# Patient Record
Sex: FEMALE | Race: WHITE | NOT HISPANIC OR LATINO | Employment: OTHER | URBAN - NONMETROPOLITAN AREA
[De-identification: names, ages, dates, MRNs, and addresses within clinical notes are randomized per-mention and may not be internally consistent; named-entity substitution may affect disease eponyms.]

---

## 2023-02-26 PROBLEM — N95.2 ATROPHIC VAGINITIS: Status: ACTIVE | Noted: 2023-02-26

## 2023-02-26 PROBLEM — M81.0 POSTMENOPAUSAL OSTEOPOROSIS: Status: ACTIVE | Noted: 2023-02-26

## 2023-02-26 PROBLEM — I10 HTN (HYPERTENSION): Status: ACTIVE | Noted: 2023-02-26

## 2023-02-26 PROBLEM — E55.9 VITAMIN D DEFICIENCY: Status: ACTIVE | Noted: 2023-02-26

## 2023-02-26 PROBLEM — E78.5 HYPERLIPIDEMIA: Status: ACTIVE | Noted: 2023-02-26

## 2023-02-26 PROBLEM — J44.9 COPD (CHRONIC OBSTRUCTIVE PULMONARY DISEASE) (MULTI): Status: ACTIVE | Noted: 2023-02-26

## 2023-02-26 PROBLEM — N89.8 VAGINAL DRYNESS: Status: ACTIVE | Noted: 2023-02-26

## 2023-02-26 PROBLEM — R03.0 ELEVATED BLOOD PRESSURE READING: Status: ACTIVE | Noted: 2023-02-26

## 2023-02-26 RX ORDER — LOSARTAN POTASSIUM 25 MG/1
25 TABLET ORAL
COMMUNITY
End: 2023-03-29 | Stop reason: SDUPTHER

## 2023-02-26 RX ORDER — ALBUTEROL SULFATE 90 UG/1
1-2 AEROSOL, METERED RESPIRATORY (INHALATION) AS NEEDED
COMMUNITY
End: 2023-09-27 | Stop reason: SDUPTHER

## 2023-02-26 RX ORDER — TIOTROPIUM BROMIDE 18 UG/1
1 CAPSULE ORAL; RESPIRATORY (INHALATION) DAILY
COMMUNITY
End: 2023-06-12 | Stop reason: SDUPTHER

## 2023-02-26 RX ORDER — ALENDRONATE SODIUM 70 MG/1
70 TABLET ORAL
COMMUNITY
End: 2023-05-15

## 2023-02-26 RX ORDER — ALBUTEROL SULFATE 90 UG/1
1-2 AEROSOL, METERED RESPIRATORY (INHALATION) AS NEEDED
COMMUNITY
End: 2023-10-03

## 2023-03-16 DIAGNOSIS — Z98.818 OTHER DENTAL PROCEDURE STATUS: Primary | ICD-10-CM

## 2023-03-16 RX ORDER — AMOXICILLIN AND CLAVULANATE POTASSIUM 500; 125 MG/1; MG/1
500 TABLET, FILM COATED ORAL 2 TIMES DAILY
Qty: 20 TABLET | Refills: 0 | Status: SHIPPED | OUTPATIENT
Start: 2023-03-16 | End: 2023-03-26

## 2023-03-29 ENCOUNTER — OFFICE VISIT (OUTPATIENT)
Dept: PRIMARY CARE | Facility: CLINIC | Age: 68
End: 2023-03-29
Payer: MEDICARE

## 2023-03-29 VITALS
HEART RATE: 111 BPM | WEIGHT: 116.4 LBS | SYSTOLIC BLOOD PRESSURE: 125 MMHG | DIASTOLIC BLOOD PRESSURE: 80 MMHG | HEIGHT: 64 IN | BODY MASS INDEX: 19.87 KG/M2

## 2023-03-29 DIAGNOSIS — I10 PRIMARY HYPERTENSION: Primary | ICD-10-CM

## 2023-03-29 PROCEDURE — 3074F SYST BP LT 130 MM HG: CPT | Performed by: REGISTERED NURSE

## 2023-03-29 PROCEDURE — 3079F DIAST BP 80-89 MM HG: CPT | Performed by: REGISTERED NURSE

## 2023-03-29 PROCEDURE — 1159F MED LIST DOCD IN RCRD: CPT | Performed by: REGISTERED NURSE

## 2023-03-29 PROCEDURE — 99213 OFFICE O/P EST LOW 20 MIN: CPT | Performed by: REGISTERED NURSE

## 2023-03-29 RX ORDER — LOSARTAN POTASSIUM 25 MG/1
25 TABLET ORAL
Qty: 90 TABLET | Refills: 3 | Status: SHIPPED | OUTPATIENT
Start: 2023-03-29 | End: 2024-01-22

## 2023-03-29 ASSESSMENT — ENCOUNTER SYMPTOMS
ABDOMINAL PAIN: 0
FEVER: 0
DIARRHEA: 0
EYE DISCHARGE: 0
WEAKNESS: 0
CHILLS: 0
FREQUENCY: 0
DIFFICULTY URINATING: 0
NERVOUS/ANXIOUS: 0
DIZZINESS: 0
CONFUSION: 0
RHINORRHEA: 0
WOUND: 0
HEADACHES: 0
VOMITING: 0
CONSTIPATION: 0
EYE REDNESS: 0
SHORTNESS OF BREATH: 0
NAUSEA: 0
COUGH: 0
WHEEZING: 0

## 2023-03-29 NOTE — PROGRESS NOTES
Subjective   Patient ID: Billie Hernadez is a 67 y.o. female who presents for No chief complaint on file..    HPI   HTN: Bp has been running high recently, started on Losartan 25mg last visit. Here for a BP check. BP today is 125/80.     All other systems reviewed and negative for complaint unless stated above.    Review of Systems   Constitutional:  Negative for chills and fever.   HENT:  Negative for congestion, ear pain and rhinorrhea.    Eyes:  Negative for discharge and redness.   Respiratory:  Negative for cough, shortness of breath and wheezing.    Cardiovascular:  Negative for chest pain and leg swelling.   Gastrointestinal:  Negative for abdominal pain, constipation, diarrhea, nausea and vomiting.   Genitourinary:  Negative for difficulty urinating, frequency and urgency.   Musculoskeletal:  Negative for gait problem.   Skin:  Negative for rash and wound.   Neurological:  Negative for dizziness, weakness and headaches.   Psychiatric/Behavioral:  Negative for confusion. The patient is not nervous/anxious.        Objective   There were no vitals taken for this visit.    Physical Exam  Vitals reviewed.   Constitutional:       Appearance: Normal appearance.   HENT:      Head: Normocephalic.      Right Ear: Tympanic membrane, ear canal and external ear normal.      Left Ear: Tympanic membrane, ear canal and external ear normal.      Nose: No rhinorrhea.      Mouth/Throat:      Mouth: Mucous membranes are moist.      Pharynx: Oropharynx is clear.   Eyes:      Pupils: Pupils are equal, round, and reactive to light.   Cardiovascular:      Rate and Rhythm: Normal rate and regular rhythm.      Pulses: Normal pulses.   Pulmonary:      Effort: Pulmonary effort is normal.      Breath sounds: Normal breath sounds.   Abdominal:      General: Abdomen is flat. Bowel sounds are normal.      Palpations: Abdomen is soft.   Musculoskeletal:         General: No tenderness. Normal range of motion.      Right lower leg: No edema.       Left lower leg: No edema.   Lymphadenopathy:      Cervical: No cervical adenopathy.   Skin:     General: Skin is warm and dry.      Findings: No rash.   Neurological:      Mental Status: She is alert and oriented to person, place, and time.   Psychiatric:         Mood and Affect: Mood normal.         Behavior: Behavior normal.         Assessment/Plan        #HTN   Losartan 25mg   BP controlled   Continue medication

## 2023-05-03 LAB
ALANINE AMINOTRANSFERASE (SGPT) (U/L) IN SER/PLAS: 20 U/L (ref 7–45)
ALBUMIN (G/DL) IN SER/PLAS: 4.5 G/DL (ref 3.4–5)
ALKALINE PHOSPHATASE (U/L) IN SER/PLAS: 81 U/L (ref 33–136)
ANION GAP IN SER/PLAS: 12 MMOL/L (ref 10–20)
ASPARTATE AMINOTRANSFERASE (SGOT) (U/L) IN SER/PLAS: 28 U/L (ref 9–39)
BASOPHILS (10*3/UL) IN BLOOD BY AUTOMATED COUNT: NORMAL
BASOPHILS/100 LEUKOCYTES IN BLOOD BY AUTOMATED COUNT: NORMAL
BILIRUBIN TOTAL (MG/DL) IN SER/PLAS: 0.4 MG/DL (ref 0–1.2)
CALCIUM (MG/DL) IN SER/PLAS: 9.3 MG/DL (ref 8.6–10.3)
CARBON DIOXIDE, TOTAL (MMOL/L) IN SER/PLAS: 29 MMOL/L (ref 21–32)
CHLORIDE (MMOL/L) IN SER/PLAS: 100 MMOL/L (ref 98–107)
CHOLESTEROL (MG/DL) IN SER/PLAS: 146 MG/DL (ref 0–199)
CHOLESTEROL IN HDL (MG/DL) IN SER/PLAS: 63.3 MG/DL
CHOLESTEROL/HDL RATIO: 2.3
CREATININE (MG/DL) IN SER/PLAS: 0.53 MG/DL (ref 0.5–1.05)
EOSINOPHILS (10*3/UL) IN BLOOD BY AUTOMATED COUNT: NORMAL
EOSINOPHILS/100 LEUKOCYTES IN BLOOD BY AUTOMATED COUNT: NORMAL
ERYTHROCYTE DISTRIBUTION WIDTH (RATIO) BY AUTOMATED COUNT: NORMAL
ERYTHROCYTE MEAN CORPUSCULAR HEMOGLOBIN CONCENTRATION (G/DL) BY AUTOMATED: NORMAL
ERYTHROCYTE MEAN CORPUSCULAR VOLUME (FL) BY AUTOMATED COUNT: NORMAL
ERYTHROCYTES (10*6/UL) IN BLOOD BY AUTOMATED COUNT: NORMAL
GFR FEMALE: >90 ML/MIN/1.73M2
GLUCOSE (MG/DL) IN SER/PLAS: 87 MG/DL (ref 74–99)
HEMATOCRIT (%) IN BLOOD BY AUTOMATED COUNT: NORMAL
HEMOGLOBIN (G/DL) IN BLOOD: NORMAL
IMMATURE GRANULOCYTES/100 LEUKOCYTES IN BLOOD BY AUTOMATED COUNT: NORMAL
LDL: 72 MG/DL (ref 0–99)
LEUKOCYTES (10*3/UL) IN BLOOD BY AUTOMATED COUNT: NORMAL
LYMPHOCYTES (10*3/UL) IN BLOOD BY AUTOMATED COUNT: NORMAL
LYMPHOCYTES/100 LEUKOCYTES IN BLOOD BY AUTOMATED COUNT: NORMAL
MANUAL DIFFERENTIAL Y/N: NORMAL
MONOCYTES (10*3/UL) IN BLOOD BY AUTOMATED COUNT: NORMAL
MONOCYTES/100 LEUKOCYTES IN BLOOD BY AUTOMATED COUNT: NORMAL
NEUTROPHILS (10*3/UL) IN BLOOD BY AUTOMATED COUNT: NORMAL
NEUTROPHILS/100 LEUKOCYTES IN BLOOD BY AUTOMATED COUNT: NORMAL
NRBC (PER 100 WBCS) BY AUTOMATED COUNT: NORMAL
PLATELETS (10*3/UL) IN BLOOD AUTOMATED COUNT: NORMAL
POTASSIUM (MMOL/L) IN SER/PLAS: 4.5 MMOL/L (ref 3.5–5.3)
PROTEIN TOTAL: 7.3 G/DL (ref 6.4–8.2)
SODIUM (MMOL/L) IN SER/PLAS: 136 MMOL/L (ref 136–145)
TRIGLYCERIDE (MG/DL) IN SER/PLAS: 56 MG/DL (ref 0–149)
UREA NITROGEN (MG/DL) IN SER/PLAS: 8 MG/DL (ref 6–23)
VLDL: 11 MG/DL (ref 0–40)

## 2023-05-05 LAB
BASOPHILS (10*3/UL) IN BLOOD BY AUTOMATED COUNT: 0.05 X10E9/L (ref 0–0.1)
BASOPHILS/100 LEUKOCYTES IN BLOOD BY AUTOMATED COUNT: 0.5 % (ref 0–2)
CALCIDIOL (25 OH VITAMIN D3) (NG/ML) IN SER/PLAS: NORMAL
EOSINOPHILS (10*3/UL) IN BLOOD BY AUTOMATED COUNT: 0.08 X10E9/L (ref 0–0.7)
EOSINOPHILS/100 LEUKOCYTES IN BLOOD BY AUTOMATED COUNT: 0.8 % (ref 0–6)
ERYTHROCYTE DISTRIBUTION WIDTH (RATIO) BY AUTOMATED COUNT: 13.4 % (ref 11.5–14.5)
ERYTHROCYTE MEAN CORPUSCULAR HEMOGLOBIN CONCENTRATION (G/DL) BY AUTOMATED: 32.1 G/DL (ref 32–36)
ERYTHROCYTE MEAN CORPUSCULAR VOLUME (FL) BY AUTOMATED COUNT: 101 FL (ref 80–100)
ERYTHROCYTES (10*6/UL) IN BLOOD BY AUTOMATED COUNT: 4.11 X10E12/L (ref 4–5.2)
HEMATOCRIT (%) IN BLOOD BY AUTOMATED COUNT: 41.7 % (ref 36–46)
HEMOGLOBIN (G/DL) IN BLOOD: 13.4 G/DL (ref 12–16)
IMMATURE GRANULOCYTES/100 LEUKOCYTES IN BLOOD BY AUTOMATED COUNT: 0.2 % (ref 0–0.9)
LEUKOCYTES (10*3/UL) IN BLOOD BY AUTOMATED COUNT: 10.5 X10E9/L (ref 4.4–11.3)
LYMPHOCYTES (10*3/UL) IN BLOOD BY AUTOMATED COUNT: 3.37 X10E9/L (ref 1.2–4.8)
LYMPHOCYTES/100 LEUKOCYTES IN BLOOD BY AUTOMATED COUNT: 32.2 % (ref 13–44)
MONOCYTES (10*3/UL) IN BLOOD BY AUTOMATED COUNT: 0.56 X10E9/L (ref 0.1–1)
MONOCYTES/100 LEUKOCYTES IN BLOOD BY AUTOMATED COUNT: 5.4 % (ref 2–10)
NEUTROPHILS (10*3/UL) IN BLOOD BY AUTOMATED COUNT: 6.37 X10E9/L (ref 1.2–7.7)
NEUTROPHILS/100 LEUKOCYTES IN BLOOD BY AUTOMATED COUNT: 60.9 % (ref 40–80)
PLATELETS (10*3/UL) IN BLOOD AUTOMATED COUNT: 412 X10E9/L (ref 150–450)

## 2023-05-13 DIAGNOSIS — E78.49 OTHER HYPERLIPIDEMIA: Primary | ICD-10-CM

## 2023-05-15 RX ORDER — ALENDRONATE SODIUM 70 MG/1
TABLET ORAL
Qty: 12 TABLET | Refills: 3 | Status: SHIPPED | OUTPATIENT
Start: 2023-05-15 | End: 2023-06-12 | Stop reason: SDUPTHER

## 2023-06-12 DIAGNOSIS — E78.49 OTHER HYPERLIPIDEMIA: ICD-10-CM

## 2023-06-12 DIAGNOSIS — J44.9 CHRONIC OBSTRUCTIVE PULMONARY DISEASE, UNSPECIFIED COPD TYPE (MULTI): ICD-10-CM

## 2023-06-12 RX ORDER — ALENDRONATE SODIUM 70 MG/1
70 TABLET ORAL
Qty: 12 TABLET | Refills: 3 | Status: SHIPPED | OUTPATIENT
Start: 2023-06-12 | End: 2023-12-18 | Stop reason: SDUPTHER

## 2023-06-12 RX ORDER — TIOTROPIUM BROMIDE 18 UG/1
1 CAPSULE ORAL; RESPIRATORY (INHALATION)
Qty: 90 CAPSULE | Refills: 3 | Status: SHIPPED | OUTPATIENT
Start: 2023-06-12 | End: 2023-06-19

## 2023-06-18 DIAGNOSIS — J44.9 CHRONIC OBSTRUCTIVE PULMONARY DISEASE, UNSPECIFIED COPD TYPE (MULTI): ICD-10-CM

## 2023-06-19 RX ORDER — TIOTROPIUM BROMIDE 18 UG/1
CAPSULE ORAL; RESPIRATORY (INHALATION)
Qty: 90 CAPSULE | Refills: 3 | Status: SHIPPED | OUTPATIENT
Start: 2023-06-19

## 2023-09-22 NOTE — PROGRESS NOTES
"Subjective   Reason for Visit: Billie Hernadez is an 67 y.o. female here for a Medicare Wellness visit.          Reviewed all medications by prescribing practitioner or clinical pharmacist (such as prescriptions, OTCs, herbal therapies and supplements) and documented in the medical record.    HPI  Postmenopausal osteoporosis- she is taking alendronate 70 mg oral tablet every week. Taking vitamin D and calcium supplements. Very active in the summer. She stays active at home in winter.      COPD: Breathing has been better. Patient is also active smoker and get short of breath with exertion. She have a history of COPD. She smokes about a pack for a few days. She is trying to wean herself down. She has been smoking since she was about 18. She has issues with blowing out candles or blowing up balloons.   Goal is try to get it down to 5.      Mammogram: history of abnormal results. Last was Aug 2020, supposed to follow up every 6 months to monitor \"suspicious for Microcalcification.\" 4/2022- normal mammogram, due in 1 year      Hyperlipidemia- She is not on any medication.      Elevated BP reading: She is still running high. Agreeable to start low dose bp medication.      Vitamin D deficiency- Last vitamin D level increased to normal.     [facility] is [facility]      She is Florin's grandma     All other systems reviewed and negative for complaint unless stated above.    Patient Care Team:  KAREN Holliday-PAMELA as PCP - General  MOR Holliday as PCP - [employer] PCP     Review of Systems   Constitutional:  Negative for chills and fever.   HENT:  Negative for congestion, ear pain and rhinorrhea.    Eyes:  Negative for discharge and redness.   Respiratory:  Negative for cough, shortness of breath and wheezing.    Cardiovascular:  Negative for chest pain and leg swelling.   Gastrointestinal:  Negative for abdominal pain, constipation, diarrhea, nausea and vomiting. " "  Genitourinary:  Negative for difficulty urinating, frequency and urgency.   Musculoskeletal:  Negative for gait problem.   Skin:  Negative for rash and wound.   Neurological:  Negative for dizziness, weakness and headaches.   Psychiatric/Behavioral:  Negative for confusion. The patient is not nervous/anxious.        Objective   Vitals:  /71 (BP Location: Right arm, Patient Position: Sitting, BP Cuff Size: Adult)   Pulse 90   Ht 1.626 m (5' 4\")   Wt 49 kg (108 lb)   SpO2 97%   BMI 18.54 kg/m²       Physical Exam  Vitals reviewed.   Constitutional:       Appearance: Normal appearance.   HENT:      Head: Normocephalic.      Right Ear: Tympanic membrane, ear canal and external ear normal.      Left Ear: Tympanic membrane, ear canal and external ear normal.      Nose: No rhinorrhea.      Mouth/Throat:      Mouth: Mucous membranes are moist.      Pharynx: Oropharynx is clear.   Eyes:      Pupils: Pupils are equal, round, and reactive to light.   Cardiovascular:      Rate and Rhythm: Normal rate and regular rhythm.      Pulses: Normal pulses.   Pulmonary:      Effort: Pulmonary effort is normal.      Breath sounds: Normal breath sounds.   Abdominal:      General: Abdomen is flat. Bowel sounds are normal.      Palpations: Abdomen is soft.   Musculoskeletal:         General: No tenderness. Normal range of motion.      Right lower leg: No edema.      Left lower leg: No edema.   Lymphadenopathy:      Cervical: No cervical adenopathy.   Skin:     General: Skin is warm and dry.      Findings: No rash.   Neurological:      Mental Status: She is alert and oriented to person, place, and time.   Psychiatric:         Mood and Affect: Mood normal.         Behavior: Behavior normal.       Assessment/Plan   Problem List Items Addressed This Visit    None       #HTN   Losartan 25mg   BP controlled   Continue medication      Postmenopausal Osteoporosis  - Continue Alendronate.  - Dexa scan 2016, needed every 2 years  - " ordered today      Hyperlipidemia  - Lipid panel ordered.  - Lifestyle modification to continue.     COPD  - No wheezing on auscultation  - Patient encouraged to stop smoking.  - discussed possible PFT to assess  - refilled Spiriva and albuterol inhaler for PRN use      Breast cancer screening  - ordered mammogram today, needed every year      Vitamin D level ordered.     #HCM  Cologuard was ordered, a fecal occult blood was done.   Needs Cologuard 2023   Ordering Dexa   CT low cancer screening- May consider at follow up  declining today   UTD on vaccines

## 2023-09-27 ENCOUNTER — OFFICE VISIT (OUTPATIENT)
Dept: PRIMARY CARE | Facility: CLINIC | Age: 68
End: 2023-09-27
Payer: MEDICARE

## 2023-09-27 VITALS
BODY MASS INDEX: 18.44 KG/M2 | WEIGHT: 108 LBS | HEIGHT: 64 IN | OXYGEN SATURATION: 97 % | DIASTOLIC BLOOD PRESSURE: 71 MMHG | SYSTOLIC BLOOD PRESSURE: 135 MMHG | HEART RATE: 90 BPM

## 2023-09-27 DIAGNOSIS — I10 PRIMARY HYPERTENSION: ICD-10-CM

## 2023-09-27 DIAGNOSIS — E78.2 MIXED HYPERLIPIDEMIA: ICD-10-CM

## 2023-09-27 DIAGNOSIS — E55.9 VITAMIN D DEFICIENCY: ICD-10-CM

## 2023-09-27 DIAGNOSIS — M81.0 POSTMENOPAUSAL OSTEOPOROSIS: ICD-10-CM

## 2023-09-27 DIAGNOSIS — Z00.00 ROUTINE GENERAL MEDICAL EXAMINATION AT HEALTH CARE FACILITY: Primary | ICD-10-CM

## 2023-09-27 DIAGNOSIS — M81.0 AGE-RELATED OSTEOPOROSIS WITHOUT CURRENT PATHOLOGICAL FRACTURE: ICD-10-CM

## 2023-09-27 DIAGNOSIS — Z12.11 COLON CANCER SCREENING: ICD-10-CM

## 2023-09-27 DIAGNOSIS — J43.9 PULMONARY EMPHYSEMA, UNSPECIFIED EMPHYSEMA TYPE (MULTI): ICD-10-CM

## 2023-09-27 PROCEDURE — 1170F FXNL STATUS ASSESSED: CPT | Performed by: REGISTERED NURSE

## 2023-09-27 PROCEDURE — 1159F MED LIST DOCD IN RCRD: CPT | Performed by: REGISTERED NURSE

## 2023-09-27 PROCEDURE — G0439 PPPS, SUBSEQ VISIT: HCPCS | Performed by: REGISTERED NURSE

## 2023-09-27 PROCEDURE — 3078F DIAST BP <80 MM HG: CPT | Performed by: REGISTERED NURSE

## 2023-09-27 PROCEDURE — 3075F SYST BP GE 130 - 139MM HG: CPT | Performed by: REGISTERED NURSE

## 2023-09-27 PROCEDURE — 1160F RVW MEDS BY RX/DR IN RCRD: CPT | Performed by: REGISTERED NURSE

## 2023-09-27 RX ORDER — CHLORHEXIDINE GLUCONATE ORAL RINSE 1.2 MG/ML
SOLUTION DENTAL
COMMUNITY
Start: 2023-08-01 | End: 2023-11-29 | Stop reason: ALTCHOICE

## 2023-09-27 ASSESSMENT — ENCOUNTER SYMPTOMS
CONFUSION: 0
RHINORRHEA: 0
DEPRESSION: 0
CHILLS: 0
EYE REDNESS: 0
DIFFICULTY URINATING: 0
DIARRHEA: 0
VOMITING: 0
COUGH: 0
LOSS OF SENSATION IN FEET: 0
DIZZINESS: 0
CONSTIPATION: 0
NERVOUS/ANXIOUS: 0
ABDOMINAL PAIN: 0
WOUND: 0
NAUSEA: 0
FREQUENCY: 0
OCCASIONAL FEELINGS OF UNSTEADINESS: 0
EYE DISCHARGE: 0
WEAKNESS: 0
HEADACHES: 0
FEVER: 0
WHEEZING: 0
SHORTNESS OF BREATH: 0

## 2023-09-27 ASSESSMENT — ACTIVITIES OF DAILY LIVING (ADL)
DRESSING: INDEPENDENT
BATHING: INDEPENDENT
GROCERY_SHOPPING: INDEPENDENT
MANAGING_FINANCES: INDEPENDENT
TAKING_MEDICATION: INDEPENDENT
DOING_HOUSEWORK: INDEPENDENT

## 2023-09-27 ASSESSMENT — PATIENT HEALTH QUESTIONNAIRE - PHQ9: 2. FEELING DOWN, DEPRESSED OR HOPELESS: NOT AT ALL

## 2023-10-03 DIAGNOSIS — J43.9 PULMONARY EMPHYSEMA, UNSPECIFIED EMPHYSEMA TYPE (MULTI): Primary | ICD-10-CM

## 2023-10-03 RX ORDER — ALBUTEROL SULFATE 90 UG/1
AEROSOL, METERED RESPIRATORY (INHALATION)
Qty: 51 G | Refills: 2 | Status: SHIPPED | OUTPATIENT
Start: 2023-10-03 | End: 2023-12-18 | Stop reason: SDUPTHER

## 2023-10-11 LAB — NONINV COLON CA DNA+OCC BLD SCRN STL QL: POSITIVE

## 2023-10-18 DIAGNOSIS — R19.5 POSITIVE COLORECTAL CANCER SCREENING USING COLOGUARD TEST: ICD-10-CM

## 2023-10-18 DIAGNOSIS — R19.5 POSITIVE COLORECTAL CANCER SCREENING USING COLOGUARD TEST: Primary | ICD-10-CM

## 2023-10-26 ENCOUNTER — HOSPITAL ENCOUNTER (OUTPATIENT)
Dept: RADIOLOGY | Facility: HOSPITAL | Age: 68
Discharge: HOME | End: 2023-10-26
Payer: MEDICARE

## 2023-10-26 ENCOUNTER — LAB (OUTPATIENT)
Dept: LAB | Facility: LAB | Age: 68
End: 2023-10-26
Payer: MEDICARE

## 2023-10-26 DIAGNOSIS — E55.9 VITAMIN D DEFICIENCY: ICD-10-CM

## 2023-10-26 DIAGNOSIS — M81.0 AGE-RELATED OSTEOPOROSIS WITHOUT CURRENT PATHOLOGICAL FRACTURE: ICD-10-CM

## 2023-10-26 PROCEDURE — 36415 COLL VENOUS BLD VENIPUNCTURE: CPT

## 2023-10-26 PROCEDURE — 77085 DXA BONE DENSITY AXL VRT FX: CPT

## 2023-10-26 PROCEDURE — 82306 VITAMIN D 25 HYDROXY: CPT

## 2023-10-26 PROCEDURE — 77085 DXA BONE DENSITY AXL VRT FX: CPT | Performed by: RADIOLOGY

## 2023-10-27 LAB — 25(OH)D3 SERPL-MCNC: 42 NG/ML (ref 30–100)

## 2023-11-29 ENCOUNTER — ANESTHESIA EVENT (OUTPATIENT)
Dept: GASTROENTEROLOGY | Facility: HOSPITAL | Age: 68
End: 2023-11-29
Payer: MEDICARE

## 2023-11-29 ENCOUNTER — PRE-ADMISSION TESTING (OUTPATIENT)
Dept: PREADMISSION TESTING | Facility: HOSPITAL | Age: 68
End: 2023-11-29
Payer: MEDICARE

## 2023-11-29 VITALS — HEIGHT: 63 IN | BODY MASS INDEX: 19.49 KG/M2 | WEIGHT: 110 LBS

## 2023-11-29 ASSESSMENT — DUKE ACTIVITY SCORE INDEX (DASI)
CAN YOU CLIMB A FLIGHT OF STAIRS OR WALK UP A HILL: YES
DASI METS SCORE: 4.6
TOTAL_SCORE: 15.45
CAN YOU WALK INDOORS, SUCH AS AROUND YOUR HOUSE: YES
CAN YOU DO MODERATE WORK AROUND THE HOUSE LIKE VACUUMING, SWEEPING FLOORS OR CARRYING GROCERIES: NO
CAN YOU DO HEAVY WORK AROUND THE HOUSE LIKE SCRUBBING FLOORS OR LIFTING AND MOVING HEAVY FURNITURE: NO
CAN YOU DO LIGHT WORK AROUND THE HOUSE LIKE DUSTING OR WASHING DISHES: YES
CAN YOU PARTICIPATE IN STRENOUS SPORTS LIKE SWIMMING, SINGLES TENNIS, FOOTBALL, BASKETBALL, OR SKIING: NO
CAN YOU DO YARD WORK LIKE RAKING LEAVES, WEEDING OR PUSHING A MOWER: NO
CAN YOU TAKE CARE OF YOURSELF (EAT, DRESS, BATHE, OR USE TOILET): YES
CAN YOU HAVE SEXUAL RELATIONS: NO
CAN YOU PARTICIPATE IN MODERATE RECREATIONAL ACTIVITIES LIKE GOLF, BOWLING, DANCING, DOUBLES TENNIS OR THROWING A BASEBALL OR FOOTBALL: NO
CAN YOU RUN A SHORT DISTANCE: NO
CAN YOU WALK A BLOCK OR TWO ON LEVEL GROUND: YES

## 2023-11-29 NOTE — PREPROCEDURE INSTRUCTIONS
Medication List            Accurate as of November 29, 2023 11:08 AM. Always use your most recent med list.                albuterol 90 mcg/actuation inhaler  USE 1 TO 2 INHALATIONS BY  MOUTH EVERY 4 TO 6 HOURS AS NEEDED  Medication Adjustments for Surgery: Take morning of surgery with sip of water, no other fluids     alendronate 70 mg tablet  Commonly known as: Fosamax  Take 1 tablet (70 mg) by mouth 1 (one) time per week. Take in the morning with a full glass of water, on an empty stomach, and do not take anything else by mouth or lie down for the next 30 min.  Medication Adjustments for Surgery: Continue until night before surgery     cetirizine 10 mg capsule  Commonly known as: ZYRTEC  Medication Adjustments for Surgery: Take morning of surgery with sip of water, no other fluids     losartan 25 mg tablet  Commonly known as: Cozaar  Take 1 tablet (25 mg) by mouth once every day.  Medication Adjustments for Surgery: Take morning of surgery with sip of water, no other fluids     Spiriva with HandiHaler 18 mcg inhalation capsule  Generic drug: tiotropium  INHALE THE CONTENTS OF 1  CAPSULE BY MOUTH VIA  HANDIHALER DAILY  Medication Adjustments for Surgery: Take morning of surgery with sip of water, no other fluids                              NPO Instructions:    Follow instructions. Day before procedure-  Then CLEAR LIQUIDS ONLY-No dairy products, nothing red/purple.  Take first part of prep- Evening Before Procedure.  Drink lots of liquids.  Day of Procedure- 3-4am Take Second Part of Prep.   STOP DRINKING 3 HOURS PRIOR TO PROCEDURE.  Take medications as instructed.      Additional Instructions:     Review your medication instructions, take indicated medications  Wear  comfortable loose fitting clothing  All jewelry and valuables should be left at home    Park in back of hospital by ER. Come up to Second floor-Out dept to check in.  Bring Photo ID and Insurance card,   You MUST have a  with you.      If  you get ill at all before your procedure- CALL YOUR DOCTOR/SURGEON.  We want you in the best shape that is possible. Any sickness might lead to your procedure being delayed.      Call Outpatient dept at [phone] the night before your procedure (Friday for Monday procedure), between 1-3 pm.     Be here at 9:30am for 10:30 procedure- try to decrease smoking.

## 2023-11-29 NOTE — ANESTHESIA PREPROCEDURE EVALUATION
Patient: Billie Hernadez    Procedure Information       Date/Time: 11/30/23 1030    Scheduled providers: Juventino Mckenzie MD    Procedure: COLONOSCOPY    Location: [facility]          There were no vitals filed for this visit.    Past Surgical History:   Procedure Laterality Date    OTHER SURGICAL HISTORY  03/12/2015    Open Treatment Of Tibial Shaft Fracture With Implant     Past Medical History:   Diagnosis Date    Chronic obstructive pulmonary disease with (acute) exacerbation (CMS/[facility]) 08/08/2017    COPD exacerbation    Other specified health status 03/12/2015    No known problems    Personal history of other venous thrombosis and embolism 03/12/2015    History of deep venous thrombosis       Current Outpatient Medications:     albuterol 90 mcg/actuation inhaler, USE 1 TO 2 INHALATIONS BY  MOUTH EVERY 4 TO 6 HOURS AS NEEDED, Disp: 51 g, Rfl: 2    alendronate (Fosamax) 70 mg tablet, Take 1 tablet (70 mg) by mouth 1 (one) time per week. Take in the morning with a full glass of water, on an empty stomach, and do not take anything else by mouth or lie down for the next 30 min. (Patient not taking: Reported on 9/27/2023), Disp: 12 tablet, Rfl: 3    cetirizine (ZYRTEC) 10 mg capsule, Take by mouth., Disp: , Rfl:     chlorhexidine (Peridex) 0.12 % solution, RINSE with ONE capful TWICE DAILY FOR 30 seconds THEN EXPECTORATE. DO not swallow, Disp: , Rfl:     losartan (Cozaar) 25 mg tablet, Take 1 tablet (25 mg) by mouth once every day., Disp: 90 tablet, Rfl: 3    Spiriva with HandiHaler 18 mcg inhalation capsule, INHALE THE CONTENTS OF 1  CAPSULE BY MOUTH VIA  HANDIHALER DAILY, Disp: 90 capsule, Rfl: 3  Prior to Admission medications    Medication Sig Start Date End Date Taking? Authorizing Provider   albuterol 90 mcg/actuation inhaler USE 1 TO 2 INHALATIONS BY  MOUTH EVERY 4 TO 6 HOURS AS NEEDED 10/3/23   Puja Rae, APRN-CNP   alendronate (Fosamax) 70 mg tablet Take 1 tablet (70 mg) by mouth 1 (one)  "time per week. Take in the morning with a full glass of water, on an empty stomach, and do not take anything else by mouth or lie down for the next 30 min.  Patient not taking: Reported on 9/27/2023 6/12/23   MOR Holliday   cetirizine (ZYRTEC) 10 mg capsule Take by mouth.    Historical Provider, MD   chlorhexidine (Peridex) 0.12 % solution RINSE with ONE capful TWICE DAILY FOR 30 seconds THEN EXPECTORATE. DO not swallow 8/1/23   Historical Provider, MD   losartan (Cozaar) 25 mg tablet Take 1 tablet (25 mg) by mouth once every day. 3/29/23 3/28/24  MOR Holliday   Spiriva with HandiHaler 18 mcg inhalation capsule INHALE THE CONTENTS OF 1  CAPSULE BY MOUTH VIA  HANDIHALER DAILY 6/19/23   MOR Holliday     No Known Allergies  Social History     Tobacco Use    Smoking status: Every Day     Types: Cigarettes    Smokeless tobacco: Never   Substance Use Topics    Alcohol use: Not on file         Chemistry    Lab Results   Component Value Date/Time     05/03/2023 1022    K 4.5 05/03/2023 1022     05/03/2023 1022    CO2 29 05/03/2023 1022    BUN 8 05/03/2023 1022    CREATININE 0.53 05/03/2023 1022    Lab Results   Component Value Date/Time    CALCIUM 9.3 05/03/2023 1022    ALKPHOS 81 05/03/2023 1022    AST 28 05/03/2023 1022    ALT 20 05/03/2023 1022    BILITOT 0.4 05/03/2023 1022          Lab Results   Component Value Date/Time    WBC 10.5 05/05/2023 1314    HGB 13.4 05/05/2023 1314    HCT 41.7 05/05/2023 1314     05/05/2023 1314     No results found for: \"PROTIME\", \"PTT\", \"INR\"  No results found for this or any previous visit (from the past 4464 hour(s)).  No results found for this or any previous visit from the past 1095 days.    Relevant Problems   Cardiovascular   (+) HTN (hypertension)   (+) Hyperlipidemia      Pulmonary   (+) COPD (chronic obstructive pulmonary disease) (CMS/HCC)       Clinical information reviewed: PT. Routinely follows up with primary " care. Recent normal wellness follow up. No further clearances required.      Meds               NPO Detail:  No data recorded     Physical Exam    Airway  Mallampati: II  TM distance: >3 FB     Cardiovascular - normal exam     Dental - normal exam     Pulmonary - normal exam     Abdominal - normal exam         : Deferred    Anesthesia Plan    ASA 3     MAC     The patient is a current smoker.  Patient was previously instructed to abstain from smoking on day of procedure.  Patient did not smoke on day of procedure.  Education provided regarding risk of obstructive sleep apnea.  intravenous induction   Anesthetic plan and risks discussed with patient.

## 2023-11-30 ENCOUNTER — ANESTHESIA (OUTPATIENT)
Dept: GASTROENTEROLOGY | Facility: HOSPITAL | Age: 68
End: 2023-11-30
Payer: MEDICARE

## 2023-11-30 ENCOUNTER — HOSPITAL ENCOUNTER (OUTPATIENT)
Dept: GASTROENTEROLOGY | Facility: HOSPITAL | Age: 68
Setting detail: OUTPATIENT SURGERY
Discharge: HOME | End: 2023-11-30
Payer: MEDICARE

## 2023-11-30 VITALS
SYSTOLIC BLOOD PRESSURE: 85 MMHG | OXYGEN SATURATION: 95 % | TEMPERATURE: 97 F | RESPIRATION RATE: 16 BRPM | HEART RATE: 89 BPM | DIASTOLIC BLOOD PRESSURE: 49 MMHG

## 2023-11-30 DIAGNOSIS — R19.5 POSITIVE COLORECTAL CANCER SCREENING USING COLOGUARD TEST: ICD-10-CM

## 2023-11-30 PROCEDURE — 3700000001 HC GENERAL ANESTHESIA TIME - INITIAL BASE CHARGE

## 2023-11-30 PROCEDURE — 7100000010 HC PHASE TWO TIME - EACH INCREMENTAL 1 MINUTE

## 2023-11-30 PROCEDURE — 2720000007 HC OR 272 NO HCPCS

## 2023-11-30 PROCEDURE — 3700000002 HC GENERAL ANESTHESIA TIME - EACH INCREMENTAL 1 MINUTE

## 2023-11-30 PROCEDURE — 45385 COLONOSCOPY W/LESION REMOVAL: CPT | Performed by: SURGERY

## 2023-11-30 PROCEDURE — 2500000004 HC RX 250 GENERAL PHARMACY W/ HCPCS (ALT 636 FOR OP/ED): Performed by: NURSE ANESTHETIST, CERTIFIED REGISTERED

## 2023-11-30 PROCEDURE — 45380 COLONOSCOPY AND BIOPSY: CPT | Performed by: SURGERY

## 2023-11-30 PROCEDURE — 2500000005 HC RX 250 GENERAL PHARMACY W/O HCPCS: Performed by: NURSE ANESTHETIST, CERTIFIED REGISTERED

## 2023-11-30 PROCEDURE — 45381 COLONOSCOPY SUBMUCOUS NJX: CPT | Performed by: SURGERY

## 2023-11-30 PROCEDURE — 2500000004 HC RX 250 GENERAL PHARMACY W/ HCPCS (ALT 636 FOR OP/ED)

## 2023-11-30 PROCEDURE — 88305 TISSUE EXAM BY PATHOLOGIST: CPT | Performed by: PATHOLOGY

## 2023-11-30 PROCEDURE — 88305 TISSUE EXAM BY PATHOLOGIST: CPT | Mod: TC,SUR,GENLAB | Performed by: SURGERY

## 2023-11-30 PROCEDURE — 7100000009 HC PHASE TWO TIME - INITIAL BASE CHARGE

## 2023-11-30 RX ORDER — LIDOCAINE HYDROCHLORIDE 20 MG/ML
INJECTION, SOLUTION INFILTRATION; PERINEURAL AS NEEDED
Status: DISCONTINUED | OUTPATIENT
Start: 2023-11-30 | End: 2023-11-30

## 2023-11-30 RX ORDER — PHENYLEPHRINE HCL IN 0.9% NACL 1 MG/10 ML
SYRINGE (ML) INTRAVENOUS AS NEEDED
Status: DISCONTINUED | OUTPATIENT
Start: 2023-11-30 | End: 2023-11-30

## 2023-11-30 RX ORDER — SODIUM CHLORIDE, SODIUM LACTATE, POTASSIUM CHLORIDE, CALCIUM CHLORIDE 600; 310; 30; 20 MG/100ML; MG/100ML; MG/100ML; MG/100ML
30 INJECTION, SOLUTION INTRAVENOUS CONTINUOUS
Status: DISCONTINUED | OUTPATIENT
Start: 2023-11-30 | End: 2023-12-01 | Stop reason: HOSPADM

## 2023-11-30 RX ORDER — PROPOFOL 10 MG/ML
INJECTION, EMULSION INTRAVENOUS AS NEEDED
Status: DISCONTINUED | OUTPATIENT
Start: 2023-11-30 | End: 2023-11-30

## 2023-11-30 RX ADMIN — LIDOCAINE HYDROCHLORIDE 40 MG: 20 INJECTION, SOLUTION INFILTRATION; PERINEURAL at 10:55

## 2023-11-30 RX ADMIN — PROPOFOL 10 MG: 10 INJECTION, EMULSION INTRAVENOUS at 10:59

## 2023-11-30 RX ADMIN — PROPOFOL 10 MG: 10 INJECTION, EMULSION INTRAVENOUS at 11:09

## 2023-11-30 RX ADMIN — PROPOFOL 10 MG: 10 INJECTION, EMULSION INTRAVENOUS at 11:13

## 2023-11-30 RX ADMIN — PROPOFOL 10 MG: 10 INJECTION, EMULSION INTRAVENOUS at 11:03

## 2023-11-30 RX ADMIN — PROPOFOL 10 MG: 10 INJECTION, EMULSION INTRAVENOUS at 11:07

## 2023-11-30 RX ADMIN — Medication 200 MCG: at 11:15

## 2023-11-30 RX ADMIN — PROPOFOL 10 MG: 10 INJECTION, EMULSION INTRAVENOUS at 11:01

## 2023-11-30 RX ADMIN — PROPOFOL 10 MG: 10 INJECTION, EMULSION INTRAVENOUS at 11:05

## 2023-11-30 RX ADMIN — Medication 200 MCG: at 11:06

## 2023-11-30 RX ADMIN — PROPOFOL 10 MG: 10 INJECTION, EMULSION INTRAVENOUS at 11:15

## 2023-11-30 RX ADMIN — PROPOFOL 10 MG: 10 INJECTION, EMULSION INTRAVENOUS at 10:57

## 2023-11-30 RX ADMIN — SODIUM CHLORIDE, POTASSIUM CHLORIDE, SODIUM LACTATE AND CALCIUM CHLORIDE 30 ML/HR: 600; 310; 30; 20 INJECTION, SOLUTION INTRAVENOUS at 09:49

## 2023-11-30 RX ADMIN — PROPOFOL 70 MG: 10 INJECTION, EMULSION INTRAVENOUS at 10:55

## 2023-11-30 RX ADMIN — PROPOFOL 10 MG: 10 INJECTION, EMULSION INTRAVENOUS at 11:11

## 2023-11-30 SDOH — HEALTH STABILITY: MENTAL HEALTH: CURRENT SMOKER: 1

## 2023-11-30 ASSESSMENT — COLUMBIA-SUICIDE SEVERITY RATING SCALE - C-SSRS
1. IN THE PAST MONTH, HAVE YOU WISHED YOU WERE DEAD OR WISHED YOU COULD GO TO SLEEP AND NOT WAKE UP?: NO
2. HAVE YOU ACTUALLY HAD ANY THOUGHTS OF KILLING YOURSELF?: NO
6. HAVE YOU EVER DONE ANYTHING, STARTED TO DO ANYTHING, OR PREPARED TO DO ANYTHING TO END YOUR LIFE?: NO

## 2023-11-30 ASSESSMENT — PAIN - FUNCTIONAL ASSESSMENT
PAIN_FUNCTIONAL_ASSESSMENT: 0-10
PAIN_FUNCTIONAL_ASSESSMENT: 0-10

## 2023-11-30 ASSESSMENT — PAIN SCALES - GENERAL
PAINLEVEL_OUTOF10: 0 - NO PAIN
PAIN_LEVEL: 0

## 2023-11-30 NOTE — ANESTHESIA POSTPROCEDURE EVALUATION
[birth year]Patient: Billie Hernadez    Procedure Summary       Date: 11/30/23 Room / Location: [facility]    Anesthesia Start: 1052 Anesthesia Stop: 1123    Procedure: COLONOSCOPY Diagnosis: Positive colorectal cancer screening using Cologuard test    Scheduled Providers: Juventino Mckenzie MD Responsible Provider: AURA Salazar    Anesthesia Type: MAC ASA Status: 3            Anesthesia Type: MAC    Vitals Value Taken Time   BP 89/56 11/30/23 1123   Temp 36.1 11/30/23 1123   Pulse 78 11/30/23 1123   Resp 18 11/30/23 1123   SpO2 91 11/30/23 1123       Anesthesia Post Evaluation    Patient location during evaluation: PACU  Patient participation: complete - patient participated  Level of consciousness: awake and alert  Pain score: 0  Pain management: adequate  Airway patency: patent  Two or more strategies used to mitigate risk of obstructive sleep apnea  Cardiovascular status: acceptable  Respiratory status: acceptable  Hydration status: acceptable  Postoperative Nausea and Vomiting: none        There were no known notable events for this encounter.

## 2023-11-30 NOTE — H&P
History Of Present Illness  Billie Hernadez is a 68 y.o. female presenting for a colonoscopy for a positive cologuard      Past Medical History  Past Medical History:   Diagnosis Date    Chronic obstructive pulmonary disease with (acute) exacerbation (CMS/[facility]) 08/08/2017    COPD exacerbation    Hypertension     Other specified health status 03/12/2015    No known problems    Personal history of other venous thrombosis and embolism 03/12/2015    History of deep venous thrombosis    Pneumonia        Surgical History  Past Surgical History:   Procedure Laterality Date    OTHER SURGICAL HISTORY  03/12/2015    Open Treatment Of Tibial Shaft Fracture With Implant    TONSILLECTOMY          Social History  She reports that she has been smoking cigarettes. She has a 15.00 pack-year smoking history. She has never used smokeless tobacco. She reports current alcohol use. She reports that she does not use drugs.    Family History  No family history on file.     Allergies  Patient has no known allergies.    Review of Systems   All other systems reviewed and are negative.       Physical Exam  Vitals reviewed. Exam conducted with a chaperone present.   Constitutional:       Appearance: Normal appearance.   HENT:      Head: Normocephalic.      Nose: Nose normal.      Mouth/Throat:      Pharynx: Oropharynx is clear.   Cardiovascular:      Rate and Rhythm: Normal rate and regular rhythm.      Heart sounds: Normal heart sounds.   Pulmonary:      Effort: Pulmonary effort is normal.      Breath sounds: Normal breath sounds. Decreased air movement present.   Abdominal:      General: Abdomen is flat.      Palpations: Abdomen is soft. There is no mass.      Tenderness: There is no abdominal tenderness. There is no guarding.      Hernia: No hernia is present.   Musculoskeletal:         General: Normal range of motion.      Cervical back: Normal range of motion.   Skin:     General: Skin is warm.   Neurological:      General: No focal deficit  present.   Psychiatric:         Mood and Affect: Mood normal.          Last Recorded Vitals  There were no vitals taken for this visit.    Relevant Results         Assessment/Plan   Active Problems: Positive cologuard   There are no active Hospital Problems.       COLONOSCOPY/BIOPSIES.  Risks include, but not limited to pain, infection, bleeding, perforation, missed lesions, aspiration, risk of cardiac, pulmonary, neurologic, locomotor, anesthetic events, incomplete colonoscopy, and other unforeseen complications including death      Juventino Mckenzie MD

## 2023-11-30 NOTE — DISCHARGE INSTRUCTIONS
Patient Instructions after a Colonoscopy    Smoking cessation.      The anesthetics, sedatives or narcotics which were given to you today will be acting in your body for the next 24 hours, so you might feel a little sleepy or groggy.  This feeling should slowly wear off. Carefully read and follow the instructions.     You received sedation today:  - Do not drive or operate any machinery or power tools of any kind.   - No alcoholic beverages today, not even beer or wine.  - Do not make any important decisions or sign any legal documents.  - No over the counter medications that contain alcohol or that may cause drowsiness.  - Do not make any important decisions or sign any legal documents.    While it is common to experience mild to moderate abdominal distention, gas, or belching after your procedure, if any of these symptoms occur following discharge from the GI Lab or within one week of having your procedure, call the [facility] to be advised whether a visit to your nearest Urgent Care or Emergency Department is indicated.  Take this paper with you if you go.     - If you develop an allergic reaction to the medications that were given during your procedure such as difficulty breathing, rash, hives, severe nausea, vomiting or lightheadedness.  - If you experience chest pain, shortness of breath, severe abdominal pain, fevers and chills.  -If you develop signs and symptoms of bleeding such as blood in your spit, if your stools turn black, tarry, or bloody  - If you have not urinated within 8 hours following your procedure.  - If your IV site becomes painful, red, inflamed, or looks infected.    If you received a biopsy/polypectomy/sphincterotomy the following instructions apply below:    __ Do not use Aspirin containing products, non-steroidal medications or anti-coagulants for one week following your procedure. (Examples of these types of medications are: Advil, Arthrotec, Aleve, Coumadin, Ecotrin,  Heparin, Ibuprofen, Indocin, Motrin, Naprosyn, Nuprin, Plavix, Vioxx, and Voltarin, or their generic forms.  This list is not all-inclusive.  Check with your physician or pharmacist before resuming medications.)   __ Eat a soft diet today.  Avoid foods that are poorly digested for the next 24 hours.  These foods would include: nuts, beans, lettuce, red meats, and fried foods. Start with liquids and advance your diet as tolerated, gradually work up to eating solids.   __ Do not have a Barium Study or Enema for one week.    Your physician recommends the additional following instructions:    -You have a contact number available for emergencies. The signs and symptoms of potential delayed complications were discussed with you. You may return to normal activities tomorrow.  -Resume your previous diet.  -Continue your present medications.   -We are waiting for your pathology results.  -Your physician has recommended a repeat colonoscopy (date to be determined after pending pathology results are reviewed) for surveillance based on pathology results.  -The findings and recommendations have been discussed with you.  -The findings and recommendations were discussed with your family.  - Please see Medication Reconciliation Form for new medication/medications prescribed.       If you experience any problems or have any questions following discharge from the GI Lab, please call:        Nurse Signature                                                                        Date___________________                                                                            Patient/Responsible Party Signature                                        Date___________________

## 2023-12-04 NOTE — ADDENDUM NOTE
Encounter addended by: Eric Linares RN on: 12/4/2023 1:34 PM   Actions taken: Contacts section saved

## 2023-12-11 LAB
LABORATORY COMMENT REPORT: NORMAL
PATH REPORT.FINAL DX SPEC: NORMAL
PATH REPORT.GROSS SPEC: NORMAL
PATH REPORT.TOTAL CANCER: NORMAL

## 2023-12-12 ENCOUNTER — TELEPHONE (OUTPATIENT)
Dept: SURGERY | Facility: CLINIC | Age: 68
End: 2023-12-12
Payer: MEDICARE

## 2023-12-12 NOTE — TELEPHONE ENCOUNTER
----- Message from Juventino Mckenzie MD sent at 12/11/2023  4:38 PM EST -----  Let patient know polyp was removed and nothing to worry about. A my chart reminder will be sent for a repeat colonoscopy in 5  years

## 2023-12-18 ENCOUNTER — TELEPHONE (OUTPATIENT)
Dept: PRIMARY CARE | Facility: CLINIC | Age: 68
End: 2023-12-18
Payer: MEDICARE

## 2023-12-18 DIAGNOSIS — E78.49 OTHER HYPERLIPIDEMIA: ICD-10-CM

## 2023-12-18 DIAGNOSIS — J43.9 PULMONARY EMPHYSEMA, UNSPECIFIED EMPHYSEMA TYPE (MULTI): ICD-10-CM

## 2023-12-18 RX ORDER — ALBUTEROL SULFATE 90 UG/1
AEROSOL, METERED RESPIRATORY (INHALATION)
Qty: 51 G | Refills: 2 | Status: SHIPPED | OUTPATIENT
Start: 2023-12-18

## 2023-12-18 RX ORDER — ALENDRONATE SODIUM 70 MG/1
70 TABLET ORAL
Qty: 12 TABLET | Refills: 3 | Status: SHIPPED | OUTPATIENT
Start: 2023-12-18

## 2024-01-21 DIAGNOSIS — I10 PRIMARY HYPERTENSION: ICD-10-CM

## 2024-01-22 RX ORDER — LOSARTAN POTASSIUM 25 MG/1
25 TABLET ORAL DAILY
Qty: 90 TABLET | Refills: 3 | Status: SHIPPED | OUTPATIENT
Start: 2024-01-22

## 2024-03-20 NOTE — PROGRESS NOTES
"Subjective   Patient ID: Billie Hernadez is a 68 y.o. female who presents for Follow-up (No concerns at this time; ).    HPI   Postmenopausal osteoporosis- she is taking alendronate 70 mg oral tablet every week. Taking vitamin D and calcium supplements. Very active in the summer. She stays active at home in winter.      COPD: Breathing has been better. Patient is also active smoker and get short of breath with exertion. She have a history of COPD. She smokes about a pack for a few days. She is trying to wean herself down. She has been smoking since she was about 18. She has issues with blowing out candles or blowing up balloons.   Goal is try to get it down to 5.      Mammogram: history of abnormal results. Last was Aug 2020, supposed to follow up every 6 months to monitor \"suspicious for Microcalcification.\" 4/2022- normal mammogram, due in 1 year      Hyperlipidemia- She is not on any medication.      Elevated BP reading: She is still running high. Agreeable to start low dose bp medication.      Vitamin D deficiency- Last vitamin D level increased to normal.     [facility] is [facility]      She is Florin's grandma     All other systems reviewed and negative for complaint unless stated above.    Review of Systems   Constitutional:  Negative for chills and fever.   HENT:  Negative for congestion, ear pain and rhinorrhea.    Eyes:  Negative for discharge and redness.   Respiratory:  Negative for cough, shortness of breath and wheezing.    Cardiovascular:  Negative for chest pain and leg swelling.   Gastrointestinal:  Negative for abdominal pain, constipation, diarrhea, nausea and vomiting.   Genitourinary:  Negative for difficulty urinating, frequency and urgency.   Musculoskeletal:  Negative for gait problem.   Skin:  Negative for rash and wound.   Neurological:  Negative for dizziness, weakness and headaches.   Psychiatric/Behavioral:  Negative for confusion. The patient is not nervous/anxious.  "       Objective   /77 (BP Location: Right arm, Patient Position: Sitting, BP Cuff Size: Adult)   Pulse 101   Wt 51.8 kg (114 lb 3.2 oz)   BMI 20.23 kg/m²     Physical Exam  Constitutional:       Appearance: Normal appearance.   Cardiovascular:      Rate and Rhythm: Normal rate and regular rhythm.   Pulmonary:      Effort: Pulmonary effort is normal.      Breath sounds: Normal breath sounds.   Skin:     General: Skin is warm and dry.   Neurological:      Mental Status: She is alert and oriented to person, place, and time. Mental status is at baseline.   Psychiatric:         Mood and Affect: Mood normal.         Behavior: Behavior normal.         Assessment/Plan           #HTN   Losartan 25mg   BP controlled   Continue medication      Postmenopausal Osteoporosis  - Continue Alendronate.  - Dexa scan 2016, needed every 2 years  - ordered today      Hyperlipidemia  - Lipid panel ordered.  - Lifestyle modification to continue.     COPD  - No wheezing on auscultation  - Patient encouraged to stop smoking.  - discussed possible PFT to assess  - refilled Spiriva and albuterol inhaler for PRN use      Breast cancer screening  - ordered mammogram today, needed every year     Vitamin D level ordered.     #HCM  Cologuard was ordered, a fecal occult blood was done.   Cologuard done 2023   Dexa 2023- osteoporosis   CT low cancer screening- May consider at follow up  declining today   UTD on vaccines

## 2024-03-27 ENCOUNTER — OFFICE VISIT (OUTPATIENT)
Dept: PRIMARY CARE | Facility: CLINIC | Age: 69
End: 2024-03-27
Payer: MEDICARE

## 2024-03-27 VITALS
DIASTOLIC BLOOD PRESSURE: 77 MMHG | SYSTOLIC BLOOD PRESSURE: 134 MMHG | WEIGHT: 114.2 LBS | BODY MASS INDEX: 20.23 KG/M2 | HEART RATE: 101 BPM

## 2024-03-27 DIAGNOSIS — E55.9 VITAMIN D DEFICIENCY: ICD-10-CM

## 2024-03-27 DIAGNOSIS — F17.210 CIGARETTE NICOTINE DEPENDENCE WITHOUT COMPLICATION: ICD-10-CM

## 2024-03-27 DIAGNOSIS — Z12.31 ENCOUNTER FOR SCREENING MAMMOGRAM FOR MALIGNANT NEOPLASM OF BREAST: ICD-10-CM

## 2024-03-27 DIAGNOSIS — I10 PRIMARY HYPERTENSION: ICD-10-CM

## 2024-03-27 DIAGNOSIS — Z12.2 ENCOUNTER FOR SCREENING FOR LUNG CANCER: Primary | ICD-10-CM

## 2024-03-27 DIAGNOSIS — E78.2 MIXED HYPERLIPIDEMIA: ICD-10-CM

## 2024-03-27 PROCEDURE — 1159F MED LIST DOCD IN RCRD: CPT | Performed by: REGISTERED NURSE

## 2024-03-27 PROCEDURE — 3078F DIAST BP <80 MM HG: CPT | Performed by: REGISTERED NURSE

## 2024-03-27 PROCEDURE — 3075F SYST BP GE 130 - 139MM HG: CPT | Performed by: REGISTERED NURSE

## 2024-03-27 PROCEDURE — 99214 OFFICE O/P EST MOD 30 MIN: CPT | Performed by: REGISTERED NURSE

## 2024-03-27 ASSESSMENT — ENCOUNTER SYMPTOMS
DIARRHEA: 0
VOMITING: 0
EYE DISCHARGE: 0
WHEEZING: 0
CHILLS: 0
HEADACHES: 0
COUGH: 0
CONFUSION: 0
DIZZINESS: 0
WOUND: 0
WEAKNESS: 0
SHORTNESS OF BREATH: 0
EYE REDNESS: 0
NERVOUS/ANXIOUS: 0
DIFFICULTY URINATING: 0
FREQUENCY: 0
FEVER: 0
RHINORRHEA: 0
ABDOMINAL PAIN: 0
CONSTIPATION: 0
NAUSEA: 0

## 2024-03-27 NOTE — PATIENT INSTRUCTIONS
Radiology:  [place]:  [phone]    [place]:  [phone] opt 2     Central Scheduling:  Radiology: [phone]  [place]: [phone]    [facility] [phone]    Open [phone], [place]    Sedated -[phone], [facility]

## 2024-05-28 ENCOUNTER — HOSPITAL ENCOUNTER (OUTPATIENT)
Dept: RADIOLOGY | Facility: HOSPITAL | Age: 69
Discharge: HOME | End: 2024-05-28
Payer: MEDICARE

## 2024-05-28 VITALS — HEIGHT: 63 IN | WEIGHT: 115 LBS | BODY MASS INDEX: 20.38 KG/M2

## 2024-05-28 DIAGNOSIS — Z12.31 ENCOUNTER FOR SCREENING MAMMOGRAM FOR MALIGNANT NEOPLASM OF BREAST: ICD-10-CM

## 2024-05-28 PROCEDURE — 77063 BREAST TOMOSYNTHESIS BI: CPT | Performed by: RADIOLOGY

## 2024-05-28 PROCEDURE — 77067 SCR MAMMO BI INCL CAD: CPT | Performed by: RADIOLOGY

## 2024-05-28 PROCEDURE — 77067 SCR MAMMO BI INCL CAD: CPT

## 2024-06-24 DIAGNOSIS — J44.9 CHRONIC OBSTRUCTIVE PULMONARY DISEASE, UNSPECIFIED COPD TYPE (MULTI): ICD-10-CM

## 2024-06-24 RX ORDER — TIOTROPIUM BROMIDE 18 UG/1
CAPSULE ORAL; RESPIRATORY (INHALATION)
Qty: 90 CAPSULE | Refills: 3 | Status: SHIPPED | OUTPATIENT
Start: 2024-06-24

## 2024-09-16 ENCOUNTER — LAB (OUTPATIENT)
Dept: LAB | Facility: LAB | Age: 69
End: 2024-09-16
Payer: MEDICARE

## 2024-09-16 DIAGNOSIS — E78.2 MIXED HYPERLIPIDEMIA: ICD-10-CM

## 2024-09-16 DIAGNOSIS — E55.9 VITAMIN D DEFICIENCY: ICD-10-CM

## 2024-09-16 DIAGNOSIS — I10 PRIMARY HYPERTENSION: ICD-10-CM

## 2024-09-16 LAB
ALBUMIN SERPL BCP-MCNC: 4.1 G/DL (ref 3.4–5)
ALP SERPL-CCNC: 57 U/L (ref 33–136)
ALT SERPL W P-5'-P-CCNC: 12 U/L (ref 7–45)
ANION GAP SERPL CALC-SCNC: 10 MMOL/L (ref 10–20)
AST SERPL W P-5'-P-CCNC: 18 U/L (ref 9–39)
BASOPHILS # BLD AUTO: 0.04 X10*3/UL (ref 0–0.1)
BASOPHILS NFR BLD AUTO: 0.5 %
BILIRUB SERPL-MCNC: 0.6 MG/DL (ref 0–1.2)
BUN SERPL-MCNC: 12 MG/DL (ref 6–23)
CALCIUM SERPL-MCNC: 9.5 MG/DL (ref 8.6–10.3)
CHLORIDE SERPL-SCNC: 95 MMOL/L (ref 98–107)
CHOLEST SERPL-MCNC: 171 MG/DL (ref 0–199)
CHOLESTEROL/HDL RATIO: 2.4
CO2 SERPL-SCNC: 35 MMOL/L (ref 21–32)
CREAT SERPL-MCNC: 0.52 MG/DL (ref 0.5–1.05)
EGFRCR SERPLBLD CKD-EPI 2021: >90 ML/MIN/1.73M*2
EOSINOPHIL # BLD AUTO: 0.06 X10*3/UL (ref 0–0.7)
EOSINOPHIL NFR BLD AUTO: 0.8 %
ERYTHROCYTE [DISTWIDTH] IN BLOOD BY AUTOMATED COUNT: 15 % (ref 11.5–14.5)
GLUCOSE SERPL-MCNC: 98 MG/DL (ref 74–99)
HCT VFR BLD AUTO: 45.3 % (ref 36–46)
HDLC SERPL-MCNC: 71.7 MG/DL
HGB BLD-MCNC: 14.7 G/DL (ref 12–16)
IMM GRANULOCYTES # BLD AUTO: 0.01 X10*3/UL (ref 0–0.7)
IMM GRANULOCYTES NFR BLD AUTO: 0.1 % (ref 0–0.9)
LDLC SERPL CALC-MCNC: 86 MG/DL
LYMPHOCYTES # BLD AUTO: 1.93 X10*3/UL (ref 1.2–4.8)
LYMPHOCYTES NFR BLD AUTO: 26.3 %
MCH RBC QN AUTO: 31.1 PG (ref 26–34)
MCHC RBC AUTO-ENTMCNC: 32.5 G/DL (ref 32–36)
MCV RBC AUTO: 96 FL (ref 80–100)
MONOCYTES # BLD AUTO: 0.56 X10*3/UL (ref 0.1–1)
MONOCYTES NFR BLD AUTO: 7.6 %
NEUTROPHILS # BLD AUTO: 4.74 X10*3/UL (ref 1.2–7.7)
NEUTROPHILS NFR BLD AUTO: 64.7 %
NON HDL CHOLESTEROL: 99 MG/DL (ref 0–149)
NRBC BLD-RTO: 0 /100 WBCS (ref 0–0)
PLATELET # BLD AUTO: 315 X10*3/UL (ref 150–450)
POTASSIUM SERPL-SCNC: 4.8 MMOL/L (ref 3.5–5.3)
PROT SERPL-MCNC: 7 G/DL (ref 6.4–8.2)
RBC # BLD AUTO: 4.72 X10*6/UL (ref 4–5.2)
SODIUM SERPL-SCNC: 135 MMOL/L (ref 136–145)
TRIGL SERPL-MCNC: 65 MG/DL (ref 0–149)
VLDL: 13 MG/DL (ref 0–40)
WBC # BLD AUTO: 7.3 X10*3/UL (ref 4.4–11.3)

## 2024-09-16 PROCEDURE — 80061 LIPID PANEL: CPT

## 2024-09-16 PROCEDURE — 82306 VITAMIN D 25 HYDROXY: CPT

## 2024-09-16 PROCEDURE — 85025 COMPLETE CBC W/AUTO DIFF WBC: CPT

## 2024-09-16 PROCEDURE — 80053 COMPREHEN METABOLIC PANEL: CPT

## 2024-09-16 PROCEDURE — 36415 COLL VENOUS BLD VENIPUNCTURE: CPT

## 2024-09-17 LAB — 25(OH)D3 SERPL-MCNC: 77 NG/ML (ref 30–100)

## 2024-09-19 NOTE — PROGRESS NOTES
"Subjective   Patient ID: Billie Hernadez is a 68 y.o. female who presents for Follow-up (No concerns at this time;).    HPI     Postmenopausal osteoporosis- she is taking alendronate 70 mg oral tablet every week. Taking vitamin D and calcium supplements. Very active in the summer. She stays active at home in winter.      COPD: Breathing has been better. Patient is also active smoker and get short of breath with exertion. She have a history of COPD. She smokes about a pack for a few days. She is trying to wean herself down. She has been smoking since she was about 18. She has issues with blowing out candles or blowing up balloons. Goal is try to get it down to 5.   The last few days it has been bad. She is now having some nasal congestion. Taking zyrtec daily, unsure if this is helping.     She is going for the CT scan on 10/8 for cancer screening.      Mammogram: history of abnormal results. Last was Aug 2020, supposed to follow up every 6 months to monitor \"suspicious for Microcalcification.\" 4/2022- normal mammogram, due in 1 year      Hyperlipidemia- She is not on any medication.      Elevated BP reading: She is still running high. Agreeable to start low dose bp medication.      Vitamin D deficiency- Last vitamin D level increased to normal.     [facility] when [facility] is due      She is Florin's grandma     All other systems reviewed and negative for complaint unless stated above.    Review of Systems    Objective   /79 (BP Location: Right arm, Patient Position: Sitting, BP Cuff Size: Child)   Pulse 101   Wt 45.6 kg (100 lb 9.6 oz)   BMI 17.82 kg/m²     Physical Exam  Vitals reviewed.   Constitutional:       Appearance: Normal appearance.   HENT:      Head: Normocephalic.      Right Ear: Tympanic membrane, ear canal and external ear normal.      Left Ear: Tympanic membrane, ear canal and external ear normal.      Nose: No rhinorrhea.      Mouth/Throat:      Mouth: Mucous membranes are " moist.      Pharynx: Oropharynx is clear.   Eyes:      Pupils: Pupils are equal, round, and reactive to light.   Cardiovascular:      Rate and Rhythm: Normal rate and regular rhythm.      Pulses: Normal pulses.   Pulmonary:      Effort: Pulmonary effort is normal.      Breath sounds: Normal breath sounds.   Abdominal:      General: Abdomen is flat. Bowel sounds are normal.      Palpations: Abdomen is soft.   Musculoskeletal:         General: No tenderness. Normal range of motion.      Right lower leg: No edema.      Left lower leg: No edema.   Lymphadenopathy:      Cervical: No cervical adenopathy.   Skin:     General: Skin is warm and dry.      Findings: No rash.   Neurological:      Mental Status: She is alert and oriented to person, place, and time.   Psychiatric:         Mood and Affect: Mood normal.         Behavior: Behavior normal.       Assessment/Plan        #HTN   Losartan 25mg   BP controlled   Continue medication   BP better at home      Postmenopausal Osteoporosis  - Continue Alendronate.  - Dexa scan 2016, needed every 2 years  - ordered today      Hyperlipidemia  - Lipid panel ordered.  - Lifestyle modification to continue.     COPD  - No wheezing on auscultation  - Patient encouraged to stop smoking.  - discussed possible PFT to assess  - refilled Spiriva and albuterol inhaler for PRN use   Has Low dose CT lung cancer screening scheduled for 10/8/2024     Breast cancer screening  - ordered mammogram today, needed every year      Vitamin D level ordered.     #HCM  Mammogram 5/2024  Cologuard was ordered, a fecal occult blood was done.   Cologuard done 2023   Dexa 2023- osteoporosis   CT low cancer screening- getting next month   UTD on vaccines   Getting flu vaccine today

## 2024-09-25 ENCOUNTER — APPOINTMENT (OUTPATIENT)
Dept: PRIMARY CARE | Facility: CLINIC | Age: 69
End: 2024-09-25
Payer: MEDICARE

## 2024-09-25 VITALS
WEIGHT: 100.6 LBS | SYSTOLIC BLOOD PRESSURE: 142 MMHG | HEART RATE: 101 BPM | BODY MASS INDEX: 17.82 KG/M2 | DIASTOLIC BLOOD PRESSURE: 79 MMHG

## 2024-09-25 DIAGNOSIS — I10 PRIMARY HYPERTENSION: Primary | ICD-10-CM

## 2024-09-25 DIAGNOSIS — E78.2 MIXED HYPERLIPIDEMIA: ICD-10-CM

## 2024-09-25 DIAGNOSIS — Z23 ENCOUNTER FOR IMMUNIZATION: ICD-10-CM

## 2024-09-25 DIAGNOSIS — M81.0 POSTMENOPAUSAL OSTEOPOROSIS: ICD-10-CM

## 2024-09-25 DIAGNOSIS — J43.9 PULMONARY EMPHYSEMA, UNSPECIFIED EMPHYSEMA TYPE (MULTI): ICD-10-CM

## 2024-09-25 PROCEDURE — 1159F MED LIST DOCD IN RCRD: CPT | Performed by: REGISTERED NURSE

## 2024-09-25 PROCEDURE — 99214 OFFICE O/P EST MOD 30 MIN: CPT | Performed by: REGISTERED NURSE

## 2024-09-25 PROCEDURE — 1160F RVW MEDS BY RX/DR IN RCRD: CPT | Performed by: REGISTERED NURSE

## 2024-09-25 PROCEDURE — 3077F SYST BP >= 140 MM HG: CPT | Performed by: REGISTERED NURSE

## 2024-09-25 PROCEDURE — G0008 ADMIN INFLUENZA VIRUS VAC: HCPCS | Performed by: REGISTERED NURSE

## 2024-09-25 PROCEDURE — 3078F DIAST BP <80 MM HG: CPT | Performed by: REGISTERED NURSE

## 2024-09-25 PROCEDURE — 90662 IIV NO PRSV INCREASED AG IM: CPT | Performed by: REGISTERED NURSE

## 2024-10-08 ENCOUNTER — HOSPITAL ENCOUNTER (OUTPATIENT)
Dept: RADIOLOGY | Facility: HOSPITAL | Age: 69
Discharge: HOME | End: 2024-10-08
Payer: MEDICARE

## 2024-10-08 DIAGNOSIS — F17.210 CIGARETTE NICOTINE DEPENDENCE WITHOUT COMPLICATION: ICD-10-CM

## 2024-10-08 DIAGNOSIS — Z12.2 ENCOUNTER FOR SCREENING FOR LUNG CANCER: ICD-10-CM

## 2024-10-08 PROCEDURE — 71271 CT THORAX LUNG CANCER SCR C-: CPT

## 2024-10-10 ENCOUNTER — TELEPHONE (OUTPATIENT)
Dept: RADIOLOGY | Facility: HOSPITAL | Age: 69
End: 2024-10-10
Payer: MEDICARE

## 2024-10-10 NOTE — TELEPHONE ENCOUNTER
Call placed to the patient to review the results of her LDCT scan with her and to inquire if there are any questions that I can assist with. Unable to leave voicemail message due to no patient identifiers available on the patient voicemail message.

## 2024-10-10 NOTE — TELEPHONE ENCOUNTER
Epic message sent to the patient provider with regard to the significant findings on the patient LDCT of the chest.

## 2024-10-14 DIAGNOSIS — C34.11 MALIGNANT NEOPLASM OF UPPER LOBE, RIGHT BRONCHUS OR LUNG: ICD-10-CM

## 2024-10-14 DIAGNOSIS — R93.89 ABNORMAL SCREENING CT OF CHEST: Primary | ICD-10-CM

## 2024-10-14 NOTE — PROGRESS NOTES
Spoke with patient over the phone about test results. Patient states understanding and read over [employer]. She is aware of needing a PET scan next with consultation to thoracic surgeon. Patient states understanding, and all questions asked an answered. Planning to move forward with PET scan and information provided for Dr. Dominguez.       [place]:  [phone] opt 2  [phone]      Aman Dominguez, he's in [facility] [phone]

## 2024-11-06 ENCOUNTER — HOSPITAL ENCOUNTER (OUTPATIENT)
Dept: RADIOLOGY | Facility: HOSPITAL | Age: 69
Discharge: HOME | End: 2024-11-06
Payer: MEDICARE

## 2024-11-06 DIAGNOSIS — C34.11 MALIGNANT NEOPLASM OF UPPER LOBE, RIGHT BRONCHUS OR LUNG: ICD-10-CM

## 2024-11-06 DIAGNOSIS — R93.89 ABNORMAL SCREENING CT OF CHEST: ICD-10-CM

## 2024-11-06 PROCEDURE — 3430000001 HC RX 343 DIAGNOSTIC RADIOPHARMACEUTICALS: Performed by: REGISTERED NURSE

## 2024-11-06 PROCEDURE — A9552 F18 FDG: HCPCS | Performed by: REGISTERED NURSE

## 2024-11-06 PROCEDURE — 78815 PET IMAGE W/CT SKULL-THIGH: CPT | Mod: PET TUMOR INIT TX STRAT | Performed by: RADIOLOGY

## 2024-11-06 PROCEDURE — 78815 PET IMAGE W/CT SKULL-THIGH: CPT | Mod: PI

## 2024-11-06 RX ORDER — FLUDEOXYGLUCOSE F 18 200 MCI/ML
13 INJECTION, SOLUTION INTRAVENOUS
Status: COMPLETED | OUTPATIENT
Start: 2024-11-06 | End: 2024-11-06

## 2024-11-12 ENCOUNTER — OFFICE VISIT (OUTPATIENT)
Dept: CARDIAC SURGERY | Facility: CLINIC | Age: 69
End: 2024-11-12
Payer: MEDICARE

## 2024-11-12 VITALS
SYSTOLIC BLOOD PRESSURE: 118 MMHG | BODY MASS INDEX: 18.42 KG/M2 | WEIGHT: 104 LBS | DIASTOLIC BLOOD PRESSURE: 75 MMHG | HEART RATE: 110 BPM | OXYGEN SATURATION: 94 %

## 2024-11-12 DIAGNOSIS — R93.89 ABNORMAL SCREENING CT OF CHEST: ICD-10-CM

## 2024-11-12 DIAGNOSIS — R91.1 LUNG NODULE: Primary | ICD-10-CM

## 2024-11-12 DIAGNOSIS — C34.11 MALIGNANT NEOPLASM OF UPPER LOBE OF RIGHT LUNG (MULTI): ICD-10-CM

## 2024-11-12 PROCEDURE — 3078F DIAST BP <80 MM HG: CPT | Performed by: THORACIC SURGERY (CARDIOTHORACIC VASCULAR SURGERY)

## 2024-11-12 PROCEDURE — 1159F MED LIST DOCD IN RCRD: CPT | Performed by: THORACIC SURGERY (CARDIOTHORACIC VASCULAR SURGERY)

## 2024-11-12 PROCEDURE — 3074F SYST BP LT 130 MM HG: CPT | Performed by: THORACIC SURGERY (CARDIOTHORACIC VASCULAR SURGERY)

## 2024-11-12 PROCEDURE — 99205 OFFICE O/P NEW HI 60 MIN: CPT | Performed by: THORACIC SURGERY (CARDIOTHORACIC VASCULAR SURGERY)

## 2024-11-12 PROCEDURE — 99215 OFFICE O/P EST HI 40 MIN: CPT | Performed by: THORACIC SURGERY (CARDIOTHORACIC VASCULAR SURGERY)

## 2024-11-12 PROCEDURE — 1126F AMNT PAIN NOTED NONE PRSNT: CPT | Performed by: THORACIC SURGERY (CARDIOTHORACIC VASCULAR SURGERY)

## 2024-11-12 ASSESSMENT — ENCOUNTER SYMPTOMS
EYES NEGATIVE: 1
SHORTNESS OF BREATH: 0
WHEEZING: 0
DIAPHORESIS: 0
CHILLS: 0
ALLERGIC/IMMUNOLOGIC NEGATIVE: 1
PSYCHIATRIC NEGATIVE: 1
LOSS OF SENSATION IN FEET: 0
VOMITING: 0
DIARRHEA: 0
PALPITATIONS: 0
COUGH: 0
HEMATOLOGIC/LYMPHATIC NEGATIVE: 1
FEVER: 0
ENDOCRINE NEGATIVE: 1
ABDOMINAL PAIN: 0
APPETITE CHANGE: 0
CHEST TIGHTNESS: 0
UNEXPECTED WEIGHT CHANGE: 0
OCCASIONAL FEELINGS OF UNSTEADINESS: 0
CHOKING: 0
NEUROLOGICAL NEGATIVE: 1
CONSTIPATION: 0
NAUSEA: 0
MUSCULOSKELETAL NEGATIVE: 1
STRIDOR: 0
DEPRESSION: 0
FATIGUE: 0
ABDOMINAL DISTENTION: 0

## 2024-11-12 ASSESSMENT — PATIENT HEALTH QUESTIONNAIRE - PHQ9
SUM OF ALL RESPONSES TO PHQ9 QUESTIONS 1 AND 2: 0
2. FEELING DOWN, DEPRESSED OR HOPELESS: NOT AT ALL
1. LITTLE INTEREST OR PLEASURE IN DOING THINGS: NOT AT ALL

## 2024-11-12 ASSESSMENT — PAIN SCALES - GENERAL: PAINLEVEL_OUTOF10: 0-NO PAIN

## 2024-11-12 NOTE — PROGRESS NOTES
Subjective   Patient ID: Billie Hernadez is a 69 y.o. female who presents for Ref from [facility] /Abnormal screening CT of chest.  HPI    69-year-old female with a history of COPD who is a smoker of 50 packs year actively smoking 1 pack a day.  Found to have a right upper lobe lung nodule measuring 11 x 14 mm.  PET scan showing that the nodule has an SUV max of 9.  He was also found to have avid mediastinal lymph nodes including hilar right paratracheal subcarinal.  I do believe were dealing with a malignancy here she will need biopsy of these nodule and the mediastinal lymph nodes.  Will refer her to interventional pulmonology for navigational bronchoscopy and EBUS.  She will need PFTs.  She has baseline short of breath and probably not a good candidate for surgery if all the mediastinal lymph nodes come back positive.  I will refer her to medical oncology and radiation oncology as well.    Review of Systems   Constitutional:  Negative for appetite change, chills, diaphoresis, fatigue, fever and unexpected weight change.   HENT: Negative.     Eyes: Negative.    Respiratory:  Negative for cough, choking, chest tightness, shortness of breath, wheezing and stridor.    Cardiovascular:  Negative for chest pain, palpitations and leg swelling.   Gastrointestinal:  Negative for abdominal distention, abdominal pain, constipation, diarrhea, nausea and vomiting.   Endocrine: Negative.    Genitourinary: Negative.    Musculoskeletal: Negative.    Skin: Negative.    Allergic/Immunologic: Negative.    Neurological: Negative.    Hematological: Negative.    Psychiatric/Behavioral: Negative.     All other systems reviewed and are negative.      Objective   Physical Exam  Constitutional:       Appearance: Normal appearance.   HENT:      Head: Normocephalic and atraumatic.      Nose: Nose normal.      Mouth/Throat:      Mouth: Mucous membranes are moist.      Pharynx: Oropharynx is clear.   Eyes:      Extraocular Movements: Extraocular  movements intact.      Conjunctiva/sclera: Conjunctivae normal.      Pupils: Pupils are equal, round, and reactive to light.   Cardiovascular:      Rate and Rhythm: Normal rate and regular rhythm.      Pulses: Normal pulses.      Heart sounds: Normal heart sounds.   Pulmonary:      Effort: Pulmonary effort is normal. No respiratory distress.      Breath sounds: Normal breath sounds. No stridor. No wheezing, rhonchi or rales.   Chest:      Chest wall: No tenderness.   Abdominal:      General: Abdomen is flat. Bowel sounds are normal.      Palpations: Abdomen is soft.   Musculoskeletal:         General: Normal range of motion.      Cervical back: Normal range of motion and neck supple.   Skin:     General: Skin is warm and dry.      Capillary Refill: Capillary refill takes less than 2 seconds.   Neurological:      General: No focal deficit present.      Mental Status: She is alert and oriented to person, place, and time.         Assessment/Plan   Diagnoses and all orders for this visit:  Lung nodule  Abnormal screening CT of chest  -     Referral to Thoracic Surgery  Obtain PFTs  Referral to interventional pulmonology for navigational bronchoscopy and EBUS  Referral to medical oncology  Referral to radiation oncology         Aman Dominguez MD 11/12/24 12:32 PM

## 2024-11-12 NOTE — PATIENT INSTRUCTIONS
New Referrals:  Medical Oncology Dr. Alicja Sanford  Radiation Oncology Dr. Abimael Bridges  Interventional Pulmonology Dr. Giles Garcia for your lung biopsy.  Pulmonary Function Test. Please Call [phone] to schedule Please!    We will see you with results from the biopsy and will call to schedule that appointment with you.  Thank you!

## 2024-11-13 DIAGNOSIS — R94.2 ABNORMAL PET SCAN OF LUNG: Primary | ICD-10-CM

## 2024-11-13 DIAGNOSIS — Z01.818 PRE-OP TESTING: ICD-10-CM

## 2024-11-13 NOTE — PROGRESS NOTES
Bronchoscopy Scheduling Request    Pre-bronchoscopy visit: New patient visit with Bronchoscopy group provider  Please schedule procedure: Next available    Cytology on-site:  Yes  Location:  [facility]  Performing physician:  Advanced diagnostic bronchoscopist  Referring physician:  Aman Zazueta MD, Puja Rae, APRN-CNP  Indication:  RUL Nodule, RML collapse, LAD, abnormal PET   Sedation / Anesthesia:  GA  Procedure:  Airway exam, BAL, EBBx, TBNA, TBBx, Navigational bronchoscopy, Radial EBUS, Staging EBUS  Time:  Tier 3  Fluorscopy:   Yes  Imaging needed:  CT Ky[place] - same day as procedure  Labs:  CBC, BMP  Meds:  None  Special Considerations:  None  History of smoking.  Evaluate for RUL nodule and RML collapse, ? Infectious related, obstruction, also abnormal PET/CT and LAD.      Reviewed by:  Luna Dyer MD

## 2024-11-18 ENCOUNTER — APPOINTMENT (OUTPATIENT)
Dept: PULMONOLOGY | Facility: CLINIC | Age: 69
End: 2024-11-18
Payer: MEDICARE

## 2024-11-18 ENCOUNTER — LAB (OUTPATIENT)
Dept: LAB | Facility: LAB | Age: 69
End: 2024-11-18
Payer: MEDICARE

## 2024-11-18 VITALS — WEIGHT: 104 LBS | BODY MASS INDEX: 18.43 KG/M2 | HEIGHT: 63 IN

## 2024-11-18 DIAGNOSIS — R59.0 MEDIASTINAL ADENOPATHY: ICD-10-CM

## 2024-11-18 DIAGNOSIS — Z01.811 PREOP PULMONARY/RESPIRATORY EXAM: Primary | ICD-10-CM

## 2024-11-18 DIAGNOSIS — Z01.818 PRE-OP TESTING: ICD-10-CM

## 2024-11-18 DIAGNOSIS — R91.1 LUNG NODULE: ICD-10-CM

## 2024-11-18 DIAGNOSIS — R94.2 ABNORMAL PET SCAN OF LUNG: ICD-10-CM

## 2024-11-18 LAB
ANION GAP SERPL CALC-SCNC: 12 MMOL/L (ref 10–20)
BUN SERPL-MCNC: 7 MG/DL (ref 6–23)
CALCIUM SERPL-MCNC: 9.3 MG/DL (ref 8.6–10.3)
CHLORIDE SERPL-SCNC: 94 MMOL/L (ref 98–107)
CO2 SERPL-SCNC: 35 MMOL/L (ref 21–32)
CREAT SERPL-MCNC: 0.52 MG/DL (ref 0.5–1.05)
EGFRCR SERPLBLD CKD-EPI 2021: >90 ML/MIN/1.73M*2
ERYTHROCYTE [DISTWIDTH] IN BLOOD BY AUTOMATED COUNT: 14.8 % (ref 11.5–14.5)
GLUCOSE SERPL-MCNC: 97 MG/DL (ref 74–99)
HCT VFR BLD AUTO: 46.1 % (ref 36–46)
HGB BLD-MCNC: 14.7 G/DL (ref 12–16)
MCH RBC QN AUTO: 31.1 PG (ref 26–34)
MCHC RBC AUTO-ENTMCNC: 31.9 G/DL (ref 32–36)
MCV RBC AUTO: 98 FL (ref 80–100)
NRBC BLD-RTO: 0 /100 WBCS (ref 0–0)
PLATELET # BLD AUTO: 389 X10*3/UL (ref 150–450)
POTASSIUM SERPL-SCNC: 4.6 MMOL/L (ref 3.5–5.3)
RBC # BLD AUTO: 4.73 X10*6/UL (ref 4–5.2)
SODIUM SERPL-SCNC: 136 MMOL/L (ref 136–145)
WBC # BLD AUTO: 8.6 X10*3/UL (ref 4.4–11.3)

## 2024-11-18 PROCEDURE — 1159F MED LIST DOCD IN RCRD: CPT | Performed by: INTERNAL MEDICINE

## 2024-11-18 PROCEDURE — 85027 COMPLETE CBC AUTOMATED: CPT

## 2024-11-18 PROCEDURE — 99202 OFFICE O/P NEW SF 15 MIN: CPT | Performed by: INTERNAL MEDICINE

## 2024-11-18 PROCEDURE — 3008F BODY MASS INDEX DOCD: CPT | Performed by: INTERNAL MEDICINE

## 2024-11-18 PROCEDURE — 80048 BASIC METABOLIC PNL TOTAL CA: CPT

## 2024-11-18 PROCEDURE — 36415 COLL VENOUS BLD VENIPUNCTURE: CPT

## 2024-11-18 RX ORDER — MULTIVIT-MIN/IRON FUM/FOLIC AC 7.5 MG-4
1 TABLET ORAL DAILY
COMMUNITY

## 2024-11-18 NOTE — H&P (VIEW-ONLY)
Subjective   Patient ID: Billie Hernadez is a 69 y.o. female who presents for Pre Bronchoscopy.  HPI  The patient was contacted at her home by telephone this afternoon.  On reviewing her chart it appears that there will be potentially some interventional bronchoscopy being done on her to evaluate the right upper lobe nodule and the right middle lobe collapse also.  Patient reports some shortness of breath that is increased over the last 6 months.  She denies having any chest pain and no fevers.  She is not having a sore throat is unaware of any heart problems.  She does have chronic obstructive pulmonary disease and is on Spiriva HandiHaler and albuterol inhaler.  She denies being on any oxygen therapy.  She has had no problems with hemoptysis and she has had no previous history of surgery on her chest.  She also tells me that she smokes 1/2 to 1 pack a day since 1993.  She is scheduled for her procedure on 11/21/2024 at 9:15 in the morning to be done at the [facility] in [facility].  I answered all the questions that the patient had to her satisfaction.  Review of Systems  No change  Objective   Physical Exam  Not done.  Assessment/Plan        1.  Preop pulmonary/respiratory exam.  2.  Lung nodule.  3.  Mediastinal adenopathy    Jarrod Flores, DO 11/18/24 2:28 PM

## 2024-11-18 NOTE — PROGRESS NOTES
Subjective   Patient ID: Billie Hernadez is a 69 y.o. female who presents for Pre Bronchoscopy.  HPI  The patient was contacted at her home by telephone this afternoon.  On reviewing her chart it appears that there will be potentially some interventional bronchoscopy being done on her to evaluate the right upper lobe nodule and the right middle lobe collapse also.  Patient reports some shortness of breath that is increased over the last 6 months.  She denies having any chest pain and no fevers.  She is not having a sore throat is unaware of any heart problems.  She does have chronic obstructive pulmonary disease and is on Spiriva HandiHaler and albuterol inhaler.  She denies being on any oxygen therapy.  She has had no problems with hemoptysis and she has had no previous history of surgery on her chest.  She also tells me that she smokes 1/2 to 1 pack a day since 1993.  She is scheduled for her procedure on 11/21/2024 at 9:15 in the morning to be done at the [facility] in [facility].  I answered all the questions that the patient had to her satisfaction.  Review of Systems  No change  Objective   Physical Exam  Not done.  Assessment/Plan        1.  Preop pulmonary/respiratory exam.  2.  Lung nodule.  3.  Mediastinal adenopathy    Jarrod Flores, DO 11/18/24 2:28 PM

## 2024-11-21 ENCOUNTER — HOSPITAL ENCOUNTER (OUTPATIENT)
Dept: RADIOLOGY | Facility: HOSPITAL | Age: 69
Discharge: HOME | End: 2024-11-21
Payer: MEDICARE

## 2024-11-21 ENCOUNTER — ANESTHESIA EVENT (OUTPATIENT)
Dept: GASTROENTEROLOGY | Facility: HOSPITAL | Age: 69
End: 2024-11-21
Payer: MEDICARE

## 2024-11-21 ENCOUNTER — ANESTHESIA (OUTPATIENT)
Dept: GASTROENTEROLOGY | Facility: HOSPITAL | Age: 69
End: 2024-11-21
Payer: MEDICARE

## 2024-11-21 ENCOUNTER — HOSPITAL ENCOUNTER (OUTPATIENT)
Dept: GASTROENTEROLOGY | Facility: HOSPITAL | Age: 69
Discharge: HOME | End: 2024-11-21
Payer: MEDICARE

## 2024-11-21 VITALS
SYSTOLIC BLOOD PRESSURE: 106 MMHG | RESPIRATION RATE: 16 BRPM | HEART RATE: 75 BPM | HEIGHT: 63 IN | OXYGEN SATURATION: 94 % | TEMPERATURE: 97.9 F | BODY MASS INDEX: 18.43 KG/M2 | DIASTOLIC BLOOD PRESSURE: 90 MMHG | WEIGHT: 104 LBS

## 2024-11-21 DIAGNOSIS — R94.2 ABNORMAL PET SCAN OF LUNG: ICD-10-CM

## 2024-11-21 PROCEDURE — 2720000007 HC OR 272 NO HCPCS

## 2024-11-21 PROCEDURE — 3700000001 HC GENERAL ANESTHESIA TIME - INITIAL BASE CHARGE

## 2024-11-21 PROCEDURE — 7100000002 HC RECOVERY ROOM TIME - EACH INCREMENTAL 1 MINUTE

## 2024-11-21 PROCEDURE — 31623 DX BRONCHOSCOPE/BRUSH: CPT | Performed by: STUDENT IN AN ORGANIZED HEALTH CARE EDUCATION/TRAINING PROGRAM

## 2024-11-21 PROCEDURE — 71250 CT THORAX DX C-: CPT

## 2024-11-21 PROCEDURE — 3700000002 HC GENERAL ANESTHESIA TIME - EACH INCREMENTAL 1 MINUTE

## 2024-11-21 PROCEDURE — 31629 BRONCHOSCOPY/NEEDLE BX EACH: CPT | Performed by: STUDENT IN AN ORGANIZED HEALTH CARE EDUCATION/TRAINING PROGRAM

## 2024-11-21 PROCEDURE — 71250 CT THORAX DX C-: CPT | Performed by: RADIOLOGY

## 2024-11-21 PROCEDURE — 31653 BRONCH EBUS SAMPLNG 3/> NODE: CPT | Performed by: STUDENT IN AN ORGANIZED HEALTH CARE EDUCATION/TRAINING PROGRAM

## 2024-11-21 PROCEDURE — 7100000001 HC RECOVERY ROOM TIME - INITIAL BASE CHARGE

## 2024-11-21 PROCEDURE — 7100000010 HC PHASE TWO TIME - EACH INCREMENTAL 1 MINUTE

## 2024-11-21 PROCEDURE — 71045 X-RAY EXAM CHEST 1 VIEW: CPT

## 2024-11-21 PROCEDURE — 7100000009 HC PHASE TWO TIME - INITIAL BASE CHARGE

## 2024-11-21 PROCEDURE — 31628 BRONCHOSCOPY/LUNG BX EACH: CPT | Performed by: STUDENT IN AN ORGANIZED HEALTH CARE EDUCATION/TRAINING PROGRAM

## 2024-11-21 PROCEDURE — 31654 BRONCH EBUS IVNTJ PERPH LES: CPT | Performed by: STUDENT IN AN ORGANIZED HEALTH CARE EDUCATION/TRAINING PROGRAM

## 2024-11-21 PROCEDURE — 2500000005 HC RX 250 GENERAL PHARMACY W/O HCPCS

## 2024-11-21 PROCEDURE — C1601 HC OR 272 NO HCPCS: HCPCS

## 2024-11-21 PROCEDURE — 31641 BRONCHOSCOPY TREAT BLOCKAGE: CPT | Performed by: STUDENT IN AN ORGANIZED HEALTH CARE EDUCATION/TRAINING PROGRAM

## 2024-11-21 PROCEDURE — 31622 DX BRONCHOSCOPE/WASH: CPT | Performed by: STUDENT IN AN ORGANIZED HEALTH CARE EDUCATION/TRAINING PROGRAM

## 2024-11-21 PROCEDURE — 31627 NAVIGATIONAL BRONCHOSCOPY: CPT | Performed by: STUDENT IN AN ORGANIZED HEALTH CARE EDUCATION/TRAINING PROGRAM

## 2024-11-21 PROCEDURE — P9045 ALBUMIN (HUMAN), 5%, 250 ML: HCPCS | Mod: JZ,JG

## 2024-11-21 PROCEDURE — 2500000004 HC RX 250 GENERAL PHARMACY W/ HCPCS (ALT 636 FOR OP/ED): Mod: JZ,JG

## 2024-11-21 RX ORDER — ROCURONIUM BROMIDE 10 MG/ML
INJECTION, SOLUTION INTRAVENOUS AS NEEDED
Status: DISCONTINUED | OUTPATIENT
Start: 2024-11-21 | End: 2024-11-21

## 2024-11-21 RX ORDER — SODIUM CHLORIDE, SODIUM LACTATE, POTASSIUM CHLORIDE, CALCIUM CHLORIDE 600; 310; 30; 20 MG/100ML; MG/100ML; MG/100ML; MG/100ML
INJECTION, SOLUTION INTRAVENOUS CONTINUOUS PRN
Status: DISCONTINUED | OUTPATIENT
Start: 2024-11-21 | End: 2024-11-21

## 2024-11-21 RX ORDER — PROPOFOL 10 MG/ML
INJECTION, EMULSION INTRAVENOUS CONTINUOUS PRN
Status: DISCONTINUED | OUTPATIENT
Start: 2024-11-21 | End: 2024-11-21

## 2024-11-21 RX ORDER — LIDOCAINE HYDROCHLORIDE 20 MG/ML
INJECTION, SOLUTION INFILTRATION; PERINEURAL AS NEEDED
Status: DISCONTINUED | OUTPATIENT
Start: 2024-11-21 | End: 2024-11-21

## 2024-11-21 RX ORDER — PHENYLEPHRINE 10 MG/250 ML(40 MCG/ML)IN 0.9 % SOD.CHLORIDE INTRAVENOUS
CONTINUOUS PRN
Status: DISCONTINUED | OUTPATIENT
Start: 2024-11-21 | End: 2024-11-21

## 2024-11-21 RX ORDER — MIDAZOLAM HYDROCHLORIDE 1 MG/ML
INJECTION, SOLUTION INTRAMUSCULAR; INTRAVENOUS CONTINUOUS PRN
Status: DISCONTINUED | OUTPATIENT
Start: 2024-11-21 | End: 2024-11-21

## 2024-11-21 RX ORDER — PHENYLEPHRINE HCL IN 0.9% NACL 0.4MG/10ML
SYRINGE (ML) INTRAVENOUS AS NEEDED
Status: DISCONTINUED | OUTPATIENT
Start: 2024-11-21 | End: 2024-11-21

## 2024-11-21 RX ORDER — LABETALOL HYDROCHLORIDE 5 MG/ML
5 INJECTION, SOLUTION INTRAVENOUS ONCE AS NEEDED
OUTPATIENT
Start: 2024-11-21

## 2024-11-21 RX ORDER — LIDOCAINE HYDROCHLORIDE 10 MG/ML
0.1 INJECTION, SOLUTION EPIDURAL; INFILTRATION; INTRACAUDAL; PERINEURAL ONCE
OUTPATIENT
Start: 2024-11-21 | End: 2024-11-21

## 2024-11-21 RX ORDER — ONDANSETRON HYDROCHLORIDE 2 MG/ML
INJECTION, SOLUTION INTRAVENOUS AS NEEDED
Status: DISCONTINUED | OUTPATIENT
Start: 2024-11-21 | End: 2024-11-21

## 2024-11-21 RX ORDER — ALBUTEROL SULFATE 0.83 MG/ML
2.5 SOLUTION RESPIRATORY (INHALATION) ONCE AS NEEDED
OUTPATIENT
Start: 2024-11-21

## 2024-11-21 RX ORDER — HYDRALAZINE HYDROCHLORIDE 20 MG/ML
5 INJECTION INTRAMUSCULAR; INTRAVENOUS EVERY 30 MIN PRN
OUTPATIENT
Start: 2024-11-21

## 2024-11-21 RX ORDER — FENTANYL CITRATE 50 UG/ML
INJECTION, SOLUTION INTRAMUSCULAR; INTRAVENOUS CONTINUOUS PRN
Status: DISCONTINUED | OUTPATIENT
Start: 2024-11-21 | End: 2024-11-21

## 2024-11-21 RX ORDER — ALBUMIN HUMAN 50 G/1000ML
SOLUTION INTRAVENOUS AS NEEDED
Status: DISCONTINUED | OUTPATIENT
Start: 2024-11-21 | End: 2024-11-21

## 2024-11-21 RX ORDER — ONDANSETRON HYDROCHLORIDE 2 MG/ML
4 INJECTION, SOLUTION INTRAVENOUS ONCE AS NEEDED
OUTPATIENT
Start: 2024-11-21

## 2024-11-21 RX ORDER — SODIUM CHLORIDE, SODIUM LACTATE, POTASSIUM CHLORIDE, CALCIUM CHLORIDE 600; 310; 30; 20 MG/100ML; MG/100ML; MG/100ML; MG/100ML
100 INJECTION, SOLUTION INTRAVENOUS CONTINUOUS
OUTPATIENT
Start: 2024-11-21 | End: 2024-11-22

## 2024-11-21 SDOH — HEALTH STABILITY: MENTAL HEALTH: CURRENT SMOKER: 1

## 2024-11-21 ASSESSMENT — PAIN - FUNCTIONAL ASSESSMENT
PAIN_FUNCTIONAL_ASSESSMENT: 0-10

## 2024-11-21 ASSESSMENT — COLUMBIA-SUICIDE SEVERITY RATING SCALE - C-SSRS
2. HAVE YOU ACTUALLY HAD ANY THOUGHTS OF KILLING YOURSELF?: NO
6. HAVE YOU EVER DONE ANYTHING, STARTED TO DO ANYTHING, OR PREPARED TO DO ANYTHING TO END YOUR LIFE?: NO
1. IN THE PAST MONTH, HAVE YOU WISHED YOU WERE DEAD OR WISHED YOU COULD GO TO SLEEP AND NOT WAKE UP?: NO

## 2024-11-21 ASSESSMENT — PAIN SCALES - GENERAL
PAINLEVEL_OUTOF10: 0 - NO PAIN

## 2024-11-21 NOTE — ANESTHESIA PROCEDURE NOTES
Airway  Date/Time: 11/21/2024 10:49 AM  Urgency: elective    Airway not difficult    Staffing  Performed: MILADIS   Authorized by: Marcel Magdaleno MD    Performed by: MILADIS Alvarenga  Patient location during procedure: OR    Indications and Patient Condition  Indications for airway management: anesthesia  Spontaneous Ventilation: absent  Sedation level: deep  Preoxygenated: yes  Patient position: sniffing  MILS maintained throughout  Mask difficulty assessment: 1 - vent by mask    Final Airway Details  Final airway type: endotracheal airway      Successful airway: ETT  Cuffed: yes   Successful intubation technique: direct laryngoscopy  Facilitating devices/methods: intubating stylet  Endotracheal tube insertion site: oral  Blade: Angie  Blade size: #3  ETT size (mm): 8.5  Cormack-Lehane Classification: grade I - full view of glottis  Placement verified by: chest auscultation and capnometry   Measured from: lips  ETT to lips (cm): 21  Number of attempts at approach: 1

## 2024-11-21 NOTE — DISCHARGE INSTRUCTIONS
[facility] Interventional Pulmonology    The anesthetics, sedatives or narcotics which were given to you today will be acting in your body for the next 24 hours, so you might feel a little sleepy or groggy. This feeling should slowly wear off.   Carefully read and follow the instructions below:   You received sedation today.   Do not drive or operate machinery or power tools of any kind.   No alcoholic beverages today, not even beer or wine.   No over the counter medications that contain alcohol or may cause drowsiness.   Do not make important decisions or sign legal documents.     Do not use Aspirin containing products or non-steroidal medications for the next 24 hours.  (Examples of these types of medications include: Advil, Aleve, Ecotrin, Ibuprofen, Motrin or Naprosyn.  This list is not all-inclusive.  Check with your physician or pharmacist before resuming these medications.)  Tylenol, cough medicine, cough drops or throat lozenges may be used when you are allowed to resume eating and drinking.     Call your physician if any of these symptoms occur:   High fever over 101 degrees or chills (a low grade fever is common for 24 hours)   Rash or hives   Persistent nausea or vomiting   Inability to urinate within 8 hours after the procedure  Go directly to the emergency room if you notice any of the following:   Shortness of breath   Chest pain  Coughing up large amounts of bright red blood greater than a teaspoonful of blood clots (about a teaspoonful for the next 24-48 hours is normal, especially if you had a biopsy)  Resume all normal medications unless directed otherwise by your doctor.       Follow up with your referring physician as previously scheduled.    If you experience any problems or have any questions following discharge, please call:   Before 5 pm: [phone]   After 5pm and on weekends: [phone] / [phone] and ask for the Pulmonary Fellow on-call (Pager Number: [phone])

## 2024-11-21 NOTE — INTERVAL H&P NOTE
H&P reviewed. The patient was examined and there are no changes to the H&P.    Plan for robotic bronchoscopy with interventions *right upper lobe pet avid nodule)  and linear ebus for staging.     Candy Joe MD  Interventional Pulmonology

## 2024-11-21 NOTE — LETTER
Dear, Billie Hernadez       11/13/2024                                                                                              INFORMATION FOR YOUR PROCEDURE    INSTRUCTIONS MUST BE FOLLOWED OR YOU RUN THE RISK OF YOUR CASE BEING CANCELED    Information is attached to this e-mail for your upcoming procedure. (Look for the paperclip in your email to access this information)  Lab requisitions (if needed), are also included as well as procedural instructions.       You are scheduled for your Bronchoscopy on  11/21/24, , with Dr. Candy Joe [facility] [address] [place] [address]    09:30 AM    - CT  Scan  [facility]   2nd Floor Radiology  [address]    When finished with the CT scan,   please make your way to [facility] Room 1300.  This is right around the corner from [facility].  Located on the first floor.  There are information desks there for your convenience and if you have questions.    Check In will be at  10:30  AM.  This allows us to prepare you for the actual procedure.                                        Bronchoscopy   Location:  [address] [facility]                                         Bronchoscopy / Endoscopy Suite    NPO (No food or drink) after midnight the night before your procedure. This includes coffee, water, and soda, hard candy, gum or mints.  If taking your morning medications, a small sip of water is allowed to get the medication down.    For your safety, you must have a responsible, adult  accompany you to your procedure.  You will not be permitted to drive yourself home if you have received any type of anesthesia or sedation.    Automated calls about your upcoming scheduled appointments can be quite confusing for patients.    The times may vary depending on what you have scheduled for procedure day. Follow the time/s I have given you on your information sheet so there is no confusion.  Be aware you may receive  conflicting times from different departments.      Blood work will need to be done prior to your procedure, preferably at a  Facility.  There are no restrictions for testing. I have attached a requisition for you.    Dr. Jarrod Flores, will call you on 11/18/24, @ 1:30 PM    This is a phone visit to go over your medical history prior to your procedure, and is a necessary part of your medical workup.  The patient must be present at this visit.      Please reach out to me with any questions you may have  Day  [phone] or Floyd @ [phone]    Have a nice day!    Day Kern    Bronchoscopy   Interventional Pulmonology    MD Cameron Quintana MD Sameer Avasarala, MD Catalina Teba, MD Andrew Dunatchik, MD Sruti Brahmandam, MD      [facility]  Pulmonary, Critical Care and Sleep Medicine  [address] [place] [address]  P -[phone]  - [phone]  [email or web address]

## 2024-11-21 NOTE — ANESTHESIA PREPROCEDURE EVALUATION
Patient: Billie Hernadez    Procedure Information       Date/Time: 11/21/24 1130    Scheduled providers: Candy Joe MD    Procedure: BRONCHOSCOPY    Location: [facility]            Relevant Problems   Cardiac   (+) HTN (hypertension)   (+) Hyperlipidemia      Pulmonary   (+) COPD (chronic obstructive pulmonary disease) (Multi)       Clinical information reviewed:   Tobacco  Allergies  Meds   Med Hx  Surg Hx  OB Status  Fam Hx  Soc   Hx        NPO Detail:  NPO/Void Status  Carbohydrate Drink Given Prior to Surgery? : N  Date of Last Liquid: 11/20/24  Time of Last Liquid: 2200  Date of Last Solid: 11/20/24  Time of Last Solid: 2000  Last Intake Type: Clear fluids  Time of Last Void: 0936         Physical Exam    Airway  Mallampati: I  TM distance: >3 FB     Cardiovascular - normal exam     Dental    Pulmonary - normal exam     Abdominal   Abdomen: soft             Anesthesia Plan    History of general anesthesia?: yes  History of complications of general anesthesia?: no    ASA 3     general     The patient is a current smoker.    intravenous induction   Trial extubation is planned.  Anesthetic plan and risks discussed with patient.  Use of blood products discussed with patient who.    Plan discussed with [facility].

## 2024-11-22 ASSESSMENT — PAIN SCALES - GENERAL: PAIN_LEVEL: 0

## 2024-11-22 NOTE — ANESTHESIA POSTPROCEDURE EVALUATION
Patient: Billie Hernadez    Procedure Summary       Date: 11/21/24 Room / Location: [facility]    Anesthesia Start: 1033 Anesthesia Stop: 1317    Procedure: BRONCHOSCOPY Diagnosis: Abnormal PET scan of lung    Scheduled Providers: Candy Joe MD Responsible Provider: Marcel Magdaleno MD    Anesthesia Type: general ASA Status: 3            Anesthesia Type: general    Vitals Value Taken Time   /61 11/21/24 1355   Temp 36.6 °C (97.9 °F) 11/21/24 1318   Pulse 69 11/21/24 1355   Resp 16 11/21/24 1355   SpO2 98 % 11/21/24 1355       Anesthesia Post Evaluation    Patient location during evaluation: PACU  Patient participation: complete - patient participated  Level of consciousness: awake and alert  Pain score: 0  Pain management: adequate  Multimodal analgesia pain management approach  Airway patency: patent  Cardiovascular status: acceptable  Respiratory status: acceptable  Hydration status: acceptable  Postoperative Nausea and Vomiting: none        There were no known notable events for this encounter.

## 2024-11-26 ENCOUNTER — OFFICE VISIT (OUTPATIENT)
Dept: HEMATOLOGY/ONCOLOGY | Facility: HOSPITAL | Age: 69
End: 2024-11-26
Payer: MEDICARE

## 2024-11-26 VITALS
RESPIRATION RATE: 16 BRPM | BODY MASS INDEX: 18.49 KG/M2 | TEMPERATURE: 98.5 F | DIASTOLIC BLOOD PRESSURE: 84 MMHG | SYSTOLIC BLOOD PRESSURE: 131 MMHG | OXYGEN SATURATION: 96 % | WEIGHT: 104.4 LBS | HEART RATE: 113 BPM

## 2024-11-26 DIAGNOSIS — C34.11 MALIGNANT NEOPLASM OF UPPER LOBE OF RIGHT LUNG (MULTI): Primary | ICD-10-CM

## 2024-11-26 PROCEDURE — 1126F AMNT PAIN NOTED NONE PRSNT: CPT | Performed by: INTERNAL MEDICINE

## 2024-11-26 PROCEDURE — 99215 OFFICE O/P EST HI 40 MIN: CPT | Performed by: INTERNAL MEDICINE

## 2024-11-26 PROCEDURE — 1159F MED LIST DOCD IN RCRD: CPT | Performed by: INTERNAL MEDICINE

## 2024-11-26 PROCEDURE — 99205 OFFICE O/P NEW HI 60 MIN: CPT | Performed by: INTERNAL MEDICINE

## 2024-11-26 PROCEDURE — 3075F SYST BP GE 130 - 139MM HG: CPT | Performed by: INTERNAL MEDICINE

## 2024-11-26 PROCEDURE — 3079F DIAST BP 80-89 MM HG: CPT | Performed by: INTERNAL MEDICINE

## 2024-11-26 ASSESSMENT — PATIENT HEALTH QUESTIONNAIRE - PHQ9
1. LITTLE INTEREST OR PLEASURE IN DOING THINGS: NOT AT ALL
SUM OF ALL RESPONSES TO PHQ9 QUESTIONS 1 AND 2: 0
2. FEELING DOWN, DEPRESSED OR HOPELESS: NOT AT ALL

## 2024-11-26 ASSESSMENT — ENCOUNTER SYMPTOMS
LOSS OF SENSATION IN FEET: 0
DEPRESSION: 0
OCCASIONAL FEELINGS OF UNSTEADINESS: 0

## 2024-11-26 ASSESSMENT — COLUMBIA-SUICIDE SEVERITY RATING SCALE - C-SSRS
6. HAVE YOU EVER DONE ANYTHING, STARTED TO DO ANYTHING, OR PREPARED TO DO ANYTHING TO END YOUR LIFE?: NO
2. HAVE YOU ACTUALLY HAD ANY THOUGHTS OF KILLING YOURSELF?: NO
1. IN THE PAST MONTH, HAVE YOU WISHED YOU WERE DEAD OR WISHED YOU COULD GO TO SLEEP AND NOT WAKE UP?: NO

## 2024-11-26 ASSESSMENT — PAIN SCALES - GENERAL: PAINLEVEL_OUTOF10: 0-NO PAIN

## 2024-11-26 NOTE — PROGRESS NOTES
Patient ID: Billie Hernadez is a 69 y.o. female.  Referring Physician: Aman Dominguez MD  [id number]  [facility] [address]  [place] [address]  Primary Care Provider: KAREN Holliday-CNP  Referral Reason: Lung mass    Subjective:  Was found to have a R lung nodule in a routine screening CT Chest in Oct 2024. Was feeling well without any symptoms. Now has more cough likely due to recent bronchoscopy. Denies fever. No previous h/o cancer. No weight loss. No fever.     Heme/Onc History:  - CT c screening (Oct 2024): RUL nodule  - PET/CT: RUL lung nodule with uptake. Small R hilar, mediastinal, and a supraclavicular LN with moderate uptake. L adrenal nodule with low uptake (could not see that one myself)  - Bronch (11/22/24): Path pending    Assessment/Plan:  ? Very likely lung cancer: I reviewed PET/CT images and the report. Supraclavicular Lymph node is concerning but is rather small with faint uptake on PET. Would not label this cancer as incurable based on it alone. We need the final path results to tailor her treatment. I am inclined toward starting with chemoimmunotherapy, repeating scans, and then decide on surgery vs. Radiation. We talked abotu chemo and immunotherapy side effects today. Will talk more after the path results. Will need MR Brain as well.    Review Of Systems:  Review of Systems - Oncology    Physical Exam:  /84 (BP Location: Left arm, Patient Position: Sitting)   Pulse (!) 113   Temp 36.9 °C (98.5 °F) (Temporal)   Resp 16   Wt 47.4 kg (104 lb 6.4 oz)   SpO2 96%   BMI 18.49 kg/m²   BSA: 1.45 meters squared  Performance Status: Asymptomatic  Physical Exam    Results:  Diagnostic Results   Lab Results   Component Value Date    WBC 8.6 11/18/2024    HGB 14.7 11/18/2024    HCT 46.1 (H) 11/18/2024    MCV 98 11/18/2024     11/18/2024     Lab Results   Component Value Date    CALCIUM 9.3 11/18/2024     11/18/2024    K 4.6 11/18/2024    CO2 35 (H) 11/18/2024    CL 94  (L) 11/18/2024    BUN 7 11/18/2024    CREATININE 0.52 11/18/2024    ALT 12 09/16/2024    AST 18 09/16/2024       Current Outpatient Medications:     albuterol 90 mcg/actuation inhaler, USE 1 TO 2 INHALATIONS BY  MOUTH EVERY 4 TO 6 HOURS AS NEEDED, Disp: 51 g, Rfl: 2    alendronate (Fosamax) 70 mg tablet, Take 1 tablet (70 mg) by mouth 1 (one) time per week. Take in the morning with a full glass of water, on an empty stomach, and do not take anything else by mouth or lie down for the next 30 min., Disp: 12 tablet, Rfl: 3    calcium citrate-vitamin D2 250 mg-2.5 mcg (100 unit) tablet, Take 1 tablet by mouth 2 times a day., Disp: , Rfl:     losartan (Cozaar) 25 mg tablet, TAKE ONE TABLET BY MOUTH ONCE DAILY, Disp: 90 tablet, Rfl: 3    multivitamin with minerals tablet, Take 1 tablet by mouth once daily., Disp: , Rfl:     Spiriva with HandiHaler 18 mcg inhalation capsule, place ONE CAPSULE into inhaler AND inhale ONCE DAILY, Disp: 90 capsule, Rfl: 3     Past Surgical History:   Procedure Laterality Date    EXCISION / BIOPSY BREAST / NIPPLE / DUCT Left 05/28/2014    OTHER SURGICAL HISTORY  03/12/2015    Open Treatment Of Tibial Shaft Fracture With Implant    TONSILLECTOMY       No family history on file.   reports that she has been smoking cigarettes. She started smoking about 51 years ago. She has a 26 pack-year smoking history. She has never used smokeless tobacco.  Social History     Socioeconomic History    Marital status:      Spouse name: Not on file    Number of children: Not on file    Years of education: Not on file    Highest education level: Not on file   Occupational History    Not on file   Tobacco Use    Smoking status: Every Day     Current packs/day: 0.50     Average packs/day: 0.5 packs/day for 51.9 years (26.0 ttl pk-yrs)     Types: Cigarettes     Start date: 1973    Smokeless tobacco: Never   Vaping Use    Vaping status: Never Used   Substance and Sexual Activity    Alcohol use: Yes      Alcohol/week: 2.0 standard drinks of alcohol     Types: 2 Cans of beer per week     Comment: 1-3 beers a week    Drug use: Never    Sexual activity: Defer   Other Topics Concern    Not on file   Social History Narrative    Not on file     Social Drivers of Health     Financial Resource Strain: Not on file   Food Insecurity: Not on file   Transportation Needs: Not on file   Physical Activity: Not on file   Stress: Stress Concern Present (11/26/2024)    [employer] - Occupational Stress Questionnaire     Feeling of Stress : To some extent   Social Connections: Not on file   Intimate Partner Violence: Not on file   Housing Stability: Not on file       Diagnoses and all orders for this visit:  Malignant neoplasm of upper lobe of right lung (Multi)  -     Referral to Hematology and Oncology  -     Clinic Appointment Request; Future       Judith Santillan MD

## 2024-11-26 NOTE — PROGRESS NOTES
Patient scored 2A on PG-SGA nutrition screen. Nursing education provided on nutrition, weight management, and healthy lifestyle.

## 2024-11-29 LAB
LAB AP ASR DISCLAIMER: NORMAL
LAB AP ASR DISCLAIMER: NORMAL
LABORATORY COMMENT REPORT: NORMAL
PATH REPORT.COMMENTS IMP SPEC: NORMAL
PATH REPORT.FINAL DX SPEC: NORMAL
PATH REPORT.FINAL DX SPEC: NORMAL
PATH REPORT.GROSS SPEC: NORMAL
PATH REPORT.GROSS SPEC: NORMAL
PATH REPORT.INTRAOP OBS SPEC DOC: NORMAL
PATH REPORT.TOTAL CANCER: NORMAL
PATH REPORT.TOTAL CANCER: NORMAL

## 2024-12-02 ENCOUNTER — HOSPITAL ENCOUNTER (OUTPATIENT)
Dept: RADIATION ONCOLOGY | Facility: CLINIC | Age: 69
Setting detail: RADIATION/ONCOLOGY SERIES
Discharge: HOME | End: 2024-12-02
Payer: MEDICARE

## 2024-12-02 DIAGNOSIS — C34.11 MALIGNANT NEOPLASM OF UPPER LOBE OF RIGHT LUNG (MULTI): Primary | ICD-10-CM

## 2024-12-02 PROCEDURE — 99205 OFFICE O/P NEW HI 60 MIN: CPT | Performed by: STUDENT IN AN ORGANIZED HEALTH CARE EDUCATION/TRAINING PROGRAM

## 2024-12-02 PROCEDURE — 99215 OFFICE O/P EST HI 40 MIN: CPT | Mod: 95 | Performed by: STUDENT IN AN ORGANIZED HEALTH CARE EDUCATION/TRAINING PROGRAM

## 2024-12-02 SDOH — ECONOMIC STABILITY: FOOD INSECURITY: WITHIN THE PAST 12 MONTHS, THE FOOD YOU BOUGHT JUST DIDN'T LAST AND YOU DIDN'T HAVE MONEY TO GET MORE.: NEVER TRUE

## 2024-12-02 SDOH — ECONOMIC STABILITY: INCOME INSECURITY: IN THE LAST 12 MONTHS, WAS THERE A TIME WHEN YOU WERE NOT ABLE TO PAY THE MORTGAGE OR RENT ON TIME?: NO

## 2024-12-02 SDOH — ECONOMIC STABILITY: TRANSPORTATION INSECURITY
IN THE PAST 12 MONTHS, HAS THE LACK OF TRANSPORTATION KEPT YOU FROM MEDICAL APPOINTMENTS OR FROM GETTING MEDICATIONS?: NO

## 2024-12-02 SDOH — ECONOMIC STABILITY: TRANSPORTATION INSECURITY
IN THE PAST 12 MONTHS, HAS LACK OF TRANSPORTATION KEPT YOU FROM MEETINGS, WORK, OR FROM GETTING THINGS NEEDED FOR DAILY LIVING?: NO

## 2024-12-02 SDOH — ECONOMIC STABILITY: FOOD INSECURITY: WITHIN THE PAST 12 MONTHS, YOU WORRIED THAT YOUR FOOD WOULD RUN OUT BEFORE YOU GOT MONEY TO BUY MORE.: NEVER TRUE

## 2024-12-02 ASSESSMENT — ENCOUNTER SYMPTOMS
LOSS OF SENSATION IN FEET: 0
DEPRESSION: 0
HEADACHES: 0
SHORTNESS OF BREATH: 1
CHILLS: 0
FATIGUE: 0
FEVER: 0
OCCASIONAL FEELINGS OF UNSTEADINESS: 0

## 2024-12-02 ASSESSMENT — COLUMBIA-SUICIDE SEVERITY RATING SCALE - C-SSRS: 1. IN THE PAST MONTH, HAVE YOU WISHED YOU WERE DEAD OR WISHED YOU COULD GO TO SLEEP AND NOT WAKE UP?: NO

## 2024-12-02 ASSESSMENT — PATIENT HEALTH QUESTIONNAIRE - PHQ9
1. LITTLE INTEREST OR PLEASURE IN DOING THINGS: NOT AT ALL
2. FEELING DOWN, DEPRESSED OR HOPELESS: NOT AT ALL
SUM OF ALL RESPONSES TO PHQ9 QUESTIONS 1 AND 2: 0

## 2024-12-02 ASSESSMENT — SOCIAL DETERMINANTS OF HEALTH (SDOH): HOW HARD IS IT FOR YOU TO PAY FOR THE VERY BASICS LIKE FOOD, HOUSING, MEDICAL CARE, AND HEATING?: NOT HARD AT ALL

## 2024-12-02 NOTE — PROGRESS NOTES
Radiation Oncology Outpatient Consult    Patient Name:  Billie Hernadez  MRN:  [id number]  :  1955    Referring Provider: Aman Briscoe*  Primary Care Provider: MOR Holliday  Care Team: Patient Care Team:  MOR Holliday as PCP - General  Fredi Nolen MD as PCP - [employer] PCP  Judith Santillan MD as Medical Oncologist (Hematology and Oncology)    Date of Service: 2024     Virtual or Telephone Consent    An interactive audio and video telecommunication system which permits real time communications between the patient (at the originating site) and provider (at the distant site) was utilized to provide this telehealth service.   Verbal consent was requested and obtained from Billie Hernadez on this date, 24 for a telehealth visit.      SUBJECTIVE  History of Present Illness:  Billie Hernadez is a 69 y.o. female who was referred by Aman Briscoe*, for a consultation to the [facility] Department of Radiation Oncology.  She is presenting for evaluation and management of at least locally advanced non-small cell lung cancer of the right upper lobe.     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 Hernadez is a female that was found to have on screening CT lung imaging on 10/8/2024 a irregular 1.4 cm nodule in the right upper lobe with subcentimeter nodules of the right lower lobe and increase in size and number of mediastinal lymph nodes.  The patient was seen by cardiothoracic surgery and underwent staging PET/CT on 2024 which showed right hypermetabolic supraclavicular lymph node, irregular right upper lobe for metabolic nodule, hypermetabolic activity in mediastinal and hilar lymph nodes including right lower paratracheal, subcarinal, right hilar and aortopulmonary lymph node and mild uptake and thickening in the left adrenal  gland with no evidence of distant metastatic osseous disease.  The patient underwent bronchoscopy/EBUS on 11/21/2024 which revealed no evidence of tracheal, right or left mainstem bronchus lesions, sampling occurred from station 11 RS, 4R, 7 and 11L.  Rapid onsite evaluation was a suggestive of lymphoid tissue in the lymph nodes and in the right upper lobe atypical cells noted.  Final pathology revealed malignant cells of adenosquamous possibility in the right upper lobe, atypical cells on bronchial right upper lobe brushings, malignant cells from non-small cell lung cancer in station 7 negative in station 4R, 11 RS and 11L..  PD-L1 TPS 10% (low).    The patient is being further evaluated with pulmonary function test, evaluation for surgical options from cardiothoracic surgery and discussion of additional workup and additional options including definitive chemoradiation therapy with medical oncology.  The patient presents today for discussion of radiation therapy options.    Prior Radiotherapy:  No radiation treatments to show. (Treatments may have been administered in another system.)       Past Medical History:    Past Medical History:   Diagnosis Date    Chronic obstructive pulmonary disease with (acute) exacerbation (Multi) 08/08/2017    COPD exacerbation    Hypertension     Other specified health status 03/12/2015    No known problems    Personal history of other venous thrombosis and embolism 03/12/2015    History of deep venous thrombosis    Pneumonia         Past Surgical History:    Past Surgical History:   Procedure Laterality Date    EXCISION / BIOPSY BREAST / NIPPLE / DUCT Left 05/28/2014    OTHER SURGICAL HISTORY  03/12/2015    Open Treatment Of Tibial Shaft Fracture With Implant    TONSILLECTOMY          Family History:  Cancer-related family history is not on file.    Social History:    Social History     Tobacco Use    Smoking status: Every Day     Current packs/day: 0.50     Average packs/day: 0.5  packs/day for 51.9 years (26.0 ttl pk-yrs)     Types: Cigarettes     Start date: 1973    Smokeless tobacco: Never   Vaping Use    Vaping status: Never Used   Substance Use Topics    Alcohol use: Yes     Alcohol/week: 2.0 standard drinks of alcohol     Types: 2 Cans of beer per week     Comment: 1-3 beers a week    Drug use: Never       Allergies:  No Known Allergies     Medications:    Current Outpatient Medications:     albuterol 90 mcg/actuation inhaler, USE 1 TO 2 INHALATIONS BY  MOUTH EVERY 4 TO 6 HOURS AS NEEDED, Disp: 51 g, Rfl: 2    alendronate (Fosamax) 70 mg tablet, Take 1 tablet (70 mg) by mouth 1 (one) time per week. Take in the morning with a full glass of water, on an empty stomach, and do not take anything else by mouth or lie down for the next 30 min., Disp: 12 tablet, Rfl: 3    calcium citrate-vitamin D2 250 mg-2.5 mcg (100 unit) tablet, Take 1 tablet by mouth 2 times a day., Disp: , Rfl:     losartan (Cozaar) 25 mg tablet, TAKE ONE TABLET BY MOUTH ONCE DAILY, Disp: 90 tablet, Rfl: 3    multivitamin with minerals tablet, Take 1 tablet by mouth once daily., Disp: , Rfl:     Spiriva with HandiHaler 18 mcg inhalation capsule, place ONE CAPSULE into inhaler AND inhale ONCE DAILY, Disp: 90 capsule, Rfl: 3      Review of Systems:  Review of Systems   Constitutional:  Negative for chills, fatigue and fever.   Respiratory:  Positive for shortness of breath.    Cardiovascular:  Negative for chest pain.   Neurological:  Negative for headaches.       Performance Status:  The Karnofsky performance scale today is 80, Normal activity with effort; some signs or symptoms of disease (ECOG equivalent 1).        OBJECTIVE  There were no vitals taken for this visit.   Physical Exam  Nursing note reviewed.   Constitutional:       Comments: Limited examination due to televisit   HENT:      Head: Normocephalic.   Eyes:      Extraocular Movements: Extraocular movements intact.   Pulmonary:      Effort: Pulmonary effort is  normal.   Neurological:      Mental Status: She is alert.          Laboratory Review:  There are no laboratory contraindications to radiation therapy.    The pertinent lab results were reviewed and discussed with the patient.  Notably,   12/3/2024-PFTs: Pending  11/18/2024-BMP: Chloride 94 (L), bicarbonate 35 (H), creatinine 0.52      Pathology Review:  The pertinent pathology results were reviewed and discussed with the patient.  Notably,     11/21/2024- A. LUNG, RIGHT UPPER LOBE; BIOPSY:-- Non-small cell carcinoma. Comment: The tumor is composed of two morphologically and immunophenotypically distinct populations; one of which is positive for p40 and negative for TTF-1 and the other is positive for TTF-1 and negative for p40. These findings raise the possibility of adenosquamous carcinoma, but this classification requires a resection specimen. Clinical and radiologic correlation is recommended.    11/21/2024- A. LUNG FINE NEEDLE ASPIRATION RIGHT UPPER LOBE NODULE, CYTOLOGY AND CELL BLOCK: --MALIGNANT CELLS DERIVED FROM NON-SMALL CELL CARCINOMA.  --SEE ALSO SURGICAL PATHOLOGY REPORT, [id number]. Note:  Two malignant components are identified. One component shows keratinizing squamous morphology and immunohistochemically is positive for p40, while it is negative for TTF-1. The other component shows adenocarcinoma morphology and immunohistochemically is focally positive for TTF-1 and negative for p40, raising concern for adenosquamous carcinoma.  However a final classification cannot be done on this cell block material. Molecular testing (NGS) has been ordered and results will be issued in a separate report. The cell block contains low tumor cellularity representing roughly 20% of all nucleated cells.    B. BRONCHIAL BRUSH RIGHT UPPER LOBE, CYTOLOGY AND CELL BLOCK:  --PAUCICELLULAR SPECIMEN.  A FEW ATYPICAL CELLS ARE PRESENT, SUSPICIOUS FOR MALIGNANCY.       C. LYMPH NODE 11 L PULMONARY FINE NEEDLE ASPIRATION,  CYTOLOGY AND CELL BLOCK:  --LIMITED LYMPHOID SPECIMEN. --NO MALIGNANT CELLS IDENTIFIED.           D. LYMPH NODE 7 PULMONARY FINE NEEDLE ASPIRATION, CYTOLOGY AND CELL BLOCK:  --MALIGNANT CELLS  DERIVED FROM NON-SMALL CELL CARCINOMA. Note:  Tumor cell morphology is similar to that of the adenocarcinoma component noted in part A (right upper lobe nodule).   Tumor cells show positive immunostaining for CK7 and keratins AE1/AE3 and CAM5.2.  Tumor cells show no immunostaining for TTF-1 or p40.      E. LYMPH NODE 4 R PULMONARY FINE NEEDLE ASPIRATION, CYTOLOGY AND CELL BLOCK: --LIMITED LYMPHOID SPECIMEN. --NO MALIGNANT CELLS IDENTIFIED.           F. LYMPH NODE 11 Rs PULMONARY FINE NEEDLE ASPIRATION, CYTOLOGY AND CELL BLOCK: --LIMITED LYMPHOID SPECIMEN. --NO MALIGNANT CELLS IDENTIFIED.      Disease Associated Genomics:   PD-L1 TPS 10% (low)  NGS: Pending    Disease Tumor Markers:  None     Imaging:  The pertinent imaging results were reviewed and discussed with the patient.  Notably,     11/21/2024-CT chest without contrast: Mild centrilobular emphysematous changes with enlargement of the right upper lobe pulmonary nodule measuring 1.7 x 1.6 cm, right middle lobe consolidation and collapse and pulmonary nodule within the anterior right lower lobe measuring 6 mm, interval increase of 4 mm subpleural nodule within the left lower lobe and multiple punctate subcentimeter pulmonary nodules throughout the right lung.  Multiple prominent right-sided hilar and mediastinal lymph nodes with right paratracheal measuring 1.5 cm and additional right paratracheal lymph node measuring 1.2 cm.  Interlobar septal thickening in the right upper lobe surrounding the nodule, lymphangitic spread cannot be excluded.    11/6/2024-PET/CT: No evidence of hypermetabolic cervical lymphadenopathy.  There is a right hypermetabolic supraclavicular lymph node with max SUV 6.4.  Within the chest there is a irregular right upper lobe nodule with max SUV 9.1.   No hypermetabolic activity at the right lower lobe or subpleural left lower lobe nodules.  Mediastinal and hilar lymph nodes with hypermetabolic activity including right upper paratracheal lymph node with max SUV 3.3, right lower paratracheal lymph node with max SUV 5.9, subcarinal lymph node with max SUV 6.9 and right hilar lymph node with max SUV 7.0.  There is uptake and aortopulmonary lymph nodes with max SUV 4.3.  There is mild uptake within the left adrenal gland with max SUV 2.9.  No evidence of osseous metastatic disease.    10/8/2024-CT lung dose screening: Extensive tracheobronchial calcifications with narrowing of the right middle lobe bronchus with mucoid impaction.  Right middle lobe consolidation/collapse.  In the right upper lobe a irregular 1.1 x 1.4 cm nodule, within the anterior segment of the right lower lobe a irregular 5 mm lung nodule and 2 mm subpleural lung nodule with associated 3 mm focus in the right lower lobe of mucoid impaction.  Increased size and number of mediastinal lymph nodes.       Prior Systemic Therapies:  None    Prior Surgeries:  11/21/2024-bronchoscopy/EBUS: No evidence of lesions involving the trachea, right or left bronchial tree.  Navigation bronchoscopy was conducted with sampling of the right upper lobe.  EBUS was conducted and sampling occurred from station 11 RS, 4R, 7 and 11L.  Rapid onsite evaluation was suggestive of lymphoid tissue in station 4R, 7 and 11L.  Rapid onsite evaluation of the right upper lobe lesion was suggestive of atypical cells.    Prior Radiation Therapy:  None    ASSESSMENT:   Billie Hernadez is a 69 y.o. female with Primary malignant neoplasm of right upper lobe of lung (Multi), Clinical: cT1b, cN2.      Ms. Hernadez is a female with at least locally advanced non-small cell lung cancer, cT1b cN2 (N3? 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  I discussed with the patient regarding additional workup required including MRI of the brain  with and without contrast and referral for MRI of the adrenal glands with and without contrast to both evaluate for intracranial disease involvement as well as further characterization of the hypermetabolic thickened left adrenal gland.  The patient has significant claustrophobia and declines MRIs at this time, the patient will be scheduled instead for CT head with contrast and CT abdomen with contrast to characterize for any obvious intracranial metastatic disease or evaluation of the left adrenal nodule prior to referral to biopsy of concerning right supraclavicular lymph node.  The patient will be undergoing pulmonary function test this month.    I discussed with the patient regarding her clinical presentation, pathology, images and treatment recommendations under the pretenses of confirmed locally advanced disease for which the patient would likely be more favored of chemoradiation therapy with N3 disease and depending on the patient's pulmonary function testing may be a candidate for perioperative therapy followed by surgical resection if found to only have concern for single station N2 disease.  I discussed with the patient if the patient is found to have extracranial or intracranial metastatic disease outside of the thorax that the patient would undergo initial systemic therapy.    I discussed with the patient regarding the acute, long-term and process of chemoradiation therapy to the thorax.  The plan would be for 6000 cGy in 30 fractions with concurrent chemotherapy covering all sites of involvement of gross disease.  Depending on NGS, the patient may be a candidate for additional consolidative therapies.    The patient will be scheduled for CT head with contrast and CT abdomen with contrast.  The patient will follow-up in clinic after completion of imaging.      PLAN:     #) At least locally advanced non-small cell lung cancer, cT1b cN2 (N3? 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  -Ordered CT  head with contrast (patient declines MRI brain due to claustrophobia) and CT abdomen with contrast to evaluate left adrenal gland  -If no concern for left adrenal metastases, discussed referral to IR for biopsy of the right supraclavicular lymph node to rule out N3 disease  -Following with medical oncology and thoracic surgery for discussion of treatment options  -Follow-up in clinic after completion of imaging to discuss next steps regarding biopsy of supraclavicular right lymph node versus left adrenal gland    NCCN Guidelines were applicable to guide this patients treatment plan.     A total of 30 minutes were spent face-to-face with the patient, the majority of time spent detailing treatment options with an additional 30 minutes spent reviewing records including imaging, pathology and physician notes.    Abimael Bridges MD  [facility]  [facility] clinical  - Department of Radiation Oncology  Phone: [phone]  Fax: [phone]  [facility] secure chat preferred / Pager [phone]    Note: This was transcribed using Dragon voice recognition software. Attempts were made to correct any errors; however, errors or omissions may be present.

## 2024-12-02 NOTE — ADDENDUM NOTE
Encounter addended by: Sandra Eduardo RN on: 12/2/2024 2:56 PM   Actions taken: Clinical Note Signed

## 2024-12-02 NOTE — PROGRESS NOTES
Patient has virtual NPV with Dr Bridges. Today she denies pain, chest tightness, headaches, and fatigue. The patient does report SOB with activity and chronic cough with milky to clear sputum. The patient is a current everyday smoker of 0.5 packs per day. She had bronchoscopy 11/22. Whe will have PFT's tomoorw 12/3/24 and see [facility] 12/10/24. The phone call was ended so that the patient could proceed to virtual visit with Dr Bridges.

## 2024-12-03 ENCOUNTER — OFFICE VISIT (OUTPATIENT)
Dept: HEMATOLOGY/ONCOLOGY | Facility: HOSPITAL | Age: 69
End: 2024-12-03
Payer: MEDICARE

## 2024-12-03 ENCOUNTER — LAB (OUTPATIENT)
Dept: LAB | Facility: LAB | Age: 69
End: 2024-12-03
Payer: MEDICARE

## 2024-12-03 ENCOUNTER — HOSPITAL ENCOUNTER (OUTPATIENT)
Dept: RESPIRATORY THERAPY | Facility: HOSPITAL | Age: 69
Discharge: HOME | End: 2024-12-03
Payer: MEDICARE

## 2024-12-03 VITALS
WEIGHT: 101.3 LBS | HEART RATE: 76 BPM | SYSTOLIC BLOOD PRESSURE: 106 MMHG | BODY MASS INDEX: 17.94 KG/M2 | OXYGEN SATURATION: 93 % | TEMPERATURE: 99.1 F | RESPIRATION RATE: 20 BRPM | DIASTOLIC BLOOD PRESSURE: 63 MMHG

## 2024-12-03 DIAGNOSIS — C34.11 MALIGNANT NEOPLASM OF UPPER LOBE OF RIGHT LUNG (MULTI): ICD-10-CM

## 2024-12-03 DIAGNOSIS — C34.11 MALIGNANT NEOPLASM OF UPPER LOBE OF RIGHT LUNG (MULTI): Primary | ICD-10-CM

## 2024-12-03 LAB
ALBUMIN SERPL BCP-MCNC: 4.8 G/DL (ref 3.4–5)
ALP SERPL-CCNC: 66 U/L (ref 33–136)
ALT SERPL W P-5'-P-CCNC: 16 U/L (ref 7–45)
ANION GAP SERPL CALC-SCNC: 13 MMOL/L (ref 10–20)
AST SERPL W P-5'-P-CCNC: 24 U/L (ref 9–39)
BASOPHILS # BLD AUTO: 0.03 X10*3/UL (ref 0–0.1)
BASOPHILS NFR BLD AUTO: 0.3 %
BILIRUB SERPL-MCNC: 1.1 MG/DL (ref 0–1.2)
BUN SERPL-MCNC: 10 MG/DL (ref 6–23)
CALCIUM SERPL-MCNC: 9.9 MG/DL (ref 8.6–10.3)
CHLORIDE SERPL-SCNC: 94 MMOL/L (ref 98–107)
CO2 SERPL-SCNC: 34 MMOL/L (ref 21–32)
CREAT SERPL-MCNC: 0.6 MG/DL (ref 0.5–1.05)
EGFRCR SERPLBLD CKD-EPI 2021: >90 ML/MIN/1.73M*2
EOSINOPHIL # BLD AUTO: 0.05 X10*3/UL (ref 0–0.4)
EOSINOPHIL NFR BLD AUTO: 0.5 %
ERYTHROCYTE [DISTWIDTH] IN BLOOD BY AUTOMATED COUNT: 14.6 % (ref 11.5–14.5)
GLUCOSE SERPL-MCNC: 77 MG/DL (ref 74–99)
HCT VFR BLD AUTO: 47.3 % (ref 36–46)
HGB BLD-MCNC: 15 G/DL (ref 12–16)
IMM GRANULOCYTES # BLD AUTO: 0.02 X10*3/UL (ref 0–0.5)
IMM GRANULOCYTES NFR BLD AUTO: 0.2 % (ref 0–0.9)
LYMPHOCYTES # BLD AUTO: 2.08 X10*3/UL (ref 0.8–3)
LYMPHOCYTES NFR BLD AUTO: 21.3 %
MCH RBC QN AUTO: 31.3 PG (ref 26–34)
MCHC RBC AUTO-ENTMCNC: 31.7 G/DL (ref 32–36)
MCV RBC AUTO: 99 FL (ref 80–100)
MGC ASCENT PFT - FEV1 - PRE: 0.7
MGC ASCENT PFT - FEV1 - PREDICTED: 2.12
MGC ASCENT PFT - FVC - PRE: 1.88
MGC ASCENT PFT - FVC - PREDICTED: 2.73
MONOCYTES # BLD AUTO: 0.49 X10*3/UL (ref 0.05–0.8)
MONOCYTES NFR BLD AUTO: 5 %
NEUTROPHILS # BLD AUTO: 7.09 X10*3/UL (ref 1.6–5.5)
NEUTROPHILS NFR BLD AUTO: 72.7 %
NRBC BLD-RTO: 0 /100 WBCS (ref 0–0)
PLATELET # BLD AUTO: 398 X10*3/UL (ref 150–450)
POTASSIUM SERPL-SCNC: 4.9 MMOL/L (ref 3.5–5.3)
PROT SERPL-MCNC: 7.9 G/DL (ref 6.4–8.2)
RBC # BLD AUTO: 4.8 X10*6/UL (ref 4–5.2)
SODIUM SERPL-SCNC: 136 MMOL/L (ref 136–145)
T4 FREE SERPL-MCNC: 1.07 NG/DL (ref 0.61–1.12)
TSH SERPL-ACNC: 0.34 MIU/L (ref 0.44–3.98)
WBC # BLD AUTO: 9.8 X10*3/UL (ref 4.4–11.3)

## 2024-12-03 PROCEDURE — 94760 N-INVAS EAR/PLS OXIMETRY 1: CPT

## 2024-12-03 PROCEDURE — 94726 PLETHYSMOGRAPHY LUNG VOLUMES: CPT | Performed by: PEDIATRICS

## 2024-12-03 PROCEDURE — 86704 HEP B CORE ANTIBODY TOTAL: CPT

## 2024-12-03 PROCEDURE — 85025 COMPLETE CBC W/AUTO DIFF WBC: CPT

## 2024-12-03 PROCEDURE — 99215 OFFICE O/P EST HI 40 MIN: CPT | Mod: 25 | Performed by: INTERNAL MEDICINE

## 2024-12-03 PROCEDURE — 36415 COLL VENOUS BLD VENIPUNCTURE: CPT

## 2024-12-03 PROCEDURE — 82024 ASSAY OF ACTH: CPT

## 2024-12-03 PROCEDURE — 99215 OFFICE O/P EST HI 40 MIN: CPT | Performed by: INTERNAL MEDICINE

## 2024-12-03 PROCEDURE — 86706 HEP B SURFACE ANTIBODY: CPT

## 2024-12-03 PROCEDURE — 3074F SYST BP LT 130 MM HG: CPT | Performed by: INTERNAL MEDICINE

## 2024-12-03 PROCEDURE — 94729 DIFFUSING CAPACITY: CPT | Performed by: PEDIATRICS

## 2024-12-03 PROCEDURE — 80053 COMPREHEN METABOLIC PANEL: CPT

## 2024-12-03 PROCEDURE — 1126F AMNT PAIN NOTED NONE PRSNT: CPT | Performed by: INTERNAL MEDICINE

## 2024-12-03 PROCEDURE — 82533 TOTAL CORTISOL: CPT

## 2024-12-03 PROCEDURE — 94726 PLETHYSMOGRAPHY LUNG VOLUMES: CPT

## 2024-12-03 PROCEDURE — 3078F DIAST BP <80 MM HG: CPT | Performed by: INTERNAL MEDICINE

## 2024-12-03 PROCEDURE — 94729 DIFFUSING CAPACITY: CPT

## 2024-12-03 PROCEDURE — 87340 HEPATITIS B SURFACE AG IA: CPT

## 2024-12-03 PROCEDURE — 1159F MED LIST DOCD IN RCRD: CPT | Performed by: INTERNAL MEDICINE

## 2024-12-03 PROCEDURE — 94010 BREATHING CAPACITY TEST: CPT

## 2024-12-03 PROCEDURE — 94010 BREATHING CAPACITY TEST: CPT | Performed by: PEDIATRICS

## 2024-12-03 RX ORDER — DEXAMETHASONE 4 MG/1
8 TABLET ORAL DAILY
Qty: 6 TABLET | Refills: 2 | Status: SHIPPED | OUTPATIENT
Start: 2024-12-03

## 2024-12-03 RX ORDER — ONDANSETRON HYDROCHLORIDE 8 MG/1
8 TABLET, FILM COATED ORAL EVERY 8 HOURS PRN
Qty: 30 TABLET | Refills: 5 | Status: SHIPPED | OUTPATIENT
Start: 2024-12-03

## 2024-12-03 RX ORDER — DIPHENHYDRAMINE HCL 50 MG
50 CAPSULE ORAL ONCE
OUTPATIENT
Start: 2024-12-10

## 2024-12-03 RX ORDER — FAMOTIDINE 10 MG/ML
20 INJECTION INTRAVENOUS ONCE
OUTPATIENT
Start: 2024-12-10

## 2024-12-03 RX ORDER — PROCHLORPERAZINE EDISYLATE 5 MG/ML
10 INJECTION INTRAMUSCULAR; INTRAVENOUS EVERY 6 HOURS PRN
OUTPATIENT
Start: 2024-12-10

## 2024-12-03 RX ORDER — OLANZAPINE 5 MG/1
5 TABLET ORAL NIGHTLY
Qty: 4 TABLET | Refills: 2 | Status: SHIPPED | OUTPATIENT
Start: 2024-12-03

## 2024-12-03 RX ORDER — PROCHLORPERAZINE MALEATE 10 MG
10 TABLET ORAL EVERY 6 HOURS PRN
Qty: 30 TABLET | Refills: 5 | Status: SHIPPED | OUTPATIENT
Start: 2024-12-03

## 2024-12-03 RX ORDER — PROCHLORPERAZINE MALEATE 5 MG
10 TABLET ORAL EVERY 6 HOURS PRN
OUTPATIENT
Start: 2024-12-10

## 2024-12-03 RX ORDER — PALONOSETRON 0.05 MG/ML
0.25 INJECTION, SOLUTION INTRAVENOUS ONCE
OUTPATIENT
Start: 2024-12-10

## 2024-12-03 ASSESSMENT — ENCOUNTER SYMPTOMS
CARDIOVASCULAR NEGATIVE: 1
RESPIRATORY NEGATIVE: 1
CONSTITUTIONAL NEGATIVE: 1
GASTROINTESTINAL NEGATIVE: 1

## 2024-12-03 ASSESSMENT — PAIN SCALES - GENERAL: PAINLEVEL_OUTOF10: 0-NO PAIN

## 2024-12-03 NOTE — PROGRESS NOTES
Patient ID: Billie Hernadez is a 69 y.o. female.  Referring Physician: Judith Santillan MD  [address] [place] [address]  Primary Care Provider: KAREN Holliday-CNP  Referral Reason: Lung mass    Subjective:  Returns for follow up for lung cancer.     Heme/Onc History:  - CT c screening (Oct 2024): RUL nodule  - PET/CT: RUL lung nodule with uptake. Small R hilar, mediastinal, and a supraclavicular LN with moderate uptake. L adrenal nodule with low uptake (could not see that one myself)  - Bronch (11/22/24): NSCLC. C/w adenosquamous. PD-L1 10%. NGS pending. Level 7 LN aspiration positive for malignant cells. Level 4R, 11R, 11L negative.     Assessment/Plan:  ? NSCLC: At least stage III with biopsy proven mediastinal LAPs. I reviwed PET/CT second time today, R supraclavicular LAP is small but has FDG uptake. L adrenal nodule has very mild uptake. As per Rad Onc recommendations, we will get CT abd to evalaute the adrenal gland. Meanwhile, a CT guided biopsy was ordered for supraclavuclar Lymph node. That node is small and the yield would be small. I asked Dr. Varghese @ Rad Onc whether they can radiate that Ly,ph node with the tumor if needed => He said it is possible.     After a long conversation with the patient, we decided to go ahead with the following possibly curative plan:   A. Start chemoimmunotherapy with CARBO-PACLitaxel + nivolumab  B. Repeat PET/CT after C3. If disease is controlled, start chemoRT to include R supraclavicular lymph node.   C. Meanwhile, if L adrenal gland is c/w metastasis, would get radiation to the adrenal as well.   D. CT Brain is pending        Review Of Systems:  Review of Systems   Constitutional: Negative.    HENT:  Negative.     Respiratory: Negative.     Cardiovascular: Negative.    Gastrointestinal: Negative.        Physical Exam:  /63 (BP Location: Left arm, Patient Position: Sitting)   Pulse 76   Temp 37.3 °C (99.1 °F) (Temporal)   Resp 20   Wt 45.9 kg (101  lb 4.8 oz)   SpO2 93%   BMI 17.94 kg/m²   BSA: 1.43 meters squared  Performance Status: Asymptomatic  Physical Exam  HENT:      Head: Normocephalic and atraumatic.   Eyes:      General: No scleral icterus.  Pulmonary:      Effort: Pulmonary effort is normal.   Musculoskeletal:         General: Normal range of motion.   Skin:     Coloration: Skin is not jaundiced.   Neurological:      General: No focal deficit present.      Mental Status: She is alert and oriented to person, place, and time.         Results:  Diagnostic Results   Lab Results   Component Value Date    WBC 8.6 11/18/2024    HGB 14.7 11/18/2024    HCT 46.1 (H) 11/18/2024    MCV 98 11/18/2024     11/18/2024     Lab Results   Component Value Date    CALCIUM 9.3 11/18/2024     11/18/2024    K 4.6 11/18/2024    CO2 35 (H) 11/18/2024    CL 94 (L) 11/18/2024    BUN 7 11/18/2024    CREATININE 0.52 11/18/2024    ALT 12 09/16/2024    AST 18 09/16/2024       Current Outpatient Medications:     albuterol 90 mcg/actuation inhaler, USE 1 TO 2 INHALATIONS BY  MOUTH EVERY 4 TO 6 HOURS AS NEEDED, Disp: 51 g, Rfl: 2    alendronate (Fosamax) 70 mg tablet, Take 1 tablet (70 mg) by mouth 1 (one) time per week. Take in the morning with a full glass of water, on an empty stomach, and do not take anything else by mouth or lie down for the next 30 min., Disp: 12 tablet, Rfl: 3    calcium citrate-vitamin D2 250 mg-2.5 mcg (100 unit) tablet, Take 1 tablet by mouth 2 times a day., Disp: , Rfl:     losartan (Cozaar) 25 mg tablet, TAKE ONE TABLET BY MOUTH ONCE DAILY, Disp: 90 tablet, Rfl: 3    multivitamin with minerals tablet, Take 1 tablet by mouth once daily., Disp: , Rfl:     Spiriva with HandiHaler 18 mcg inhalation capsule, place ONE CAPSULE into inhaler AND inhale ONCE DAILY, Disp: 90 capsule, Rfl: 3    dexAMETHasone (Decadron) 4 mg tablet, Take 2 tablets (8 mg) by mouth once daily. For 3 days starting the day after treatment., Disp: 6 tablet, Rfl: 2     OLANZapine (ZyPREXA) 5 mg tablet, Take 1 tablet (5 mg) by mouth once daily at bedtime. For 4 days starting the evening of treatment., Disp: 4 tablet, Rfl: 2    ondansetron (Zofran) 8 mg tablet, Take 1 tablet (8 mg) by mouth every 8 hours if needed for nausea or vomiting., Disp: 30 tablet, Rfl: 5    prochlorperazine (Compazine) 10 mg tablet, Take 1 tablet (10 mg) by mouth every 6 hours if needed for nausea or vomiting., Disp: 30 tablet, Rfl: 5     Past Surgical History:   Procedure Laterality Date    EXCISION / BIOPSY BREAST / NIPPLE / DUCT Left 05/28/2014    OTHER SURGICAL HISTORY  03/12/2015    Open Treatment Of Tibial Shaft Fracture With Implant    TONSILLECTOMY       No family history on file.   reports that she has been smoking cigarettes. She started smoking about 51 years ago. She has a 26 pack-year smoking history. She has never used smokeless tobacco.  Social History     Socioeconomic History    Marital status:      Spouse name: Not on file    Number of children: Not on file    Years of education: Not on file    Highest education level: Not on file   Occupational History    Not on file   Tobacco Use    Smoking status: Every Day     Current packs/day: 0.50     Average packs/day: 0.5 packs/day for 51.9 years (26.0 ttl pk-yrs)     Types: Cigarettes     Start date: 1973    Smokeless tobacco: Never   Vaping Use    Vaping status: Never Used   Substance and Sexual Activity    Alcohol use: Yes     Alcohol/week: 2.0 standard drinks of alcohol     Types: 2 Cans of beer per week     Comment: 1-3 beers a week    Drug use: Never    Sexual activity: Defer   Other Topics Concern    Not on file   Social History Narrative    Not on file     Social Drivers of Health     Financial Resource Strain: Low Risk  (12/2/2024)    Overall Financial Resource Strain (CARDIA)     Difficulty of Paying Living Expenses: Not hard at all   Food Insecurity: No Food Insecurity (12/2/2024)    Hunger Vital Sign     Worried About Running Out  of Food in the Last Year: Never true     Ran Out of Food in the Last Year: Never true   Transportation Needs: No Transportation Needs (12/2/2024)    PRAPARE - Transportation     Lack of Transportation (Medical): No     Lack of Transportation (Non-Medical): No   Physical Activity: Not on file   Stress: Stress Concern Present (12/3/2024)    [employer] [place] [employer] - Occupational Stress Questionnaire     Feeling of Stress : To some extent   Social Connections: Not on file   Intimate Partner Violence: Not on file   Housing Stability: Low Risk  (12/2/2024)    Housing Stability Vital Sign     Unable to Pay for Housing in the Last Year: No     Number of Times Moved in the Last Year: 0     Homeless in the Last Year: No       Diagnoses and all orders for this visit:  Malignant neoplasm of upper lobe of right lung (Multi)  -     Clinic Appointment Request  -     OLANZapine (ZyPREXA) 5 mg tablet; Take 1 tablet (5 mg) by mouth once daily at bedtime. For 4 days starting the evening of treatment.  -     dexAMETHasone (Decadron) 4 mg tablet; Take 2 tablets (8 mg) by mouth once daily. For 3 days starting the day after treatment.  -     ondansetron (Zofran) 8 mg tablet; Take 1 tablet (8 mg) by mouth every 8 hours if needed for nausea or vomiting.  -     prochlorperazine (Compazine) 10 mg tablet; Take 1 tablet (10 mg) by mouth every 6 hours if needed for nausea or vomiting.  -     CBC and Auto Differential; Future  -     Comprehensive metabolic panel; Future  -     Hepatitis B surface antigen; Future  -     Hepatitis B Core Antibody, Total; Future  -     Hepatitis B surface antibody; Future  -     Acth; Future  -     Cortisol Am; Future  -     Tsh With Reflex To Free T4 If Abnormal; Future  -     Clinic Appointment Request; Future  -     Infusion Appointment Request; Future  Other orders  -     dexAMETHasone (Decadron) 20 mg in dextrose 5% 50 mL IV  -     diphenhydrAMINE (BENADryl) capsule 50 mg  -     famotidine PF  (Pepcid) injection 20 mg  -     palonosetron (Aloxi) injection 250 mcg  -     fosaprepitant (Emend) 150 mg in sodium chloride 0.9% 250 mL IV  -     prochlorperazine (Compazine) tablet 10 mg  -     prochlorperazine (Compazine) injection 10 mg       Judith Santillan MD

## 2024-12-04 LAB
CORTIS AM PEAK SERPL-MSCNC: 14.5 UG/DL (ref 5–20)
ELECTRONICALLY SIGNED BY: NORMAL
FOCUSED SOLID TUMOR DNA/RNA RESULTS: NORMAL
HBV CORE AB SER QL: NONREACTIVE
HBV SURFACE AB SER-ACNC: <3.1 MIU/ML
HBV SURFACE AG SERPL QL IA: NONREACTIVE

## 2024-12-05 ENCOUNTER — HOSPITAL ENCOUNTER (OUTPATIENT)
Dept: RADIOLOGY | Facility: HOSPITAL | Age: 69
Discharge: HOME | End: 2024-12-05
Payer: MEDICARE

## 2024-12-05 DIAGNOSIS — C34.11 MALIGNANT NEOPLASM OF UPPER LOBE OF RIGHT LUNG (MULTI): ICD-10-CM

## 2024-12-05 LAB — ACTH PLAS-MCNC: 16 PG/ML (ref 7.2–63.3)

## 2024-12-05 PROCEDURE — 2550000001 HC RX 255 CONTRASTS: Performed by: STUDENT IN AN ORGANIZED HEALTH CARE EDUCATION/TRAINING PROGRAM

## 2024-12-05 PROCEDURE — 70460 CT HEAD/BRAIN W/DYE: CPT

## 2024-12-05 PROCEDURE — 74160 CT ABDOMEN W/CONTRAST: CPT

## 2024-12-06 ENCOUNTER — HOSPITAL ENCOUNTER (OUTPATIENT)
Dept: RADIATION ONCOLOGY | Facility: CLINIC | Age: 69
Setting detail: RADIATION/ONCOLOGY SERIES
Discharge: HOME | End: 2024-12-06
Payer: MEDICARE

## 2024-12-06 VITALS
BODY MASS INDEX: 17.93 KG/M2 | HEART RATE: 128 BPM | DIASTOLIC BLOOD PRESSURE: 55 MMHG | TEMPERATURE: 97.5 F | OXYGEN SATURATION: 95 % | RESPIRATION RATE: 18 BRPM | WEIGHT: 101.19 LBS | SYSTOLIC BLOOD PRESSURE: 105 MMHG

## 2024-12-06 DIAGNOSIS — C34.11 MALIGNANT NEOPLASM OF UPPER LOBE OF RIGHT LUNG (MULTI): Primary | ICD-10-CM

## 2024-12-06 LAB — FOCUSED SOLID TUMOR DNA/RNA RESULTS: NORMAL

## 2024-12-06 PROCEDURE — 99215 OFFICE O/P EST HI 40 MIN: CPT | Performed by: STUDENT IN AN ORGANIZED HEALTH CARE EDUCATION/TRAINING PROGRAM

## 2024-12-06 ASSESSMENT — PAIN SCALES - GENERAL: PAINLEVEL_OUTOF10: 0-NO PAIN

## 2024-12-06 ASSESSMENT — ENCOUNTER SYMPTOMS
SHORTNESS OF BREATH: 1
CHILLS: 0
HEADACHES: 0
FEVER: 0
FATIGUE: 0

## 2024-12-06 NOTE — PROGRESS NOTES
Patient is in today for follow up with Dr Bridges. She denies pain, SOB, and headaches. The patient reports cough with clear sputum. She had CT yesterday and is back to discuss plan with Dr Bridges. She will see Jacque on 1/7/25.

## 2024-12-06 NOTE — PROGRESS NOTES
Radiation Oncology Follow-Up    Patient Name:  Billie Hernadez  MRN:  [id number]  :  1955    Referring Provider: Abimael Bridges MD  Primary Care Provider: MOR Holliday  Care Team: Patient Care Team:  MOR Holliday as PCP - General  Fredi Nolen MD as PCP - [employer] PCP  Judith Santillan MD as Medical Oncologist (Hematology and Oncology)    Date of Service: 2024    SUBJECTIVE  History of Present Illness:  Billie Hernadez is a 69 y.o. female who was previously seen at the [facility] Department of Radiation Oncology for  locally advanced non-small cell lung cancer of the right upper lobe.      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    Ms. Hernadez is a female that was found to have on screening CT lung imaging on 10/8/2024 a irregular 1.4 cm nodule in the right upper lobe with subcentimeter nodules of the right lower lobe and increase in size and number of mediastinal lymph nodes.  The patient was seen by cardiothoracic surgery and underwent staging PET/CT on 2024 which showed right hypermetabolic supraclavicular lymph node, irregular right upper lobe for metabolic nodule, hypermetabolic activity in mediastinal and hilar lymph nodes including right lower paratracheal, subcarinal, right hilar and aortopulmonary lymph node and mild uptake and thickening in the left adrenal gland with no evidence of distant metastatic osseous disease.  The patient underwent bronchoscopy/EBUS on 2024 which revealed no evidence of tracheal, right or left mainstem bronchus lesions, sampling occurred from station 11 RS, 4R, 7 and 11L.  Rapid onsite evaluation was a suggestive of lymphoid tissue in the lymph nodes and in the right upper lobe atypical cells noted.  Final pathology revealed malignant cells of adenosquamous possibility in the  right upper lobe, atypical cells on bronchial right upper lobe brushings, malignant cells from non-small cell lung cancer in station 7 negative in station 4R, 11 RS and 11L..  PD-L1 TPS 10% (low).     The patient elected for intracranial staging with CT head with contrast, deferring MRI brain with and without contrast which showed no evidence of metastatic disease or acute hemorrhage on 12/2/2024.  The patient underwent CT abdomen with IV contrast to further evaluate the hypermetabolic left adrenal lesion for which final pathology is pending.    The patient is planned for carbo Taxol and nivolumab on 12/10/2024.    Labs:  12/3/2024-PFTs: Pending  11/18/2024-BMP: Chloride 94 (L), bicarbonate 35 (H), creatinine 0.52        Pathology:  11/21/2024- A. LUNG, RIGHT UPPER LOBE; BIOPSY:-- Non-small cell carcinoma. Comment: The tumor is composed of two morphologically and immunophenotypically distinct populations; one of which is positive for p40 and negative for TTF-1 and the other is positive for TTF-1 and negative for p40. These findings raise the possibility of adenosquamous carcinoma, but this classification requires a resection specimen. Clinical and radiologic correlation is recommended.     11/21/2024- A. LUNG FINE NEEDLE ASPIRATION RIGHT UPPER LOBE NODULE, CYTOLOGY AND CELL BLOCK: --MALIGNANT CELLS DERIVED FROM NON-SMALL CELL CARCINOMA.  --SEE ALSO SURGICAL PATHOLOGY REPORT, [id number]. Note:  Two malignant components are identified. One component shows keratinizing squamous morphology and immunohistochemically is positive for p40, while it is negative for TTF-1. The other component shows adenocarcinoma morphology and immunohistochemically is focally positive for TTF-1 and negative for p40, raising concern for adenosquamous carcinoma.  However a final classification cannot be done on this cell block material. Molecular testing (NGS) has been ordered and results will be issued in a separate report. The cell block  contains low tumor cellularity representing roughly 20% of all nucleated cells.     B. BRONCHIAL BRUSH RIGHT UPPER LOBE, CYTOLOGY AND CELL BLOCK:  --PAUCICELLULAR SPECIMEN.  A FEW ATYPICAL CELLS ARE PRESENT, SUSPICIOUS FOR MALIGNANCY.       C. LYMPH NODE 11 L PULMONARY FINE NEEDLE ASPIRATION, CYTOLOGY AND CELL BLOCK:  --LIMITED LYMPHOID SPECIMEN. --NO MALIGNANT CELLS IDENTIFIED.           D. LYMPH NODE 7 PULMONARY FINE NEEDLE ASPIRATION, CYTOLOGY AND CELL BLOCK:  --MALIGNANT CELLS  DERIVED FROM NON-SMALL CELL CARCINOMA. Note:  Tumor cell morphology is similar to that of the adenocarcinoma component noted in part A (right upper lobe nodule).   Tumor cells show positive immunostaining for CK7 and keratins AE1/AE3 and CAM5.2.  Tumor cells show no immunostaining for TTF-1 or p40.      E. LYMPH NODE 4 R PULMONARY FINE NEEDLE ASPIRATION, CYTOLOGY AND CELL BLOCK: --LIMITED LYMPHOID SPECIMEN. --NO MALIGNANT CELLS IDENTIFIED.           F. LYMPH NODE 11 Rs PULMONARY FINE NEEDLE ASPIRATION, CYTOLOGY AND CELL BLOCK: --LIMITED LYMPHOID SPECIMEN. --NO MALIGNANT CELLS IDENTIFIED.       Disease Associated Genomics:   PD-L1 TPS 10% (low)  NGS: Pending     Disease Tumor Markers:  None      Imagin2024-CT abdomen with contrast: (Pending official read) the left adrenal gland has a 1.5 cm isodense mass.    2024-CT chest without contrast: Mild centrilobular emphysematous changes with enlargement of the right upper lobe pulmonary nodule measuring 1.7 x 1.6 cm, right middle lobe consolidation and collapse and pulmonary nodule within the anterior right lower lobe measuring 6 mm, interval increase of 4 mm subpleural nodule within the left lower lobe and multiple punctate subcentimeter pulmonary nodules throughout the right lung.  Multiple prominent right-sided hilar and mediastinal lymph nodes with right paratracheal measuring 1.5 cm and additional right paratracheal lymph node measuring 1.2 cm.  Interlobar septal thickening  in the right upper lobe surrounding the nodule, lymphangitic spread cannot be excluded.     11/6/2024-PET/CT: No evidence of hypermetabolic cervical lymphadenopathy.  There is a right hypermetabolic supraclavicular lymph node with max SUV 6.4.  Within the chest there is a irregular right upper lobe nodule with max SUV 9.1.  No hypermetabolic activity at the right lower lobe or subpleural left lower lobe nodules.  Mediastinal and hilar lymph nodes with hypermetabolic activity including right upper paratracheal lymph node with max SUV 3.3, right lower paratracheal lymph node with max SUV 5.9, subcarinal lymph node with max SUV 6.9 and right hilar lymph node with max SUV 7.0.  There is uptake and aortopulmonary lymph nodes with max SUV 4.3.  There is mild uptake within the left adrenal gland with max SUV 2.9.  No evidence of osseous metastatic disease.     10/8/2024-CT lung dose screening: Extensive tracheobronchial calcifications with narrowing of the right middle lobe bronchus with mucoid impaction.  Right middle lobe consolidation/collapse.  In the right upper lobe a irregular 1.1 x 1.4 cm nodule, within the anterior segment of the right lower lobe a irregular 5 mm lung nodule and 2 mm subpleural lung nodule with associated 3 mm focus in the right lower lobe of mucoid impaction.  Increased size and number of mediastinal lymph nodes.     Prior Systemic Therapies:  12/2024-current: CarboTaxol plus nivolumab     Prior Surgeries:  11/21/2024-bronchoscopy/EBUS: No evidence of lesions involving the trachea, right or left bronchial tree.  Navigation bronchoscopy was conducted with sampling of the right upper lobe.  EBUS was conducted and sampling occurred from station 11 RS, 4R, 7 and 11L.  Rapid onsite evaluation was suggestive of lymphoid tissue in station 4R, 7 and 11L.  Rapid onsite evaluation of the right upper lobe lesion was suggestive of atypical cells.     Prior Radiation Therapy:  None    Treatment Rendered:   No  radiation treatments to show. (Treatments may have been administered in another system.)       Review of Systems:   Review of Systems   Constitutional:  Negative for chills, fatigue and fever.   Respiratory:  Positive for shortness of breath.    Cardiovascular:  Negative for chest pain.   Neurological:  Negative for headaches.       Performance Status:   The Karnofsky performance scale today is 80, Normal activity with effort; some signs or symptoms of disease (ECOG equivalent 1).       OBJECTIVE  Vital Signs:  /55 (BP Location: Left arm, Patient Position: Sitting, BP Cuff Size: Adult)   Pulse (!) 128   Temp 36.4 °C (97.5 °F) (Temporal)   Resp 18   Wt 45.9 kg (101 lb 3.1 oz)   SpO2 95%   BMI 17.93 kg/m²   Physical Exam  Vitals and nursing note reviewed.   HENT:      Head: Normocephalic.   Eyes:      Extraocular Movements: Extraocular movements intact.   Pulmonary:      Effort: Pulmonary effort is normal.   Musculoskeletal:         General: Normal range of motion.   Neurological:      General: No focal deficit present.      Mental Status: She is alert.            ASSESSMENT:   Billie Hernadez is a 69 y.o. female with Malignant neoplasm of upper lobe of right lung (Multi), Clinical: cT1b, cN2.      Ms. Hernadez is a female with at least locally advanced non-small cell lung cancer, cT1b cN2 (N3? 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  The patient has negative CT head with contrast, which I discussed with her does not completely rule out risk of small metastatic disease but given her deferment of brain MRI is enough to proceed with potential curative intent definitive therapy.  The CT abdomen is pending read; however, the left adrenal lesion is approximately 1.5 cm in size, circumferential without any overt sign of significant density changes concerning for necrosis or significant enhancement.  I discussed with the patient regarding awaiting the final read for possible need for additional imaging  to workup the adrenal lesion versus proceeding to biopsy if the lesion looks concerning.    I discussed with the patient tentatively, the patient will require biopsy of her right supraclavicular lymph node if the patient desires radiation omission to the right supraclavicular fossa, given the complexity of her disease manifestation with hypermetabolic lymphadenopathy that has been staged with endobronchial ultrasound and negative except for station 7 being positive, for which her PET avidity may not be specific for malignancy.  I discussed with the patient regarding the potential risk of false negative biopsy if the patient is treated with chemo-immunotherapy prior to the biopsy.  If the supraclavicular lymph node is found to be positive, the radiation field will be extended to cover the regional lymphatics of drainage as well as the right supraclavicular fossa while ensuring safe delivery of treatment to organs at risk.    I discussed with the patient regarding the acute and long-term toxicities of radiation therapy to the thorax, hilum, mediastinum and supraclavicular fossa.  The patient had all questions and concerns addressed during the visit.  Consent was obtained.  The patient will be scheduled for CT simulation once final pathology from the supraclavicular lymph node has resulted.     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  -Follow-up after biopsy for discussion of final pathology and potential CT simulation  -CT head with contrast negative (patient declines MRI brain due to claustrophobia) and CT abdomen with contrast shows possible left adrenal adenoma (pending final read)  -Referred to interventional radiology for right supraclavicular lymph node biopsy, if radiology deems appropriate for biopsy of the left adrenal gland, supraclavicular lymph node biopsy will be canceled and instead  pursued of the left adrenal gland as this will change clinical management  -Following with medical oncology and thoracic surgery for discussion of treatment options    #) Acute toxicities    #) Long term side effects    A total of 30 minutes were spent face-to-face with the patient, with an additional 15 minutes spent reviewing records including imaging, pathology and physician notes.    Abimael Bridges MD  [facility] [place]  [facility] clinical  - Department of Radiation Oncology  Phone: [phone]  Fax: [phone]  Startapp secure chat preferred / Pager [phone]    Note: This was transcribed using Dragon voice recognition software. Attempts were made to correct any errors; however, errors or omissions may be present.     NCCN Guidelines were applicable to guide this patients treatment plan.

## 2024-12-09 ENCOUNTER — TELEPHONE (OUTPATIENT)
Dept: RADIATION ONCOLOGY | Facility: CLINIC | Age: 69
End: 2024-12-09
Payer: MEDICARE

## 2024-12-09 DIAGNOSIS — E27.9 LESION OF ADRENAL GLAND (MULTI): Primary | ICD-10-CM

## 2024-12-09 LAB
MGC ASCENT PFT - FEV1 - PRE: 0.7
MGC ASCENT PFT - FEV1 - PREDICTED: 2.12
MGC ASCENT PFT - FVC - PRE: 1.88
MGC ASCENT PFT - FVC - PREDICTED: 2.73

## 2024-12-10 ENCOUNTER — APPOINTMENT (OUTPATIENT)
Dept: HEMATOLOGY/ONCOLOGY | Facility: HOSPITAL | Age: 69
End: 2024-12-10
Payer: MEDICARE

## 2024-12-11 ENCOUNTER — TELEPHONE (OUTPATIENT)
Dept: RADIATION ONCOLOGY | Facility: CLINIC | Age: 69
End: 2024-12-11
Payer: MEDICARE

## 2024-12-11 NOTE — TELEPHONE ENCOUNTER
Patient contacted via phone call with appointment date CT and informed it is needed to decide which area will be biopsied. She verbalized understanding of information and phone call was ended.

## 2024-12-11 NOTE — TELEPHONE ENCOUNTER
CT abdomen inconclusive for etiology, obtaining CT adrenals w/ wo/ contrast to assess for adenoma vs metastasis.

## 2024-12-16 NOTE — PROGRESS NOTES
Treatment Pre-Review for Date of Service: 12/16/24    Diagnosis:  Malignant neoplasm of upper lobe of right lung (Multi), Clinical: cT1b, cN2      Regimen: Opdivo + Paclitaxel + Carboplatin every 21 days     Current Cycle/Day: Cycle 1 Day 1   Treatment Date Appropriate: Yes; Last Treatment: NA/ Cycle 1   Date change required: none    Dose or Regimen Modifications: Yes:  Starting carboplatin dose AUC 6 -> 5     Med Rec/Drug Interactions: None noted during pre-review    Growth Factor: none    Financial Clearance/Authorization: Yes    Echo:  No results found for this or any previous visit from the past 1095 days.    Lifetime Dose Tracking   No doses have been documented on this patient for the following tracked chemicals: doxorubicin, epirubicin, idarubicin, daunorubicin, mitoxantrone, bleomycin, mitomycin, carmustine, doxorubicin HCl pegylated liposomal, daunorubicin citrate liposomal     Comments: none    I Axel White, PharmD hereby acknowledge that the treatment plan indicated above has been verified for correctness to the best of my ability on 12/16/2024 at 2:07 PM.

## 2024-12-17 ENCOUNTER — INFUSION (OUTPATIENT)
Dept: HEMATOLOGY/ONCOLOGY | Facility: HOSPITAL | Age: 69
End: 2024-12-17
Payer: MEDICARE

## 2024-12-17 VITALS
OXYGEN SATURATION: 95 % | BODY MASS INDEX: 18.44 KG/M2 | HEIGHT: 62 IN | TEMPERATURE: 98.1 F | HEART RATE: 100 BPM | SYSTOLIC BLOOD PRESSURE: 127 MMHG | DIASTOLIC BLOOD PRESSURE: 73 MMHG | RESPIRATION RATE: 18 BRPM | WEIGHT: 100.2 LBS

## 2024-12-17 DIAGNOSIS — C34.11 MALIGNANT NEOPLASM OF UPPER LOBE OF RIGHT LUNG (MULTI): ICD-10-CM

## 2024-12-17 LAB
CREAT SERPL-MCNC: 0.52 MG/DL (ref 0.5–1.05)
EGFRCR SERPLBLD CKD-EPI 2021: >90 ML/MIN/1.73M*2

## 2024-12-17 PROCEDURE — 2500000001 HC RX 250 WO HCPCS SELF ADMINISTERED DRUGS (ALT 637 FOR MEDICARE OP): Performed by: INTERNAL MEDICINE

## 2024-12-17 PROCEDURE — 96415 CHEMO IV INFUSION ADDL HR: CPT

## 2024-12-17 PROCEDURE — 96413 CHEMO IV INFUSION 1 HR: CPT

## 2024-12-17 PROCEDURE — 96375 TX/PRO/DX INJ NEW DRUG ADDON: CPT | Mod: INF

## 2024-12-17 PROCEDURE — 2500000004 HC RX 250 GENERAL PHARMACY W/ HCPCS (ALT 636 FOR OP/ED): Performed by: INTERNAL MEDICINE

## 2024-12-17 PROCEDURE — 96367 TX/PROPH/DG ADDL SEQ IV INF: CPT | Mod: INF

## 2024-12-17 PROCEDURE — 82565 ASSAY OF CREATININE: CPT

## 2024-12-17 PROCEDURE — 96417 CHEMO IV INFUS EACH ADDL SEQ: CPT

## 2024-12-17 RX ORDER — FAMOTIDINE 10 MG/ML
20 INJECTION INTRAVENOUS ONCE AS NEEDED
Status: DISCONTINUED | OUTPATIENT
Start: 2024-12-17 | End: 2024-12-17 | Stop reason: HOSPADM

## 2024-12-17 RX ORDER — PROCHLORPERAZINE MALEATE 5 MG
10 TABLET ORAL EVERY 6 HOURS PRN
Status: DISCONTINUED | OUTPATIENT
Start: 2024-12-17 | End: 2024-12-17 | Stop reason: HOSPADM

## 2024-12-17 RX ORDER — DIPHENHYDRAMINE HCL 25 MG
50 CAPSULE ORAL ONCE
Status: COMPLETED | OUTPATIENT
Start: 2024-12-17 | End: 2024-12-17

## 2024-12-17 RX ORDER — EPINEPHRINE 0.3 MG/.3ML
0.3 INJECTION SUBCUTANEOUS EVERY 5 MIN PRN
Status: DISCONTINUED | OUTPATIENT
Start: 2024-12-17 | End: 2024-12-17 | Stop reason: HOSPADM

## 2024-12-17 RX ORDER — DIPHENHYDRAMINE HYDROCHLORIDE 50 MG/ML
50 INJECTION INTRAMUSCULAR; INTRAVENOUS AS NEEDED
Status: DISCONTINUED | OUTPATIENT
Start: 2024-12-17 | End: 2024-12-17 | Stop reason: HOSPADM

## 2024-12-17 RX ORDER — FAMOTIDINE 10 MG/ML
20 INJECTION INTRAVENOUS ONCE
Status: COMPLETED | OUTPATIENT
Start: 2024-12-17 | End: 2024-12-17

## 2024-12-17 RX ORDER — PROCHLORPERAZINE EDISYLATE 5 MG/ML
10 INJECTION INTRAMUSCULAR; INTRAVENOUS EVERY 6 HOURS PRN
Status: DISCONTINUED | OUTPATIENT
Start: 2024-12-17 | End: 2024-12-17 | Stop reason: HOSPADM

## 2024-12-17 RX ORDER — ALBUTEROL SULFATE 0.83 MG/ML
3 SOLUTION RESPIRATORY (INHALATION) AS NEEDED
Status: DISCONTINUED | OUTPATIENT
Start: 2024-12-17 | End: 2024-12-17 | Stop reason: HOSPADM

## 2024-12-17 RX ORDER — DEXAMETHASONE IN 0.9 % SOD CHL 20 MG/50ML
20 INTRAVENOUS SOLUTION, PIGGYBACK (ML) INTRAVENOUS ONCE
Status: DISCONTINUED | OUTPATIENT
Start: 2024-12-17 | End: 2024-12-17

## 2024-12-17 RX ORDER — DEXAMETHASONE 4 MG/1
20 TABLET ORAL ONCE
Status: COMPLETED | OUTPATIENT
Start: 2024-12-17 | End: 2024-12-17

## 2024-12-17 RX ORDER — PALONOSETRON 0.05 MG/ML
0.25 INJECTION, SOLUTION INTRAVENOUS ONCE
Status: COMPLETED | OUTPATIENT
Start: 2024-12-17 | End: 2024-12-17

## 2024-12-17 ASSESSMENT — LIFESTYLE VARIABLES
AUDIT-C TOTAL SCORE: 0
HOW OFTEN DO YOU HAVE SIX OR MORE DRINKS ON ONE OCCASION: NEVER
SKIP TO QUESTIONS 9-10: 1
HOW MANY STANDARD DRINKS CONTAINING ALCOHOL DO YOU HAVE ON A TYPICAL DAY: PATIENT DOES NOT DRINK
HOW OFTEN DO YOU HAVE A DRINK CONTAINING ALCOHOL: NEVER

## 2024-12-17 ASSESSMENT — COLUMBIA-SUICIDE SEVERITY RATING SCALE - C-SSRS
6. HAVE YOU EVER DONE ANYTHING, STARTED TO DO ANYTHING, OR PREPARED TO DO ANYTHING TO END YOUR LIFE?: NO
1. IN THE PAST MONTH, HAVE YOU WISHED YOU WERE DEAD OR WISHED YOU COULD GO TO SLEEP AND NOT WAKE UP?: NO
2. HAVE YOU ACTUALLY HAD ANY THOUGHTS OF KILLING YOURSELF?: NO

## 2024-12-17 ASSESSMENT — PAIN SCALES - GENERAL: PAINLEVEL_OUTOF10: 0-NO PAIN

## 2024-12-17 NOTE — SIGNIFICANT EVENT
12/17/24 0901   Prechemo Checklist   Has the patient been in the hospital, ED, or urgent care since last date of service No   Chemo/Immuno Consent Completed and Signed Yes   Protocol/Indications Verified Yes   Confirmed to previous date/time of medication Yes   Compared to previous dose N/A   All medications are dated accurately Yes   Pregnancy Test Negative Not applicable   Parameters Met Yes   BSA/Weight-Height Verified Yes   Dose Calculations Verified (current, total, cumulative) Yes

## 2024-12-17 NOTE — PROGRESS NOTES
"December 17, 2024 at 10:49 AM    Patient has no known allergies.    Billie Hernadez is a 69 y.o. female   Blood pressure 132/73, pulse 105, temperature 36.8 °C (98.2 °F), temperature source Temporal, resp. rate 18, height 1.58 m (5' 2.21\"), weight 45.5 kg (100 lb 3.2 oz), SpO2 95%.  Body surface area is 1.41 meters squared.    Past Medical History:   Diagnosis Date    Chronic obstructive pulmonary disease with (acute) exacerbation (Multi) 08/08/2017    COPD exacerbation    Hypertension     Other specified health status 03/12/2015    No known problems    Personal history of other venous thrombosis and embolism 03/12/2015    History of deep venous thrombosis    Pneumonia        Encounter Diagnosis   Name Primary?    Malignant neoplasm of upper lobe of right lung (Multi)        Has the entire regimen been authorized by financial clearance (pre-certification)?  Yes     Prior to Admission medications    Medication Sig Start Date End Date Taking? Authorizing Provider   albuterol 90 mcg/actuation inhaler USE 1 TO 2 INHALATIONS BY  MOUTH EVERY 4 TO 6 HOURS AS NEEDED 12/18/23   MOR Holliday   alendronate (Fosamax) 70 mg tablet Take 1 tablet (70 mg) by mouth 1 (one) time per week. Take in the morning with a full glass of water, on an empty stomach, and do not take anything else by mouth or lie down for the next 30 min. 12/18/23   MOR Holliday   calcium citrate-vitamin D2 250 mg-2.5 mcg (100 unit) tablet Take 1 tablet by mouth 2 times a day.    Historical Provider, MD   dexAMETHasone (Decadron) 4 mg tablet Take 2 tablets (8 mg) by mouth once daily. For 3 days starting the day after treatment. 12/3/24   Judith Santillan MD   losartan (Cozaar) 25 mg tablet TAKE ONE TABLET BY MOUTH ONCE DAILY 1/22/24   MOR Holliday   multivitamin with minerals tablet Take 1 tablet by mouth once daily.    Historical Provider, MD   OLANZapine (ZyPREXA) 5 mg tablet Take 1 tablet (5 mg) by mouth once daily " at bedtime. For 4 days starting the evening of treatment. 12/3/24   Judith Santillan MD   ondansetron (Zofran) 8 mg tablet Take 1 tablet (8 mg) by mouth every 8 hours if needed for nausea or vomiting. 12/3/24   Judith Santillan MD   prochlorperazine (Compazine) 10 mg tablet Take 1 tablet (10 mg) by mouth every 6 hours if needed for nausea or vomiting. 12/3/24   Judith Santillan MD   Spiriva with HandiHaler 18 mcg inhalation capsule place ONE CAPSULE into inhaler AND inhale ONCE DAILY 6/24/24   KAREN Holliday-CNP       Reviewed documented list of home medications.  No significant drug interactions identified between home medications and chemotherapy regimen.    Yes    WBC   Date Value Ref Range Status   12/03/2024 9.8 4.4 - 11.3 x10*3/uL Final   11/18/2024 8.6 4.4 - 11.3 x10*3/uL Final   09/16/2024 7.3 4.4 - 11.3 x10*3/uL Final     Hemoglobin   Date Value Ref Range Status   12/03/2024 15.0 12.0 - 16.0 g/dL Final   11/18/2024 14.7 12.0 - 16.0 g/dL Final   09/16/2024 14.7 12.0 - 16.0 g/dL Final     Hematocrit   Date Value Ref Range Status   12/03/2024 47.3 (H) 36.0 - 46.0 % Final   11/18/2024 46.1 (H) 36.0 - 46.0 % Final   09/16/2024 45.3 36.0 - 46.0 % Final     Neutrophils Absolute   Date Value Ref Range Status   12/03/2024 7.09 (H) 1.60 - 5.50 x10*3/uL Final     Comment:     Percent differential counts (%) should be interpreted in the context of the absolute cell counts (cells/uL).   09/16/2024 4.74 1.20 - 7.70 x10*3/uL Final     Comment:     Percent differential counts (%) should be interpreted in the context of the absolute cell counts (cells/uL).   05/05/2023 6.37 1.20 - 7.70 x10E9/L Final   05/03/2023 CANCELED       Comment:     Result canceled by the ancillary.   04/19/2022 4.62 1.20 - 7.70 x10E9/L Final     Platelets   Date Value Ref Range Status   12/03/2024 398 150 - 450 x10*3/uL Final   11/18/2024 389 150 - 450 x10*3/uL Final   09/16/2024 315 150 - 450 x10*3/uL Final     Creatinine   Date Value  Ref Range Status   12/17/2024 0.52 0.50 - 1.05 mg/dL Final   12/03/2024 0.60 0.50 - 1.05 mg/dL Final   11/18/2024 0.52 0.50 - 1.05 mg/dL Final     Alkaline Phosphatase   Date Value Ref Range Status   12/03/2024 66 33 - 136 U/L Final   09/16/2024 57 33 - 136 U/L Final   05/03/2023 81 33 - 136 U/L Final   04/19/2022 56 33 - 136 U/L Final   02/02/2021 43 33 - 136 U/L Final     AST   Date Value Ref Range Status   12/03/2024 24 9 - 39 U/L Final   09/16/2024 18 9 - 39 U/L Final   05/03/2023 28 9 - 39 U/L Final   04/19/2022 20 9 - 39 U/L Final   02/02/2021 20 9 - 39 U/L Final     ALT   Date Value Ref Range Status   12/03/2024 16 7 - 45 U/L Final     Comment:     Patients treated with Sulfasalazine may generate falsely decreased results for ALT.   09/16/2024 12 7 - 45 U/L Final     Comment:     Patients treated with Sulfasalazine may generate falsely decreased results for ALT.     ALT (SGPT)   Date Value Ref Range Status   05/03/2023 20 7 - 45 U/L Final     Comment:      Patients treated with Sulfasalazine may generate    falsely decreased results for ALT.     04/19/2022 9 7 - 45 U/L Final     Comment:      Patients treated with Sulfasalazine may generate    falsely decreased results for ALT.     02/02/2021 13 7 - 45 U/L Final     Comment:      Patients treated with Sulfasalazine may generate    falsely decreased results for ALT.       Bilirubin, Total   Date Value Ref Range Status   12/03/2024 1.1 0.0 - 1.2 mg/dL Final   09/16/2024 0.6 0.0 - 1.2 mg/dL Final     Total Bilirubin   Date Value Ref Range Status   05/03/2023 0.4 0.0 - 1.2 mg/dL Final   04/19/2022 0.9 0.0 - 1.2 mg/dL Final   02/02/2021 0.7 0.0 - 1.2 mg/dL Final         Does the patient meet the treatment conditions?  Yes    ANC >= 1.5  Plt >= 100  AST/ALT <= 3 x ULN  Bili <= 1.5 x ULN  sCr <= 1.5 x ULN    Are dosage calculations correct?  Yes    Are doses the same as previous cycle?   Yes  NA, cycle 1  Were any doses modified?  No    If appropriate, was the  Creatinine Clearance independently calculated based on [facility] standards?   Yes      Comments:      I Axel White, PharmD hereby acknowledge that the treatment plan indicated above has been verified for correctness to the best of my ability on 12/17/2024 at 10:49 AM.

## 2024-12-17 NOTE — SIGNIFICANT EVENT

## 2024-12-19 ENCOUNTER — TELEPHONE (OUTPATIENT)
Dept: HEMATOLOGY/ONCOLOGY | Facility: HOSPITAL | Age: 69
End: 2024-12-19
Payer: MEDICARE

## 2024-12-19 ENCOUNTER — APPOINTMENT (OUTPATIENT)
Dept: RADIATION ONCOLOGY | Facility: CLINIC | Age: 69
End: 2024-12-19
Payer: MEDICARE

## 2024-12-19 NOTE — TELEPHONE ENCOUNTER
Reached out to patient to make her aware of her infusion time for 1/7/2024. Patient will come in and get prepped for chemo @ 8:30 then see provider Dr. Santillan @ 9AM. Patient verbalized and agreed to appointment time. Patient will get labs done the day prior to her Chemo.

## 2024-12-19 NOTE — H&P
History Of Present Illness  Billie Hernadez is a 69 y.o. female presenting with history of RUL lung cancer. Pet scan identified FDG uptake in right supraclavicular and right hilar lymph nodes. CT showed nodule right adrenal gland. Concerns for metastasis. Here for supraclavicular lymph node biopsy. Currently on chemoimmunotherapy treatment for malignant right lung caner. Last treatment 2 weeks ago. Next treatment scheduled next week. PMH includes  lung cancer, HTN, HLD, COPD,         Past Medical History:  Past Medical History:   Diagnosis Date    Chronic obstructive pulmonary disease with (acute) exacerbation (Multi) 08/08/2017    COPD exacerbation    Hypertension     Other specified health status 03/12/2015    No known problems    Personal history of other venous thrombosis and embolism 03/12/2015    History of deep venous thrombosis    Pneumonia         Past Surgical History:  Past Surgical History:   Procedure Laterality Date    EXCISION / BIOPSY BREAST / NIPPLE / DUCT Left 05/28/2014    OTHER SURGICAL HISTORY  03/12/2015    Open Treatment Of Tibial Shaft Fracture With Implant    TONSILLECTOMY            Social History:   reports that she has been smoking cigarettes. She started smoking about 52 years ago. She has a 26 pack-year smoking history. She has never used smokeless tobacco. She reports current alcohol use of about 2.0 standard drinks of alcohol per week. She reports that she does not use drugs.     Family History:  No family history on file.     Allergies:  No Known Allergies     Home Medications:  Current Outpatient Medications   Medication Instructions    albuterol 90 mcg/actuation inhaler USE 1 TO 2 INHALATIONS BY  MOUTH EVERY 4 TO 6 HOURS AS NEEDED    alendronate (FOSAMAX) 70 mg, oral, Once Weekly, Take in the morning with a full glass of water, on an empty stomach, and do not take anything else by mouth or lie down for the next 30 min.    calcium citrate-vitamin D2 250 mg-2.5 mcg (100 unit) tablet 1  tablet, 2 times daily    dexAMETHasone (DECADRON) 8 mg, oral, Daily, For 3 days starting the day after treatment.    losartan (COZAAR) 25 mg, oral, Daily    multivitamin with minerals tablet 1 tablet, Daily    OLANZapine (ZYPREXA) 5 mg, oral, Nightly, For 4 days starting the evening of treatment.    ondansetron (ZOFRAN) 8 mg, oral, Every 8 hours PRN    prochlorperazine (COMPAZINE) 10 mg, oral, Every 6 hours PRN    Spiriva with HandiHaler 18 mcg inhalation capsule place ONE CAPSULE into inhaler AND inhale ONCE DAILY       Inpatient Medications:            Review of Systems   Constitutional:  Positive for fatigue. Negative for fever.   HENT: Negative.     Eyes: Negative.    Respiratory:  Positive for cough (frequent moist cough) and shortness of breath (CADET).    Cardiovascular:  Positive for leg swelling.   Gastrointestinal: Negative.    Endocrine: Negative.    Genitourinary: Negative.    Musculoskeletal: Negative.    Skin: Negative.    Allergic/Immunologic: Negative.    Neurological: Negative.    Hematological: Negative.    Psychiatric/Behavioral: Negative.            Physical Exam  Constitutional:       General: She is awake. She is not in acute distress.     Appearance: She is not ill-appearing.   HENT:      Head: Normocephalic and atraumatic.      Mouth/Throat:      Mouth: Mucous membranes are moist.      Pharynx: Oropharynx is clear.   Cardiovascular:      Rate and Rhythm: Normal rate and regular rhythm.      Pulses:           Radial pulses are 2+ on the right side and 2+ on the left side.        Dorsalis pedis pulses are 2+ on the right side and 2+ on the left side.      Heart sounds: Normal heart sounds. No murmur heard.  Pulmonary:      Effort: Pulmonary effort is normal.      Breath sounds: Normal breath sounds.   Abdominal:      General: Bowel sounds are normal.      Palpations: Abdomen is soft.   Musculoskeletal:      Cervical back: Normal range of motion and neck supple.      Right lower leg: Edema  present.      Left lower leg: Edema present.      Comments: Trace bilat ankle edema   Skin:     General: Skin is warm and dry.      Capillary Refill: Capillary refill takes less than 2 seconds.   Neurological:      General: No focal deficit present.      Mental Status: She is alert and oriented to person, place, and time. Mental status is at baseline.      Cranial Nerves: Cranial nerves 2-12 are intact.   Psychiatric:         Mood and Affect: Mood and affect normal.         Behavior: Behavior normal.        Sedation Plan    ASA 3     Mallampati class: II.           NPO since 2200 on 12/30/2024    Last Recorded Vitals  Blood pressure 114/62, pulse 110, temperature 36.6 °C (97.9 °F), temperature source Temporal, resp. rate 18, SpO2 98%.         Vitals from the Past 24 Hours  Heart Rate:  [110]   Temp:  [36.6 °C (97.9 °F)]   Resp:  [18]   BP: (114)/(62)   SpO2:  [91 %-98 %]          Relevant Results    Labs    POCT Glucose:      POCT INR:   Results from last 7 days   Lab Units 12/31/24  0922   POCT INTERNATIONAL NORMALIZATION RATIO  1.0     POCT Urine Pregnancy:       CBC:   Recent Labs     12/03/24  1220 11/18/24  0827 09/16/24  1047 05/05/23  1314 05/03/23  1022 04/19/22  0911   WBC 9.8 8.6 7.3 10.5 CANCELED 8.4   HGB 15.0 14.7 14.7 13.4 CANCELED 15.0   HCT 47.3* 46.1* 45.3 41.7 CANCELED 46.8*    389 315 412 CANCELED 318   MCV 99 98 96 101* CANCELED 105*     BMP/CMP:   Recent Labs     12/17/24  0843 12/03/24  1220 11/18/24  0827 09/16/24  1047 05/03/23  1022 04/19/22  0911 02/02/21  0939 07/30/18  0854   NA  --  136 136 135* 136 136 138 137   K  --  4.9 4.6 4.8 4.5 3.8 4.0 3.9   CL  --  94* 94* 95* 100 97* 99 98   BUN  --  10 7 12 8 16 10 6   CREATININE 0.52 0.60 0.52 0.52 0.53 0.70 0.61 0.51   CO2  --  34* 35* 35* 29 32 34* 35*   CALCIUM  --  9.9 9.3 9.5 9.3 10.2 9.2 9.4   PROT  --  7.9  --  7.0 7.3 7.7 7.0 6.4   BILITOT  --  1.1  --  0.6 0.4 0.9 0.7 0.8   ALKPHOS  --  66  --  57 81 56 43 42   ALT  --  16  --  " 12 20 9 13 14   AST  --  24  --  18 28 20 20 21   GLUCOSE  --  77 97 98 87 109* 108* 101*      Magnesium: No results for input(s): \"MG\" in the last 58290 hours.  Lipid Panel:   Recent Labs     09/16/24  1047 05/03/23  1022 04/19/22  0911 02/02/21  0939 07/24/20  1051 07/30/18  0854   CHOL 171 146 195 181 203* 167   HDL 71.7 63.3 78.7 80.4 79.1 86.3   CHHDL 2.4 2.3 2.5 2.3 2.6 1.9   VLDL 13 11 18 17 16 20   TRIG 65 56 92 87 79 101   NHDL 99  --   --   --   --   --      Cardiac       No lab exists for component: \"CK\", \"CKMBP\"   Hemoglobin A1C: No results for input(s): \"HGBA1C\" in the last 34268 hours.  TSH/ Free T4:   Recent Labs     12/03/24  1220   TSH 0.34*   FREET4 1.07         STUDY:  CT ABDOMEN W IV CONTRAST;  12/5/2024 1:18 pm      INDICATION:  Signs/Symptoms:Mildly hypermetabolic adrenal lesion, diagnosis of  non-small cell lung cancer, assess adrenal lesion.      ,C34.11 Malignant neoplasm of upper lobe, right bronchus or lung      COMPARISON:  No prior abdomen CT multiple prior chest CTs.      ACCESSION NUMBER(S):  [id number]      ORDERING CLINICIAN:  LOULOU GRAVES      TECHNIQUE:  CT of the abdomen was performed.  Contiguous axial images were  obtained at 3 mm slice thickness through the abdomen. Coronal and  sagittal reconstructions at 3 mm slice thickness were performed.   68  ML of Omnipaque 350 was administered intravenously without immediate  complication.      FINDINGS:  LOWER CHEST:  Centrilobular emphysema. Some mucous plugging in right lower lobe  bronchus posterior basal segment. Cardiomegaly with marked  enlargement of the right atrium.      ABDOMEN:      LIVER:  Liver: Within normal limits.      BILE DUCTS:  Bile ducts: Normal caliber.      GALLBLADDER:  Gallbladder: No calcified stones. No wall thickening.      PANCREAS:  The pancreas appears unremarkable.      SPLEEN:  Within normal limits.      ADRENAL GLANDS:  The right adrenal gland appears normal. There is nodular enlargement  of the " left adrenal gland. This measures approximately 14 mm in  diameter similar to prior but recent chest CTs. Its Hounsfield unit  measurement on this exam is 60. Nonspecific Hounsfield unit  measurements of 15 on unenhanced exam is CT suggesting some  enhancement of this lesion. The findings on this exam are nonspecific  and require a dedicated adrenal study either adrenal washout protocol  CT or adrenal MRI.      KIDNEYS AND URETERS:  The kidneys have normal size and enhance symmetrically. No  hydroureteronephrosis or nephroureterolithiasis is present.      BOWEL:  The stomach appears normal. There is mild diffuse dilation of  multiple small bowel loops with a few gas fluid levels. This is  nonspecific. There could be a degree of obstipation is there is a  large amount of stool retained stool in visualized segments of the  colon. The appendix is not identified.      VESSELS:  There are heavy atherosclerotic calcification of the abdominal aorta.  This includes remarkably prominent plaque in the right and anterior  upper abdominal aorta extending into the lumen. This likely results  in considerable stenosis of the right renal artery and possibly more  mild stenosis of the superior mesenteric artery. All if symptoms  warrant, CT angiogram would better characterize.      PERITONEUM/RETROPERITONEUM/LYMPH NODES:  No ascites or free air, no fluid collection.  No abdominopelvic  lymphadenopathy is present.      ABDOMINAL WALL:  Visualized portions are intact.      BONES:  No suspicious osseous lesions are identified.      IMPRESSION:  1.  There is a nonspecific nodule in the anterior limb of the right  adrenal gland. In light of its activity on and FDG PET scanning,  further evaluation with dedicated washout protocol adrenal CT or  adrenal MRI is recommended in light of the lack of more remote  comparative exams.  2. Considerable atherosclerotic plaque in the upper abdominal aorta  as detailed above. This may warrant further  evaluation with CT  angiogram of symptoms warrant.  3. There is diffuse dilation of visualized portions of the small  bowel and considerable retained stool in the colon in the visualized  segments. This could represent a degree of obstipation associated  constipation.      MACRO:  None      Signed by: Joseph Schoenberger 12/7/2024 4:30 PM  Dictation workstation:   [id number]        STUDY:  NM PET CT LUNG CA  INITIAL DIAGNOSIS;  11/6/2024 12:01 pm      INDICATION:  Signs/Symptoms:Abnormal lung cancer screening CT.      COMPARISON:  CT lung screening dated 10/08/2024.      ACCESSION NUMBER(S):  [id number]      ORDERING CLINICIAN:  MARGRET SCHAEFER      TECHNIQUE:  DIVISION OF NUCLEAR MEDICINE  POSITRON EMISSION TOMOGRAPHY (PET-CT)      The patient received an intravenous dose of 13.0 mCi of Fluorine-18  fluorodeoxyglucose (FDG).  Positron emission tomographic (PET) images  from mid thigh to skull base were then acquired after a one hour  delay. Also acquired was a contemporaneous low dose non-contrast CT  scan performed for attenuation correction of PET images and anatomic  localization.  The PET and CT images were digitally fused for  display.  All images were acquired on a combined PET-CT scanner unit.  Some areas of FDG accumulation may be described in standardized  uptake value (SUV) units.      CODING:  Initial Treatment Strategy (PI)      CALIBRATION:  Dose Injection-to-Scan Interval (mins): 53 min  Mediastinal bloodpool SUV (normal 1.5-2.5): 1.9  Blood glucose: 115 mg/dL      FINDINGS:  HEAD AND NECK:  *No evidence of focal FDG avid lesion in the partially visualized  brain parenchyma, noting that evaluation is limited because of the  expected physiologic diffuse FDG uptake in the brain. *No FDG avid  cervical lymphadenopathy is present. * No paranasal sinus diease.  *FDG avid right supraclavicular lymph node with maximum SUV 6.4.          CHEST:  *FDG uptake with maximum SUV 9.1 within the irregular right  "upper  lobe nodule as seen on recent CT chest dated 10/08/2024. *The 5 mm  right lower lobe and 2 mm subpleural left lower lobe nodules do not  show FDG uptake. *Multiple FDG avid mediastinal and hilar lymph  nodes. For example: Right upper paratracheal (maximum SUV 3.3), right  lower paratracheal (maximum SUV 5.9), carinal (maximum SUV 6.9),  right hilar (maximum SUV 7.0), subcarinal (maximum SUV 5.0). *Mild  FDG uptake with maximum SUV 4.3 within aortopulmonary lymph node.          ABDOMEN AND PELVIS:  *No evidence of FDG avid abdominal or pelvic lymphadenopathy.  *Mild FDG uptake within left adrenal gland with maximum SUV 2.9,  slightly more than liver (maximum SUV 2.3) *Physiologic radiotracer  uptake is present in the liver and spleen with excretion into the  bowel loops and the genitourinary tract.          MUSCULOSKELETAL:  *No focal FDG avid  lesion is seen in the axial or appendicular to  suggest osseous metastasis.          IMPRESSION:  1. FDG avid right upper lobe nodule, concerning for neoplastic  process.  2. FDG avid right supraclavicular mediastinal and right hilar lymph  nodes as described, concerning for carrie metastasis.  3. FDG avid aortopulmonary lymph node. Finding is also concerning for  malignant involvement.  4. Mildly FDG avid left adrenal nodule (greater than liver) is  nonspecific, but but metastatic involvement cannot be excluded.  5. No FDG avid bone metastasis.      I personally reviewed the images/study and I agree with the findings  as stated by Gabriel Moscoso MD.  This study was interpreted at  [facility], [place], [place].      Signed by: Jarrod Tolentino 11/6/2024 12:54 PM  Dictation workstation:   [id number]              Past Cardiology Tests (Last 3 Years):    EKG:  No results for input(s): \"ATRRATE\", \"VENTRATE\", \"PRINT\", \"QRSDUR\", \"QTCFRED\", \"QTCCALCB\" in the last 91386 hours.No results found for this or any previous visit (from the past 4464 " "hours).  Echo:  Echocardiogram: No results found for this or any previous visit from the past 1800 days.    Ejection Fractions:  No results found for: \"EF\"  Cath:  Coronary Angiography: No results found for this or any previous visit from the past 1800 days.    Right Heart Cath: No results found for this or any previous visit from the past 1800 days.    Stress Test:  Nuclear:No results found for this or any previous visit from the past 1800 days.    Metabolic Stress: No results found for this or any previous visit from the past 1800 days.    Cardiac Imaging:  Cardiac Scoring: No results found for this or any previous visit from the past 1800 days.    Cardiac MRI: No results found for this or any previous visit from the past 1800 days.         Assessment/Plan  Assessment/Plan   Assessment & Plan  Malignant neoplasm of upper lobe of right lung (Multi)      Right lung cancer  Concerns for metastasis to  right supraclavicular and right hilar lymph nodes     Here for ultrasound guided lymph node biopsy with Dr. Vega on 12/31/2024.          I spent 35 minutes in the professional and overall care of this patient.      Asia Gasca, APRN-CNP    "

## 2024-12-23 ENCOUNTER — TELEPHONE (OUTPATIENT)
Dept: HEMATOLOGY/ONCOLOGY | Facility: HOSPITAL | Age: 69
End: 2024-12-23
Payer: MEDICARE

## 2024-12-23 NOTE — TELEPHONE ENCOUNTER
First dose follow up, 12/17/20 Nivolumab, Paclitaxel, Carboplatin. Pt reports zero side effects on days 1-3 after chemotherapy. Stated she experienced fatigue for a few days and it is currently improving. No other side effects except normal (per chemotherapy) constipation resolved with usual OTC medications. Confirmed the next treatment date. No questions at this time.

## 2024-12-26 ENCOUNTER — HOSPITAL ENCOUNTER (OUTPATIENT)
Dept: RADIOLOGY | Facility: HOSPITAL | Age: 69
Discharge: HOME | End: 2024-12-26
Payer: MEDICARE

## 2024-12-26 DIAGNOSIS — E27.9 LESION OF ADRENAL GLAND (MULTI): ICD-10-CM

## 2024-12-26 PROCEDURE — 74176 CT ABD & PELVIS W/O CONTRAST: CPT | Mod: 52

## 2024-12-30 ENCOUNTER — TELEPHONE (OUTPATIENT)
Dept: RADIATION ONCOLOGY | Facility: CLINIC | Age: 69
End: 2024-12-30
Payer: MEDICARE

## 2024-12-31 ENCOUNTER — APPOINTMENT (OUTPATIENT)
Dept: RADIOLOGY | Facility: HOSPITAL | Age: 69
DRG: 190 | End: 2024-12-31
Payer: MEDICARE

## 2024-12-31 ENCOUNTER — APPOINTMENT (OUTPATIENT)
Dept: CARDIOLOGY | Facility: HOSPITAL | Age: 69
DRG: 190 | End: 2024-12-31
Payer: MEDICARE

## 2024-12-31 ENCOUNTER — HOSPITAL ENCOUNTER (INPATIENT)
Facility: HOSPITAL | Age: 69
LOS: 5 days | Discharge: HOME | DRG: 190 | End: 2025-01-05
Attending: STUDENT IN AN ORGANIZED HEALTH CARE EDUCATION/TRAINING PROGRAM | Admitting: INTERNAL MEDICINE
Payer: MEDICARE

## 2024-12-31 ENCOUNTER — HOSPITAL ENCOUNTER (OUTPATIENT)
Dept: RADIOLOGY | Facility: HOSPITAL | Age: 69
Discharge: HOME | End: 2024-12-31
Payer: MEDICARE

## 2024-12-31 VITALS
TEMPERATURE: 97.9 F | SYSTOLIC BLOOD PRESSURE: 110 MMHG | RESPIRATION RATE: 20 BRPM | OXYGEN SATURATION: 93 % | DIASTOLIC BLOOD PRESSURE: 55 MMHG | HEART RATE: 90 BPM

## 2024-12-31 DIAGNOSIS — J44.1 COPD EXACERBATION (MULTI): Primary | ICD-10-CM

## 2024-12-31 DIAGNOSIS — J96.01 ACUTE RESPIRATORY FAILURE WITH HYPOXIA (MULTI): ICD-10-CM

## 2024-12-31 DIAGNOSIS — J96.01 ACUTE HYPOXEMIC RESPIRATORY FAILURE (MULTI): ICD-10-CM

## 2024-12-31 DIAGNOSIS — I50.20 UNSPECIFIED SYSTOLIC (CONGESTIVE) HEART FAILURE: ICD-10-CM

## 2024-12-31 DIAGNOSIS — C34.11 MALIGNANT NEOPLASM OF UPPER LOBE OF RIGHT LUNG (MULTI): ICD-10-CM

## 2024-12-31 PROBLEM — J18.9 PNEUMONIA DUE TO INFECTIOUS ORGANISM: Status: ACTIVE | Noted: 2024-12-31

## 2024-12-31 LAB
ALBUMIN SERPL BCP-MCNC: 3.6 G/DL (ref 3.4–5)
ALP SERPL-CCNC: 67 U/L (ref 33–136)
ALT SERPL W P-5'-P-CCNC: 15 U/L (ref 7–45)
ANION GAP BLDA CALCULATED.4IONS-SCNC: 7 MMO/L (ref 10–25)
ANION GAP SERPL CALCULATED.3IONS-SCNC: 11 MMOL/L (ref 10–20)
APPARATUS: ABNORMAL
ARTERIAL PATENCY WRIST A: POSITIVE
AST SERPL W P-5'-P-CCNC: 21 U/L (ref 9–39)
BASE EXCESS BLDA CALC-SCNC: 6.4 MMOL/L (ref -2–3)
BASOPHILS # BLD AUTO: 0.04 X10*3/UL (ref 0–0.1)
BASOPHILS NFR BLD AUTO: 0.7 %
BILIRUB SERPL-MCNC: 0.4 MG/DL (ref 0–1.2)
BNP SERPL-MCNC: 134 PG/ML (ref 0–99)
BODY TEMPERATURE: 37 DEGREES CELSIUS
BUN SERPL-MCNC: 5 MG/DL (ref 6–23)
CA-I BLDA-SCNC: 1.17 MMOL/L (ref 1.1–1.33)
CALCIUM SERPL-MCNC: 8.3 MG/DL (ref 8.6–10.3)
CHLORIDE BLDA-SCNC: 92 MMOL/L (ref 98–107)
CHLORIDE SERPL-SCNC: 93 MMOL/L (ref 98–107)
CO2 SERPL-SCNC: 31 MMOL/L (ref 21–32)
CREAT SERPL-MCNC: 0.45 MG/DL (ref 0.5–1.05)
EGFRCR SERPLBLD CKD-EPI 2021: >90 ML/MIN/1.73M*2
EOSINOPHIL # BLD AUTO: 0.06 X10*3/UL (ref 0–0.4)
EOSINOPHIL NFR BLD AUTO: 1.1 %
ERYTHROCYTE [DISTWIDTH] IN BLOOD BY AUTOMATED COUNT: 13.9 % (ref 11.5–14.5)
FLOW: 6 LPM
FLUAV RNA RESP QL NAA+PROBE: NOT DETECTED
FLUBV RNA RESP QL NAA+PROBE: NOT DETECTED
GLUCOSE BLDA-MCNC: 122 MG/DL (ref 74–99)
GLUCOSE SERPL-MCNC: 93 MG/DL (ref 74–99)
HCO3 BLDA-SCNC: 34 MMOL/L (ref 22–26)
HCT VFR BLD AUTO: 38.7 % (ref 36–46)
HCT VFR BLD EST: 36 % (ref 36–46)
HGB BLD-MCNC: 12.7 G/DL (ref 12–16)
HGB BLDA-MCNC: 12.1 G/DL (ref 12–16)
IMM GRANULOCYTES # BLD AUTO: 0.02 X10*3/UL (ref 0–0.5)
IMM GRANULOCYTES NFR BLD AUTO: 0.4 % (ref 0–0.9)
INHALED O2 CONCENTRATION: 44 %
LACTATE BLDA-SCNC: 0.5 MMOL/L (ref 0.4–2)
LACTATE SERPL-SCNC: 0.8 MMOL/L (ref 0.4–2)
LYMPHOCYTES # BLD AUTO: 1.66 X10*3/UL (ref 0.8–3)
LYMPHOCYTES NFR BLD AUTO: 31 %
MCH RBC QN AUTO: 32.2 PG (ref 26–34)
MCHC RBC AUTO-ENTMCNC: 32.8 G/DL (ref 32–36)
MCV RBC AUTO: 98 FL (ref 80–100)
MONOCYTES # BLD AUTO: 0.63 X10*3/UL (ref 0.05–0.8)
MONOCYTES NFR BLD AUTO: 11.8 %
NEUTROPHILS # BLD AUTO: 2.94 X10*3/UL (ref 1.6–5.5)
NEUTROPHILS NFR BLD AUTO: 55 %
NRBC BLD-RTO: 0 /100 WBCS (ref 0–0)
OXYHGB MFR BLDA: 90.7 % (ref 94–98)
PCO2 BLDA: 63 MM HG (ref 38–42)
PH BLDA: 7.34 PH (ref 7.38–7.42)
PLATELET # BLD AUTO: 307 X10*3/UL (ref 150–450)
PO2 BLDA: 74 MM HG (ref 85–95)
POCT INTERNATIONAL NORMALIZATION RATIO: 1
POCT PROTHROMBIN TIME: 12 SECONDS
POTASSIUM BLDA-SCNC: 3.9 MMOL/L (ref 3.5–5.3)
POTASSIUM SERPL-SCNC: 4.4 MMOL/L (ref 3.5–5.3)
PROT SERPL-MCNC: 6.4 G/DL (ref 6.4–8.2)
RBC # BLD AUTO: 3.95 X10*6/UL (ref 4–5.2)
RSV RNA RESP QL NAA+PROBE: NOT DETECTED
SAO2 % BLDA: 96 % (ref 94–100)
SARS-COV-2 RNA RESP QL NAA+PROBE: NOT DETECTED
SODIUM BLDA-SCNC: 129 MMOL/L (ref 136–145)
SODIUM SERPL-SCNC: 131 MMOL/L (ref 136–145)
SPECIMEN DRAWN FROM PATIENT: ABNORMAL
WBC # BLD AUTO: 5.4 X10*3/UL (ref 4.4–11.3)

## 2024-12-31 PROCEDURE — 87637 SARSCOV2&INF A&B&RSV AMP PRB: CPT | Performed by: STUDENT IN AN ORGANIZED HEALTH CARE EDUCATION/TRAINING PROGRAM

## 2024-12-31 PROCEDURE — 2500000004 HC RX 250 GENERAL PHARMACY W/ HCPCS (ALT 636 FOR OP/ED): Performed by: STUDENT IN AN ORGANIZED HEALTH CARE EDUCATION/TRAINING PROGRAM

## 2024-12-31 PROCEDURE — 2500000002 HC RX 250 W HCPCS SELF ADMINISTERED DRUGS (ALT 637 FOR MEDICARE OP, ALT 636 FOR OP/ED): Performed by: NURSE PRACTITIONER

## 2024-12-31 PROCEDURE — 38505 NEEDLE BIOPSY LYMPH NODES: CPT

## 2024-12-31 PROCEDURE — 99223 1ST HOSP IP/OBS HIGH 75: CPT | Performed by: INTERNAL MEDICINE

## 2024-12-31 PROCEDURE — 99223 1ST HOSP IP/OBS HIGH 75: CPT | Performed by: NURSE PRACTITIONER

## 2024-12-31 PROCEDURE — 96361 HYDRATE IV INFUSION ADD-ON: CPT

## 2024-12-31 PROCEDURE — 71045 X-RAY EXAM CHEST 1 VIEW: CPT

## 2024-12-31 PROCEDURE — 36600 WITHDRAWAL OF ARTERIAL BLOOD: CPT

## 2024-12-31 PROCEDURE — 71275 CT ANGIOGRAPHY CHEST: CPT

## 2024-12-31 PROCEDURE — 36415 COLL VENOUS BLD VENIPUNCTURE: CPT | Performed by: STUDENT IN AN ORGANIZED HEALTH CARE EDUCATION/TRAINING PROGRAM

## 2024-12-31 PROCEDURE — 83880 ASSAY OF NATRIURETIC PEPTIDE: CPT | Performed by: STUDENT IN AN ORGANIZED HEALTH CARE EDUCATION/TRAINING PROGRAM

## 2024-12-31 PROCEDURE — 2500000004 HC RX 250 GENERAL PHARMACY W/ HCPCS (ALT 636 FOR OP/ED): Performed by: RADIOLOGY

## 2024-12-31 PROCEDURE — 2500000001 HC RX 250 WO HCPCS SELF ADMINISTERED DRUGS (ALT 637 FOR MEDICARE OP): Performed by: NURSE PRACTITIONER

## 2024-12-31 PROCEDURE — 2500000002 HC RX 250 W HCPCS SELF ADMINISTERED DRUGS (ALT 637 FOR MEDICARE OP, ALT 636 FOR OP/ED)

## 2024-12-31 PROCEDURE — 2060000001 HC INTERMEDIATE ICU ROOM DAILY

## 2024-12-31 PROCEDURE — 87040 BLOOD CULTURE FOR BACTERIA: CPT | Mod: WESLAB | Performed by: STUDENT IN AN ORGANIZED HEALTH CARE EDUCATION/TRAINING PROGRAM

## 2024-12-31 PROCEDURE — 2500000004 HC RX 250 GENERAL PHARMACY W/ HCPCS (ALT 636 FOR OP/ED): Performed by: NURSE PRACTITIONER

## 2024-12-31 PROCEDURE — 71045 X-RAY EXAM CHEST 1 VIEW: CPT | Performed by: RADIOLOGY

## 2024-12-31 PROCEDURE — 82435 ASSAY OF BLOOD CHLORIDE: CPT | Performed by: STUDENT IN AN ORGANIZED HEALTH CARE EDUCATION/TRAINING PROGRAM

## 2024-12-31 PROCEDURE — 71045 X-RAY EXAM CHEST 1 VIEW: CPT | Performed by: STUDENT IN AN ORGANIZED HEALTH CARE EDUCATION/TRAINING PROGRAM

## 2024-12-31 PROCEDURE — 94640 AIRWAY INHALATION TREATMENT: CPT

## 2024-12-31 PROCEDURE — 85025 COMPLETE CBC W/AUTO DIFF WBC: CPT | Performed by: STUDENT IN AN ORGANIZED HEALTH CARE EDUCATION/TRAINING PROGRAM

## 2024-12-31 PROCEDURE — 2500000002 HC RX 250 W HCPCS SELF ADMINISTERED DRUGS (ALT 637 FOR MEDICARE OP, ALT 636 FOR OP/ED): Performed by: STUDENT IN AN ORGANIZED HEALTH CARE EDUCATION/TRAINING PROGRAM

## 2024-12-31 PROCEDURE — 99291 CRITICAL CARE FIRST HOUR: CPT

## 2024-12-31 PROCEDURE — 71275 CT ANGIOGRAPHY CHEST: CPT | Performed by: RADIOLOGY

## 2024-12-31 PROCEDURE — 2550000001 HC RX 255 CONTRASTS

## 2024-12-31 PROCEDURE — 96374 THER/PROPH/DIAG INJ IV PUSH: CPT

## 2024-12-31 PROCEDURE — 83605 ASSAY OF LACTIC ACID: CPT | Performed by: STUDENT IN AN ORGANIZED HEALTH CARE EDUCATION/TRAINING PROGRAM

## 2024-12-31 PROCEDURE — 84075 ASSAY ALKALINE PHOSPHATASE: CPT | Performed by: STUDENT IN AN ORGANIZED HEALTH CARE EDUCATION/TRAINING PROGRAM

## 2024-12-31 PROCEDURE — 93005 ELECTROCARDIOGRAM TRACING: CPT

## 2024-12-31 RX ORDER — ALBUTEROL SULFATE 0.83 MG/ML
2.5 SOLUTION RESPIRATORY (INHALATION) EVERY 6 HOURS PRN
Status: DISCONTINUED | OUTPATIENT
Start: 2024-12-31 | End: 2025-01-01 | Stop reason: HOSPADM

## 2024-12-31 RX ORDER — ONDANSETRON 4 MG/1
4 TABLET, FILM COATED ORAL EVERY 8 HOURS PRN
Status: DISCONTINUED | OUTPATIENT
Start: 2024-12-31 | End: 2025-01-05 | Stop reason: HOSPADM

## 2024-12-31 RX ORDER — BUDESONIDE 0.5 MG/2ML
0.5 INHALANT ORAL
Status: DISCONTINUED | OUTPATIENT
Start: 2024-12-31 | End: 2025-01-01

## 2024-12-31 RX ORDER — IPRATROPIUM BROMIDE AND ALBUTEROL SULFATE 2.5; .5 MG/3ML; MG/3ML
3 SOLUTION RESPIRATORY (INHALATION) ONCE
Status: COMPLETED | OUTPATIENT
Start: 2024-12-31 | End: 2024-12-31

## 2024-12-31 RX ORDER — POLYETHYLENE GLYCOL 3350 17 G/17G
17 POWDER, FOR SOLUTION ORAL DAILY
Status: DISCONTINUED | OUTPATIENT
Start: 2024-12-31 | End: 2025-01-05 | Stop reason: HOSPADM

## 2024-12-31 RX ORDER — ONDANSETRON HYDROCHLORIDE 2 MG/ML
4 INJECTION, SOLUTION INTRAVENOUS EVERY 8 HOURS PRN
Status: DISCONTINUED | OUTPATIENT
Start: 2024-12-31 | End: 2025-01-05 | Stop reason: HOSPADM

## 2024-12-31 RX ORDER — BISACODYL 10 MG/1
10 SUPPOSITORY RECTAL DAILY PRN
Status: DISCONTINUED | OUTPATIENT
Start: 2024-12-31 | End: 2025-01-05 | Stop reason: HOSPADM

## 2024-12-31 RX ORDER — LIDOCAINE HYDROCHLORIDE 10 MG/ML
INJECTION, SOLUTION EPIDURAL; INFILTRATION; INTRACAUDAL; PERINEURAL
Status: COMPLETED | OUTPATIENT
Start: 2024-12-31 | End: 2024-12-31

## 2024-12-31 RX ORDER — IPRATROPIUM BROMIDE AND ALBUTEROL SULFATE 2.5; .5 MG/3ML; MG/3ML
3 SOLUTION RESPIRATORY (INHALATION) EVERY 2 HOUR PRN
Status: DISCONTINUED | OUTPATIENT
Start: 2024-12-31 | End: 2025-01-05 | Stop reason: HOSPADM

## 2024-12-31 RX ORDER — DOCUSATE SODIUM 100 MG/1
100 CAPSULE, LIQUID FILLED ORAL 2 TIMES DAILY
Status: DISCONTINUED | OUTPATIENT
Start: 2024-12-31 | End: 2025-01-05 | Stop reason: HOSPADM

## 2024-12-31 RX ORDER — ENOXAPARIN SODIUM 100 MG/ML
40 INJECTION SUBCUTANEOUS NIGHTLY
Status: DISCONTINUED | OUTPATIENT
Start: 2024-12-31 | End: 2025-01-05 | Stop reason: HOSPADM

## 2024-12-31 RX ORDER — INSULIN LISPRO 100 [IU]/ML
0-5 INJECTION, SOLUTION INTRAVENOUS; SUBCUTANEOUS
Status: DISCONTINUED | OUTPATIENT
Start: 2025-01-01 | End: 2025-01-05 | Stop reason: HOSPADM

## 2024-12-31 RX ORDER — ADHESIVE BANDAGE
30 BANDAGE TOPICAL DAILY PRN
Status: DISCONTINUED | OUTPATIENT
Start: 2024-12-31 | End: 2025-01-05 | Stop reason: HOSPADM

## 2024-12-31 RX ORDER — LOSARTAN POTASSIUM 25 MG/1
25 TABLET ORAL DAILY
Status: DISCONTINUED | OUTPATIENT
Start: 2025-01-01 | End: 2025-01-05 | Stop reason: HOSPADM

## 2024-12-31 RX ORDER — GUAIFENESIN 600 MG/1
600 TABLET, EXTENDED RELEASE ORAL 2 TIMES DAILY
Status: DISCONTINUED | OUTPATIENT
Start: 2024-12-31 | End: 2025-01-05 | Stop reason: HOSPADM

## 2024-12-31 RX ORDER — DEXTROSE 50 % IN WATER (D50W) INTRAVENOUS SYRINGE
12.5
Status: DISCONTINUED | OUTPATIENT
Start: 2024-12-31 | End: 2025-01-05 | Stop reason: HOSPADM

## 2024-12-31 RX ORDER — IPRATROPIUM BROMIDE AND ALBUTEROL SULFATE 2.5; .5 MG/3ML; MG/3ML
3 SOLUTION RESPIRATORY (INHALATION)
Status: COMPLETED | OUTPATIENT
Start: 2024-12-31 | End: 2024-12-31

## 2024-12-31 RX ORDER — IPRATROPIUM BROMIDE AND ALBUTEROL SULFATE 2.5; .5 MG/3ML; MG/3ML
3 SOLUTION RESPIRATORY (INHALATION)
Status: DISCONTINUED | OUTPATIENT
Start: 2024-12-31 | End: 2025-01-04

## 2024-12-31 RX ORDER — DEXTROSE 50 % IN WATER (D50W) INTRAVENOUS SYRINGE
25
Status: DISCONTINUED | OUTPATIENT
Start: 2024-12-31 | End: 2025-01-05 | Stop reason: HOSPADM

## 2024-12-31 RX ORDER — ACETAMINOPHEN 325 MG/1
650 TABLET ORAL EVERY 4 HOURS PRN
Status: DISCONTINUED | OUTPATIENT
Start: 2024-12-31 | End: 2025-01-05 | Stop reason: HOSPADM

## 2024-12-31 RX ADMIN — ALBUTEROL SULFATE 2.5 MG: 2.5 SOLUTION RESPIRATORY (INHALATION) at 12:18

## 2024-12-31 RX ADMIN — IPRATROPIUM BROMIDE AND ALBUTEROL SULFATE 3 ML: 2.5; .5 SOLUTION RESPIRATORY (INHALATION) at 18:21

## 2024-12-31 RX ADMIN — IPRATROPIUM BROMIDE AND ALBUTEROL SULFATE 3 ML: 2.5; .5 SOLUTION RESPIRATORY (INHALATION) at 20:00

## 2024-12-31 RX ADMIN — DOCUSATE SODIUM 100 MG: 100 CAPSULE, LIQUID FILLED ORAL at 20:03

## 2024-12-31 RX ADMIN — DEXTROSE MONOHYDRATE 500 MG: 50 INJECTION, SOLUTION INTRAVENOUS at 20:00

## 2024-12-31 RX ADMIN — GUAIFENESIN 600 MG: 600 TABLET, MULTILAYER, EXTENDED RELEASE ORAL at 20:05

## 2024-12-31 RX ADMIN — PIPERACILLIN SODIUM AND TAZOBACTAM SODIUM 3.38 G: 3; .375 INJECTION, SOLUTION INTRAVENOUS at 19:10

## 2024-12-31 RX ADMIN — LIDOCAINE HYDROCHLORIDE 6 ML: 10 INJECTION, SOLUTION EPIDURAL; INFILTRATION; INTRACAUDAL; PERINEURAL at 11:44

## 2024-12-31 RX ADMIN — METHYLPREDNISOLONE SODIUM SUCCINATE 125 MG: 125 INJECTION, POWDER, FOR SOLUTION INTRAMUSCULAR; INTRAVENOUS at 17:32

## 2024-12-31 RX ADMIN — IOHEXOL 75 ML: 350 INJECTION, SOLUTION INTRAVENOUS at 16:06

## 2024-12-31 RX ADMIN — SODIUM CHLORIDE 500 ML: 900 INJECTION, SOLUTION INTRAVENOUS at 13:45

## 2024-12-31 RX ADMIN — IPRATROPIUM BROMIDE AND ALBUTEROL SULFATE 3 ML: 2.5; .5 SOLUTION RESPIRATORY (INHALATION) at 17:25

## 2024-12-31 RX ADMIN — BUDESONIDE INHALATION 0.5 MG: 0.5 SUSPENSION RESPIRATORY (INHALATION) at 20:00

## 2024-12-31 RX ADMIN — IPRATROPIUM BROMIDE AND ALBUTEROL SULFATE 3 ML: 2.5; .5 SOLUTION RESPIRATORY (INHALATION) at 17:32

## 2024-12-31 ASSESSMENT — COLUMBIA-SUICIDE SEVERITY RATING SCALE - C-SSRS
6. HAVE YOU EVER DONE ANYTHING, STARTED TO DO ANYTHING, OR PREPARED TO DO ANYTHING TO END YOUR LIFE?: NO
1. IN THE PAST MONTH, HAVE YOU WISHED YOU WERE DEAD OR WISHED YOU COULD GO TO SLEEP AND NOT WAKE UP?: NO
2. HAVE YOU ACTUALLY HAD ANY THOUGHTS OF KILLING YOURSELF?: NO
6. HAVE YOU EVER DONE ANYTHING, STARTED TO DO ANYTHING, OR PREPARED TO DO ANYTHING TO END YOUR LIFE?: NO
1. IN THE PAST MONTH, HAVE YOU WISHED YOU WERE DEAD OR WISHED YOU COULD GO TO SLEEP AND NOT WAKE UP?: NO
2. HAVE YOU ACTUALLY HAD ANY THOUGHTS OF KILLING YOURSELF?: NO

## 2024-12-31 ASSESSMENT — ENCOUNTER SYMPTOMS
ALLERGIC/IMMUNOLOGIC NEGATIVE: 1
NEUROLOGICAL NEGATIVE: 1
CARDIOVASCULAR NEGATIVE: 1
WHEEZING: 1
ENDOCRINE NEGATIVE: 1
CONSTIPATION: 1
SHORTNESS OF BREATH: 1
MUSCULOSKELETAL NEGATIVE: 1
COUGH: 1
FATIGUE: 1
COUGH: 1
GASTROINTESTINAL NEGATIVE: 1
PSYCHIATRIC NEGATIVE: 1
HEMATOLOGIC/LYMPHATIC NEGATIVE: 1
ENDOCRINE NEGATIVE: 1
EYES NEGATIVE: 1
FEVER: 0
PSYCHIATRIC NEGATIVE: 1
EYES NEGATIVE: 1
FATIGUE: 1
NEUROLOGICAL NEGATIVE: 1
SHORTNESS OF BREATH: 1
MUSCULOSKELETAL NEGATIVE: 1

## 2024-12-31 ASSESSMENT — PAIN SCALES - GENERAL
PAINLEVEL_OUTOF10: 0 - NO PAIN

## 2024-12-31 ASSESSMENT — PAIN - FUNCTIONAL ASSESSMENT
PAIN_FUNCTIONAL_ASSESSMENT: 0-10
PAIN_FUNCTIONAL_ASSESSMENT: 0-10

## 2024-12-31 NOTE — PROGRESS NOTES
Pharmacy Medication History Review    Billie Hernadez is a 69 y.o. female who is being seen in the Emergency Department for No Principal Problem currently listed. Pharmacy reviewed the patient's [id number] medications and allergies for accuracy.    Medications ADDED:  None  Medications CHANGED:  calcium citrate-vitamin D2 250 mg-2.5 mcg (100 unit) tablet (once daily)  Medications REMOVED:   prochlorperazine (Compazine) 10 mg tablet   OLANZapine (ZyPREXA) 5 mg tablet  ondansetron (Zofran) 8 mg tablet  dexAMETHasone (Decadron) 4 mg tablet    The list below reflects the updated PTA list. Comments regarding how patient may be taking medications differently can be found in the Admit Orders Activity  Prior to Admission Medications   Prescriptions Last Dose Informant   Spiriva with HandiHaler 18 mcg inhalation capsule 12/31/2024 Morning Self   Sig: place ONE CAPSULE into inhaler AND inhale ONCE   DAILY   albuterol 90 mcg/actuation inhaler 12/31/2024 Morning Self   Sig: USE 1 TO 2 INHALATIONS BY  MOUTH EVERY 4 TO 6   HOURS AS NEEDED   alendronate (Fosamax) 70 mg tablet 12/29/2024 Morning Self   Sig: Take 1 tablet (70 mg) by mouth 1 (one) time per week.   Take in the morning with a full glass of water, on an empty   stomach, and do not take anything else by mouth or lie down   for the next 30 min. Take every Sunday   calcium citrate-vitamin D2 250 mg-2.5 mcg (100 unit) tablet 12/31/2024 Morning Self   Sig: Take 1 tablet by mouth once daily.      losartan (Cozaar) 25 mg tablet 12/31/2024 Morning Self   Sig: TAKE ONE TABLET BY MOUTH ONCE DAILY   multivitamin with minerals tablet 12/31/2024 Morning Self   Sig: Take 1 tablet by mouth once daily.      Facility-Administered Medications: None        The list below reflects the updated allergy list. Please review each documented allergy for additional clarification and justification.  Allergies  Reviewed by Yossi Klein on 12/31/2024   No Known Allergies         Patient  accepts M2B at discharge. Pharmacy has been updated to [facility].    Sources used to complete the med history include:  Patient interviewed about medications with  at bedside. Patient alert, cooperative, pleasant, knew her medications and doses  Epic Dispense History reviewed  Care Everywhere reviewed  Chart Review History reviewed    Below are additional concerns with the patient's PTA list.  None    Yossi Klein CPhT  Please reach out via [employer] Secure Chat for questions

## 2024-12-31 NOTE — ASSESSMENT & PLAN NOTE
Titrate O2 as needed to maintain SaO2   ABG completed   IV steroids   Inhaled steroids   Duonebs scheduled and PRN   CT PE negative for PE, pneumothorax, consolidation   Detail Level: Generalized Detail Level: Detailed

## 2024-12-31 NOTE — H&P
History Of Present Illness  Billie Hernadez is a 69 y.o. female presenting with shortness of breath. Patient was in interventional radiology for lung biopsy today. After the procedure she became acute short of breath and hypoxic. Patient brought to ER for evaluation.   Patient states she had a similar episode this morning prior to procedure when she felt short of breath. She uses albuterol inhaler and improved. Has stable cough producing white sputum. Denies chest pain, abdominal pain. Had chills recently, but states she has been monitoring her temperature since chemotherapy treatment and hasn't had any fevers. Recently received her 1st chemotherapy treatment for lung cancer. Has some constipation that is resolved with OTC treatments.      Past Medical History  Past Medical History:   Diagnosis Date    Cancer (Multi)     Chronic obstructive pulmonary disease with (acute) exacerbation (Multi) 08/08/2017    COPD exacerbation    Hypertension     Other specified health status 03/12/2015    No known problems    Personal history of other venous thrombosis and embolism 03/12/2015    History of deep venous thrombosis    Pneumonia        Surgical History  Past Surgical History:   Procedure Laterality Date    EXCISION / BIOPSY BREAST / NIPPLE / DUCT Left 05/28/2014    OTHER SURGICAL HISTORY  03/12/2015    Open Treatment Of Tibial Shaft Fracture With Implant    TONSILLECTOMY          Social History  She reports that she has been smoking cigarettes. She started smoking about 52 years ago. She has a 26 pack-year smoking history. She has never used smokeless tobacco. She reports current alcohol use of about 2.0 standard drinks of alcohol per week. She reports that she does not use drugs.    Family History  No family history on file.     Allergies  Patient has no known allergies.    Review of Systems   Constitutional:  Positive for fatigue.   HENT: Negative.     Eyes: Negative.    Respiratory:  Positive for cough, shortness of  "breath and wheezing.    Cardiovascular: Negative.    Gastrointestinal:  Positive for constipation.   Endocrine: Negative.    Genitourinary: Negative.    Musculoskeletal: Negative.    Skin: Negative.    Neurological: Negative.    Psychiatric/Behavioral: Negative.          Physical Exam  Constitutional:       Appearance: She is ill-appearing.   HENT:      Head: Normocephalic and atraumatic.      Mouth/Throat:      Mouth: Mucous membranes are moist.      Pharynx: Oropharynx is clear.   Eyes:      Extraocular Movements: Extraocular movements intact.      Conjunctiva/sclera: Conjunctivae normal.      Pupils: Pupils are equal, round, and reactive to light.   Cardiovascular:      Rate and Rhythm: Normal rate and regular rhythm.      Pulses: Normal pulses.      Heart sounds: Normal heart sounds.   Pulmonary:      Effort: Tachypnea and accessory muscle usage present.      Breath sounds: Wheezing present.   Abdominal:      General: Bowel sounds are normal.      Palpations: Abdomen is soft.      Tenderness: There is no abdominal tenderness.   Musculoskeletal:         General: Normal range of motion.      Cervical back: Normal range of motion and neck supple.   Skin:     General: Skin is warm and dry.      Capillary Refill: Capillary refill takes less than 2 seconds.   Neurological:      General: No focal deficit present.      Mental Status: She is alert and oriented to person, place, and time.   Psychiatric:         Mood and Affect: Mood normal.         Behavior: Behavior normal.          Last Recorded Vitals  Blood pressure 102/60, pulse 99, temperature 36.8 °C (98.2 °F), temperature source Temporal, resp. rate 18, height 1.6 m (5' 3\"), weight 45.4 kg (100 lb), SpO2 95%.    Relevant Results  Results for orders placed or performed during the hospital encounter of 12/31/24 (from the past 24 hours)   CBC and Auto Differential   Result Value Ref Range    WBC 5.4 4.4 - 11.3 x10*3/uL    nRBC 0.0 0.0 - 0.0 /100 WBCs    RBC 3.95 (L) " 4.00 - 5.20 x10*6/uL    Hemoglobin 12.7 12.0 - 16.0 g/dL    Hematocrit 38.7 36.0 - 46.0 %    MCV 98 80 - 100 fL    MCH 32.2 26.0 - 34.0 pg    MCHC 32.8 32.0 - 36.0 g/dL    RDW 13.9 11.5 - 14.5 %    Platelets 307 150 - 450 x10*3/uL    Neutrophils % 55.0 40.0 - 80.0 %    Immature Granulocytes %, Automated 0.4 0.0 - 0.9 %    Lymphocytes % 31.0 13.0 - 44.0 %    Monocytes % 11.8 2.0 - 10.0 %    Eosinophils % 1.1 0.0 - 6.0 %    Basophils % 0.7 0.0 - 2.0 %    Neutrophils Absolute 2.94 1.60 - 5.50 x10*3/uL    Immature Granulocytes Absolute, Automated 0.02 0.00 - 0.50 x10*3/uL    Lymphocytes Absolute 1.66 0.80 - 3.00 x10*3/uL    Monocytes Absolute 0.63 0.05 - 0.80 x10*3/uL    Eosinophils Absolute 0.06 0.00 - 0.40 x10*3/uL    Basophils Absolute 0.04 0.00 - 0.10 x10*3/uL   Comprehensive Metabolic Panel   Result Value Ref Range    Glucose 93 74 - 99 mg/dL    Sodium 131 (L) 136 - 145 mmol/L    Potassium 4.4 3.5 - 5.3 mmol/L    Chloride 93 (L) 98 - 107 mmol/L    Bicarbonate 31 21 - 32 mmol/L    Anion Gap 11 10 - 20 mmol/L    Urea Nitrogen 5 (L) 6 - 23 mg/dL    Creatinine 0.45 (L) 0.50 - 1.05 mg/dL    eGFR >90 >60 mL/min/1.73m*2    Calcium 8.3 (L) 8.6 - 10.3 mg/dL    Albumin 3.6 3.4 - 5.0 g/dL    Alkaline Phosphatase 67 33 - 136 U/L    Total Protein 6.4 6.4 - 8.2 g/dL    AST 21 9 - 39 U/L    Bilirubin, Total 0.4 0.0 - 1.2 mg/dL    ALT 15 7 - 45 U/L   Lactate   Result Value Ref Range    Lactate 0.8 0.4 - 2.0 mmol/L   B-type natriuretic peptide   Result Value Ref Range     (H) 0 - 99 pg/mL   Sars-CoV-2 and Influenza A/B PCR   Result Value Ref Range    Flu A Result Not Detected Not Detected    Flu B Result Not Detected Not Detected    Coronavirus 2019, PCR Not Detected Not Detected   RSV PCR   Result Value Ref Range    RSV PCR Not Detected Not Detected   Blood Gas Arterial Full Panel   Result Value Ref Range    POCT pH, Arterial 7.34 (L) 7.38 - 7.42 pH    POCT pCO2, Arterial 63 (H) 38 - 42 mm Hg    POCT pO2, Arterial 74 (L) 85  - 95 mm Hg    POCT SO2, Arterial 96 94 - 100 %    POCT Oxy Hemoglobin, Arterial 90.7 (L) 94.0 - 98.0 %    POCT Hematocrit Calculated, Arterial 36.0 36.0 - 46.0 %    POCT Sodium, Arterial 129 (L) 136 - 145 mmol/L    POCT Potassium, Arterial 3.9 3.5 - 5.3 mmol/L    POCT Chloride, Arterial 92 (L) 98 - 107 mmol/L    POCT Ionized Calcium, Arterial 1.17 1.10 - 1.33 mmol/L    POCT Glucose, Arterial 122 (H) 74 - 99 mg/dL    POCT Lactate, Arterial 0.5 0.4 - 2.0 mmol/L    POCT Base Excess, Arterial 6.4 (H) -2.0 - 3.0 mmol/L    POCT HCO3 Calculated, Arterial 34.0 (H) 22.0 - 26.0 mmol/L    POCT Hemoglobin, Arterial 12.1 12.0 - 16.0 g/dL    POCT Anion Gap, Arterial 7 (L) 10 - 25 mmo/L    Patient Temperature 37.0 degrees Celsius    FiO2 44 %    Apparatus CANNULA     Flow 6.0 LPM    Site of Arterial Puncture Radial Left     Franki's Test Positive      CT angio chest for pulmonary embolism    Result Date: 12/31/2024  Interpreted By:  rBianna Linares, STUDY: CT ANGIO CHEST FOR PULMONARY EMBOLISM;  12/31/2024 4:20 pm   INDICATION: Signs/Symptoms:concern for pe. hx of cancer and new onset hypoxia.   COMPARISON: 11/21/2024   ACCESSION NUMBER(S): [id number]   ORDERING CLINICIAN: WALT COOK   TECHNIQUE: Contiguous axial images of the chest were obtained after the intravenous administration of contrast using angiographic PE protocol. Coronal and sagittal reformatted images were reconstructed from the axial data. MIP images were created and reviewed.   FINDINGS: MEDIASTINUM AND LYMPH NODES: The right paratracheal lymph node measures up to 14 mm additional subcarinal and hilar lymph nodes are again noted. No pneumomediastinum.   VESSELS:  Normal caliber aorta without with aortic atherosclerosis. The main pulmonary artery measures up to 3 cm, suggestive of pulmonary hypertension. No pulmonary embolism identified to the segmental level.   HEART: Normal in size.  No coronary artery calcifications. No significant pericardial effusion.    LUNG, AIRWAYS, AND PLEURA: Redemonstrated moderate central lobular emphysematous changes. A spiculated nodules/masses in the right upper lobe measures 2.0 x 1.7 cm, enlarged from prior imaging previously measuring 1.7 x 1.6 cm (series 5, image 88). A right lower lobe nodule measures 7 mm, previously 5 mm (image 99). A 3 mm medial right lower lobe nodule is similar to prior imaging (120). A previously measured left subpleural lower lobe nodule is obscured by adjacent lung space opacities likely atelectasis.   There is a new 9 mm nodule in the anterior right lower lobe and a new 7 mm nodule in the posterior right lower lobe (image 132 and 126 respectively). Right basilar opacities are progressed from prior imaging and may also obscure additional nodules. There is progression of fluid in the bilateral bronchi with expansile mucous plugging, greater on the right. Redemonstrated right middle lobe atelectasis with opacification of the central bronchi. No sizable pleural effusion or pneumothorax.   OSSEOUS STRUCTURES/CHEST WALL:  No acute osseous abnormality.   UPPER ABDOMEN/OTHER: Dense atherosclerotic calcification in the upper abdominal aorta extending into the right renal artery otherwise grossly patent. Thickening noted of the left adrenal gland.       No acute pulmonary embolism to the segmental level.   Emphysema with scattered pulmonary nodules which appear stable to enlarged since prior imaging as well as interval development of at least 2 lung nodules measuring up to 9 mm suggestive of disease progression. Additional nodules may be obscured by lung base opacities.   Interval progression of right greater than left mucous plugging and bibasilar opacities which could represent atelectasis however superimposed aspiration pneumonitis not excluded.   Read demonstrated opacification of the right middle lobe bronchus with associated atelectasis. Bronchoscopy again suggested if not already performed.   Mediastinal  lymphadenopathy again suggestive of metastatic process.   MACRO: None.   Signed by: Brianna Linares 12/31/2024 5:07 PM Dictation workstation:   [id number]    XR chest 1 view    Result Date: 12/31/2024  Interpreted By:  Owen Marti, STUDY: XR CHEST 1 VIEW; 12/31/2024 2:53 pm   INDICATION: CLINICAL INFORMATION: Signs/Symptoms:Shortness of breath.   COMPARISON: 11/21/2024   ACCESSION NUMBER(S): [id number]   ORDERING CLINICIAN: KATLYN KAUFFMAN   TECHNIQUE: Portable chest one view.   FINDINGS: The cardiac size is indeterminate in view of the AP projection. There is a 2.7 cm mass lateral to the right hilum, not accounting for magnification. The aorta is tortuous. No infiltrates or effusions are identified.  Incidental note is made of what I believe is a nipple shadow overlying the left base.       2.7 cm mass lateral to the right hilum. This has been evaluated on previous PET-CT. This is likely neoplastic.   MACRO: none   Signed by: Owen Marti 12/31/2024 3:29 PM Dictation workstation:   [id number]          Assessment/Plan   Assessment & Plan  COPD exacerbation (Multi)  Titrate O2 as needed to maintain SaO2   ABG completed   IV steroids   Inhaled steroids   Duonebs scheduled and PRN   CT PE negative for PE, pneumothorax, consolidation  HTN (hypertension)  Continue home losartan dose   Malignant neoplasm of upper lobe of right lung (Multi)  Following with [facility].   Received 1st treatments 12/17  Biopsy 12/31  Acute respiratory failure with hypoxia (Multi)  Titrate O2 as needed   ABG done   Inhalers as above   Admit to SDU   Pneumonia due to infectious organism  IV Zosyn and Azithromycin   Recently received chemotherapy on 12/17  IV steroids   Aerosols as ordered     DVT prophylaxis   Lovenox       Elisha Dubois, APRN-CNP

## 2024-12-31 NOTE — ED PROVIDER NOTES
Patient was seen by both myself and advanced practitioner.  I personally saw the patient and made/approved the management plan and take responsibility for the patient management     Patient is a 69-year-old female who presents emergency department for evaluation of shortness of breath.  She has a history of lung cancer, prior PE but not on blood thinners.  She notes that today she was coming in for a biopsy of a lymph node near her clavicle.  She was found to be hypoxic and was sent to the emergency room for further evaluation.  She does note that she has been having some increasing shortness of breath for the last several days with mild increased cough that has been productive.  She denies fever, chills, chest pain, abdominal pain, nausea or vomiting.  Patient not on oxygen at baseline.    On exam patient frail-appearing, uncomfortable.  She is awake, alert and oriented.  She is moving all extremities equally with no obvious focal deficit.  She was found to be hypoxic with an oxygen saturation of 87% was placed on 3 L nasal cannula.  She is also mildly tachycardic with regular rhythm on auscultation of the heart.  She does have equal breath sounds bilaterally with no wheezing, rhonchi or rails.  No peripheral pitting edema noted.  Blood work ordered including CBC, CMP, lactate, BNP, testing for influenza, COVID-19, RSV.  Patient given IV fluids at this time.  Chest x-ray shows no evidence of pneumonia, pneumothorax or wide mediastinum however does show the chronic findings consistent with patient's known history of lung cancer.  Patient does have normal white count of 5.4 with no left shift, slight hyponatremia with a sodium of 131 however normal electrolytes otherwise with baseline kidney function of 0.45.  Patient does have mildly elevated BNP of 134 and I have very low suspicion for congestive heart failure patient source of symptoms at this time.  She has normal lactate of 0.8 and did test negative for  COVID-19, influenza, RSV.  CT angio of the chest was ordered for evaluation of possible pulmonary embolism given patient's known history of lung cancer however shows no evidence of pulmonary embolism.  He does show up significant emphysema with pulmonary nodules present as well as some interval progression of mucous plugging.  Patient given DuoNeb breathing treatment, IV Solu-Medrol.  Given her persistent hypoxia patient was brought in for further evaluation.    I independently interpreted the following study(s) chest x-ray, CT angio the chest, EKG.  Chest x-ray shows 2.7 cm mass in the lateral right hilum consistent with her known history.  CT angio of the chest shows no evidence of pulmonary embolism but does shows emphysema with scattered pulmonary nodules as well as the lung mass seen.  There is some interval progression of mucous plugging and bibasilar opacities which are suggestive atelectasis.  EKG shows sinus rhythm with premature supraventricular complexes but no evidence of STEMI or active ischemia.     Jim Mata, DO  12/31/24 [id number]

## 2024-12-31 NOTE — PROGRESS NOTES
I have met with and examined this patient.  I have discussed the case with my nurse practitioner Elisha Dubois.  I reviewed her H&P and agree with its contents.    This woman has a history of COPD and metastatic lung cancer.  She says she is been coughing and short of breath for the last 2 or 3 days.  She had a chemotherapy treatment about 2 weeks ago.  She had a biopsy earlier today of a supraclavicular lymph node.  She was found to be hypoxemic and sent to the ER.  ER signed her out to me telling me that her CT showed no pneumothorax and she was requiring 2 L of oxygen.  However rather suddenly 2 L of oxygen was no longer enough and she was put on a nonrebreather.  Then suddenly she started to improve.  Now she is pulse oxing in the mid 90s on 6 L.  She says that exactly what happened she suddenly got much more short of breath and then suddenly got better.    On exam she does have some prolonged expiratory phase and wheezing    2+ leg edema    CT of the chest showed some mucous plugging in the right upper lobe and bibasilar infiltrates.      Will treat for pneumonia in an immune compromised patient with Zosyn and Zithromax.  Nasal swab for MRSA.  When she was acutely decompensating just now her blood gas showed mostly compensated respiratory acidosis.  I did order another stat chest x-ray with a thought that perhaps now she is manifesting a pneumothorax from the biopsy earlier today.  Since she just improved significantly I suspect that is not the case.    He also has some leg edema and her chest imaging could be consistent with CHF although her BNP is actually quite low.  Will order an echocardiogram.  Consulting pulmonary.    Otherwise for full details read the H&P from Elisha Quigley MD

## 2024-12-31 NOTE — POST-PROCEDURE NOTE
Interventional Radiology Brief Postprocedure Note    Attending: Robles Vega MD     Assistant: none    Diagnosis: right supraclavicular abnormal lymph node    Description of procedure: Successful fine needle aspiration of the target right supraclavicular abnormal lymph node. 4 samples obtained and placed on slides and in CytoLyt.      Anesthesia:  Local    Complications: None    Estimated Blood Loss: minimal    Medications (Filter: Administrations occurring from 1025 to 1209 on 12/31/24) As of 12/31/24 1209      lidocaine PF (Xylocaine) 10 mg/mL (1 %) injection (mL) Total volume:  6 mL      Date/Time Rate/Dose/Volume Action       12/31/24  1144 6 mL Given                   ID Type Source Tests Collected by Time   1 : Ultrasound guided right supraclavicular lymph node biopsy Non-Gynecologic Cytology LYMPH NODE SUPRACLAVICULAR FINE NEEDLE ASPIRATION RIGHT CYTOLOGY CONSULTATION (NON-GYNECOLOGIC) Robles Vega MD 12/31/2024 1155         See detailed result report with images in PACS.    The patient tolerated the procedure well without incident or complication and is in stable condition.

## 2024-12-31 NOTE — ASSESSMENT & PLAN NOTE
IV Zosyn and Azithromycin   Recently received chemotherapy on 12/17  IV steroids   Aerosols as ordered

## 2024-12-31 NOTE — Clinical Note
Brought pt down from [facility] on 3liters o2 nc.  O2 sats 92-93% on 3liters nc.  Denies any shortness of breath.  The [facility] RN states that when the pt initially came in from home, they checked her o2 sats,, they were in the 70's on room air which why they placed her on o2.

## 2024-12-31 NOTE — Clinical Note
Dr made aware of low sats when she came in this am and had to be placed on o2.  Dr states he is doing a FNA of a lymph node so he says he will not sedate especially if her sats were low tobegin with so he will only give local lidocaine to the skin and pt is okay with that.

## 2024-12-31 NOTE — ED PROVIDER NOTES
HPI   Chief Complaint   Patient presents with   • Shortness of Breath     Presents to ED from an outpatient procedure for shortness of breath.  States SOB for the past 3 days.  87% on 3 liters upon arrival.  States she does not normally wear oxygen.         Patient is a 69-year-old female with past medical history of COPD, lung cancer presenting with concerns for hypoxia.  She was at an outpatient procedure for lymph node biopsy and after the procedure she was noted to be hypoxic at 87% on room air.  She was given 1 DuoNeb prior to arrival to the ED.  Patient does not wear oxygen at home.  Family at bedside states that they did have contact with some young children around Fiona time.  Patient denies fevers, chills, cough, sore throat, runny nose, chest pain, abdominal pain, nausea, vomiting, diarrhea or urinary complaints.              Patient History   Past Medical History:   Diagnosis Date   • Chronic obstructive pulmonary disease with (acute) exacerbation (Multi) 08/08/2017    COPD exacerbation   • Hypertension    • Other specified health status 03/12/2015    No known problems   • Personal history of other venous thrombosis and embolism 03/12/2015    History of deep venous thrombosis   • Pneumonia      Past Surgical History:   Procedure Laterality Date   • EXCISION / BIOPSY BREAST / NIPPLE / DUCT Left 05/28/2014   • OTHER SURGICAL HISTORY  03/12/2015    Open Treatment Of Tibial Shaft Fracture With Implant   • TONSILLECTOMY       No family history on file.  Social History     Tobacco Use   • Smoking status: Every Day     Current packs/day: 0.50     Average packs/day: 0.5 packs/day for 52.0 years (26.0 ttl pk-yrs)     Types: Cigarettes     Start date: 1973   • Smokeless tobacco: Never   Vaping Use   • Vaping status: Never Used   Substance Use Topics   • Alcohol use: Yes     Alcohol/week: 2.0 standard drinks of alcohol     Types: 2 Cans of beer per week     Comment: 1-3 beers a week   • Drug use: Never        Physical Exam   ED Triage Vitals [12/31/24 1311]   Temperature Heart Rate Respirations BP   36.8 °C (98.2 °F) (!) 103 19 102/60      Pulse Ox Temp Source Heart Rate Source Patient Position   (!) 87 % Temporal -- Sitting      BP Location FiO2 (%)     Right arm --       Physical Exam  Vitals and nursing note reviewed.   Constitutional:       Appearance: She is well-developed.      Comments: Awake, laying in examination bed   HENT:      Head: Normocephalic and atraumatic.      Nose: Nose normal.      Mouth/Throat:      Mouth: Mucous membranes are moist.      Pharynx: Oropharynx is clear.   Eyes:      Extraocular Movements: Extraocular movements intact.      Conjunctiva/sclera: Conjunctivae normal.      Pupils: Pupils are equal, round, and reactive to light.   Cardiovascular:      Rate and Rhythm: Normal rate and regular rhythm.      Pulses: Normal pulses.      Heart sounds: Normal heart sounds. No murmur heard.  Pulmonary:      Effort: Pulmonary effort is normal. No respiratory distress.      Breath sounds: Normal breath sounds. No decreased breath sounds, wheezing, rhonchi or rales.      Comments: Currently on nasal cannula  Chest:      Chest wall: No tenderness.   Abdominal:      General: Abdomen is flat.      Palpations: Abdomen is soft.      Tenderness: There is no abdominal tenderness.   Musculoskeletal:         General: No swelling. Normal range of motion.      Cervical back: Normal range of motion and neck supple.   Skin:     General: Skin is warm and dry.      Capillary Refill: Capillary refill takes less than 2 seconds.   Neurological:      General: No focal deficit present.      Mental Status: She is alert and oriented to person, place, and time.   Psychiatric:         Mood and Affect: Mood normal.         Behavior: Behavior normal.           ED Course & MDM   ED Course as of 01/06/25 0858   Tu Dec 31, 2024   1417 EKG Time:1409  EKG Interpretation time:1418  EKG Interpretation: EKG shows sinus rhythm  with premature supraventricular complexes with a rate of 95 bpm, normal axis, QTc normal at 419, no evidence of STEMI.    EKG was interpreted by myself independently [JL]   1815 After I had admitted this patient to Dr. Quigley, he calls back and states that the patient is now hypoxic on a nonrebreather.  I was not informed of this while he was down in the emergency department.  Upon reevaluation, patient does have wheezing heard in all 4 lung fields.  Patient is now on 6 L.  She states that when she was eating, she became acutely short of breath.  Additional DuoNeb ordered. [AR]   1818 Patient has upgraded to stepdown. [AR]      ED Course User Index  [AR] Vinod Lemos PA-C  [JL] Jim Mata, DO         Diagnoses as of 01/06/25 0858   COPD exacerbation (Multi)   Acute hypoxemic respiratory failure (Multi)                 No data recorded     [place] Coma Scale Score: 15 (12/31/24 1310 : Ebony Benitez RN)                           Medical Decision Making  Patient is a 69-year-old female with past medical history of COPD, lung cancer presenting with concerns for hypoxia.  Lab work, viral swabs, imaging ordered.  Conditions considered include but are not limited to: Viral illness, pneumonia, pneumothorax, PE.    I saw this patient in conjunction with Dr. Mata.  CBC is without leukocytosis or anemia.  CMP without significant electrolyte abnormality or renal impairment.  Viral swabs are negative.  BNP of 134.  Lactate within normal limits.  Chest x-ray does show a 2.7 cm mass lateral to the right hilum but was seen on PET scan.  CT PE without findings for PE or pneumonia.    I spoke with Dr. Quigley, admitting provider, as well as his LAM Elisha.  After discussions, patient will be admitted for further management of acute hypoxemic respiratory failure requiring supplemental oxygen.  As I deemed necessary from the patient's history, physical, laboratory and imaging findings as well as ED course, I considered the  above listed diagnoses.    Portions of this note made with Dragon software, please be mindful of potential grammatical errors.        Labs Reviewed   CBC WITH AUTO DIFFERENTIAL - Abnormal       Result Value    WBC 5.4      nRBC 0.0      RBC 3.95 (*)     Hemoglobin 12.7      Hematocrit 38.7      MCV 98      MCH 32.2      MCHC 32.8      RDW 13.9      Platelets 307      Neutrophils % 55.0      Immature Granulocytes %, Automated 0.4      Lymphocytes % 31.0      Monocytes % 11.8      Eosinophils % 1.1      Basophils % 0.7      Neutrophils Absolute 2.94      Immature Granulocytes Absolute, Automated 0.02      Lymphocytes Absolute 1.66      Monocytes Absolute 0.63      Eosinophils Absolute 0.06      Basophils Absolute 0.04     COMPREHENSIVE METABOLIC PANEL - Abnormal    Glucose 93      Sodium 131 (*)     Potassium 4.4      Chloride 93 (*)     Bicarbonate 31      Anion Gap 11      Urea Nitrogen 5 (*)     Creatinine 0.45 (*)     eGFR >90      Calcium 8.3 (*)     Albumin 3.6      Alkaline Phosphatase 67      Total Protein 6.4      AST 21      Bilirubin, Total 0.4      ALT 15     B-TYPE NATRIURETIC PEPTIDE - Abnormal     (*)     Narrative:        <100 pg/mL - Heart failure unlikely  100-299 pg/mL - Intermediate probability of acute heart                  failure exacerbation. Correlate with clinical                  context and patient history.    >=300 pg/mL - Heart Failure likely. Correlate with clinical                  context and patient history.    BNP testing is performed using different testing methodology at [facility] than at other [facility] hospitals. Direct result comparisons should only be made within the same method.      LACTATE - Normal    Lactate 0.8      Narrative:     Venipuncture immediately after or during the administration of Metamizole may lead to falsely low results. Testing should be performed immediately prior to Metamizole dosing.   SARS-COV-2 AND INFLUENZA A/B PCR - Normal    Flu  A Result Not Detected      Flu B Result Not Detected      Coronavirus 2019, PCR Not Detected      Narrative:     This assay has received FDA Emergency Use Authorization (EUA) and  is only authorized for the duration of time that circumstances exist to justify the authorization of the emergency use of in vitro diagnostic tests for the detection of SARS-CoV-2 virus and/or diagnosis of COVID-19 infection under section 564(b)(1) of the Act, 21 U.S.C. 360bbb-3(b)(1). Testing for SARS-CoV-2 is only recommended for patients who meet current clinical and/or epidemiological criteria as defined by federal, state, or local public health directives. This assay is an in vitro diagnostic nucleic acid amplification test for the qualitative detection of SARS-CoV-2, Influenza A, and Influenza B from nasopharyngeal specimens and has been validated for use at [facility]. Negative results do not preclude COVID-19 infections or Influenza A/B infections, and should not be used as the sole basis for diagnosis, treatment, or other management decisions. If Influenza A/B and RSV PCR results are negative, testing for Parainfluenza virus, Adenovirus and Metapneumovirus is routinely performed for [facility] pediatric oncology and intensive care inpatients, and is available on other patients by placing an add-on request.    RSV PCR - Normal    RSV PCR Not Detected      Narrative:     This assay is an FDA-cleared, in vitro diagnostic nucleic acid amplification test for the detection of RSV from nasopharyngeal specimens, and has been validated for use at [facility]. Negative results do not preclude RSV infections, and should not be used as the sole basis for diagnosis, treatment, or other management decisions. If Influenza A/B and RSV PCR results are negative, testing for Parainfluenza virus, Adenovirus and Metapneumovirus is routinely performed for pediatric oncology and intensive care inpatients at [facility],  and is available on other patients by placing an add-on request.       BLOOD CULTURE   BLOOD CULTURE         CT angio chest for pulmonary embolism   Final Result   No acute pulmonary embolism to the segmental level.        Emphysema with scattered pulmonary nodules which appear stable to   enlarged since prior imaging as well as interval development of at   least 2 lung nodules measuring up to 9 mm suggestive of disease   progression. Additional nodules may be obscured by lung base   opacities.        Interval progression of right greater than left mucous plugging and   bibasilar opacities which could represent atelectasis however   superimposed aspiration pneumonitis not excluded.        Read demonstrated opacification of the right middle lobe bronchus   with associated atelectasis. Bronchoscopy again suggested if not   already performed.        Mediastinal lymphadenopathy again suggestive of metastatic process.        MACRO:   None.        Signed by: Brianna Linares 12/31/2024 5:07 PM   Dictation workstation:   [id number]      XR chest 1 view   Final Result   2.7 cm mass lateral to the right hilum. This has been evaluated on   previous PET-CT. This is likely neoplastic.        MACRO:   none        Signed by: Owen Marti 12/31/2024 3:29 PM   Dictation workstation:   [id number]            Procedure  Critical Care    Performed by: Vinod Lemos PA-C  Authorized by: Jim Mata DO    Critical care provider statement:     Critical care time (minutes):  31    Critical care time was exclusive of:  Separately billable procedures and treating other patients    Critical care was necessary to treat or prevent imminent or life-threatening deterioration of the following conditions:  Respiratory failure    Critical care was time spent personally by me on the following activities:  Blood draw for specimens, development of treatment plan with patient or surrogate, evaluation of patient's response to treatment,  examination of patient, interpretation of cardiac output measurements, obtaining history from patient or surrogate, ordering and performing treatments and interventions, ordering and review of laboratory studies, ordering and review of radiographic studies, pulse oximetry, re-evaluation of patient's condition and review of old charts    Care discussed with: admitting provider         Vinod Lemos PA-C  12/31/24 4542       Vinod Lemos PA-C  01/06/25 2641

## 2024-12-31 NOTE — DISCHARGE INSTRUCTIONS
Patient and Family Education  What is a Biopsy or Aspiration?  A biopsy or aspiration is used to help doctors diagnose disease. Small pieces of tissue or cells  are taken from the area using a special kind of needle. The tissue is sent to the lab to be looked  at under a microscope. The procedure can take up to 1 hour.  Before the Test:  ? If you take blood thinning medications, you Must ask your doctor when you should stop  taking them. You may need to stop taking the medicine up to seven (7) days prior to the  test.  ? Do Not Drink or Eat Anything after midnight the day before the test. You may take  medications with a small amount of clear liquid.  ? If you take daily oral diabetic medications, contact your Radiologist or your Radiology  Nurse to determine if you should take your medicine before the test or adjust the dosage.  ? Make plans for a ride home if you are an outpatient.  ? If you have an allergy to iodine or iodinated contrast, your doctor may prescribe special  pills to take before the test.  During the Test:  ? You will lie on a padded X-Ray table.  ? You may be given medicine that will make you drowsy.  ? You will also be given a local anesthetic to numb the biopsy site.  ? You will feel pressure during the biopsy.  After the Test:  ? You will remain on bed rest for 1 to 4 hours.  ? Your blood pressure, pulse and biopsy site will be checked often.  ? If a large sample of tissue needs to be taken, you may be admitted to the hospital for  observation.  Following these instructions for a safer recovery:     Page 2 of 2  Activity:  ? Limit your activity for 24 hours after the test.  ? Do not drive for 24 hours.  ? Do not do any heavy lifting, such as groceries, for 24 hours.  ? Avoid intense exercise and contact sports for 24 hours.  Diet:  ? You may resume your normal diet.  Medicines:  ? If you take medications to thin your blood, ask your doctor when you should start taking  them after the test.  ?  You may take your other medicines as ordered by your doctor.  Call your Doctor if you have:  ? Redness, swelling or pus-like drainage at the biopsy site.  ? Temperature of 100.4 F degrees or higher.  ? Increased pain or tenderness at the biopsy site.  ? Any questions.  Call your Doctor Right Away if you have any of the Signs:  ? Increase in pain in the biopsy site.  ? Dizziness or fainting.  ? Shortness of breath or trouble breathing.  ? Bleeding from the biopsy site.  If you are not able to contact your doctor, call 911 and/or go to the nearest hospital.     How to Reach your Doctor:  Call Dr. Bridges at [phone] with problems or questions.

## 2024-12-31 NOTE — NURSING NOTE
Patients oxygen level dropped below 90%, per IR physician Dr. Saavedra transporting patient down to ER.

## 2025-01-01 LAB
ANION GAP SERPL CALCULATED.3IONS-SCNC: 10 MMOL/L (ref 10–20)
BUN SERPL-MCNC: 7 MG/DL (ref 6–23)
CALCIUM SERPL-MCNC: 7.9 MG/DL (ref 8.6–10.3)
CHLORIDE SERPL-SCNC: 93 MMOL/L (ref 98–107)
CO2 SERPL-SCNC: 33 MMOL/L (ref 21–32)
CREAT SERPL-MCNC: 0.45 MG/DL (ref 0.5–1.05)
EGFRCR SERPLBLD CKD-EPI 2021: >90 ML/MIN/1.73M*2
ERYTHROCYTE [DISTWIDTH] IN BLOOD BY AUTOMATED COUNT: 14 % (ref 11.5–14.5)
GLUCOSE BLD MANUAL STRIP-MCNC: 115 MG/DL (ref 74–99)
GLUCOSE BLD MANUAL STRIP-MCNC: 165 MG/DL (ref 74–99)
GLUCOSE BLD MANUAL STRIP-MCNC: 260 MG/DL (ref 74–99)
GLUCOSE BLD MANUAL STRIP-MCNC: 89 MG/DL (ref 74–99)
GLUCOSE SERPL-MCNC: 120 MG/DL (ref 74–99)
HCT VFR BLD AUTO: 36.5 % (ref 36–46)
HGB BLD-MCNC: 12.1 G/DL (ref 12–16)
MCH RBC QN AUTO: 31.9 PG (ref 26–34)
MCHC RBC AUTO-ENTMCNC: 33.2 G/DL (ref 32–36)
MCV RBC AUTO: 96 FL (ref 80–100)
MRSA DNA SPEC QL NAA+PROBE: NOT DETECTED
NRBC BLD-RTO: 0 /100 WBCS (ref 0–0)
PLATELET # BLD AUTO: 346 X10*3/UL (ref 150–450)
POTASSIUM SERPL-SCNC: 4.3 MMOL/L (ref 3.5–5.3)
RBC # BLD AUTO: 3.79 X10*6/UL (ref 4–5.2)
SODIUM SERPL-SCNC: 132 MMOL/L (ref 136–145)
WBC # BLD AUTO: 7.8 X10*3/UL (ref 4.4–11.3)

## 2025-01-01 PROCEDURE — 94799 UNLISTED PULMONARY SVC/PX: CPT

## 2025-01-01 PROCEDURE — 2500000004 HC RX 250 GENERAL PHARMACY W/ HCPCS (ALT 636 FOR OP/ED): Performed by: NURSE PRACTITIONER

## 2025-01-01 PROCEDURE — 36415 COLL VENOUS BLD VENIPUNCTURE: CPT | Performed by: NURSE PRACTITIONER

## 2025-01-01 PROCEDURE — 2500000002 HC RX 250 W HCPCS SELF ADMINISTERED DRUGS (ALT 637 FOR MEDICARE OP, ALT 636 FOR OP/ED): Performed by: NURSE PRACTITIONER

## 2025-01-01 PROCEDURE — 87449 NOS EACH ORGANISM AG IA: CPT | Mod: WESLAB | Performed by: INTERNAL MEDICINE

## 2025-01-01 PROCEDURE — 87899 AGENT NOS ASSAY W/OPTIC: CPT | Mod: WESLAB | Performed by: INTERNAL MEDICINE

## 2025-01-01 PROCEDURE — 99232 SBSQ HOSP IP/OBS MODERATE 35: CPT | Performed by: NURSE PRACTITIONER

## 2025-01-01 PROCEDURE — 86738 MYCOPLASMA ANTIBODY: CPT | Performed by: INTERNAL MEDICINE

## 2025-01-01 PROCEDURE — 80048 BASIC METABOLIC PNL TOTAL CA: CPT | Performed by: NURSE PRACTITIONER

## 2025-01-01 PROCEDURE — 87070 CULTURE OTHR SPECIMN AEROBIC: CPT | Mod: WESLAB | Performed by: INTERNAL MEDICINE

## 2025-01-01 PROCEDURE — 2500000001 HC RX 250 WO HCPCS SELF ADMINISTERED DRUGS (ALT 637 FOR MEDICARE OP): Performed by: NURSE PRACTITIONER

## 2025-01-01 PROCEDURE — 5A0935A ASSISTANCE WITH RESPIRATORY VENTILATION, LESS THAN 24 CONSECUTIVE HOURS, HIGH NASAL FLOW/VELOCITY: ICD-10-PCS | Performed by: INTERNAL MEDICINE

## 2025-01-01 PROCEDURE — 2060000001 HC INTERMEDIATE ICU ROOM DAILY

## 2025-01-01 PROCEDURE — 84145 PROCALCITONIN (PCT): CPT | Mod: WESLAB | Performed by: INTERNAL MEDICINE

## 2025-01-01 PROCEDURE — 99222 1ST HOSP IP/OBS MODERATE 55: CPT | Performed by: INTERNAL MEDICINE

## 2025-01-01 PROCEDURE — 87641 MR-STAPH DNA AMP PROBE: CPT | Performed by: INTERNAL MEDICINE

## 2025-01-01 PROCEDURE — 94640 AIRWAY INHALATION TREATMENT: CPT

## 2025-01-01 PROCEDURE — 85027 COMPLETE CBC AUTOMATED: CPT | Performed by: NURSE PRACTITIONER

## 2025-01-01 PROCEDURE — 36415 COLL VENOUS BLD VENIPUNCTURE: CPT | Performed by: INTERNAL MEDICINE

## 2025-01-01 PROCEDURE — 82947 ASSAY GLUCOSE BLOOD QUANT: CPT

## 2025-01-01 RX ORDER — ALBUTEROL SULFATE 0.83 MG/ML
2.5 SOLUTION RESPIRATORY (INHALATION) EVERY 6 HOURS PRN
Status: DISCONTINUED | OUTPATIENT
Start: 2025-01-01 | End: 2025-01-05 | Stop reason: HOSPADM

## 2025-01-01 RX ADMIN — METHYLPREDNISOLONE SODIUM SUCCINATE 40 MG: 40 INJECTION, POWDER, FOR SOLUTION INTRAMUSCULAR; INTRAVENOUS at 05:27

## 2025-01-01 RX ADMIN — ENOXAPARIN SODIUM 40 MG: 40 INJECTION SUBCUTANEOUS at 21:40

## 2025-01-01 RX ADMIN — IPRATROPIUM BROMIDE AND ALBUTEROL SULFATE 3 ML: 2.5; .5 SOLUTION RESPIRATORY (INHALATION) at 07:38

## 2025-01-01 RX ADMIN — IPRATROPIUM BROMIDE AND ALBUTEROL SULFATE 3 ML: 2.5; .5 SOLUTION RESPIRATORY (INHALATION) at 11:21

## 2025-01-01 RX ADMIN — IPRATROPIUM BROMIDE AND ALBUTEROL SULFATE 3 ML: 2.5; .5 SOLUTION RESPIRATORY (INHALATION) at 18:02

## 2025-01-01 RX ADMIN — DOCUSATE SODIUM 100 MG: 100 CAPSULE, LIQUID FILLED ORAL at 08:44

## 2025-01-01 RX ADMIN — PIPERACILLIN SODIUM AND TAZOBACTAM SODIUM 3.38 G: 3; .375 INJECTION, SOLUTION INTRAVENOUS at 12:33

## 2025-01-01 RX ADMIN — LOSARTAN POTASSIUM 25 MG: 25 TABLET, FILM COATED ORAL at 08:44

## 2025-01-01 RX ADMIN — IPRATROPIUM BROMIDE AND ALBUTEROL SULFATE 3 ML: 2.5; .5 SOLUTION RESPIRATORY (INHALATION) at 00:35

## 2025-01-01 RX ADMIN — METHYLPREDNISOLONE SODIUM SUCCINATE 40 MG: 40 INJECTION, POWDER, FOR SOLUTION INTRAMUSCULAR; INTRAVENOUS at 18:03

## 2025-01-01 RX ADMIN — PIPERACILLIN SODIUM AND TAZOBACTAM SODIUM 3.38 G: 3; .375 INJECTION, SOLUTION INTRAVENOUS at 08:45

## 2025-01-01 RX ADMIN — DOCUSATE SODIUM 100 MG: 100 CAPSULE, LIQUID FILLED ORAL at 21:39

## 2025-01-01 RX ADMIN — GUAIFENESIN 600 MG: 600 TABLET, MULTILAYER, EXTENDED RELEASE ORAL at 21:41

## 2025-01-01 RX ADMIN — DEXTROSE MONOHYDRATE 500 MG: 50 INJECTION, SOLUTION INTRAVENOUS at 22:50

## 2025-01-01 RX ADMIN — ENOXAPARIN SODIUM 40 MG: 40 INJECTION SUBCUTANEOUS at 00:35

## 2025-01-01 RX ADMIN — PIPERACILLIN SODIUM AND TAZOBACTAM SODIUM 3.38 G: 3; .375 INJECTION, SOLUTION INTRAVENOUS at 18:02

## 2025-01-01 RX ADMIN — PIPERACILLIN SODIUM AND TAZOBACTAM SODIUM 3.38 G: 3; .375 INJECTION, SOLUTION INTRAVENOUS at 00:36

## 2025-01-01 RX ADMIN — INSULIN LISPRO 3 UNITS: 100 INJECTION, SOLUTION INTRAVENOUS; SUBCUTANEOUS at 12:14

## 2025-01-01 RX ADMIN — BUDESONIDE INHALATION 0.5 MG: 0.5 SUSPENSION RESPIRATORY (INHALATION) at 07:38

## 2025-01-01 RX ADMIN — POLYETHYLENE GLYCOL 3350 17 G: 17 POWDER, FOR SOLUTION ORAL at 08:44

## 2025-01-01 RX ADMIN — GUAIFENESIN 600 MG: 600 TABLET, MULTILAYER, EXTENDED RELEASE ORAL at 08:44

## 2025-01-01 SDOH — HEALTH STABILITY: MENTAL HEALTH: HOW MANY DRINKS CONTAINING ALCOHOL DO YOU HAVE ON A TYPICAL DAY WHEN YOU ARE DRINKING?: PATIENT DOES NOT DRINK

## 2025-01-01 SDOH — SOCIAL STABILITY: SOCIAL INSECURITY: DO YOU FEEL UNSAFE GOING BACK TO THE PLACE WHERE YOU ARE LIVING?: NO

## 2025-01-01 SDOH — SOCIAL STABILITY: SOCIAL INSECURITY: WERE YOU ABLE TO COMPLETE ALL THE BEHAVIORAL HEALTH SCREENINGS?: YES

## 2025-01-01 SDOH — SOCIAL STABILITY: SOCIAL INSECURITY: ARE YOU OR HAVE YOU BEEN THREATENED OR ABUSED PHYSICALLY, EMOTIONALLY, OR SEXUALLY BY ANYONE?: NO

## 2025-01-01 SDOH — ECONOMIC STABILITY: HOUSING INSECURITY: IN THE LAST 12 MONTHS, WAS THERE A TIME WHEN YOU WERE NOT ABLE TO PAY THE MORTGAGE OR RENT ON TIME?: NO

## 2025-01-01 SDOH — SOCIAL STABILITY: SOCIAL INSECURITY: WITHIN THE LAST YEAR, HAVE YOU BEEN AFRAID OF YOUR PARTNER OR EX-PARTNER?: NO

## 2025-01-01 SDOH — ECONOMIC STABILITY: FOOD INSECURITY: WITHIN THE PAST 12 MONTHS, THE FOOD YOU BOUGHT JUST DIDN'T LAST AND YOU DIDN'T HAVE MONEY TO GET MORE.: NEVER TRUE

## 2025-01-01 SDOH — ECONOMIC STABILITY: FOOD INSECURITY: HOW HARD IS IT FOR YOU TO PAY FOR THE VERY BASICS LIKE FOOD, HOUSING, MEDICAL CARE, AND HEATING?: NOT VERY HARD

## 2025-01-01 SDOH — HEALTH STABILITY: MENTAL HEALTH: HOW OFTEN DO YOU HAVE SIX OR MORE DRINKS ON ONE OCCASION?: LESS THAN MONTHLY

## 2025-01-01 SDOH — SOCIAL STABILITY: SOCIAL INSECURITY: HAS ANYONE EVER THREATENED TO HURT YOUR FAMILY OR YOUR PETS?: NO

## 2025-01-01 SDOH — SOCIAL STABILITY: SOCIAL INSECURITY
WITHIN THE LAST YEAR, HAVE YOU BEEN KICKED, HIT, SLAPPED, OR OTHERWISE PHYSICALLY HURT BY YOUR PARTNER OR EX-PARTNER?: NO

## 2025-01-01 SDOH — HEALTH STABILITY: PHYSICAL HEALTH
HOW OFTEN DO YOU NEED TO HAVE SOMEONE HELP YOU WHEN YOU READ INSTRUCTIONS, PAMPHLETS, OR OTHER WRITTEN MATERIAL FROM YOUR DOCTOR OR PHARMACY?: NEVER

## 2025-01-01 SDOH — SOCIAL STABILITY: SOCIAL INSECURITY
WITHIN THE LAST YEAR, HAVE YOU BEEN RAPED OR FORCED TO HAVE ANY KIND OF SEXUAL ACTIVITY BY YOUR PARTNER OR EX-PARTNER?: NO

## 2025-01-01 SDOH — SOCIAL STABILITY: SOCIAL INSECURITY: HAVE YOU HAD ANY THOUGHTS OF HARMING ANYONE ELSE?: NO

## 2025-01-01 SDOH — SOCIAL STABILITY: SOCIAL INSECURITY: WITHIN THE LAST YEAR, HAVE YOU BEEN HUMILIATED OR EMOTIONALLY ABUSED IN OTHER WAYS BY YOUR PARTNER OR EX-PARTNER?: NO

## 2025-01-01 SDOH — ECONOMIC STABILITY: HOUSING INSECURITY: AT ANY TIME IN THE PAST 12 MONTHS, WERE YOU HOMELESS OR LIVING IN A SHELTER (INCLUDING NOW)?: NO

## 2025-01-01 SDOH — HEALTH STABILITY: MENTAL HEALTH: HOW OFTEN DO YOU HAVE A DRINK CONTAINING ALCOHOL?: MONTHLY OR LESS

## 2025-01-01 SDOH — ECONOMIC STABILITY: FOOD INSECURITY: WITHIN THE PAST 12 MONTHS, YOU WORRIED THAT YOUR FOOD WOULD RUN OUT BEFORE YOU GOT THE MONEY TO BUY MORE.: NEVER TRUE

## 2025-01-01 SDOH — SOCIAL STABILITY: SOCIAL INSECURITY: DO YOU FEEL ANYONE HAS EXPLOITED OR TAKEN ADVANTAGE OF YOU FINANCIALLY OR OF YOUR PERSONAL PROPERTY?: NO

## 2025-01-01 SDOH — ECONOMIC STABILITY: HOUSING INSECURITY: IN THE PAST 12 MONTHS, HOW MANY TIMES HAVE YOU MOVED WHERE YOU WERE LIVING?: 0

## 2025-01-01 SDOH — SOCIAL STABILITY: SOCIAL INSECURITY: DOES ANYONE TRY TO KEEP YOU FROM HAVING/CONTACTING OTHER FRIENDS OR DOING THINGS OUTSIDE YOUR HOME?: NO

## 2025-01-01 SDOH — HEALTH STABILITY: PHYSICAL HEALTH: ON AVERAGE, HOW MANY MINUTES DO YOU ENGAGE IN EXERCISE AT THIS LEVEL?: 40 MIN

## 2025-01-01 SDOH — HEALTH STABILITY: PHYSICAL HEALTH: ON AVERAGE, HOW MANY DAYS PER WEEK DO YOU ENGAGE IN MODERATE TO STRENUOUS EXERCISE (LIKE A BRISK WALK)?: 3 DAYS

## 2025-01-01 SDOH — SOCIAL STABILITY: SOCIAL INSECURITY: ARE THERE ANY APPARENT SIGNS OF INJURIES/BEHAVIORS THAT COULD BE RELATED TO ABUSE/NEGLECT?: NO

## 2025-01-01 SDOH — SOCIAL STABILITY: SOCIAL INSECURITY: ABUSE: ADULT

## 2025-01-01 SDOH — ECONOMIC STABILITY: TRANSPORTATION INSECURITY: IN THE PAST 12 MONTHS, HAS LACK OF TRANSPORTATION KEPT YOU FROM MEDICAL APPOINTMENTS OR FROM GETTING MEDICATIONS?: NO

## 2025-01-01 SDOH — SOCIAL STABILITY: SOCIAL INSECURITY: HAVE YOU HAD THOUGHTS OF HARMING ANYONE ELSE?: NO

## 2025-01-01 SDOH — ECONOMIC STABILITY: INCOME INSECURITY: IN THE PAST 12 MONTHS HAS THE ELECTRIC, GAS, OIL, OR WATER COMPANY THREATENED TO SHUT OFF SERVICES IN YOUR HOME?: NO

## 2025-01-01 ASSESSMENT — LIFESTYLE VARIABLES
HOW OFTEN DO YOU HAVE 6 OR MORE DRINKS ON ONE OCCASION: NEVER
HOW OFTEN DO YOU HAVE A DRINK CONTAINING ALCOHOL: NEVER
HOW MANY STANDARD DRINKS CONTAINING ALCOHOL DO YOU HAVE ON A TYPICAL DAY: PATIENT DOES NOT DRINK
AUDIT-C TOTAL SCORE: 0
SKIP TO QUESTIONS 9-10: 1
AUDIT-C TOTAL SCORE: 0
SKIP TO QUESTIONS 9-10: 0
AUDIT-C TOTAL SCORE: 2

## 2025-01-01 ASSESSMENT — ENCOUNTER SYMPTOMS
DIZZINESS: 0
ABDOMINAL PAIN: 0
SHORTNESS OF BREATH: 1
MYALGIAS: 0
UNEXPECTED WEIGHT CHANGE: 0
CHEST TIGHTNESS: 0
RHINORRHEA: 0
DYSURIA: 0
SORE THROAT: 0
BLOOD IN STOOL: 0
NAUSEA: 0
CHILLS: 0
ADENOPATHY: 0
DIARRHEA: 0
ABDOMINAL DISTENTION: 0
FEVER: 0
VOMITING: 0
LIGHT-HEADEDNESS: 0

## 2025-01-01 ASSESSMENT — PAIN - FUNCTIONAL ASSESSMENT: PAIN_FUNCTIONAL_ASSESSMENT: 0-10

## 2025-01-01 ASSESSMENT — PATIENT HEALTH QUESTIONNAIRE - PHQ9
SUM OF ALL RESPONSES TO PHQ9 QUESTIONS 1 & 2: 0
2. FEELING DOWN, DEPRESSED OR HOPELESS: NOT AT ALL
1. LITTLE INTEREST OR PLEASURE IN DOING THINGS: NOT AT ALL

## 2025-01-01 ASSESSMENT — ACTIVITIES OF DAILY LIVING (ADL)
JUDGMENT_ADEQUATE_SAFELY_COMPLETE_DAILY_ACTIVITIES: YES
WALKS IN HOME: INDEPENDENT
FEEDING YOURSELF: INDEPENDENT
GROOMING: INDEPENDENT
LACK_OF_TRANSPORTATION: NO
DRESSING YOURSELF: INDEPENDENT
TOILETING: INDEPENDENT
ADEQUATE_TO_COMPLETE_ADL: YES
BATHING: INDEPENDENT
PATIENT'S MEMORY ADEQUATE TO SAFELY COMPLETE DAILY ACTIVITIES?: YES
HEARING - LEFT EAR: FUNCTIONAL
HEARING - RIGHT EAR: FUNCTIONAL

## 2025-01-01 ASSESSMENT — COGNITIVE AND FUNCTIONAL STATUS - GENERAL
MOBILITY SCORE: 24
DAILY ACTIVITIY SCORE: 24
PATIENT BASELINE BEDBOUND: NO

## 2025-01-01 ASSESSMENT — PAIN SCALES - GENERAL
PAINLEVEL_OUTOF10: 0 - NO PAIN
PAINLEVEL_OUTOF10: 0 - NO PAIN

## 2025-01-01 ASSESSMENT — PAIN SCALES - WONG BAKER: WONGBAKER_NUMERICALRESPONSE: NO HURT

## 2025-01-01 NOTE — CARE PLAN
Problem: Pain - Adult  Goal: Verbalizes/displays adequate comfort level or baseline comfort level  Outcome: Progressing     Problem: Safety - Adult  Goal: Free from fall injury  Outcome: Progressing     Problem: Discharge Planning  Goal: Discharge to home or other facility with appropriate resources  Outcome: Progressing     Problem: Chronic Conditions and Co-morbidities  Goal: Patient's chronic conditions and co-morbidity symptoms are monitored and maintained or improved  Outcome: Progressing     Problem: Fall/Injury  Goal: Not fall by end of shift  Outcome: Progressing  Goal: Be free from injury by end of the shift  Outcome: Progressing  Goal: Verbalize understanding of personal risk factors for fall in the hospital  Outcome: Progressing  Goal: Verbalize understanding of risk factor reduction measures to prevent injury from fall in the home  Outcome: Progressing  Goal: Use assistive devices by end of the shift  Outcome: Progressing  Goal: Pace activities to prevent fatigue by end of the shift  Outcome: Progressing     Problem: Respiratory  Goal: Clear secretions with interventions this shift  Outcome: Progressing  Goal: Minimize anxiety/maximize coping throughout shift  Outcome: Progressing  Goal: Minimal/no exertional discomfort or dyspnea this shift  Outcome: Progressing  Goal: No signs of respiratory distress (eg. Use of accessory muscles. Peds grunting)  Outcome: Progressing  Goal: Patent airway maintained this shift  Outcome: Progressing  Goal: Tolerate mechanical ventilation evidenced by VS/agitation level this shift  Outcome: Progressing  Goal: Tolerate pulmonary toileting this shift  Outcome: Progressing  Goal: Verbalize decreased shortness of breath this shift  Outcome: Progressing  Goal: Wean oxygen to maintain O2 saturation per order/standard this shift  Outcome: Progressing  Goal: Increase self care and/or family involvement in next 24 hours  Outcome: Progressing

## 2025-01-01 NOTE — ASSESSMENT & PLAN NOTE
Titrate O2 as needed to maintain SaO2 - on high flow   Consult pulmonology   ABG completed   IV steroids   Inhaled steroids   Duonebs scheduled and PRN   CT PE negative for PE, pneumothorax, consolidation

## 2025-01-01 NOTE — ASSESSMENT & PLAN NOTE
Titrate O2 as needed - on high flow   ABG done   Inhalers as above   Admit to SDU   Consult pulmonology

## 2025-01-01 NOTE — PROGRESS NOTES
"Billie Hernadez is a 69 y.o. female on day 1 of admission presenting with COPD exacerbation (Multi).    Subjective   Patient resting in bed. Feels her breathing is improved with high flow oxygen. Denies chest pain, shortness of breath, abdominal pain, fevers, chills.        Objective     Physical Exam  Constitutional:       Appearance: She is ill-appearing.   HENT:      Head: Normocephalic and atraumatic.      Mouth/Throat:      Mouth: Mucous membranes are moist.      Pharynx: Oropharynx is clear.   Eyes:      Extraocular Movements: Extraocular movements intact.      Conjunctiva/sclera: Conjunctivae normal.      Pupils: Pupils are equal, round, and reactive to light.   Cardiovascular:      Rate and Rhythm: Normal rate and regular rhythm.      Pulses: Normal pulses.      Heart sounds: Normal heart sounds.   Pulmonary:      Effort: Tachypnea present.      Breath sounds: Wheezing and rhonchi present.   Abdominal:      General: Bowel sounds are normal.      Palpations: Abdomen is soft.      Tenderness: There is no abdominal tenderness.   Musculoskeletal:         General: Normal range of motion.      Cervical back: Normal range of motion and neck supple.   Skin:     General: Skin is warm and dry.      Capillary Refill: Capillary refill takes less than 2 seconds.   Neurological:      General: No focal deficit present.      Mental Status: She is alert and oriented to person, place, and time.   Psychiatric:         Mood and Affect: Mood normal.         Behavior: Behavior normal.         Last Recorded Vitals  Blood pressure 109/65, pulse 88, temperature 36.6 °C (97.9 °F), temperature source Temporal, resp. rate 16, height 1.6 m (5' 3\"), weight 45.4 kg (100 lb), SpO2 96%.  Intake/Output last 3 Shifts:  I/O last 3 completed shifts:  In: 350 (7.7 mL/kg) [IV Piggyback:350]  Out: - (0 mL/kg)   Weight: 45.4 kg     Relevant Results  Results for orders placed or performed during the hospital encounter of 12/31/24 (from the past 24 " hours)   CBC and Auto Differential   Result Value Ref Range    WBC 5.4 4.4 - 11.3 x10*3/uL    nRBC 0.0 0.0 - 0.0 /100 WBCs    RBC 3.95 (L) 4.00 - 5.20 x10*6/uL    Hemoglobin 12.7 12.0 - 16.0 g/dL    Hematocrit 38.7 36.0 - 46.0 %    MCV 98 80 - 100 fL    MCH 32.2 26.0 - 34.0 pg    MCHC 32.8 32.0 - 36.0 g/dL    RDW 13.9 11.5 - 14.5 %    Platelets 307 150 - 450 x10*3/uL    Neutrophils % 55.0 40.0 - 80.0 %    Immature Granulocytes %, Automated 0.4 0.0 - 0.9 %    Lymphocytes % 31.0 13.0 - 44.0 %    Monocytes % 11.8 2.0 - 10.0 %    Eosinophils % 1.1 0.0 - 6.0 %    Basophils % 0.7 0.0 - 2.0 %    Neutrophils Absolute 2.94 1.60 - 5.50 x10*3/uL    Immature Granulocytes Absolute, Automated 0.02 0.00 - 0.50 x10*3/uL    Lymphocytes Absolute 1.66 0.80 - 3.00 x10*3/uL    Monocytes Absolute 0.63 0.05 - 0.80 x10*3/uL    Eosinophils Absolute 0.06 0.00 - 0.40 x10*3/uL    Basophils Absolute 0.04 0.00 - 0.10 x10*3/uL   Comprehensive Metabolic Panel   Result Value Ref Range    Glucose 93 74 - 99 mg/dL    Sodium 131 (L) 136 - 145 mmol/L    Potassium 4.4 3.5 - 5.3 mmol/L    Chloride 93 (L) 98 - 107 mmol/L    Bicarbonate 31 21 - 32 mmol/L    Anion Gap 11 10 - 20 mmol/L    Urea Nitrogen 5 (L) 6 - 23 mg/dL    Creatinine 0.45 (L) 0.50 - 1.05 mg/dL    eGFR >90 >60 mL/min/1.73m*2    Calcium 8.3 (L) 8.6 - 10.3 mg/dL    Albumin 3.6 3.4 - 5.0 g/dL    Alkaline Phosphatase 67 33 - 136 U/L    Total Protein 6.4 6.4 - 8.2 g/dL    AST 21 9 - 39 U/L    Bilirubin, Total 0.4 0.0 - 1.2 mg/dL    ALT 15 7 - 45 U/L   Lactate   Result Value Ref Range    Lactate 0.8 0.4 - 2.0 mmol/L   Blood Culture    Specimen: Peripheral Venipuncture; Blood culture   Result Value Ref Range    Blood Culture Loaded on Instrument - Culture in progress    Blood Culture    Specimen: Peripheral Venipuncture; Blood culture   Result Value Ref Range    Blood Culture Loaded on Instrument - Culture in progress    B-type natriuretic peptide   Result Value Ref Range     (H) 0 - 99  pg/mL   Sars-CoV-2 and Influenza A/B PCR   Result Value Ref Range    Flu A Result Not Detected Not Detected    Flu B Result Not Detected Not Detected    Coronavirus 2019, PCR Not Detected Not Detected   RSV PCR   Result Value Ref Range    RSV PCR Not Detected Not Detected   Blood Gas Arterial Full Panel   Result Value Ref Range    POCT pH, Arterial 7.34 (L) 7.38 - 7.42 pH    POCT pCO2, Arterial 63 (H) 38 - 42 mm Hg    POCT pO2, Arterial 74 (L) 85 - 95 mm Hg    POCT SO2, Arterial 96 94 - 100 %    POCT Oxy Hemoglobin, Arterial 90.7 (L) 94.0 - 98.0 %    POCT Hematocrit Calculated, Arterial 36.0 36.0 - 46.0 %    POCT Sodium, Arterial 129 (L) 136 - 145 mmol/L    POCT Potassium, Arterial 3.9 3.5 - 5.3 mmol/L    POCT Chloride, Arterial 92 (L) 98 - 107 mmol/L    POCT Ionized Calcium, Arterial 1.17 1.10 - 1.33 mmol/L    POCT Glucose, Arterial 122 (H) 74 - 99 mg/dL    POCT Lactate, Arterial 0.5 0.4 - 2.0 mmol/L    POCT Base Excess, Arterial 6.4 (H) -2.0 - 3.0 mmol/L    POCT HCO3 Calculated, Arterial 34.0 (H) 22.0 - 26.0 mmol/L    POCT Hemoglobin, Arterial 12.1 12.0 - 16.0 g/dL    POCT Anion Gap, Arterial 7 (L) 10 - 25 mmo/L    Patient Temperature 37.0 degrees Celsius    FiO2 44 %    Apparatus CANNULA     Flow 6.0 LPM    Site of Arterial Puncture Radial Left     Franki's Test Positive    POCT GLUCOSE   Result Value Ref Range    POCT Glucose 115 (H) 74 - 99 mg/dL   CBC   Result Value Ref Range    WBC 7.8 4.4 - 11.3 x10*3/uL    nRBC 0.0 0.0 - 0.0 /100 WBCs    RBC 3.79 (L) 4.00 - 5.20 x10*6/uL    Hemoglobin 12.1 12.0 - 16.0 g/dL    Hematocrit 36.5 36.0 - 46.0 %    MCV 96 80 - 100 fL    MCH 31.9 26.0 - 34.0 pg    MCHC 33.2 32.0 - 36.0 g/dL    RDW 14.0 11.5 - 14.5 %    Platelets 346 150 - 450 x10*3/uL   Basic metabolic panel   Result Value Ref Range    Glucose 120 (H) 74 - 99 mg/dL    Sodium 132 (L) 136 - 145 mmol/L    Potassium 4.3 3.5 - 5.3 mmol/L    Chloride 93 (L) 98 - 107 mmol/L    Bicarbonate 33 (H) 21 - 32 mmol/L    Anion Gap  10 10 - 20 mmol/L    Urea Nitrogen 7 6 - 23 mg/dL    Creatinine 0.45 (L) 0.50 - 1.05 mg/dL    eGFR >90 >60 mL/min/1.73m*2    Calcium 7.9 (L) 8.6 - 10.3 mg/dL   POCT GLUCOSE   Result Value Ref Range    POCT Glucose 260 (H) 74 - 99 mg/dL     XR chest 1 view    Result Date: 12/31/2024  Interpreted By:  Abimael Child, STUDY: XR CHEST 1 VIEW;  12/31/2024 6:43 pm   INDICATION: Signs/Symptoms:shortness of breath.     COMPARISON: 12/31/2024 at 2:21 p.m.   ACCESSION NUMBER(S): [id number]   ORDERING CLINICIAN: LEWIS HURT   FINDINGS: The known right upper lobe pulmonary nodule is less conspicuous due to obscuration by airspace disease that may be slightly progressed in the right perihilar region. There is complete right middle lobe collapse again noted. There is diffuse bronchial wall thickening bilaterally, background emphysema. No pneumothorax or pleural effusion.       Known, likely malignant, right upper lobe pulmonary nodule is less conspicuous, suggesting slight progression of airspace disease in the right perihilar region. Short-term radiographic follow-up recommended if there is persistent concern for worsening pneumonia.     MACRO: None.   Signed by: Abimael Child 12/31/2024 7:06 PM Dictation workstation:   [id number]    CT angio chest for pulmonary embolism    Result Date: 12/31/2024  Interpreted By:  Brianna Linares, STUDY: CT ANGIO CHEST FOR PULMONARY EMBOLISM;  12/31/2024 4:20 pm   INDICATION: Signs/Symptoms:concern for pe. hx of cancer and new onset hypoxia.   COMPARISON: 11/21/2024   ACCESSION NUMBER(S): [id number]   ORDERING CLINICIAN: WALT COOK   TECHNIQUE: Contiguous axial images of the chest were obtained after the intravenous administration of contrast using angiographic PE protocol. Coronal and sagittal reformatted images were reconstructed from the axial data. MIP images were created and reviewed.   FINDINGS: MEDIASTINUM AND LYMPH NODES: The right paratracheal lymph node measures  up to 14 mm additional subcarinal and hilar lymph nodes are again noted. No pneumomediastinum.   VESSELS:  Normal caliber aorta without with aortic atherosclerosis. The main pulmonary artery measures up to 3 cm, suggestive of pulmonary hypertension. No pulmonary embolism identified to the segmental level.   HEART: Normal in size.  No coronary artery calcifications. No significant pericardial effusion.   LUNG, AIRWAYS, AND PLEURA: Redemonstrated moderate central lobular emphysematous changes. A spiculated nodules/masses in the right upper lobe measures 2.0 x 1.7 cm, enlarged from prior imaging previously measuring 1.7 x 1.6 cm (series 5, image 88). A right lower lobe nodule measures 7 mm, previously 5 mm (image 99). A 3 mm medial right lower lobe nodule is similar to prior imaging (120). A previously measured left subpleural lower lobe nodule is obscured by adjacent lung space opacities likely atelectasis.   There is a new 9 mm nodule in the anterior right lower lobe and a new 7 mm nodule in the posterior right lower lobe (image 132 and 126 respectively). Right basilar opacities are progressed from prior imaging and may also obscure additional nodules. There is progression of fluid in the bilateral bronchi with expansile mucous plugging, greater on the right. Redemonstrated right middle lobe atelectasis with opacification of the central bronchi. No sizable pleural effusion or pneumothorax.   OSSEOUS STRUCTURES/CHEST WALL:  No acute osseous abnormality.   UPPER ABDOMEN/OTHER: Dense atherosclerotic calcification in the upper abdominal aorta extending into the right renal artery otherwise grossly patent. Thickening noted of the left adrenal gland.       No acute pulmonary embolism to the segmental level.   Emphysema with scattered pulmonary nodules which appear stable to enlarged since prior imaging as well as interval development of at least 2 lung nodules measuring up to 9 mm suggestive of disease progression.  Additional nodules may be obscured by lung base opacities.   Interval progression of right greater than left mucous plugging and bibasilar opacities which could represent atelectasis however superimposed aspiration pneumonitis not excluded.   Read demonstrated opacification of the right middle lobe bronchus with associated atelectasis. Bronchoscopy again suggested if not already performed.   Mediastinal lymphadenopathy again suggestive of metastatic process.   MACRO: None.   Signed by: Brianna Linares 12/31/2024 5:07 PM Dictation workstation:   [id number]    XR chest 1 view    Result Date: 12/31/2024  Interpreted By:  Owen Marti, STUDY: XR CHEST 1 VIEW; 12/31/2024 2:53 pm   INDICATION: CLINICAL INFORMATION: Signs/Symptoms:Shortness of breath.   COMPARISON: 11/21/2024   ACCESSION NUMBER(S): [id number]   ORDERING CLINICIAN: KATLYN KAUFFMAN   TECHNIQUE: Portable chest one view.   FINDINGS: The cardiac size is indeterminate in view of the AP projection. There is a 2.7 cm mass lateral to the right hilum, not accounting for magnification. The aorta is tortuous. No infiltrates or effusions are identified.  Incidental note is made of what I believe is a nipple shadow overlying the left base.       2.7 cm mass lateral to the right hilum. This has been evaluated on previous PET-CT. This is likely neoplastic.   MACRO: none   Signed by: Owen Marti 12/31/2024 3:29 PM Dictation workstation:   [id number]       Assessment/Plan   Assessment & Plan  COPD exacerbation (Multi)  Titrate O2 as needed to maintain SaO2 - on high flow   Consult pulmonology   ABG completed   IV steroids   Inhaled steroids   Duonebs scheduled and PRN   CT PE negative for PE, pneumothorax, consolidation  HTN (hypertension)  Continue home losartan dose   Malignant neoplasm of upper lobe of right lung (Multi)  Following with [facility].   Received 1st treatments 12/17  Biopsy 12/31  Acute respiratory failure with hypoxia (Multi)  Titrate O2 as needed  - on high flow   ABG done   Inhalers as above   Admit to SDU   Consult pulmonology   Pneumonia due to infectious organism  IV Zosyn and Azithromycin   Recently received chemotherapy on 12/17  IV steroids   Aerosols as ordered     DVT prophylaxis   Lovenox     Elisha Dubois, APRN-CNP

## 2025-01-01 NOTE — CONSULTS
Inpatient consult to Pulmonology  Consult performed by: Paolo Carlisle MD  Consult ordered by: KAREN Kirk-CNP  Reason for consult: Concern for COPD exacerbation versus pneumonia      Department of Medicine  Division of Pulmonary, Critical Care, and Sleep Medicine  Consultation Note    No ref. provider found    History Of Present Illness:    Billie Hernadez is a 69 y.o. female with a past medical history of COPD, and right non-small cell lung cancer diagnosed status post bronchoscopy on 11/2024, prior PE not on anticoagulation who presents to the ED with complaints of shortness of breath.  Pulmonary was consulted for further management of acute hypoxic respiratory failure.    Per chart review patient was coming in for a lymph node biopsy and was found to be hypoxic and was directed to come to the ED.  She states that she has been having some increasing shortness of breath over the last few days.  Does endorse a cough that is productive of sputum.  Denies fevers and chills and chest pain.  Per patient she is on room air at baseline.  She required 3 L to obtain a SpO2 greater than 92%.    ED workup was performed laboratory studies and imaging studies.  Patient was found to be hyperglycemic to 120, mildly hyponatremic to 132, hypochloremic to 93, elevated bicarb of 33.  Hypocalcemia at 7.9.  No albumin collected for correction.  CBC shows no leukocytosis and no anemia.  Patient recently had influenza A, B, RSV, COVID-19 PCR's performed on 1231 which were negative.  Last ABG noted in the chart showed compensated respiratory acidosis with a pH of 7.34 and a pCO2 of 63 with a PaO2 of 74.  This was on 12/31 a CT PE study was performed yesterday in the ED and showed redemonstration of her right middle lobe.  Mucous plugging and collapse.  Malignancy was also redemonstrated once again.  Furthermore patient has emphysematous changes and multiple nodules.  Lower lobe concerning for possible aspiration versus  atelectasis.     Pt had Vapotherm at bedside but appears to be only on NC. She denies fevers/chills. States she feels better than before.     Review of systems:   Review of Systems   Constitutional:  Negative for chills, fever and unexpected weight change.   HENT:  Negative for congestion, rhinorrhea and sore throat.    Eyes:  Negative for visual disturbance.   Respiratory:  Positive for shortness of breath. Negative for chest tightness.    Cardiovascular:  Negative for chest pain and leg swelling.   Gastrointestinal:  Negative for abdominal distention, abdominal pain, blood in stool, diarrhea, nausea and vomiting.   Endocrine: Negative for cold intolerance and heat intolerance.   Genitourinary:  Negative for dysuria.   Musculoskeletal:  Negative for myalgias.   Skin:  Negative for rash.   Neurological:  Negative for dizziness and light-headedness.   Hematological:  Negative for adenopathy.   Psychiatric/Behavioral:  Negative for behavioral problems.       A 10+ point ROS was completed and otherwise negative except as noted above and per HPI.    Past Medical History:  Past Medical History:   Diagnosis Date    Cancer (Multi)     Chronic obstructive pulmonary disease with (acute) exacerbation (Multi) 08/08/2017    COPD exacerbation    Hypertension     Other specified health status 03/12/2015    No known problems    Personal history of other venous thrombosis and embolism 03/12/2015    History of deep venous thrombosis    Pneumonia        Past Surgical History:  Past Surgical History:   Procedure Laterality Date    EXCISION / BIOPSY BREAST / NIPPLE / DUCT Left 05/28/2014    OTHER SURGICAL HISTORY  03/12/2015    Open Treatment Of Tibial Shaft Fracture With Implant    TONSILLECTOMY          Family History:  No family history on file.     Social History:   reports that she has been smoking cigarettes. She started smoking about 52 years ago. She has a 26 pack-year smoking history. She has never used smokeless tobacco. She  reports current alcohol use of about 2.0 standard drinks of alcohol per week. She reports that she does not use drugs.    Objective     Last Recorded Vitals:  Vitals:    01/01/25 0712 01/01/25 0738 01/01/25 0816 01/01/25 1100   BP:   113/77 109/65   BP Location:   Right arm Right arm   Patient Position:    Lying   Pulse:       Resp:   16 16   Temp:   36.7 °C (98.1 °F) 36.6 °C (97.9 °F)   TempSrc:   Temporal Temporal   SpO2: 98% 92% 94% 96%   Weight:       Height:           Oxygen Therapy  SpO2: 96 %  Medical Gas Therapy: Supplemental oxygen  Medical Gas Delivery Method: Nasal cannula (5lpm)       Physical Exam:  Physical Exam  Constitutional:       General: She is not in acute distress.     Appearance: She is not toxic-appearing.   HENT:      Head: Normocephalic and atraumatic.      Nose: Nose normal.      Comments: NC in place      Mouth/Throat:      Pharynx: Oropharynx is clear.   Eyes:      Extraocular Movements: Extraocular movements intact.   Neck:      Comments: No visualized masses  Cardiovascular:      Rate and Rhythm: Normal rate and regular rhythm.      Heart sounds: No murmur heard.     No friction rub. No gallop.   Pulmonary:      Effort: No respiratory distress.      Breath sounds: Normal breath sounds. No wheezing, rhonchi or rales.   Abdominal:      General: There is no distension.      Palpations: Abdomen is soft.      Tenderness: There is no abdominal tenderness. There is no guarding.   Musculoskeletal:         General: No tenderness.      Right lower leg: No edema.      Left lower leg: No edema.   Skin:     General: Skin is warm and dry.   Neurological:      General: No focal deficit present.      Mental Status: She is alert and oriented to person, place, and time.   Psychiatric:         Mood and Affect: Mood normal.         Allergies:  No Known Allergies    Inpatient Medications:  azithromycin, 500 mg, intravenous, q24h  budesonide, 0.5 mg, nebulization, BID  docusate sodium, 100 mg, oral,  BID  enoxaparin, 40 mg, subcutaneous, Nightly  guaiFENesin, 600 mg, oral, BID  insulin lispro, 0-5 Units, subcutaneous, TID AC  ipratropium-albuteroL, 3 mL, nebulization, q6h  losartan, 25 mg, oral, Daily  methylPREDNISolone sodium succinate (PF), 40 mg, intravenous, q12h  piperacillin-tazobactam, 3.375 g, intravenous, q6h  polyethylene glycol, 17 g, oral, Daily  tiotropium, 2 puff, inhalation, Daily         PRN medications: acetaminophen, bisacodyl, dextrose, dextrose, glucagon, glucagon, ipratropium-albuteroL, magnesium hydroxide, ondansetron **OR** ondansetron, oxygen    Outpatient Medications:  Prior to Admission medications    Medication Sig Start Date End Date Taking? Authorizing Provider   albuterol 90 mcg/actuation inhaler USE 1 TO 2 INHALATIONS BY  MOUTH EVERY 4 TO 6 HOURS AS NEEDED 12/18/23  Yes MOR Holliday   alendronate (Fosamax) 70 mg tablet Take 1 tablet (70 mg) by mouth 1 (one) time per week. Take in the morning with a full glass of water, on an empty stomach, and do not take anything else by mouth or lie down for the next 30 min. 12/18/23  Yes MOR Holliday   calcium citrate-vitamin D2 250 mg-2.5 mcg (100 unit) tablet Take 1 tablet by mouth once daily.   Yes Historical Provider, MD   losartan (Cozaar) 25 mg tablet TAKE ONE TABLET BY MOUTH ONCE DAILY 1/22/24  Yes MOR Holliday   multivitamin with minerals tablet Take 1 tablet by mouth once daily.   Yes Historical Provider, MD   Spiriva with HandiHaler 18 mcg inhalation capsule place ONE CAPSULE into inhaler AND inhale ONCE DAILY 6/24/24  Yes MOR Holliday   dexAMETHasone (Decadron) 4 mg tablet Take 2 tablets (8 mg) by mouth once daily. For 3 days starting the day after treatment. 12/3/24   Judith Santillan MD   OLANZapine (ZyPREXA) 5 mg tablet Take 1 tablet (5 mg) by mouth once daily at bedtime. For 4 days starting the evening of treatment. 12/3/24   Judith Santillan MD   ondansetron (Zofran)  8 mg tablet Take 1 tablet (8 mg) by mouth every 8 hours if needed for nausea or vomiting. 12/3/24   Judith Santillan MD   prochlorperazine (Compazine) 10 mg tablet Take 1 tablet (10 mg) by mouth every 6 hours if needed for nausea or vomiting. 12/3/24   Judith Santillan MD       LABS/IMAGING/DIAGNOSTICS REVIEW:    Labs:  Lab Results   Component Value Date    WBC 7.8 01/01/2025    HGB 12.1 01/01/2025    HCT 36.5 01/01/2025    MCV 96 01/01/2025     01/01/2025      Lab Results   Component Value Date    GLUCOSE 120 (H) 01/01/2025    CALCIUM 7.9 (L) 01/01/2025     (L) 01/01/2025    K 4.3 01/01/2025    CO2 33 (H) 01/01/2025    CL 93 (L) 01/01/2025    BUN 7 01/01/2025    CREATININE 0.45 (L) 01/01/2025      Lab Results   Component Value Date    ALT 15 12/31/2024    AST 21 12/31/2024    ALKPHOS 67 12/31/2024    BILITOT 0.4 12/31/2024        CT angio chest for pulmonary embolism 12/31/2024    Narrative  Interpreted By:  Brianna Linares,  STUDY:  CT ANGIO CHEST FOR PULMONARY EMBOLISM;  12/31/2024 4:20 pm    INDICATION:  Signs/Symptoms:concern for pe. hx of cancer and new onset hypoxia.    COMPARISON:  11/21/2024    ACCESSION NUMBER(S):  [id number]    ORDERING CLINICIAN:  WALT COOK    TECHNIQUE:  Contiguous axial images of the chest were obtained after the  intravenous administration of contrast using angiographic PE  protocol. Coronal and sagittal reformatted images were reconstructed  from the axial data. MIP images were created and reviewed.    FINDINGS:  MEDIASTINUM AND LYMPH NODES: The right paratracheal lymph node  measures up to 14 mm additional subcarinal and hilar lymph nodes are  again noted. No pneumomediastinum.    VESSELS:  Normal caliber aorta without with aortic atherosclerosis.  The main pulmonary artery measures up to 3 cm, suggestive of  pulmonary hypertension. No pulmonary embolism identified to the  segmental level.    HEART: Normal in size.  No coronary artery calcifications.  No  significant pericardial effusion.    LUNG, AIRWAYS, AND PLEURA: Redemonstrated moderate central lobular  emphysematous changes. A spiculated nodules/masses in the right upper  lobe measures 2.0 x 1.7 cm, enlarged from prior imaging previously  measuring 1.7 x 1.6 cm (series 5, image 88). A right lower lobe  nodule measures 7 mm, previously 5 mm (image 99). A 3 mm medial right  lower lobe nodule is similar to prior imaging (120). A previously  measured left subpleural lower lobe nodule is obscured by adjacent  lung space opacities likely atelectasis.    There is a new 9 mm nodule in the anterior right lower lobe and a new  7 mm nodule in the posterior right lower lobe (image 132 and 126  respectively). Right basilar opacities are progressed from prior  imaging and may also obscure additional nodules. There is progression  of fluid in the bilateral bronchi with expansile mucous plugging,  greater on the right. Redemonstrated right middle lobe atelectasis  with opacification of the central bronchi. No sizable pleural  effusion or pneumothorax.    OSSEOUS STRUCTURES/CHEST WALL:  No acute osseous abnormality.    UPPER ABDOMEN/OTHER: Dense atherosclerotic calcification in the upper  abdominal aorta extending into the right renal artery otherwise  grossly patent. Thickening noted of the left adrenal gland.    Impression  No acute pulmonary embolism to the segmental level.    Emphysema with scattered pulmonary nodules which appear stable to  enlarged since prior imaging as well as interval development of at  least 2 lung nodules measuring up to 9 mm suggestive of disease  progression. Additional nodules may be obscured by lung base  opacities.    Interval progression of right greater than left mucous plugging and  bibasilar opacities which could represent atelectasis however  superimposed aspiration pneumonitis not excluded.    Read demonstrated opacification of the right middle lobe bronchus  with associated  atelectasis. Bronchoscopy again suggested if not  already performed.    Mediastinal lymphadenopathy again suggestive of metastatic process.    MACRO:  None.    Signed by: Brianna Linares 12/31/2024 5:07 PM  Dictation workstation:   [id number]    XR chest 1 view 12/31/2024    Narrative  Interpreted By:  Abimael Child,  STUDY:  XR CHEST 1 VIEW;  12/31/2024 6:43 pm    INDICATION:  Signs/Symptoms:shortness of breath.      COMPARISON:  12/31/2024 at 2:21 p.m.    ACCESSION NUMBER(S):  [id number]    ORDERING CLINICIAN:  LEWIS HURT    FINDINGS:  The known right upper lobe pulmonary nodule is less conspicuous due  to obscuration by airspace disease that may be slightly progressed in  the right perihilar region. There is complete right middle lobe  collapse again noted. There is diffuse bronchial wall thickening  bilaterally, background emphysema. No pneumothorax or pleural  effusion.    Impression  Known, likely malignant, right upper lobe pulmonary nodule is less  conspicuous, suggesting slight progression of airspace disease in the  right perihilar region. Short-term radiographic follow-up recommended  if there is persistent concern for worsening pneumonia.      MACRO:  None.    Signed by: Abimael Child 12/31/2024 7:06 PM  Dictation workstation:   [id number]      XR chest 1 view 12/31/2024    Narrative  Interpreted By:  Owen Marti,  STUDY:  XR CHEST 1 VIEW; 12/31/2024 2:53 pm    INDICATION:  CLINICAL INFORMATION: Signs/Symptoms:Shortness of breath.    COMPARISON:  11/21/2024    ACCESSION NUMBER(S):  [id number]    ORDERING CLINICIAN:  KATLYN KAUFFMAN    TECHNIQUE:  Portable chest one view.    FINDINGS:  The cardiac size is indeterminate in view of the AP projection.  There is a 2.7 cm mass lateral to the right hilum, not accounting for  magnification. The aorta is tortuous. No infiltrates or effusions are  identified.  Incidental note is made of what I believe is a nipple  shadow overlying the left  "base.    Impression  2.7 cm mass lateral to the right hilum. This has been evaluated on  previous PET-CT. This is likely neoplastic.    MACRO:  none    Signed by: Owen Marti 12/31/2024 3:29 PM  Dictation workstation:   [id number]      Pulmonary Functions Testing Results:  No results found for: \"FEV1\", \"FVC\", \"DMZ4EMN\", \"TLC\", \"DLCO\"     Relevant imaging results were personally reviewed.      Assessment/Plan     Billie Hernadez is a 69 y.o. female with a past medical history of COPD, and right non-small cell lung cancer diagnosed status post bronchoscopy on 11/2024, prior PE not on anticoagulation who presents to the ED with complaints of shortness of breath.  Pulmonary was consulted for further management of acute hypoxic respiratory failure.    Patient was seen and assessed by myself and his chart was reviewed for previous pulmonary visits, Labs and imaging.  Patient's laboratory studies do not signify an infectious process currently.  However her imaging did redemonstrate her lung malignancy with mucous plugging in the right middle lobe.  There is concern for possible lower lobe aspiration versus atelectasis.  Emphysematous changes noted as well on imaging.  Recommendations are as follows below    #Right non-small cell lung cancer  #History of COPD  #Prior PE not on anticoagulation  #Right middle lobe collapse  #Right lower lobe atelectasis versus aspiration    Recommendations are as follows:  -Titrate FiO2 to SpO2 greater than 88%  -Aggressive bronchopulmonary hygiene with I-S, flutter valve, consider adding MetaNebs if she does not improve with the former.  -Scheduled bronchodilators every 6 hours   -Can continue systemic steroids.  Would recommend against inhaled steroids due to the fact that her using systemic steroid  -Agree with continuing Zosyn for antibiotics.  -No role for tiotropium which were using nebulized bronchodilators.   - PNA workup: MRSA Nares, Strep Pneumo urine ag, Legionella Urine Ag, Resp " Culture, Procal  -Follow-up blood cultures          I spent 60 minutes in the professional and overall care of this patient.    Pulmonary team will continue to follow the patient while they are in house.     Paolo Carlisle MD, MPH  Pulmonary and Critical Care Medicine     Please excuse any typographical or unwanted errors as voice recognition software was used to complete this note.

## 2025-01-02 ENCOUNTER — TELEPHONE (OUTPATIENT)
Dept: HEMATOLOGY/ONCOLOGY | Facility: HOSPITAL | Age: 70
End: 2025-01-02
Payer: MEDICARE

## 2025-01-02 LAB
ANION GAP SERPL CALCULATED.3IONS-SCNC: 7 MMOL/L (ref 10–20)
BUN SERPL-MCNC: 8 MG/DL (ref 6–23)
CALCIUM SERPL-MCNC: 7.3 MG/DL (ref 8.6–10.3)
CHLORIDE SERPL-SCNC: 94 MMOL/L (ref 98–107)
CO2 SERPL-SCNC: 37 MMOL/L (ref 21–32)
CREAT SERPL-MCNC: 0.46 MG/DL (ref 0.5–1.05)
EGFRCR SERPLBLD CKD-EPI 2021: >90 ML/MIN/1.73M*2
ERYTHROCYTE [DISTWIDTH] IN BLOOD BY AUTOMATED COUNT: 14.1 % (ref 11.5–14.5)
GLUCOSE BLD MANUAL STRIP-MCNC: 155 MG/DL (ref 74–99)
GLUCOSE BLD MANUAL STRIP-MCNC: 167 MG/DL (ref 74–99)
GLUCOSE BLD MANUAL STRIP-MCNC: 169 MG/DL (ref 74–99)
GLUCOSE SERPL-MCNC: 127 MG/DL (ref 74–99)
HCT VFR BLD AUTO: 33.5 % (ref 36–46)
HGB BLD-MCNC: 10.7 G/DL (ref 12–16)
LABORATORY COMMENT REPORT: NORMAL
LABORATORY COMMENT REPORT: NORMAL
LEGIONELLA AG UR QL: NEGATIVE
MCH RBC QN AUTO: 31.6 PG (ref 26–34)
MCHC RBC AUTO-ENTMCNC: 31.9 G/DL (ref 32–36)
MCV RBC AUTO: 99 FL (ref 80–100)
NRBC BLD-RTO: 0 /100 WBCS (ref 0–0)
PATH REPORT.FINAL DX SPEC: NORMAL
PATH REPORT.GROSS SPEC: NORMAL
PATH REPORT.TOTAL CANCER: NORMAL
PLATELET # BLD AUTO: 308 X10*3/UL (ref 150–450)
POTASSIUM SERPL-SCNC: 4.7 MMOL/L (ref 3.5–5.3)
PROCALCITONIN SERPL-MCNC: 0.02 NG/ML
PROCALCITONIN SERPL-MCNC: 0.02 NG/ML
RBC # BLD AUTO: 3.39 X10*6/UL (ref 4–5.2)
S PNEUM AG UR QL: NEGATIVE
SODIUM SERPL-SCNC: 133 MMOL/L (ref 136–145)
WBC # BLD AUTO: 12.8 X10*3/UL (ref 4.4–11.3)

## 2025-01-02 PROCEDURE — 2500000004 HC RX 250 GENERAL PHARMACY W/ HCPCS (ALT 636 FOR OP/ED): Performed by: NURSE PRACTITIONER

## 2025-01-02 PROCEDURE — 99232 SBSQ HOSP IP/OBS MODERATE 35: CPT | Performed by: NURSE PRACTITIONER

## 2025-01-02 PROCEDURE — 36415 COLL VENOUS BLD VENIPUNCTURE: CPT | Performed by: NURSE PRACTITIONER

## 2025-01-02 PROCEDURE — 2500000005 HC RX 250 GENERAL PHARMACY W/O HCPCS: Performed by: INTERNAL MEDICINE

## 2025-01-02 PROCEDURE — 94640 AIRWAY INHALATION TREATMENT: CPT

## 2025-01-02 PROCEDURE — 9420000001 HC RT PATIENT EDUCATION 5 MIN

## 2025-01-02 PROCEDURE — 82947 ASSAY GLUCOSE BLOOD QUANT: CPT

## 2025-01-02 PROCEDURE — 1200000002 HC GENERAL ROOM WITH TELEMETRY DAILY

## 2025-01-02 PROCEDURE — 99232 SBSQ HOSP IP/OBS MODERATE 35: CPT | Performed by: INTERNAL MEDICINE

## 2025-01-02 PROCEDURE — 85027 COMPLETE CBC AUTOMATED: CPT | Performed by: NURSE PRACTITIONER

## 2025-01-02 PROCEDURE — 2500000001 HC RX 250 WO HCPCS SELF ADMINISTERED DRUGS (ALT 637 FOR MEDICARE OP): Performed by: NURSE PRACTITIONER

## 2025-01-02 PROCEDURE — 2500000002 HC RX 250 W HCPCS SELF ADMINISTERED DRUGS (ALT 637 FOR MEDICARE OP, ALT 636 FOR OP/ED): Performed by: NURSE PRACTITIONER

## 2025-01-02 PROCEDURE — 80048 BASIC METABOLIC PNL TOTAL CA: CPT | Performed by: NURSE PRACTITIONER

## 2025-01-02 RX ADMIN — IPRATROPIUM BROMIDE AND ALBUTEROL SULFATE 3 ML: 2.5; .5 SOLUTION RESPIRATORY (INHALATION) at 07:39

## 2025-01-02 RX ADMIN — IPRATROPIUM BROMIDE AND ALBUTEROL SULFATE 3 ML: 2.5; .5 SOLUTION RESPIRATORY (INHALATION) at 00:19

## 2025-01-02 RX ADMIN — Medication 470 PERCENT: at 15:53

## 2025-01-02 RX ADMIN — Medication 4 L/MIN: at 22:53

## 2025-01-02 RX ADMIN — Medication 4 L/MIN: at 22:54

## 2025-01-02 RX ADMIN — IPRATROPIUM BROMIDE AND ALBUTEROL SULFATE 3 ML: 2.5; .5 SOLUTION RESPIRATORY (INHALATION) at 22:47

## 2025-01-02 RX ADMIN — METHYLPREDNISOLONE SODIUM SUCCINATE 40 MG: 40 INJECTION, POWDER, FOR SOLUTION INTRAMUSCULAR; INTRAVENOUS at 17:20

## 2025-01-02 RX ADMIN — DOCUSATE SODIUM 100 MG: 100 CAPSULE, LIQUID FILLED ORAL at 20:28

## 2025-01-02 RX ADMIN — DOCUSATE SODIUM 100 MG: 100 CAPSULE, LIQUID FILLED ORAL at 08:23

## 2025-01-02 RX ADMIN — PIPERACILLIN SODIUM AND TAZOBACTAM SODIUM 3.38 G: 3; .375 INJECTION, SOLUTION INTRAVENOUS at 01:57

## 2025-01-02 RX ADMIN — PIPERACILLIN SODIUM AND TAZOBACTAM SODIUM 3.38 G: 3; .375 INJECTION, SOLUTION INTRAVENOUS at 12:02

## 2025-01-02 RX ADMIN — IPRATROPIUM BROMIDE AND ALBUTEROL SULFATE 3 ML: 2.5; .5 SOLUTION RESPIRATORY (INHALATION) at 12:20

## 2025-01-02 RX ADMIN — GUAIFENESIN 600 MG: 600 TABLET, MULTILAYER, EXTENDED RELEASE ORAL at 08:23

## 2025-01-02 RX ADMIN — PIPERACILLIN SODIUM AND TAZOBACTAM SODIUM 3.38 G: 3; .375 INJECTION, SOLUTION INTRAVENOUS at 06:11

## 2025-01-02 RX ADMIN — POLYETHYLENE GLYCOL 3350 17 G: 17 POWDER, FOR SOLUTION ORAL at 08:27

## 2025-01-02 RX ADMIN — METHYLPREDNISOLONE SODIUM SUCCINATE 40 MG: 40 INJECTION, POWDER, FOR SOLUTION INTRAMUSCULAR; INTRAVENOUS at 06:06

## 2025-01-02 RX ADMIN — GUAIFENESIN 600 MG: 600 TABLET, MULTILAYER, EXTENDED RELEASE ORAL at 20:28

## 2025-01-02 RX ADMIN — ENOXAPARIN SODIUM 40 MG: 40 INJECTION SUBCUTANEOUS at 20:28

## 2025-01-02 RX ADMIN — LOSARTAN POTASSIUM 25 MG: 25 TABLET, FILM COATED ORAL at 08:23

## 2025-01-02 RX ADMIN — Medication 6 L/MIN: at 15:58

## 2025-01-02 RX ADMIN — INSULIN LISPRO 1 UNITS: 100 INJECTION, SOLUTION INTRAVENOUS; SUBCUTANEOUS at 12:02

## 2025-01-02 ASSESSMENT — COGNITIVE AND FUNCTIONAL STATUS - GENERAL
DAILY ACTIVITIY SCORE: 24
MOBILITY SCORE: 23
CLIMB 3 TO 5 STEPS WITH RAILING: A LITTLE

## 2025-01-02 ASSESSMENT — PAIN SCALES - GENERAL: PAINLEVEL_OUTOF10: 0 - NO PAIN

## 2025-01-02 ASSESSMENT — PAIN SCALES - WONG BAKER: WONGBAKER_NUMERICALRESPONSE: NO HURT

## 2025-01-02 ASSESSMENT — ACTIVITIES OF DAILY LIVING (ADL): LACK_OF_TRANSPORTATION: NO

## 2025-01-02 NOTE — PROGRESS NOTES
01/02/25 1815   [facility] Disability Status   Are you deaf or do you have serious difficulty hearing? N   Are you blind or do you have serious difficulty seeing, even when wearing glasses? N   Because of a physical, mental, or emotional condition, do you have serious difficulty concentrating, remembering, or making decisions? (5 years old or older) N   Do you have serious difficulty walking or climbing stairs? N   Do you have serious difficulty dressing or bathing? N   Because of a physical, mental, or emotional condition, do you have serious difficulty doing errands alone such as visiting the doctor? N

## 2025-01-02 NOTE — PROGRESS NOTES
Department of Medicine  Division of Pulmonary, Critical Care, and Sleep Medicine  Progress Note    Subjective     Billie Hernadez is a 69 y.o. female on day 2 of admission presenting with COPD exacerbation (Multi).    Pt was seen today back on Vapotherm at 30 L and 40%.  Yesterday she was on nasal cannula but stated that she woke up this morning a little bit more short of breath and she was found to be desaturating she was placed on Vapotherm.  Once again patient mentioned that she does not wear home O2.  She has patient is able to go home on O2 if needed.  Advised her that this is feasible but we still need to wean down her oxygen from the high flow nasal cannula.  Otherwise, patient is currently denying shortness of breath wearing Vapotherm.  Denies chest pain.    Objective     Vitals:      1/1/2025     8:16 AM 1/1/2025    11:00 AM 1/1/2025     3:03 PM 1/1/2025     8:59 PM 1/1/2025    11:41 PM 1/2/2025     3:59 AM 1/2/2025     7:10 AM   Vitals   Systolic 113 109 114 127 114 111 111   Diastolic 77 65 58 60 63 57 64   BP Location Right arm Right arm Right arm Right arm Right arm Right arm Right arm   Heart Rate    105      Temp 36.7 °C (98.1 °F) 36.6 °C (97.9 °F) 37 °C (98.6 °F) 37 °C (98.6 °F) 37 °C (98.6 °F) 36.8 °C (98.2 °F) 36.8 °C (98.2 °F)   Resp 16 16 18 16 17 18 19   Weight (lb)      109.13    BMI      19.33 kg/m2    BSA (m2)      1.48 m2         Oxygen Therapy:  SpO2: 91 %  Medical Gas Therapy: Supplemental oxygen  Medical Gas Delivery Method: Nasal cannula       Intake/Output::  I/O last 3 completed shifts:  In: 890 (18 mL/kg) [P.O.:240; IV Piggyback:650]  Out: 300 (6.1 mL/kg) [Urine:300 (0.2 mL/kg/hr)]  Weight: 49.5 kg     Physical Exam:  Physical Exam  Constitutional:       General: She is not in acute distress.     Appearance: She is not toxic-appearing.      Comments: Frail elderly woman laying in bed comfortably   HENT:      Head: Normocephalic and atraumatic.      Nose: Nose normal.      Comments:  Wearing Vapotherm     Mouth/Throat:      Pharynx: Oropharynx is clear.   Eyes:      Extraocular Movements: Extraocular movements intact.   Neck:      Comments: No visualized masses  Cardiovascular:      Rate and Rhythm: Normal rate and regular rhythm.      Heart sounds: No murmur heard.     No friction rub. No gallop.   Pulmonary:      Effort: No respiratory distress.      Breath sounds: No wheezing, rhonchi or rales.      Comments: Diminished breath sounds bilaterally with poor air entry  Abdominal:      General: There is no distension.      Palpations: Abdomen is soft.      Tenderness: There is no abdominal tenderness. There is no guarding.   Musculoskeletal:         General: No tenderness.      Right lower leg: No edema.      Left lower leg: No edema.   Skin:     General: Skin is warm and dry.   Neurological:      General: No focal deficit present.      Mental Status: She is alert and oriented to person, place, and time.   Psychiatric:         Mood and Affect: Mood normal.         Allergies:  No Known Allergies    Inpatient Medications:  azithromycin, 500 mg, intravenous, q24h  docusate sodium, 100 mg, oral, BID  enoxaparin, 40 mg, subcutaneous, Nightly  guaiFENesin, 600 mg, oral, BID  insulin lispro, 0-5 Units, subcutaneous, TID AC  ipratropium-albuteroL, 3 mL, nebulization, q6h  losartan, 25 mg, oral, Daily  methylPREDNISolone sodium succinate (PF), 40 mg, intravenous, q12h  piperacillin-tazobactam, 3.375 g, intravenous, q6h  polyethylene glycol, 17 g, oral, Daily         PRN medications: acetaminophen, albuterol, bisacodyl, dextrose, dextrose, glucagon, glucagon, ipratropium-albuteroL, magnesium hydroxide, ondansetron **OR** ondansetron, oxygen    Lab/Radiology/Diagnostic Review:    Labs:  Results for orders placed or performed during the hospital encounter of 12/31/24 (from the past 24 hours)   POCT GLUCOSE   Result Value Ref Range    POCT Glucose 260 (H) 74 - 99 mg/dL   Respiratory Culture/Smear    Specimen:  SPUTUM; Fluid   Result Value Ref Range    Gram Stain       Gram stain indicates specimen consists of lower respiratory tract secretions.    Gram Stain No predominant organism    MRSA Surveillance for Vancomycin De-escalation, PCR    Specimen: Anterior Nares; Swab   Result Value Ref Range    MRSA PCR Not Detected Not Detected   POCT GLUCOSE   Result Value Ref Range    POCT Glucose 89 74 - 99 mg/dL   POCT GLUCOSE   Result Value Ref Range    POCT Glucose 165 (H) 74 - 99 mg/dL   Basic Metabolic Panel   Result Value Ref Range    Glucose 127 (H) 74 - 99 mg/dL    Sodium 133 (L) 136 - 145 mmol/L    Potassium 4.7 3.5 - 5.3 mmol/L    Chloride 94 (L) 98 - 107 mmol/L    Bicarbonate 37 (H) 21 - 32 mmol/L    Anion Gap 7 (L) 10 - 20 mmol/L    Urea Nitrogen 8 6 - 23 mg/dL    Creatinine 0.46 (L) 0.50 - 1.05 mg/dL    eGFR >90 >60 mL/min/1.73m*2    Calcium 7.3 (L) 8.6 - 10.3 mg/dL   CBC   Result Value Ref Range    WBC 12.8 (H) 4.4 - 11.3 x10*3/uL    nRBC 0.0 0.0 - 0.0 /100 WBCs    RBC 3.39 (L) 4.00 - 5.20 x10*6/uL    Hemoglobin 10.7 (L) 12.0 - 16.0 g/dL    Hematocrit 33.5 (L) 36.0 - 46.0 %    MCV 99 80 - 100 fL    MCH 31.6 26.0 - 34.0 pg    MCHC 31.9 (L) 32.0 - 36.0 g/dL    RDW 14.1 11.5 - 14.5 %    Platelets 308 150 - 450 x10*3/uL      CT angio chest for pulmonary embolism 12/31/2024    Narrative  Interpreted By:  Brianna Linares,  STUDY:  CT ANGIO CHEST FOR PULMONARY EMBOLISM;  12/31/2024 4:20 pm    INDICATION:  Signs/Symptoms:concern for pe. hx of cancer and new onset hypoxia.    COMPARISON:  11/21/2024    ACCESSION NUMBER(S):  [id number]    ORDERING CLINICIAN:  WALT COOK    TECHNIQUE:  Contiguous axial images of the chest were obtained after the  intravenous administration of contrast using angiographic PE  protocol. Coronal and sagittal reformatted images were reconstructed  from the axial data. MIP images were created and reviewed.    FINDINGS:  MEDIASTINUM AND LYMPH NODES: The right paratracheal lymph node  measures up  to 14 mm additional subcarinal and hilar lymph nodes are  again noted. No pneumomediastinum.    VESSELS:  Normal caliber aorta without with aortic atherosclerosis.  The main pulmonary artery measures up to 3 cm, suggestive of  pulmonary hypertension. No pulmonary embolism identified to the  segmental level.    HEART: Normal in size.  No coronary artery calcifications. No  significant pericardial effusion.    LUNG, AIRWAYS, AND PLEURA: Redemonstrated moderate central lobular  emphysematous changes. A spiculated nodules/masses in the right upper  lobe measures 2.0 x 1.7 cm, enlarged from prior imaging previously  measuring 1.7 x 1.6 cm (series 5, image 88). A right lower lobe  nodule measures 7 mm, previously 5 mm (image 99). A 3 mm medial right  lower lobe nodule is similar to prior imaging (120). A previously  measured left subpleural lower lobe nodule is obscured by adjacent  lung space opacities likely atelectasis.    There is a new 9 mm nodule in the anterior right lower lobe and a new  7 mm nodule in the posterior right lower lobe (image 132 and 126  respectively). Right basilar opacities are progressed from prior  imaging and may also obscure additional nodules. There is progression  of fluid in the bilateral bronchi with expansile mucous plugging,  greater on the right. Redemonstrated right middle lobe atelectasis  with opacification of the central bronchi. No sizable pleural  effusion or pneumothorax.    OSSEOUS STRUCTURES/CHEST WALL:  No acute osseous abnormality.    UPPER ABDOMEN/OTHER: Dense atherosclerotic calcification in the upper  abdominal aorta extending into the right renal artery otherwise  grossly patent. Thickening noted of the left adrenal gland.    Impression  No acute pulmonary embolism to the segmental level.    Emphysema with scattered pulmonary nodules which appear stable to  enlarged since prior imaging as well as interval development of at  least 2 lung nodules measuring up to 9 mm  suggestive of disease  progression. Additional nodules may be obscured by lung base  opacities.    Interval progression of right greater than left mucous plugging and  bibasilar opacities which could represent atelectasis however  superimposed aspiration pneumonitis not excluded.    Read demonstrated opacification of the right middle lobe bronchus  with associated atelectasis. Bronchoscopy again suggested if not  already performed.    Mediastinal lymphadenopathy again suggestive of metastatic process.    MACRO:  None.    Signed by: Brianna Linares 12/31/2024 5:07 PM  Dictation workstation:   [id number]     XR chest 1 view 12/31/2024    Narrative  Interpreted By:  Abimael Child,  STUDY:  XR CHEST 1 VIEW;  12/31/2024 6:43 pm    INDICATION:  Signs/Symptoms:shortness of breath.      COMPARISON:  12/31/2024 at 2:21 p.m.    ACCESSION NUMBER(S):  [id number]    ORDERING CLINICIAN:  LEWIS HURT    FINDINGS:  The known right upper lobe pulmonary nodule is less conspicuous due  to obscuration by airspace disease that may be slightly progressed in  the right perihilar region. There is complete right middle lobe  collapse again noted. There is diffuse bronchial wall thickening  bilaterally, background emphysema. No pneumothorax or pleural  effusion.    Impression  Known, likely malignant, right upper lobe pulmonary nodule is less  conspicuous, suggesting slight progression of airspace disease in the  right perihilar region. Short-term radiographic follow-up recommended  if there is persistent concern for worsening pneumonia.      MACRO:  None.    Signed by: Abimael Child 12/31/2024 7:06 PM  Dictation workstation:   [id number]      XR chest 1 view 12/31/2024    Narrative  Interpreted By:  Owen Marti,  STUDY:  XR CHEST 1 VIEW; 12/31/2024 2:53 pm    INDICATION:  CLINICAL INFORMATION: Signs/Symptoms:Shortness of breath.    COMPARISON:  11/21/2024    ACCESSION NUMBER(S):  [id number]    ORDERING  CLINICIAN:  KATLYN KAUFFMAN    TECHNIQUE:  Portable chest one view.    FINDINGS:  The cardiac size is indeterminate in view of the AP projection.  There is a 2.7 cm mass lateral to the right hilum, not accounting for  magnification. The aorta is tortuous. No infiltrates or effusions are  identified.  Incidental note is made of what I believe is a nipple  shadow overlying the left base.    Impression  2.7 cm mass lateral to the right hilum. This has been evaluated on  previous PET-CT. This is likely neoplastic.    MACRO:  none    Signed by: Owen Marti 12/31/2024 3:29 PM  Dictation workstation:   [id number]       All labs and Imaging have been personally reviewed.     Assessment/Plan       Billie Hernadez is a 69 y.o. female with a past medical history of COPD, and right non-small cell lung cancer diagnosed status post bronchoscopy on 11/2024, prior PE not on anticoagulation who presents to the ED with complaints of shortness of breath.  Pulmonary was consulted for further management of acute hypoxic respiratory failure.     Patient was seen and assessed by myself and his chart was reviewed for previous pulmonary visits, Labs and imaging.  Patient's laboratory studies do not signify an infectious process currently.  However her imaging did redemonstrate her lung malignancy with mucous plugging in the right middle lobe.  There is concern for possible lower lobe aspiration versus atelectasis.  Emphysematous changes noted as well on imaging.  Recommendations are as follows below     #Right non-small cell lung cancer  #History of COPD  #Prior PE not on anticoagulation  #Right middle lobe collapse  #Right lower lobe atelectasis versus aspiration     Recommendations are as follows:  -Titrate FiO2 to SpO2 greater than 88%  -Currently on Vapotherm 30 L and 40%  -Aggressive bronchopulmonary hygiene with I-S, flutter valve, will plan to add MetaNebs due to worsening O2 requirements.  -Scheduled nebulized bronchodilators every  6 hours   -Continue prednisone 40 mg twice daily  -Will discontinue all antibiotics as patient does not likely have a bacterial pneumonia due to the data below.  - PNA workup:  --MRSA Nares is negative  --Strep Pneumo urine ag, Legionella Urine Ag are both negative will discontinue azithromycin  --Resp Culture is currently in process with no predominant organism seen  --Procal collected and is 0.02.  Due to this being negative this is less likely a bacterial pneumonia.  Discontinue Zosyn.  --Blood cultures showing no growth to date x 1 both sets; continue to follow  --Follow-up mycoplasma IgM collected and in process    Overall we will plan to increase bronchopulmonary hygiene to recruit more lung and continue steroids in hopes of decreasing FiO2 requirements.      I spent 35 minutes in the professional and overall care of this patient.    Pulmonary team will continue to follow the patient while they are in house.    Paolo Carlisle MD, MPH  Pulmonary and Critical Care Medicine     Please excuse any typographical or unwanted errors as voice recognition software was used to complete this note.

## 2025-01-02 NOTE — SIGNIFICANT EVENT
Came back to check on the patient and Elisha Dubois CNP is at the bedside with the pt. I checked her Spo2 and it was 96%. I decreased the settings to 30L 40%.  The patient no longer a a complaint of SOB, she does not have a increased WOB and says she is comfortable. The patient will remain on heated high flow nasal cannula. I will wean 02 when possible. Bedside RN was made aware as well.

## 2025-01-02 NOTE — ASSESSMENT & PLAN NOTE
Titrate O2 as needed to maintain SaO2 - on high flow   Consult pulmonology - appreciate recs   IV steroids   Duonebs scheduled and PRN   CT PE negative for PE, pneumothorax, consolidation

## 2025-01-02 NOTE — ASSESSMENT & PLAN NOTE
Unlikely bacterial pneumonia   Per pulmonology - stop IV antibiotics   Monitor blood cultures

## 2025-01-02 NOTE — CARE PLAN
Problem: Pain - Adult  Goal: Verbalizes/displays adequate comfort level or baseline comfort level  Outcome: Progressing     Problem: Safety - Adult  Goal: Free from fall injury  Outcome: Progressing     Problem: Discharge Planning  Goal: Discharge to home or other facility with appropriate resources  Outcome: Progressing     Problem: Chronic Conditions and Co-morbidities  Goal: Patient's chronic conditions and co-morbidity symptoms are monitored and maintained or improved  Outcome: Progressing     Problem: Fall/Injury  Goal: Not fall by end of shift  Outcome: Progressing  Goal: Be free from injury by end of the shift  Outcome: Progressing  Goal: Verbalize understanding of personal risk factors for fall in the hospital  Outcome: Progressing  Goal: Verbalize understanding of risk factor reduction measures to prevent injury from fall in the home  Outcome: Progressing  Goal: Use assistive devices by end of the shift  Outcome: Progressing  Goal: Pace activities to prevent fatigue by end of the shift  Outcome: Progressing     Problem: Respiratory  Goal: Clear secretions with interventions this shift  Outcome: Progressing  Goal: Minimize anxiety/maximize coping throughout shift  Outcome: Progressing  Goal: Minimal/no exertional discomfort or dyspnea this shift  Outcome: Progressing  Goal: No signs of respiratory distress (eg. Use of accessory muscles. Peds grunting)  Outcome: Progressing  Goal: Patent airway maintained this shift  Outcome: Progressing  Goal: Tolerate mechanical ventilation evidenced by VS/agitation level this shift  Outcome: Progressing  Goal: Tolerate pulmonary toileting this shift  Outcome: Progressing  Goal: Verbalize decreased shortness of breath this shift  Outcome: Progressing  Goal: Wean oxygen to maintain O2 saturation per order/standard this shift  Outcome: Progressing  Goal: Increase self care and/or family involvement in next 24 hours  Outcome: Progressing   The patient's goals for the shift  include  decrease O2 dependence; VSS    The clinical goals for the shift include hemodynamically stable; decrease O2 needs

## 2025-01-02 NOTE — PROGRESS NOTES
"Billie Hernadez is a 69 y.o. female on day 2 of admission presenting with COPD exacerbation (Multi).    Subjective   Patient sitting in bed. Feels more short of breath today and is using more oxygen. She is frustrated that she's not feeling better and she'd like to take a shower, but understands she needs to be on less oxygen before that can happen.        Objective     Physical Exam  Constitutional:       Appearance: Normal appearance.   HENT:      Head: Normocephalic and atraumatic.      Mouth/Throat:      Mouth: Mucous membranes are moist.      Pharynx: Oropharynx is clear.   Eyes:      Extraocular Movements: Extraocular movements intact.      Conjunctiva/sclera: Conjunctivae normal.      Pupils: Pupils are equal, round, and reactive to light.   Cardiovascular:      Rate and Rhythm: Normal rate and regular rhythm.      Pulses: Normal pulses.      Heart sounds: Normal heart sounds.   Pulmonary:      Effort: Tachypnea and accessory muscle usage present.      Breath sounds: Wheezing and rhonchi present.   Abdominal:      General: Bowel sounds are normal.      Palpations: Abdomen is soft.      Tenderness: There is no abdominal tenderness.   Musculoskeletal:         General: Normal range of motion.      Cervical back: Normal range of motion and neck supple.   Skin:     General: Skin is warm and dry.      Capillary Refill: Capillary refill takes less than 2 seconds.   Neurological:      General: No focal deficit present.      Mental Status: She is alert and oriented to person, place, and time.   Psychiatric:         Mood and Affect: Mood normal.         Behavior: Behavior normal.         Last Recorded Vitals  Blood pressure 132/57, pulse 105, temperature 37.4 °C (99.3 °F), temperature source Temporal, resp. rate 17, height 1.6 m (5' 3\"), weight 49.5 kg (109 lb 2 oz), SpO2 96%.  Intake/Output last 3 Shifts:  I/O last 3 completed shifts:  In: 890 (18 mL/kg) [P.O.:240; IV Piggyback:650]  Out: 300 (6.1 mL/kg) [Urine:300 (0.2 " mL/kg/hr)]  Weight: 49.5 kg     Relevant Results  Results for orders placed or performed during the hospital encounter of 12/31/24 (from the past 24 hours)   Procalcitonin   Result Value Ref Range    Procalcitonin 0.02 <=0.07 ng/mL   MRSA Surveillance for Vancomycin De-escalation, PCR    Specimen: Anterior Nares; Swab   Result Value Ref Range    MRSA PCR Not Detected Not Detected   POCT GLUCOSE   Result Value Ref Range    POCT Glucose 89 74 - 99 mg/dL   POCT GLUCOSE   Result Value Ref Range    POCT Glucose 165 (H) 74 - 99 mg/dL   Basic Metabolic Panel   Result Value Ref Range    Glucose 127 (H) 74 - 99 mg/dL    Sodium 133 (L) 136 - 145 mmol/L    Potassium 4.7 3.5 - 5.3 mmol/L    Chloride 94 (L) 98 - 107 mmol/L    Bicarbonate 37 (H) 21 - 32 mmol/L    Anion Gap 7 (L) 10 - 20 mmol/L    Urea Nitrogen 8 6 - 23 mg/dL    Creatinine 0.46 (L) 0.50 - 1.05 mg/dL    eGFR >90 >60 mL/min/1.73m*2    Calcium 7.3 (L) 8.6 - 10.3 mg/dL   CBC   Result Value Ref Range    WBC 12.8 (H) 4.4 - 11.3 x10*3/uL    nRBC 0.0 0.0 - 0.0 /100 WBCs    RBC 3.39 (L) 4.00 - 5.20 x10*6/uL    Hemoglobin 10.7 (L) 12.0 - 16.0 g/dL    Hematocrit 33.5 (L) 36.0 - 46.0 %    MCV 99 80 - 100 fL    MCH 31.6 26.0 - 34.0 pg    MCHC 31.9 (L) 32.0 - 36.0 g/dL    RDW 14.1 11.5 - 14.5 %    Platelets 308 150 - 450 x10*3/uL   POCT GLUCOSE   Result Value Ref Range    POCT Glucose 167 (H) 74 - 99 mg/dL     XR chest 1 view    Result Date: 12/31/2024  Interpreted By:  Abimael Child, STUDY: XR CHEST 1 VIEW;  12/31/2024 6:43 pm   INDICATION: Signs/Symptoms:shortness of breath.     COMPARISON: 12/31/2024 at 2:21 p.m.   ACCESSION NUMBER(S): [id number]   ORDERING CLINICIAN: LEWIS HURT   FINDINGS: The known right upper lobe pulmonary nodule is less conspicuous due to obscuration by airspace disease that may be slightly progressed in the right perihilar region. There is complete right middle lobe collapse again noted. There is diffuse bronchial wall thickening  bilaterally, background emphysema. No pneumothorax or pleural effusion.       Known, likely malignant, right upper lobe pulmonary nodule is less conspicuous, suggesting slight progression of airspace disease in the right perihilar region. Short-term radiographic follow-up recommended if there is persistent concern for worsening pneumonia.     MACRO: None.   Signed by: Abimael Child 12/31/2024 7:06 PM Dictation workstation:   [id number]    CT angio chest for pulmonary embolism    Result Date: 12/31/2024  Interpreted By:  Brianna Linares, STUDY: CT ANGIO CHEST FOR PULMONARY EMBOLISM;  12/31/2024 4:20 pm   INDICATION: Signs/Symptoms:concern for pe. hx of cancer and new onset hypoxia.   COMPARISON: 11/21/2024   ACCESSION NUMBER(S): [id number]   ORDERING CLINICIAN: WALT COOK   TECHNIQUE: Contiguous axial images of the chest were obtained after the intravenous administration of contrast using angiographic PE protocol. Coronal and sagittal reformatted images were reconstructed from the axial data. MIP images were created and reviewed.   FINDINGS: MEDIASTINUM AND LYMPH NODES: The right paratracheal lymph node measures up to 14 mm additional subcarinal and hilar lymph nodes are again noted. No pneumomediastinum.   VESSELS:  Normal caliber aorta without with aortic atherosclerosis. The main pulmonary artery measures up to 3 cm, suggestive of pulmonary hypertension. No pulmonary embolism identified to the segmental level.   HEART: Normal in size.  No coronary artery calcifications. No significant pericardial effusion.   LUNG, AIRWAYS, AND PLEURA: Redemonstrated moderate central lobular emphysematous changes. A spiculated nodules/masses in the right upper lobe measures 2.0 x 1.7 cm, enlarged from prior imaging previously measuring 1.7 x 1.6 cm (series 5, image 88). A right lower lobe nodule measures 7 mm, previously 5 mm (image 99). A 3 mm medial right lower lobe nodule is similar to prior imaging (120). A  previously measured left subpleural lower lobe nodule is obscured by adjacent lung space opacities likely atelectasis.   There is a new 9 mm nodule in the anterior right lower lobe and a new 7 mm nodule in the posterior right lower lobe (image 132 and 126 respectively). Right basilar opacities are progressed from prior imaging and may also obscure additional nodules. There is progression of fluid in the bilateral bronchi with expansile mucous plugging, greater on the right. Redemonstrated right middle lobe atelectasis with opacification of the central bronchi. No sizable pleural effusion or pneumothorax.   OSSEOUS STRUCTURES/CHEST WALL:  No acute osseous abnormality.   UPPER ABDOMEN/OTHER: Dense atherosclerotic calcification in the upper abdominal aorta extending into the right renal artery otherwise grossly patent. Thickening noted of the left adrenal gland.       No acute pulmonary embolism to the segmental level.   Emphysema with scattered pulmonary nodules which appear stable to enlarged since prior imaging as well as interval development of at least 2 lung nodules measuring up to 9 mm suggestive of disease progression. Additional nodules may be obscured by lung base opacities.   Interval progression of right greater than left mucous plugging and bibasilar opacities which could represent atelectasis however superimposed aspiration pneumonitis not excluded.   Read demonstrated opacification of the right middle lobe bronchus with associated atelectasis. Bronchoscopy again suggested if not already performed.   Mediastinal lymphadenopathy again suggestive of metastatic process.   MACRO: None.   Signed by: Brianna Linares 12/31/2024 5:07 PM Dictation workstation:   [id number]       Assessment/Plan   Assessment & Plan  COPD exacerbation (Multi)  Titrate O2 as needed to maintain SaO2 - on high flow   Consult pulmonology - appreciate recs   IV steroids   Duonebs scheduled and PRN   CT PE negative for PE, pneumothorax,  consolidation  HTN (hypertension)  Continue home losartan dose   Malignant neoplasm of upper lobe of right lung (Multi)  Following with [facility].   Received 1st treatments 12/17  Biopsy 12/31  Acute respiratory failure with hypoxia (Multi)  Titrate O2 as needed - on high flow   Inhalers as above   Admit to SDU   Consult pulmonology   Speech therapy consult to rule out aspiration   Pneumonia due to infectious organism  Unlikely bacterial pneumonia   Per pulmonology - stop IV antibiotics   Monitor blood cultures       DVT prophylaxis   Lovenox     Elisha Dubois, APRN-CNP

## 2025-01-02 NOTE — TELEPHONE ENCOUNTER
Patients  called she is currently admitted and they are not sure when she will be discharged. [place] and I will touch base with him Monday morning.

## 2025-01-02 NOTE — PROGRESS NOTES
"   01/02/25 1810   Discharge Planning   Living Arrangements Spouse/significant other   Support Systems Spouse/significant other   Assistance Needed none   Type of Residence Private residence   Number of Stairs to Enter Residence 3   Number of Stairs Within Residence 10   Home or Post Acute Services None   Expected Discharge Disposition Home   Does the patient need discharge transport arranged? No   Financial Resource Strain   How hard is it for you to pay for the very basics like food, housing, medical care, and heating? Not very   Housing Stability   In the last 12 months, was there a time when you were not able to pay the mortgage or rent on time? N   In the past 12 months, how many times have you moved where you were living? 0   At any time in the past 12 months, were you homeless or living in a shelter (including now)? N   Transportation Needs   In the past 12 months, has lack of transportation kept you from medical appointments or from getting medications? no   In the past 12 months, has lack of transportation kept you from meetings, work, or from getting things needed for daily living? No     Met with patient at bedside.  An explanation of discharge planning was provided.  Patient resides in a multi level house with her spouse.  Patient is independent with all ADL\"s.  Patient is able to cook, clean , shop and drive.  Patient does not require the use of any assistive devices. Patient is not diabetic. Patient does not require HD.  Patient does not have a POA. Patient smokes 1/2 pack/day. Drinks alcohol occasionally. Denies recent falls. Patient had not been requiring oxygen or cpap prior to admission. Currently on 02 6 L. Recently started chemo for lung Ca. Declines home going needs but did inquire about home 02.  An explanation of home 02 eval process was provided.   "

## 2025-01-02 NOTE — SIGNIFICANT EVENT
Came back to check on the pt. The patient was still SOB with an increased WOB and her Spo2 was 87%. I placed her back on heated HFNC settings on 30L 45%.  I will return to check on pt.

## 2025-01-02 NOTE — PROGRESS NOTES
Speech-Language Pathology                 Therapy Communication Note    Patient Name: Billie Hernadez  MRN: [id number]  Department: [facility] 3 E  Room: 10/10-A  Today's Date: 1/2/2025     Discipline: Speech Language Pathology      Comment:  Order received for CSE, chart reviewed per communication with APRN-CNP, ok to complete evaluation 1/3/25. Pt is tolerating current diet

## 2025-01-02 NOTE — ASSESSMENT & PLAN NOTE
Titrate O2 as needed - on high flow   Inhalers as above   Admit to SDU   Consult pulmonology   Speech therapy consult to rule out aspiration

## 2025-01-02 NOTE — SIGNIFICANT EVENT
Upon arriving to the patients room she was SOB had and increased WOB and Spo2 was 87%-88% on 6L N/C. The patient had jjust returned from the rest room. I gave her time to rebound and she came up to 88%-89%. I administered and aerosol and will check on the pt in 10 minutes post aerosol. If the patient is still SOB with an increased WOB I will place her back on HFNC.

## 2025-01-03 ENCOUNTER — APPOINTMENT (OUTPATIENT)
Dept: RADIOLOGY | Facility: HOSPITAL | Age: 70
DRG: 190 | End: 2025-01-03
Payer: MEDICARE

## 2025-01-03 ENCOUNTER — APPOINTMENT (OUTPATIENT)
Dept: CARDIOLOGY | Facility: HOSPITAL | Age: 70
End: 2025-01-03
Payer: MEDICARE

## 2025-01-03 LAB
ANION GAP SERPL CALCULATED.3IONS-SCNC: 7 MMOL/L (ref 10–20)
AORTIC VALVE MEAN GRADIENT: 8 MMHG
AORTIC VALVE PEAK VELOCITY: 1.9 M/S
AV PEAK GRADIENT: 14 MMHG
AVA (PEAK VEL): 1.81 CM2
AVA (VTI): 1.71 CM2
BACTERIA SPEC RESP CULT: NORMAL
BACTERIA SPEC RESP CULT: NORMAL
BUN SERPL-MCNC: 10 MG/DL (ref 6–23)
CALCIUM SERPL-MCNC: 8.5 MG/DL (ref 8.6–10.3)
CHLORIDE SERPL-SCNC: 93 MMOL/L (ref 98–107)
CO2 SERPL-SCNC: 39 MMOL/L (ref 21–32)
CREAT SERPL-MCNC: 0.41 MG/DL (ref 0.5–1.05)
EGFRCR SERPLBLD CKD-EPI 2021: >90 ML/MIN/1.73M*2
EJECTION FRACTION APICAL 4 CHAMBER: 56.9
EJECTION FRACTION: 63 %
ERYTHROCYTE [DISTWIDTH] IN BLOOD BY AUTOMATED COUNT: 14.1 % (ref 11.5–14.5)
GLUCOSE BLD MANUAL STRIP-MCNC: 111 MG/DL (ref 74–99)
GLUCOSE BLD MANUAL STRIP-MCNC: 120 MG/DL (ref 74–99)
GLUCOSE BLD MANUAL STRIP-MCNC: 125 MG/DL (ref 74–99)
GLUCOSE BLD MANUAL STRIP-MCNC: 159 MG/DL (ref 74–99)
GLUCOSE BLD MANUAL STRIP-MCNC: 237 MG/DL (ref 74–99)
GLUCOSE SERPL-MCNC: 103 MG/DL (ref 74–99)
GRAM STN SPEC: NORMAL
GRAM STN SPEC: NORMAL
HCT VFR BLD AUTO: 37.5 % (ref 36–46)
HGB BLD-MCNC: 11.5 G/DL (ref 12–16)
LEFT VENTRICLE INTERNAL DIMENSION DIASTOLE: 4.1 CM (ref 3.5–6)
LEFT VENTRICULAR OUTFLOW TRACT DIAMETER: 1.9 CM
LV EJECTION FRACTION BIPLANE: 56 %
MCH RBC QN AUTO: 31.2 PG (ref 26–34)
MCHC RBC AUTO-ENTMCNC: 30.7 G/DL (ref 32–36)
MCV RBC AUTO: 102 FL (ref 80–100)
MITRAL VALVE E/A RATIO: 0.93
NRBC BLD-RTO: 0 /100 WBCS (ref 0–0)
PLATELET # BLD AUTO: 350 X10*3/UL (ref 150–450)
POTASSIUM SERPL-SCNC: 5.1 MMOL/L (ref 3.5–5.3)
RBC # BLD AUTO: 3.69 X10*6/UL (ref 4–5.2)
RIGHT VENTRICLE PEAK SYSTOLIC PRESSURE: 40.1 MMHG
SODIUM SERPL-SCNC: 134 MMOL/L (ref 136–145)
WBC # BLD AUTO: 11 X10*3/UL (ref 4.4–11.3)

## 2025-01-03 PROCEDURE — 74230 X-RAY XM SWLNG FUNCJ C+: CPT | Performed by: RADIOLOGY

## 2025-01-03 PROCEDURE — 74230 X-RAY XM SWLNG FUNCJ C+: CPT

## 2025-01-03 PROCEDURE — 93306 TTE W/DOPPLER COMPLETE: CPT | Performed by: INTERNAL MEDICINE

## 2025-01-03 PROCEDURE — 2500000004 HC RX 250 GENERAL PHARMACY W/ HCPCS (ALT 636 FOR OP/ED): Performed by: NURSE PRACTITIONER

## 2025-01-03 PROCEDURE — 2500000005 HC RX 250 GENERAL PHARMACY W/O HCPCS: Performed by: INTERNAL MEDICINE

## 2025-01-03 PROCEDURE — 36415 COLL VENOUS BLD VENIPUNCTURE: CPT | Performed by: NURSE PRACTITIONER

## 2025-01-03 PROCEDURE — 2500000005 HC RX 250 GENERAL PHARMACY W/O HCPCS: Performed by: NURSE PRACTITIONER

## 2025-01-03 PROCEDURE — 99232 SBSQ HOSP IP/OBS MODERATE 35: CPT | Performed by: NURSE PRACTITIONER

## 2025-01-03 PROCEDURE — 92610 EVALUATE SWALLOWING FUNCTION: CPT | Mod: GN | Performed by: SPEECH-LANGUAGE PATHOLOGIST

## 2025-01-03 PROCEDURE — 85027 COMPLETE CBC AUTOMATED: CPT | Performed by: NURSE PRACTITIONER

## 2025-01-03 PROCEDURE — 94640 AIRWAY INHALATION TREATMENT: CPT

## 2025-01-03 PROCEDURE — 1200000002 HC GENERAL ROOM WITH TELEMETRY DAILY

## 2025-01-03 PROCEDURE — 2500000002 HC RX 250 W HCPCS SELF ADMINISTERED DRUGS (ALT 637 FOR MEDICARE OP, ALT 636 FOR OP/ED): Performed by: NURSE PRACTITIONER

## 2025-01-03 PROCEDURE — 82947 ASSAY GLUCOSE BLOOD QUANT: CPT

## 2025-01-03 PROCEDURE — 2500000001 HC RX 250 WO HCPCS SELF ADMINISTERED DRUGS (ALT 637 FOR MEDICARE OP): Performed by: NURSE PRACTITIONER

## 2025-01-03 PROCEDURE — 93306 TTE W/DOPPLER COMPLETE: CPT

## 2025-01-03 PROCEDURE — 92611 MOTION FLUOROSCOPY/SWALLOW: CPT | Mod: GN | Performed by: SPEECH-LANGUAGE PATHOLOGIST

## 2025-01-03 PROCEDURE — 80048 BASIC METABOLIC PNL TOTAL CA: CPT | Performed by: NURSE PRACTITIONER

## 2025-01-03 PROCEDURE — 99232 SBSQ HOSP IP/OBS MODERATE 35: CPT | Performed by: INTERNAL MEDICINE

## 2025-01-03 RX ORDER — OXYCODONE HYDROCHLORIDE 5 MG/1
5 TABLET ORAL EVERY 6 HOURS PRN
Status: DISCONTINUED | OUTPATIENT
Start: 2025-01-03 | End: 2025-01-05 | Stop reason: HOSPADM

## 2025-01-03 RX ORDER — BENZONATATE 100 MG/1
100 CAPSULE ORAL 3 TIMES DAILY PRN
Status: DISCONTINUED | OUTPATIENT
Start: 2025-01-03 | End: 2025-01-05 | Stop reason: HOSPADM

## 2025-01-03 RX ADMIN — ENOXAPARIN SODIUM 40 MG: 40 INJECTION SUBCUTANEOUS at 20:39

## 2025-01-03 RX ADMIN — Medication 3 L/MIN: at 04:25

## 2025-01-03 RX ADMIN — METHYLPREDNISOLONE SODIUM SUCCINATE 40 MG: 40 INJECTION, POWDER, FOR SOLUTION INTRAMUSCULAR; INTRAVENOUS at 06:15

## 2025-01-03 RX ADMIN — BARIUM SULFATE 700 MG: 700 TABLET ORAL at 13:16

## 2025-01-03 RX ADMIN — DOCUSATE SODIUM 100 MG: 100 CAPSULE, LIQUID FILLED ORAL at 08:28

## 2025-01-03 RX ADMIN — GUAIFENESIN 600 MG: 600 TABLET, MULTILAYER, EXTENDED RELEASE ORAL at 08:28

## 2025-01-03 RX ADMIN — POLYETHYLENE GLYCOL 3350 17 G: 17 POWDER, FOR SOLUTION ORAL at 08:28

## 2025-01-03 RX ADMIN — LOSARTAN POTASSIUM 25 MG: 25 TABLET, FILM COATED ORAL at 08:28

## 2025-01-03 RX ADMIN — IPRATROPIUM BROMIDE AND ALBUTEROL SULFATE 3 ML: 2.5; .5 SOLUTION RESPIRATORY (INHALATION) at 04:20

## 2025-01-03 RX ADMIN — INSULIN LISPRO 1 UNITS: 100 INJECTION, SOLUTION INTRAVENOUS; SUBCUTANEOUS at 16:23

## 2025-01-03 RX ADMIN — Medication 3 L/MIN: at 21:00

## 2025-01-03 RX ADMIN — Medication 4 L/MIN: at 07:47

## 2025-01-03 RX ADMIN — DOCUSATE SODIUM 100 MG: 100 CAPSULE, LIQUID FILLED ORAL at 20:39

## 2025-01-03 RX ADMIN — BARIUM SULFATE 10 ML: 400 PASTE ORAL at 13:16

## 2025-01-03 RX ADMIN — Medication 3 L/MIN: at 04:23

## 2025-01-03 RX ADMIN — BARIUM SULFATE 90 ML: 0.81 POWDER, FOR SUSPENSION ORAL at 13:14

## 2025-01-03 RX ADMIN — BARIUM SULFATE 65 ML: 400 SUSPENSION ORAL at 13:14

## 2025-01-03 RX ADMIN — GUAIFENESIN 600 MG: 600 TABLET, MULTILAYER, EXTENDED RELEASE ORAL at 20:39

## 2025-01-03 RX ADMIN — IPRATROPIUM BROMIDE AND ALBUTEROL SULFATE 3 ML: 2.5; .5 SOLUTION RESPIRATORY (INHALATION) at 21:00

## 2025-01-03 RX ADMIN — IPRATROPIUM BROMIDE AND ALBUTEROL SULFATE 3 ML: 2.5; .5 SOLUTION RESPIRATORY (INHALATION) at 11:37

## 2025-01-03 RX ADMIN — METHYLPREDNISOLONE SODIUM SUCCINATE 40 MG: 40 INJECTION, POWDER, FOR SOLUTION INTRAMUSCULAR; INTRAVENOUS at 17:22

## 2025-01-03 RX ADMIN — Medication 30 L/MIN: at 08:16

## 2025-01-03 RX ADMIN — BENZONATATE 100 MG: 100 CAPSULE ORAL at 16:23

## 2025-01-03 RX ADMIN — IPRATROPIUM BROMIDE AND ALBUTEROL SULFATE 3 ML: 2.5; .5 SOLUTION RESPIRATORY (INHALATION) at 07:44

## 2025-01-03 ASSESSMENT — COGNITIVE AND FUNCTIONAL STATUS - GENERAL
DAILY ACTIVITIY SCORE: 24
MOBILITY SCORE: 24
MOBILITY SCORE: 22
CLIMB 3 TO 5 STEPS WITH RAILING: A LITTLE
DAILY ACTIVITIY SCORE: 24
WALKING IN HOSPITAL ROOM: A LITTLE

## 2025-01-03 ASSESSMENT — PAIN - FUNCTIONAL ASSESSMENT: PAIN_FUNCTIONAL_ASSESSMENT: 0-10

## 2025-01-03 ASSESSMENT — PAIN SCALES - GENERAL
PAINLEVEL_OUTOF10: 5 - MODERATE PAIN
PAINLEVEL_OUTOF10: 0 - NO PAIN

## 2025-01-03 NOTE — NURSING NOTE
Assumed care of patient, patient ambulated to bed and is A&Ox4, she is a stand by assist and is on 4 liter O2 NC and is continent of Bowel and bladder.  She is on tele running sinus tachy to NSR. Patient is having some bloating to abdomen. Oriented to room and visiting hours .

## 2025-01-03 NOTE — PROGRESS NOTES
01/03/25 1424   Discharge Planning   Expected Discharge Disposition Home     Currently on 6 L. MBS completed today.  Recommendation made for regular diet with thin liquids. No home going needs anticipated.  May need home 02 eval prior to discharge.

## 2025-01-03 NOTE — CARE PLAN
Problem: Pain - Adult  Goal: Verbalizes/displays adequate comfort level or baseline comfort level  Outcome: Progressing     Problem: Safety - Adult  Goal: Free from fall injury  Outcome: Progressing     Problem: Discharge Planning  Goal: Discharge to home or other facility with appropriate resources  Outcome: Progressing     Problem: Chronic Conditions and Co-morbidities  Goal: Patient's chronic conditions and co-morbidity symptoms are monitored and maintained or improved  Outcome: Progressing     Problem: Fall/Injury  Goal: Be free from injury by end of the shift  Outcome: Progressing  Goal: Verbalize understanding of personal risk factors for fall in the hospital  Outcome: Progressing  Goal: Verbalize understanding of risk factor reduction measures to prevent injury from fall in the home  Outcome: Progressing  Goal: Use assistive devices by end of the shift  Outcome: Progressing  Goal: Pace activities to prevent fatigue by end of the shift  Outcome: Progressing     Problem: Respiratory  Goal: Clear secretions with interventions this shift  Outcome: Progressing  Goal: Minimal/no exertional discomfort or dyspnea this shift  Outcome: Progressing  Goal: No signs of respiratory distress (eg. Use of accessory muscles. Peds grunting)  Outcome: Progressing  Goal: Patent airway maintained this shift  Outcome: Progressing  Goal: Verbalize decreased shortness of breath this shift  Outcome: Progressing  Goal: Wean oxygen to maintain O2 saturation per order/standard this shift  Outcome: Progressing  Goal: Increase self care and/or family involvement in next 24 hours  Outcome: Progressing   The patient's goals for the shift include      The clinical goals for the shift include monitor vs, labs, I&O's; manage pain; promote safe ambualation with assistancw; Wean O2 as tolerated

## 2025-01-03 NOTE — PROGRESS NOTES
Department of Medicine  Division of Pulmonary, Critical Care, and Sleep Medicine  Progress Note    Subjective     Billie Hernadez is a 69 y.o. female on day 3 of admission presenting with COPD exacerbation (Multi).    Pt was seen today back on 4 L nasal cannula.   was at bedside today.  Patient states that she has bloated and had a small bowel movement earlier.  States that she still does feel gassy and needs to have a bigger bowel movement.  Still short of breath with exertion.  Currently denying chest pain.    Objective     Vitals:      1/2/2025    11:13 AM 1/2/2025     3:07 PM 1/2/2025     7:22 PM 1/3/2025    12:27 AM 1/3/2025     4:12 AM 1/3/2025     5:53 AM 1/3/2025     7:57 AM   Vitals   Systolic 130 132 132 131 128  122   Diastolic 68 57 56 65 66  61   BP Location Right arm Right arm Right arm Right arm Right arm  Right arm   Heart Rate   111 113 98  95   Temp 37.1 °C (98.8 °F) 37.4 °C (99.3 °F) 37.5 °C (99.5 °F) 36.6 °C (97.9 °F) 36.7 °C (98.1 °F)  36.6 °C (97.9 °F)   Resp 18 17 24 16 22  19   Weight (lb)      109.13    BMI      19.33 kg/m2    BSA (m2)      1.48 m2         Oxygen Therapy:  SpO2: 98 %  Medical Gas Therapy: Supplemental oxygen  Medical Gas Delivery Method: Nasal cannula  FiO2 (%):  [32 %-44 %] 36 %    Intake/Output::  I/O last 3 completed shifts:  In: 540 (10.9 mL/kg) [P.O.:240; IV Piggyback:300]  Out: 300 (6.1 mL/kg) [Urine:300 (0.2 mL/kg/hr)]  Weight: 49.5 kg     Physical Exam:  Physical Exam  Constitutional:       General: She is not in acute distress.     Appearance: She is not toxic-appearing.      Comments: Frail elderly woman sitting up in bed with her  at bedside.   HENT:      Head: Normocephalic and atraumatic.      Nose: Nose normal.      Comments: Wearing V4L NC     Mouth/Throat:      Pharynx: Oropharynx is clear.   Eyes:      Extraocular Movements: Extraocular movements intact.   Neck:      Comments: No visualized masses  Cardiovascular:      Rate and Rhythm: Normal  rate and regular rhythm.      Heart sounds: No murmur heard.     No friction rub. No gallop.   Pulmonary:      Effort: No respiratory distress.      Breath sounds: No wheezing, rhonchi or rales.      Comments: Diminished breath sounds bilaterally with poor air entry  Abdominal:      General: There is no distension.      Palpations: Abdomen is soft.      Tenderness: There is no abdominal tenderness. There is no guarding.   Musculoskeletal:         General: No tenderness.      Right lower leg: No edema.      Left lower leg: No edema.   Skin:     General: Skin is warm and dry.   Neurological:      General: No focal deficit present.      Mental Status: She is alert and oriented to person, place, and time.   Psychiatric:         Mood and Affect: Mood normal.         Allergies:  No Known Allergies    Inpatient Medications:  docusate sodium, 100 mg, oral, BID  enoxaparin, 40 mg, subcutaneous, Nightly  guaiFENesin, 600 mg, oral, BID  insulin lispro, 0-5 Units, subcutaneous, TID AC  ipratropium-albuteroL, 3 mL, nebulization, q6h  losartan, 25 mg, oral, Daily  methylPREDNISolone sodium succinate (PF), 40 mg, intravenous, q12h  oxygen, , inhalation, Continuous - Inhalation  polyethylene glycol, 17 g, oral, Daily         PRN medications: acetaminophen, albuterol, bisacodyl, dextrose, dextrose, glucagon, glucagon, ipratropium-albuteroL, magnesium hydroxide, ondansetron **OR** ondansetron, oxyCODONE    Lab/Radiology/Diagnostic Review:    Labs:  Results for orders placed or performed during the hospital encounter of 12/31/24 (from the past 24 hours)   POCT GLUCOSE   Result Value Ref Range    POCT Glucose 167 (H) 74 - 99 mg/dL   POCT GLUCOSE   Result Value Ref Range    POCT Glucose 155 (H) 74 - 99 mg/dL   POCT GLUCOSE   Result Value Ref Range    POCT Glucose 169 (H) 74 - 99 mg/dL   Basic Metabolic Panel   Result Value Ref Range    Glucose 103 (H) 74 - 99 mg/dL    Sodium 134 (L) 136 - 145 mmol/L    Potassium 5.1 3.5 - 5.3 mmol/L     Chloride 93 (L) 98 - 107 mmol/L    Bicarbonate 39 (H) 21 - 32 mmol/L    Anion Gap 7 (L) 10 - 20 mmol/L    Urea Nitrogen 10 6 - 23 mg/dL    Creatinine 0.41 (L) 0.50 - 1.05 mg/dL    eGFR >90 >60 mL/min/1.73m*2    Calcium 8.5 (L) 8.6 - 10.3 mg/dL   CBC   Result Value Ref Range    WBC 11.0 4.4 - 11.3 x10*3/uL    nRBC 0.0 0.0 - 0.0 /100 WBCs    RBC 3.69 (L) 4.00 - 5.20 x10*6/uL    Hemoglobin 11.5 (L) 12.0 - 16.0 g/dL    Hematocrit 37.5 36.0 - 46.0 %     (H) 80 - 100 fL    MCH 31.2 26.0 - 34.0 pg    MCHC 30.7 (L) 32.0 - 36.0 g/dL    RDW 14.1 11.5 - 14.5 %    Platelets 350 150 - 450 x10*3/uL   POCT GLUCOSE   Result Value Ref Range    POCT Glucose 111 (H) 74 - 99 mg/dL      CT angio chest for pulmonary embolism 12/31/2024    Narrative  Interpreted By:  Brianna Linares,  STUDY:  CT ANGIO CHEST FOR PULMONARY EMBOLISM;  12/31/2024 4:20 pm    INDICATION:  Signs/Symptoms:concern for pe. hx of cancer and new onset hypoxia.    COMPARISON:  11/21/2024    ACCESSION NUMBER(S):  [id number]    ORDERING CLINICIAN:  WALT COOK    TECHNIQUE:  Contiguous axial images of the chest were obtained after the  intravenous administration of contrast using angiographic PE  protocol. Coronal and sagittal reformatted images were reconstructed  from the axial data. MIP images were created and reviewed.    FINDINGS:  MEDIASTINUM AND LYMPH NODES: The right paratracheal lymph node  measures up to 14 mm additional subcarinal and hilar lymph nodes are  again noted. No pneumomediastinum.    VESSELS:  Normal caliber aorta without with aortic atherosclerosis.  The main pulmonary artery measures up to 3 cm, suggestive of  pulmonary hypertension. No pulmonary embolism identified to the  segmental level.    HEART: Normal in size.  No coronary artery calcifications. No  significant pericardial effusion.    LUNG, AIRWAYS, AND PLEURA: Redemonstrated moderate central lobular  emphysematous changes. A spiculated nodules/masses in the right upper  lobe  measures 2.0 x 1.7 cm, enlarged from prior imaging previously  measuring 1.7 x 1.6 cm (series 5, image 88). A right lower lobe  nodule measures 7 mm, previously 5 mm (image 99). A 3 mm medial right  lower lobe nodule is similar to prior imaging (120). A previously  measured left subpleural lower lobe nodule is obscured by adjacent  lung space opacities likely atelectasis.    There is a new 9 mm nodule in the anterior right lower lobe and a new  7 mm nodule in the posterior right lower lobe (image 132 and 126  respectively). Right basilar opacities are progressed from prior  imaging and may also obscure additional nodules. There is progression  of fluid in the bilateral bronchi with expansile mucous plugging,  greater on the right. Redemonstrated right middle lobe atelectasis  with opacification of the central bronchi. No sizable pleural  effusion or pneumothorax.    OSSEOUS STRUCTURES/CHEST WALL:  No acute osseous abnormality.    UPPER ABDOMEN/OTHER: Dense atherosclerotic calcification in the upper  abdominal aorta extending into the right renal artery otherwise  grossly patent. Thickening noted of the left adrenal gland.    Impression  No acute pulmonary embolism to the segmental level.    Emphysema with scattered pulmonary nodules which appear stable to  enlarged since prior imaging as well as interval development of at  least 2 lung nodules measuring up to 9 mm suggestive of disease  progression. Additional nodules may be obscured by lung base  opacities.    Interval progression of right greater than left mucous plugging and  bibasilar opacities which could represent atelectasis however  superimposed aspiration pneumonitis not excluded.    Read demonstrated opacification of the right middle lobe bronchus  with associated atelectasis. Bronchoscopy again suggested if not  already performed.    Mediastinal lymphadenopathy again suggestive of metastatic process.    MACRO:  None.    Signed by: Brianna Linares  12/31/2024 5:07 PM  Dictation workstation:   [id number]     XR chest 1 view 12/31/2024    Narrative  Interpreted By:  Abimael Child,  STUDY:  XR CHEST 1 VIEW;  12/31/2024 6:43 pm    INDICATION:  Signs/Symptoms:shortness of breath.      COMPARISON:  12/31/2024 at 2:21 p.m.    ACCESSION NUMBER(S):  [id number]    ORDERING CLINICIAN:  LEWIS HURT    FINDINGS:  The known right upper lobe pulmonary nodule is less conspicuous due  to obscuration by airspace disease that may be slightly progressed in  the right perihilar region. There is complete right middle lobe  collapse again noted. There is diffuse bronchial wall thickening  bilaterally, background emphysema. No pneumothorax or pleural  effusion.    Impression  Known, likely malignant, right upper lobe pulmonary nodule is less  conspicuous, suggesting slight progression of airspace disease in the  right perihilar region. Short-term radiographic follow-up recommended  if there is persistent concern for worsening pneumonia.      MACRO:  None.    Signed by: Abimael Child 12/31/2024 7:06 PM  Dictation workstation:   [id number]      XR chest 1 view 12/31/2024    Narrative  Interpreted By:  Owen Marti,  STUDY:  XR CHEST 1 VIEW; 12/31/2024 2:53 pm    INDICATION:  CLINICAL INFORMATION: Signs/Symptoms:Shortness of breath.    COMPARISON:  11/21/2024    ACCESSION NUMBER(S):  [id number]    ORDERING CLINICIAN:  KATLYN KAUFFMAN    TECHNIQUE:  Portable chest one view.    FINDINGS:  The cardiac size is indeterminate in view of the AP projection.  There is a 2.7 cm mass lateral to the right hilum, not accounting for  magnification. The aorta is tortuous. No infiltrates or effusions are  identified.  Incidental note is made of what I believe is a nipple  shadow overlying the left base.    Impression  2.7 cm mass lateral to the right hilum. This has been evaluated on  previous PET-CT. This is likely neoplastic.    MACRO:  none    Signed by: Owen Marti 12/31/2024  3:29 PM  Dictation workstation:   [id number]       All labs and Imaging have been personally reviewed.     Assessment/Plan       Billie Hernadez is a 69 y.o. female with a past medical history of COPD, and right non-small cell lung cancer diagnosed status post bronchoscopy on 11/2024, prior PE not on anticoagulation who presents to the ED with complaints of shortness of breath.  Pulmonary was consulted for further management of acute hypoxic respiratory failure.     Patient was seen and assessed by myself and his chart was reviewed for previous pulmonary visits, Labs and imaging.  Patient's laboratory studies do not signify an infectious process currently.  However her imaging did redemonstrate her lung malignancy with mucous plugging in the right middle lobe.  There is concern for possible lower lobe aspiration versus atelectasis.  Emphysematous changes noted as well on imaging.  Recommendations are as follows below     #Right non-small cell lung cancer  #History of COPD  #Prior PE not on anticoagulation  #Right middle lobe collapse  #Right lower lobe atelectasis versus aspiration     Recommendations are as follows:  -Titrate FiO2 to SpO2 greater than 88%  -Currently on 4 L nasal cannula  -Aggressive bronchopulmonary hygiene with I-S, flutter valve, and MetaNebs   -Scheduled nebulized bronchodilators every 6 hours   -Continue prednisone 40 mg twice daily  -Will discontinue all antibiotics as patient does not likely have a bacterial pneumonia due to the data below.  - PNA workup:  --MRSA Nares is negative  --Strep Pneumo urine ag, Legionella Urine Ag are both negative will discontinue azithromycin  --Resp Culture is currently in process with no predominant organism seen  --Procal collected and is 0.02.  Due to this being negative this is less likely a bacterial pneumonia.  Discontinue Zosyn.  --Blood cultures showing no growth to date x 3 days both sets; continue to follow  --Follow-up mycoplasma IgM collected and  in process      I spent 35 minutes in the professional and overall care of this patient.    Pulmonary team will continue to follow the patient while they are in house.    Paolo Carlisle MD, MPH  Pulmonary and Critical Care Medicine     Please excuse any typographical or unwanted errors as voice recognition software was used to complete this note.

## 2025-01-03 NOTE — NURSING NOTE
Assumed care of pt, pt alert and oriented sitting at the edge of the bed. 6LNC intact, denies SOB, pain. Bed low with call bell in reach.

## 2025-01-03 NOTE — CARE PLAN
Problem: Pain - Adult  Goal: Verbalizes/displays adequate comfort level or baseline comfort level  Outcome: Progressing     Problem: Safety - Adult  Goal: Free from fall injury  Outcome: Progressing     Problem: Discharge Planning  Goal: Discharge to home or other facility with appropriate resources  Outcome: Progressing     Problem: Chronic Conditions and Co-morbidities  Goal: Patient's chronic conditions and co-morbidity symptoms are monitored and maintained or improved  Outcome: Progressing     Problem: Fall/Injury  Goal: Not fall by end of shift  Outcome: Progressing  Goal: Be free from injury by end of the shift  Outcome: Progressing  Goal: Verbalize understanding of personal risk factors for fall in the hospital  Outcome: Progressing  Goal: Verbalize understanding of risk factor reduction measures to prevent injury from fall in the home  Outcome: Progressing  Goal: Use assistive devices by end of the shift  Outcome: Progressing  Goal: Pace activities to prevent fatigue by end of the shift  Outcome: Progressing     Problem: Respiratory  Goal: Clear secretions with interventions this shift  Outcome: Progressing  Goal: Minimize anxiety/maximize coping throughout shift  Outcome: Progressing  Goal: Minimal/no exertional discomfort or dyspnea this shift  Outcome: Progressing  Goal: No signs of respiratory distress (eg. Use of accessory muscles. Peds grunting)  Outcome: Progressing  Goal: Patent airway maintained this shift  Outcome: Progressing  Goal: Tolerate mechanical ventilation evidenced by VS/agitation level this shift  Outcome: Progressing  Goal: Tolerate pulmonary toileting this shift  Outcome: Progressing  Goal: Verbalize decreased shortness of breath this shift  Outcome: Progressing  Goal: Wean oxygen to maintain O2 saturation per order/standard this shift  Outcome: Progressing  Goal: Increase self care and/or family involvement in next 24 hours  Outcome: Progressing   The patient's goals for the shift  include      The clinical goals for the shift include no falls this shift.    Over the shift, the patient did not make progress toward the following goals. Barriers to progression include generalized weakness, hospital equipment. Recommendations to address these barriers include ambulate with assistance, bed alarm.

## 2025-01-03 NOTE — PROCEDURES
Speech-Language Pathology    Speech-Language Pathology    Inpatient Modified Barium Swallow Study    Patient Name: Billie Hernadez  MRN: [id number]  : 1955  Today's Date: 25  Time Calculation  Start Time: 1240  Stop Time: 1305  Time Calculation (min): 25 min        Modified Barium Swallow Study completed. Informed verbal consent obtained prior to completion of exam. The study was completed per protocol with various liquid barium consistencies, pudding, solids and a 13mm barium tablet.  A 1.9 cm or .75 inch (outer diameter) ring was placed on the chin in the lateral view and on the lateral, left side of the neck in the a-p view in order to complete objective measurements during swallowing. The anatomic structures and function of the oropharynx, larynx, hypopharynx and cervical esophagus were evaluated.    SLP: Ashley Grimm SLP   Contact info: [employer] secure chat; phone: [phone]    Reason for Referral: r/o aspiration d/t PNA and increased oxygenation  Patient Hx: #Right non-small cell lung cancer  #History of COPD  #Right middle lobe collapse  #Right lower lobe atelectasis versus aspiration  Respiratory Status: 4LNC  Current diet: regular and thin lqiuids    Pain:  Pain Scale: 0-10  Ratin    DIET RECOMMENDATIONS:   - Regular (IDDSI Level 7)  - Thin liquids (IDDSI Level 0)  Per the results of today's MBSS, patient to continue baseline diet of regular  consistencies and thin liquids following swallow strategies listed below:    STRATEGIES:  - Upright for all PO intake      SLP PLAN:  Skilled SLP Services: No further skilled SLP intervention is warranted for dysphagia at this time.  Discussed POC: Patient  Discussed Risks/Benefits: Yes  Patient/Caregiver Agreeable: Yes    Education Provided: Results and recommendations per MBSS, with video review; recommendations and POC at this time. Verbal understanding and agreement given on all accounts.     Treatment Provided Today: Not indicated  Additional  Medical Consults Suggested: none    Repeat Study: Per MD     Mechanics of the Swallow Summary:  ORAL PHASE:  Lip Closure - No labial escape/anterior loss of bolus   Tongue Control During Bolus Hold - Cohesive bolus between tongue to palatal seal   Bolus prep/mastication - Timely and efficient mastication skills   Bolus transport/lingual motion - Brisk tongue motion for A-P movement of the bolus   Oral residue - Complete oral clearance     PHARYNGEAL PHASE:  Initiation of pharyngeal swallow - Bolus head at posterior angle of ramus   Soft palate elevation - No bolus between soft palate/pharyngeal wall   Laryngeal elevation - Complete superior movement of thyroid cartilage with contact of arytenoids to epiglottic petiole   Anterior hyoid excursion - Complete anterior movement   Epiglottic movement - Complete inversion    Laryngeal vestibule closure - Complete - no air/contrast in laryngeal vestibule   Pharyngeal stripping wave - Complete  Pharyngeal contraction (A/P view) - Complete  Pharyngoesophageal segment opening - Complete distension and complete duration/no obstruction of flow of bolus   Tongue base retraction - No bolus between tongue base and posterior pharyngeal wall   Pharyngeal residue - Complete pharyngeal clearance     ESOPHAGEAL PHASE:  Esophageal clearance - Complete clearance     SLP Impressions with Severity Rating:   Pt presents with no oropharyngeal dysphagia upon completion of modified barium swallow study this date. Swallowing physiology is detailed above. No impairments impacting swallowing safety and efficiency.Patient demonstrated no penetration or aspiration with any  consistency.     *Of note: The A-P bolus follow-through is not intended to be utilized as a diagnostic assessment of the esophagus, rather a tool to observe the biomechanical aspects of the swallow continuum and to inform the need for further evaluation by medical specialists, as applicable.     Strategies attempted- not  indicated    OUTCOME MEASURES:  Functional Oral Intake Scale  Functional Oral Intake Scale: Level 7        total oral diet with no restrictions     Rosenbek's Penetration Aspiration Scale  Thin Liquids: 1. NO ASPIRATION & NO PENETRATION - no aspiration, contrast does not enter airway  [place] Thick Liquids: 1. NO ASPIRATION & NO PENETRATION - no aspiration, contrast does not enter airway  Puree: 1. NO ASPIRATION & NO PENETRATION - no aspiration, contrast does not enter airway  Solids: 1. NO ASPIRATION & NO PENETRATION - no aspiration, contrast does not enter airway

## 2025-01-03 NOTE — PROGRESS NOTES
Speech-Language Pathology    Speech-Language Pathology Clinical Swallow Evaluation    Patient Name: Billie Hernadez  MRN: [id number]  : 1955  Today's Date: 25  Start Time: 0945  Stop Time: 1001  Time Calculation (min): 16 min    ASSESSMENT  Impressions:  Normal oral phase and  suspected pharyngeal phase dysphagia based on clinical swallow evaluation. Pt would benefit from skilled ST to minimize aspiration risk and ensure ongoing safety with the least restrictive diet.  Prognosis: Good    PLAN  Recommendations:  Is MBSS recommended? Yes; MBSS is recommended in order to objectively assess for aspiration risk, safest/least restrictive diet, and any effective compensatory strategies.  Solid consistency: Regular (IDDSI level 7)  Liquid consistency: Thin (IDDSI 0)  Medication administration: Whole  Compensatory swallow strategies:  - Upright positioning for all PO intake  - Slow rate of intake    Recommended frequency/duration:  Skilled SLP services recommended: Yes  Frequency: 1x/week  Duration: 1 week  Discharge recommendation: Unable to determine at this time; please see follow-up notes for DC recommendation.  Treatment/Interventions: Complete MBSS  Strengths: Cognition, Motivation, and Family/caregiver support  Barriers to participation in tx: N/A    Goals:  In 1 week...  Pt will participate in MBSS to assess for safest/least restrictive diet and any effective compensatory strategies.  Further goals to be determined after MBSS.   GOAL START: 1/3/2025   GOAL END: 2025   Status: Goal initiated this date   Progress this date: scheduled for MBSS 1/3/25      SUBJECTIVE    PMHx relevant to rehab: #Right non-small cell lung cancer  #History of COPD  #Prior PE not on anticoagulation  #Right middle lobe collapse  #Right lower lobe atelectasis versus aspiration    Chief complaint: Pt was admitted on 24 due to   Chief Complaint   Patient presents with    Shortness of Breath     Presents to ED from an  outpatient procedure for shortness of breath.  States SOB for the past 3 days.  87% on 3 liters upon arrival.  States she does not normally wear oxygen.     . She was found to have COPD exacerbation (Multi).    Relevant imaging results:  CXR 12/31/24  IMPRESSION:  Known, likely malignant, right upper lobe pulmonary nodule is less  conspicuous, suggesting slight progression of airspace disease in the  right perihilar region. Short-term radiographic follow-up recommended  if there is persistent concern for worsening pneumonia.        General Visit Information:     Patient Class: Inpatient  Living Environment: Home     Reason for Referral: assess swallow d/t concern for aspiaration  Prior to Session Communication: Bedside nurse    RN cleared pt to participate in session and reported There is concern for possible lower lobe aspiration versus atelectasis.    Pt reported chronic cough, dry, no h/o dysphagia    Date of Onset: 12/31/24  Date of Order: 01/02/25  [employer] Diet: regular and thin liquids  Current Diet : regular and thin liquids    Status at time of evaluation:  Pain Assessment  Pain Assessment: 0-10  0-10 (Numeric) Pain Score: 5 - Moderate pain  Mckeon-Sandoval FACES Pain Rating: No hurt  Pain Type: Acute pain  Pain Location: Back  Pain Orientation: Mid    Pt was alert, pleasant, and cooperative for session.  Orientation: Oriented to situation and Ox4  Ability to follow functional commands: WFL  Nutritional status: Appears well-nourished/no concerns    Respiratory status: Supplemental oxygen via NC 6lpm  Baseline Vocal Quality: Normal  Volitional Cough: Strong  Volitional Swallow: Within Functional Limits  Patient positioning: Upright in chair    OBJECTIVE  Clinical swallow evaluation completed and consisted of interview, oral motor assessment, and PO trials (4 oz thin liquids, 4 oz applesauce, 1 ryan cracker).  ORAL PHASE: Natural dentition in fair condition. Oral mucosa were pink, moist, and free of obvious  lesions. Lingual strength and ROM were WFL. Labial strength/ROM were WFL. Labial seal was adequate. Mastication of regular solids was WFL A/P transit and oral clearance were WFL.  PHARYNGEAL PHASE: Laryngeal elevation was visualized or palpated with all trials, however adequacy of hyolaryngeal elevation/excursion cannot be determined at bedside. No immediate or delayed s/sx aspiration/penetration were observed with any consistencies. Pt demonstrating baseline cough    Was 3oz challenge administered: Yes; pt unable to successfully complete due to taking breaks. No overt s/sx aspiration were observed.    Treatment/Education:  Results and recommendations were relayed to: Patient and Bedside nurse  Education provided: Yes   Learner: Patient   Barriers to learning: None   Method of teaching: Verbal   Topic: role of ST, recommendation for MBSS, and recommended safe swallow strategies   Outcome of teaching: Pt verbalized understanding  Treatment provided: No  Next Treatment Priority: MBSS

## 2025-01-03 NOTE — ASSESSMENT & PLAN NOTE
Titrate O2 as needed - on high flow   Inhalers as above   Admit to SDU   Consult pulmonology   Speech therapy consult to rule out aspiration - MBS negative for aspiration

## 2025-01-03 NOTE — ASSESSMENT & PLAN NOTE
Titrate O2 as needed to maintain SaO2   Consult pulmonology - appreciate recs   IV steroids   Duonebs scheduled and PRN   CT PE negative for PE, pneumothorax, consolidation  Mucinex   Cough suppressant

## 2025-01-03 NOTE — PROGRESS NOTES
"Billie Hernadez is a 69 y.o. female on day 3 of admission presenting with COPD exacerbation (Multi).    Subjective   Patient resting in bed. Feels breathing may be better today. Has dry, non-productive cough today. Denies fevers, chills.        Objective     Physical Exam  Constitutional:       Appearance: Normal appearance.   HENT:      Head: Normocephalic and atraumatic.      Mouth/Throat:      Mouth: Mucous membranes are moist.      Pharynx: Oropharynx is clear.   Eyes:      Extraocular Movements: Extraocular movements intact.      Conjunctiva/sclera: Conjunctivae normal.      Pupils: Pupils are equal, round, and reactive to light.   Cardiovascular:      Rate and Rhythm: Normal rate and regular rhythm.      Pulses: Normal pulses.      Heart sounds: Normal heart sounds.   Pulmonary:      Effort: Pulmonary effort is normal.      Breath sounds: Normal breath sounds.   Abdominal:      General: Bowel sounds are normal.      Palpations: Abdomen is soft.      Tenderness: There is no abdominal tenderness.   Musculoskeletal:         General: Normal range of motion.      Cervical back: Normal range of motion and neck supple.   Skin:     General: Skin is warm and dry.      Capillary Refill: Capillary refill takes less than 2 seconds.   Neurological:      General: No focal deficit present.      Mental Status: She is alert and oriented to person, place, and time.   Psychiatric:         Mood and Affect: Mood normal.         Behavior: Behavior normal.         Last Recorded Vitals  Blood pressure 131/68, pulse 103, temperature 36.8 °C (98.2 °F), temperature source Temporal, resp. rate 19, height 1.6 m (5' 3\"), weight 49.5 kg (109 lb 2 oz), SpO2 96%.  Intake/Output last 3 Shifts:  I/O last 3 completed shifts:  In: 540 (10.9 mL/kg) [P.O.:240; IV Piggyback:300]  Out: 300 (6.1 mL/kg) [Urine:300 (0.2 mL/kg/hr)]  Weight: 49.5 kg     Relevant Results  Results for orders placed or performed during the hospital encounter of 12/31/24 (from " the past 24 hours)   POCT GLUCOSE   Result Value Ref Range    POCT Glucose 155 (H) 74 - 99 mg/dL   POCT GLUCOSE   Result Value Ref Range    POCT Glucose 169 (H) 74 - 99 mg/dL   Basic Metabolic Panel   Result Value Ref Range    Glucose 103 (H) 74 - 99 mg/dL    Sodium 134 (L) 136 - 145 mmol/L    Potassium 5.1 3.5 - 5.3 mmol/L    Chloride 93 (L) 98 - 107 mmol/L    Bicarbonate 39 (H) 21 - 32 mmol/L    Anion Gap 7 (L) 10 - 20 mmol/L    Urea Nitrogen 10 6 - 23 mg/dL    Creatinine 0.41 (L) 0.50 - 1.05 mg/dL    eGFR >90 >60 mL/min/1.73m*2    Calcium 8.5 (L) 8.6 - 10.3 mg/dL   CBC   Result Value Ref Range    WBC 11.0 4.4 - 11.3 x10*3/uL    nRBC 0.0 0.0 - 0.0 /100 WBCs    RBC 3.69 (L) 4.00 - 5.20 x10*6/uL    Hemoglobin 11.5 (L) 12.0 - 16.0 g/dL    Hematocrit 37.5 36.0 - 46.0 %     (H) 80 - 100 fL    MCH 31.2 26.0 - 34.0 pg    MCHC 30.7 (L) 32.0 - 36.0 g/dL    RDW 14.1 11.5 - 14.5 %    Platelets 350 150 - 450 x10*3/uL   POCT GLUCOSE   Result Value Ref Range    POCT Glucose 111 (H) 74 - 99 mg/dL   POCT GLUCOSE   Result Value Ref Range    POCT Glucose 237 (H) 74 - 99 mg/dL     FL modified barium swallow study    Result Date: 1/3/2025  Interpreted By:  Patience Palacios and Brodsky Sheryl STUDY: [place] MODIFIED BARIUM SWALLOW STUDY;; 1/3/2025 1:08 pm   INDICATION: Signs/Symptoms:r/o aspiration.     COMPARISON: None.   ACCESSION NUMBER(S): [id number]   ORDERING CLINICIAN: LEWIS HURT   SPEECH FINDINGS: Modified Barium Swallow Study completed. Informed verbal consent obtained prior to completion of exam. The study was completed per protocol with various liquid barium consistencies, pudding, solids and a 13mm barium tablet.  A 1.9 cm or .75 inch (outer diameter) ring was placed on the chin in the lateral view and on the lateral, left side of the neck in the a-p view in order to complete objective measurements during swallowing. The anatomic structures and function of the oropharynx, larynx, hypopharynx and cervical esophagus  were evaluated.   SLP: Ashley Grimm, SLP Contact info: [employer] secure chat; phone: [phone]   Reason for Referral: r/o aspiration d/t PNA and increased oxygenation Patient Hx: #Right non-small cell lung cancer #History of COPD #Right middle lobe collapse #Right lower lobe atelectasis versus aspiration Respiratory Status: 4LNC Current diet: regular and thin lqiuids   Pain: Pain Scale: 0-10 Ratin   DIET RECOMMENDATIONS: - Regular (IDDSI Level 7) - Thin liquids (IDDSI Level 0) Per the results of today's MBSS, patient to continue baseline diet of regular  consistencies and thin liquids following swallow strategies listed below:   STRATEGIES: - Upright for all PO intake     SLP PLAN: Skilled SLP Services: No further skilled SLP intervention is warranted for dysphagia at this time. Discussed POC: Patient Discussed Risks/Benefits: Yes Patient/Caregiver Agreeable: Yes   Education Provided: Results and recommendations per MBSS, with video review; recommendations and POC at this time. Verbal understanding and agreement given on all accounts.   Treatment Provided Today: Not indicated Additional Medical Consults Suggested: none   Repeat Study: Per MD   Mechanics of the Swallow Summary: ORAL PHASE: Lip Closure - No labial escape/anterior loss of bolus Tongue Control During Bolus Hold - Cohesive bolus between tongue to palatal seal Bolus prep/mastication - Timely and efficient mastication skills Bolus transport/lingual motion - Brisk tongue motion for A-P movement of the bolus Oral residue - Complete oral clearance   PHARYNGEAL PHASE: Initiation of pharyngeal swallow - Bolus head at posterior angle of ramus Soft palate elevation - No bolus between soft palate/pharyngeal wall Laryngeal elevation - Complete superior movement of thyroid cartilage with contact of arytenoids to epiglottic petiole Anterior hyoid excursion - Complete anterior movement Epiglottic movement - Complete inversion Laryngeal vestibule closure -  Complete - no air/contrast in laryngeal vestibule Pharyngeal stripping wave - Complete Pharyngeal contraction (A/P view) - Complete Pharyngoesophageal segment opening - Complete distension and complete duration/no obstruction of flow of bolus Tongue base retraction - No bolus between tongue base and posterior pharyngeal wall Pharyngeal residue - Complete pharyngeal clearance   ESOPHAGEAL PHASE: Esophageal clearance - Complete clearance   SLP Impressions with Severity Rating: Pt presents with no oropharyngeal dysphagia upon completion of modified barium swallow study this date. Swallowing physiology is detailed above. No impairments impacting swallowing safety and efficiency.Patient demonstrated no penetration or aspiration with any consistency.   *Of note: The A-P bolus follow-through is not intended to be utilized as a diagnostic assessment of the esophagus, rather a tool to observe the biomechanical aspects of the swallow continuum and to inform the need for further evaluation by medical specialists, as applicable.   Strategies attempted- not indicated   OUTCOME MEASURES: Functional Oral Intake Scale Functional Oral Intake Scale: Level 7        total oral diet with no restrictions   Rosenbek's Penetration Aspiration Scale Thin Liquids: 1. NO ASPIRATION & NO PENETRATION - no aspiration, contrast does not enter airway [place] Thick Liquids: 1. NO ASPIRATION & NO PENETRATION - no aspiration, contrast does not enter airway Puree: 1. NO ASPIRATION & NO PENETRATION - no aspiration, contrast does not enter airway Solids: 1. NO ASPIRATION & NO PENETRATION - no aspiration, contrast does not enter airway Speech Therapy section of this report signed by Ashley Grimm MA-CCC/SLP on 1/3/2025 at 1:39 pm.   RADIOLOGY FINDINGS: None   Radiology section of this report signed by Patience Palacios M.D..       Please refer to the speech therapist's findings and recommendations.   MACRO: None   Signed by: Patience Palacios 1/3/2025 1:53 PM  Dictation workstation:   [id number]       Assessment/Plan   Assessment & Plan  COPD exacerbation (Multi)  Titrate O2 as needed to maintain SaO2   Consult pulmonology - appreciate recs   IV steroids   Duonebs scheduled and PRN   CT PE negative for PE, pneumothorax, consolidation  Mucinex   Cough suppressant   HTN (hypertension)  Continue home losartan dose   Malignant neoplasm of upper lobe of right lung (Multi)  Following with [facility].   Received 1st treatments 12/17  Biopsy 12/31  Acute respiratory failure with hypoxia (Multi)  Titrate O2 as needed - on high flow   Inhalers as above   Admit to SDU   Consult pulmonology   Speech therapy consult to rule out aspiration - MBS negative for aspiration   Pneumonia due to infectious organism  Unlikely bacterial pneumonia   Per pulmonology - stop IV antibiotics   Monitor blood cultures     DVT prophylaxis   Lovenox     Elisha Dubois, APRN-CNP

## 2025-01-04 LAB
ANION GAP SERPL CALCULATED.3IONS-SCNC: 8 MMOL/L (ref 10–20)
BACTERIA BLD CULT: NORMAL
BACTERIA BLD CULT: NORMAL
BUN SERPL-MCNC: 11 MG/DL (ref 6–23)
CALCIUM SERPL-MCNC: 8.5 MG/DL (ref 8.6–10.3)
CHLORIDE SERPL-SCNC: 89 MMOL/L (ref 98–107)
CO2 SERPL-SCNC: 39 MMOL/L (ref 21–32)
CREAT SERPL-MCNC: 0.42 MG/DL (ref 0.5–1.05)
EGFRCR SERPLBLD CKD-EPI 2021: >90 ML/MIN/1.73M*2
ERYTHROCYTE [DISTWIDTH] IN BLOOD BY AUTOMATED COUNT: 13.7 % (ref 11.5–14.5)
GLUCOSE BLD MANUAL STRIP-MCNC: 113 MG/DL (ref 74–99)
GLUCOSE BLD MANUAL STRIP-MCNC: 156 MG/DL (ref 74–99)
GLUCOSE BLD MANUAL STRIP-MCNC: 175 MG/DL (ref 74–99)
GLUCOSE BLD MANUAL STRIP-MCNC: 89 MG/DL (ref 74–99)
GLUCOSE SERPL-MCNC: 96 MG/DL (ref 74–99)
HCT VFR BLD AUTO: 36 % (ref 36–46)
HGB BLD-MCNC: 11.9 G/DL (ref 12–16)
M PNEUMO IGM SER IA-ACNC: 0.04 U/L
MCH RBC QN AUTO: 31.1 PG (ref 26–34)
MCHC RBC AUTO-ENTMCNC: 33.1 G/DL (ref 32–36)
MCV RBC AUTO: 94 FL (ref 80–100)
NRBC BLD-RTO: 0 /100 WBCS (ref 0–0)
PLATELET # BLD AUTO: 385 X10*3/UL (ref 150–450)
POTASSIUM SERPL-SCNC: 4.7 MMOL/L (ref 3.5–5.3)
RBC # BLD AUTO: 3.83 X10*6/UL (ref 4–5.2)
SODIUM SERPL-SCNC: 131 MMOL/L (ref 136–145)
WBC # BLD AUTO: 12.1 X10*3/UL (ref 4.4–11.3)

## 2025-01-04 PROCEDURE — 2500000004 HC RX 250 GENERAL PHARMACY W/ HCPCS (ALT 636 FOR OP/ED): Mod: JZ | Performed by: NURSE PRACTITIONER

## 2025-01-04 PROCEDURE — 80048 BASIC METABOLIC PNL TOTAL CA: CPT | Performed by: NURSE PRACTITIONER

## 2025-01-04 PROCEDURE — 82947 ASSAY GLUCOSE BLOOD QUANT: CPT

## 2025-01-04 PROCEDURE — 2500000002 HC RX 250 W HCPCS SELF ADMINISTERED DRUGS (ALT 637 FOR MEDICARE OP, ALT 636 FOR OP/ED): Performed by: INTERNAL MEDICINE

## 2025-01-04 PROCEDURE — 99232 SBSQ HOSP IP/OBS MODERATE 35: CPT | Performed by: NURSE PRACTITIONER

## 2025-01-04 PROCEDURE — 85027 COMPLETE CBC AUTOMATED: CPT | Performed by: NURSE PRACTITIONER

## 2025-01-04 PROCEDURE — 94664 DEMO&/EVAL PT USE INHALER: CPT

## 2025-01-04 PROCEDURE — 2500000002 HC RX 250 W HCPCS SELF ADMINISTERED DRUGS (ALT 637 FOR MEDICARE OP, ALT 636 FOR OP/ED): Performed by: NURSE PRACTITIONER

## 2025-01-04 PROCEDURE — 94640 AIRWAY INHALATION TREATMENT: CPT

## 2025-01-04 PROCEDURE — 36415 COLL VENOUS BLD VENIPUNCTURE: CPT | Performed by: NURSE PRACTITIONER

## 2025-01-04 PROCEDURE — 2500000005 HC RX 250 GENERAL PHARMACY W/O HCPCS: Performed by: INTERNAL MEDICINE

## 2025-01-04 PROCEDURE — 1200000002 HC GENERAL ROOM WITH TELEMETRY DAILY

## 2025-01-04 PROCEDURE — 2500000001 HC RX 250 WO HCPCS SELF ADMINISTERED DRUGS (ALT 637 FOR MEDICARE OP): Performed by: NURSE PRACTITIONER

## 2025-01-04 RX ORDER — IPRATROPIUM BROMIDE AND ALBUTEROL SULFATE 2.5; .5 MG/3ML; MG/3ML
3 SOLUTION RESPIRATORY (INHALATION)
Status: DISCONTINUED | OUTPATIENT
Start: 2025-01-04 | End: 2025-01-05 | Stop reason: HOSPADM

## 2025-01-04 RX ORDER — PREDNISONE 20 MG/1
40 TABLET ORAL DAILY
Status: DISCONTINUED | OUTPATIENT
Start: 2025-01-05 | End: 2025-01-05 | Stop reason: HOSPADM

## 2025-01-04 RX ADMIN — METHYLPREDNISOLONE SODIUM SUCCINATE 40 MG: 40 INJECTION, POWDER, FOR SOLUTION INTRAMUSCULAR; INTRAVENOUS at 17:50

## 2025-01-04 RX ADMIN — IPRATROPIUM BROMIDE AND ALBUTEROL SULFATE 3 ML: 2.5; .5 SOLUTION RESPIRATORY (INHALATION) at 19:57

## 2025-01-04 RX ADMIN — IPRATROPIUM BROMIDE AND ALBUTEROL SULFATE 3 ML: 2.5; .5 SOLUTION RESPIRATORY (INHALATION) at 12:39

## 2025-01-04 RX ADMIN — METHYLPREDNISOLONE SODIUM SUCCINATE 40 MG: 40 INJECTION, POWDER, FOR SOLUTION INTRAMUSCULAR; INTRAVENOUS at 06:39

## 2025-01-04 RX ADMIN — IPRATROPIUM BROMIDE AND ALBUTEROL SULFATE 3 ML: 2.5; .5 SOLUTION RESPIRATORY (INHALATION) at 07:42

## 2025-01-04 RX ADMIN — GUAIFENESIN 600 MG: 600 TABLET, MULTILAYER, EXTENDED RELEASE ORAL at 20:44

## 2025-01-04 RX ADMIN — LOSARTAN POTASSIUM 25 MG: 25 TABLET, FILM COATED ORAL at 09:14

## 2025-01-04 RX ADMIN — GUAIFENESIN 600 MG: 600 TABLET, MULTILAYER, EXTENDED RELEASE ORAL at 09:14

## 2025-01-04 RX ADMIN — INSULIN LISPRO 1 UNITS: 100 INJECTION, SOLUTION INTRAVENOUS; SUBCUTANEOUS at 13:15

## 2025-01-04 RX ADMIN — ENOXAPARIN SODIUM 40 MG: 40 INJECTION SUBCUTANEOUS at 20:44

## 2025-01-04 RX ADMIN — IPRATROPIUM BROMIDE AND ALBUTEROL SULFATE 3 ML: 2.5; .5 SOLUTION RESPIRATORY (INHALATION) at 00:13

## 2025-01-04 RX ADMIN — Medication 3 L/MIN: at 19:57

## 2025-01-04 RX ADMIN — ACETAMINOPHEN 650 MG: 325 TABLET ORAL at 20:47

## 2025-01-04 RX ADMIN — DOCUSATE SODIUM 100 MG: 100 CAPSULE, LIQUID FILLED ORAL at 20:44

## 2025-01-04 RX ADMIN — BENZONATATE 100 MG: 100 CAPSULE ORAL at 20:47

## 2025-01-04 RX ADMIN — DOCUSATE SODIUM 100 MG: 100 CAPSULE, LIQUID FILLED ORAL at 09:14

## 2025-01-04 RX ADMIN — INSULIN LISPRO 1 UNITS: 100 INJECTION, SOLUTION INTRAVENOUS; SUBCUTANEOUS at 17:50

## 2025-01-04 RX ADMIN — Medication 4 L/MIN: at 07:42

## 2025-01-04 ASSESSMENT — PAIN SCALES - PAIN ASSESSMENT IN ADVANCED DEMENTIA (PAINAD)
TOTALSCORE: 0
BODYLANGUAGE: RELAXED
BREATHING: NORMAL
FACIALEXPRESSION: SMILING OR INEXPRESSIVE
CONSOLABILITY: NO NEED TO CONSOLE

## 2025-01-04 ASSESSMENT — PAIN SCALES - GENERAL
PAINLEVEL_OUTOF10: 4
PAINLEVEL_OUTOF10: 0 - NO PAIN

## 2025-01-04 ASSESSMENT — COGNITIVE AND FUNCTIONAL STATUS - GENERAL
DAILY ACTIVITIY SCORE: 24
MOBILITY SCORE: 22
DAILY ACTIVITIY SCORE: 24
MOBILITY SCORE: 22
WALKING IN HOSPITAL ROOM: A LITTLE
CLIMB 3 TO 5 STEPS WITH RAILING: A LOT
CLIMB 3 TO 5 STEPS WITH RAILING: A LITTLE

## 2025-01-04 ASSESSMENT — PAIN DESCRIPTION - DESCRIPTORS: DESCRIPTORS: ACHING;DULL

## 2025-01-04 ASSESSMENT — PAIN - FUNCTIONAL ASSESSMENT: PAIN_FUNCTIONAL_ASSESSMENT: 0-10

## 2025-01-04 ASSESSMENT — PAIN SCALES - WONG BAKER
WONGBAKER_NUMERICALRESPONSE: HURTS LITTLE BIT
WONGBAKER_NUMERICALRESPONSE: NO HURT

## 2025-01-04 ASSESSMENT — PAIN DESCRIPTION - LOCATION: LOCATION: BACK

## 2025-01-04 NOTE — PROGRESS NOTES
"Billie Hernadez is a 69 y.o. female on day 4 of admission presenting with COPD exacerbation (Multi).    Subjective   Patient resting in bed. States breathing is getting better, but she is frustrated that it's taking so long to get better. Denies chest pain, abdominal pain, fevers, chills.        Objective     Physical Exam  Constitutional:       Appearance: Normal appearance.   HENT:      Head: Normocephalic and atraumatic.      Mouth/Throat:      Mouth: Mucous membranes are moist.      Pharynx: Oropharynx is clear.   Eyes:      Extraocular Movements: Extraocular movements intact.      Conjunctiva/sclera: Conjunctivae normal.      Pupils: Pupils are equal, round, and reactive to light.   Cardiovascular:      Rate and Rhythm: Normal rate and regular rhythm.      Pulses: Normal pulses.      Heart sounds: Normal heart sounds.   Pulmonary:      Effort: Pulmonary effort is normal.      Breath sounds: Normal breath sounds.   Abdominal:      General: Bowel sounds are normal.      Palpations: Abdomen is soft.      Tenderness: There is no abdominal tenderness.   Musculoskeletal:         General: Normal range of motion.      Cervical back: Normal range of motion and neck supple.   Skin:     General: Skin is warm and dry.      Capillary Refill: Capillary refill takes less than 2 seconds.   Neurological:      General: No focal deficit present.      Mental Status: She is alert and oriented to person, place, and time.   Psychiatric:         Mood and Affect: Mood normal.         Behavior: Behavior normal.         Last Recorded Vitals  Blood pressure 128/71, pulse (!) 113, temperature 36.4 °C (97.5 °F), temperature source Oral, resp. rate 19, height 1.6 m (5' 3\"), weight 49.5 kg (109 lb 2 oz), SpO2 94%.  Intake/Output last 3 Shifts:  I/O last 3 completed shifts:  In: 325 (6.6 mL/kg) [P.O.:325]  Out: 701 (14.2 mL/kg) [Urine:700 (0.4 mL/kg/hr); Stool:1]  Weight: 49.5 kg     Relevant Results  Results for orders placed or performed " during the hospital encounter of 12/31/24 (from the past 24 hours)   Transthoracic Echo (TTE) Complete   Result Value Ref Range    AV pk catrachito 1.90 m/s    LVOT diam 1.90 cm    AV mn grad 8 mmHg    MV E/A ratio 0.93     LV Biplane EF 56 %    LV EF 63 %    RVSP 40.1 mmHg    LVIDd 4.10 cm    AV pk grad 14 mmHg    Aortic Valve Area by Continuity of VTI 1.71 cm2    Aortic Valve Area by Continuity of Peak Velocity 1.81 cm2    LV A4C EF 56.9    POCT GLUCOSE   Result Value Ref Range    POCT Glucose 159 (H) 74 - 99 mg/dL   POCT GLUCOSE   Result Value Ref Range    POCT Glucose 120 (H) 74 - 99 mg/dL   POCT GLUCOSE   Result Value Ref Range    POCT Glucose 125 (H) 74 - 99 mg/dL   Basic Metabolic Panel   Result Value Ref Range    Glucose 96 74 - 99 mg/dL    Sodium 131 (L) 136 - 145 mmol/L    Potassium 4.7 3.5 - 5.3 mmol/L    Chloride 89 (L) 98 - 107 mmol/L    Bicarbonate 39 (H) 21 - 32 mmol/L    Anion Gap 8 (L) 10 - 20 mmol/L    Urea Nitrogen 11 6 - 23 mg/dL    Creatinine 0.42 (L) 0.50 - 1.05 mg/dL    eGFR >90 >60 mL/min/1.73m*2    Calcium 8.5 (L) 8.6 - 10.3 mg/dL   CBC   Result Value Ref Range    WBC 12.1 (H) 4.4 - 11.3 x10*3/uL    nRBC 0.0 0.0 - 0.0 /100 WBCs    RBC 3.83 (L) 4.00 - 5.20 x10*6/uL    Hemoglobin 11.9 (L) 12.0 - 16.0 g/dL    Hematocrit 36.0 36.0 - 46.0 %    MCV 94 80 - 100 fL    MCH 31.1 26.0 - 34.0 pg    MCHC 33.1 32.0 - 36.0 g/dL    RDW 13.7 11.5 - 14.5 %    Platelets 385 150 - 450 x10*3/uL   POCT GLUCOSE   Result Value Ref Range    POCT Glucose 89 74 - 99 mg/dL   POCT GLUCOSE   Result Value Ref Range    POCT Glucose 175 (H) 74 - 99 mg/dL     Transthoracic Echo (TTE) Complete    Result Date: 1/3/2025           [place] [id number]            Phone [phone] TRANSTHORACIC ECHOCARDIOGRAM REPORT Patient Name:       ESTELA Dumont Physician:    [id number] Alber Oglesby MD Study Date:         1/3/2025              Ordering Provider:    [id number] ELICEO BACA MRN/PID:            [id number]             Fellow: Accession#:         [id number]         Nurse: Date of Birth/Age:  1955 / 69      Sonographer:          Severo ames RDCS Gender Assigned at  F                    Additional Staff: Birth: Height:             160.02 cm            Admit Date: Weight:             49.90 kg             Admission Status:     Inpatient -                                                                Routine BSA / BMI:          1.50 m2 / 19.49      Department Location:  [facility]                     kg/m2 Blood Pressure: 128 /66 mmHg Study Type:    TRANSTHORACIC ECHO (TTE) COMPLETE Diagnosis/ICD: Unspecified systolic (congestive) heart failure (CHF)-I50.20 Indication:    Congestive Heart Failure CPT Codes:     Echo Complete w Full [id number] Patient History: Pertinent History: HTN, Hyperlipidemia, COPD, Dyspnea and Syncope. Study Detail: The following Echo studies were performed: 2D, M-Mode, Doppler and               color flow. Technically challenging study due to poor acoustic               windows, body habitus and seated in bed.  PHYSICIAN INTERPRETATION: Left Ventricle: The left ventricular systolic function is normal, with a visually estimated ejection fraction of 60-65%. There are no regional wall motion abnormalities. The left ventricular cavity size is normal. There is normal septal and normal posterior left ventricular wall thickness. Spectral Doppler shows an abnormal pattern of left ventricular diastolic filling. Left Atrium: The left atrium is normal in size. Right Ventricle: The right ventricle is normal in size. There is normal right ventriclar wall thickness. There is normal right ventricular global systolic function. Right Atrium: The right atrium is normal in size. Aortic Valve: The  aortic valve is trileaflet. There is mild aortic valve cusp calcification. There is no evidence of reduced aortic valve leaflet excursion excursion. There is evidence of mildly elevated transaortic gradients consistent with sclerosis of the aortic valve. The aortic valve dimensionless index is 0.60. There is no evidence of aortic valve regurgitation. The peak instantaneous gradient of the aortic valve is 14 mmHg. The mean gradient of the aortic valve is 8 mmHg. Mitral Valve: The mitral valve is normal in structure. There is normal mitral valve leaflet mobility. There is trace to mild mitral valve regurgitation. Tricuspid Valve: The tricuspid valve is structurally normal. There is normal tricuspid valve leaflet mobility. There is mild tricuspid regurgitation. The Doppler estimated RVSP is mildly elevated right ventricular systolic pressure at 40.1 mmHg. Pulmonic Valve: The pulmonic valve is structurally normal. There is trace pulmonic valve regurgitation. Pericardium: No pericardial effusion noted. Aorta: The aortic root is normal. There is no dilatation of the aortic arch. There is no dilatation of the ascending aorta. There is no dilatation of the aortic root. There is plaque visualized in the ascending aorta, which is classified as a Grade 2 [mild (focal or diffuse) intimal thickening of 2-3 mm] atherosclerosis. Pulmonary Artery: The pulmonary artery is normal in size. The tricuspid regurgitant velocity is 3.04 m/s, and with an estimated right atrial pressure of 3 mmHg, the estimated pulmonary artery pressure is mildly elevated with the RVSP at 40.1 mmHg. The estimated PASP is 40 mmHg. Systemic Veins: The inferior vena cava appears mildly dilated, with IVC inspiratory collapse greater than 50%.  CONCLUSIONS:  1. The left ventricular systolic function is normal, with a visually estimated ejection fraction of 60-65%.  2. Spectral Doppler shows an abnormal pattern of left ventricular diastolic filling.  3. There is  normal right ventricular global systolic function.  4. Trace to mild mitral valve regurgitation.  5. Mild tricuspid regurgitation is visualized.  6. Mildly elevated right ventricular systolic pressure.  7. Aortic valve sclerosis.  8. The estimated PASP is 40 mmHg.  9. The inferior vena cava appears mildly dilated, with IVC inspiratory collapse greater than 50%. 10. There is plaque visualized in the ascending aorta. QUANTITATIVE DATA SUMMARY:  2D MEASUREMENTS:           Normal Ranges: Ao Root s:       2.91 cm IVSd:            0.70 cm   (0.6-1.1cm) LVPWd:           0.77 cm   (0.6-1.1cm) LVIDd:           4.10 cm   (3.9-5.9cm) LVIDs:           2.92 cm LV Mass Index:   58.0 g/m2 LV % FS          28.8 %  LA VOLUME:                   Normal Ranges: LA Vol A4C:       19.3 ml LA Vol A2C:       21.3 ml LA Vol Index BSA: 13.5 ml/m2  RA VOLUME BY A/L METHOD:          Normal Ranges: RA Area A4C:             12.1 cm2  M-MODE MEASUREMENTS:         Normal Ranges: AoV Exc:             1.36 cm (1.5-2.5cm)  AORTA MEASUREMENTS:         Normal Ranges: AoV Exc:            1.36 cm (1.5-2.5cm) Ao Sinus, d:        2.90 cm (2.1-3.5cm)  LV SYSTOLIC FUNCTION BY 2D PLANIMETRY (MOD):                      Normal Ranges: EF-A4C View:    57 % (>=55%) EF-A2C View:    48 % EF-Biplane:     56 % EF-Visual:      63 % EF-Auto:        53 % LV EF Reported: 63 %  LV DIASTOLIC FUNCTION:           Normal Ranges: MV Peak E:             0.73 m/s  (0.7-1.2 m/s) MV Peak A:             0.79 m/s  (0.42-0.7 m/s) E/A Ratio:             0.93      (1.0-2.2) MV e'                  0.112 m/s (>8.0) MV lateral e'          0.12 m/s MV medial e'           0.11 m/s E/e' Ratio:            6.57      (<8.0)  MITRAL VALVE:          Normal Ranges: MV DT:        148 msec (150-240msec)  AORTIC VALVE:                      Normal Ranges: AoV Vmax:                1.90 m/s  (<=1.7m/s) AoV Peak P.4 mmHg (<20mmHg) AoV Mean P.7 mmHg  (1.7-11.5mmHg) LVOT  Max Odilon:            1.21 m/s  (<=1.1m/s) AoV VTI:                 35.21 cm  (18-25cm) LVOT VTI:                21.21 cm LVOT Diameter:           1.90 cm   (1.8-2.4cm) AoV Area, VTI:           1.71 cm2  (2.5-5.5cm2) AoV Area,Vmax:           1.81 cm2  (2.5-4.5cm2) AoV Dimensionless Index: 0.60  RIGHT VENTRICLE: RV Basal 3.40 cm RV Mid   2.30 cm RV Major 6.5 cm  TRICUSPID VALVE/RVSP:          Normal Ranges: Peak TR Velocity:     3.04 m/s RV Syst Pressure:     40 mmHg  (< 30mmHg) IVC Diam:             1.98 cm  [id number] Alber Oglesby MD Electronically signed on 1/3/2025 at 5:23:35 PM  ** Final **     FL modified barium swallow study    Result Date: 1/3/2025  Interpreted By:  Patience Palacios and Brodsky Sheryl STUDY: FL MODIFIED BARIUM SWALLOW STUDY;; 1/3/2025 1:08 pm   INDICATION: Signs/Symptoms:r/o aspiration.     COMPARISON: None.   ACCESSION NUMBER(S): [id number]   ORDERING CLINICIAN: LEWIS HURT   SPEECH FINDINGS: Modified Barium Swallow Study completed. Informed verbal consent obtained prior to completion of exam. The study was completed per protocol with various liquid barium consistencies, pudding, solids and a 13mm barium tablet.  A 1.9 cm or .75 inch (outer diameter) ring was placed on the chin in the lateral view and on the lateral, left side of the neck in the a-p view in order to complete objective measurements during swallowing. The anatomic structures and function of the oropharynx, larynx, hypopharynx and cervical esophagus were evaluated.   SLP: BRITTON Sutton Contact info: [employer] secure chat; phone: [phone]   Reason for Referral: r/o aspiration d/t PNA and increased oxygenation Patient Hx: #Right non-small cell lung cancer #History of COPD #Right middle lobe collapse #Right lower lobe atelectasis versus aspiration Respiratory Status: 4LNC Current diet: regular and thin lqiuids   Pain: Pain Scale: 0-10 Ratin   DIET RECOMMENDATIONS: - Regular (IDDSI Level 7) - Thin liquids (IDDSI Level 0)  Per the results of today's MBSS, patient to continue baseline diet of regular  consistencies and thin liquids following swallow strategies listed below:   STRATEGIES: - Upright for all PO intake     SLP PLAN: Skilled SLP Services: No further skilled SLP intervention is warranted for dysphagia at this time. Discussed POC: Patient Discussed Risks/Benefits: Yes Patient/Caregiver Agreeable: Yes   Education Provided: Results and recommendations per MBSS, with video review; recommendations and POC at this time. Verbal understanding and agreement given on all accounts.   Treatment Provided Today: Not indicated Additional Medical Consults Suggested: none   Repeat Study: Per MD   Mechanics of the Swallow Summary: ORAL PHASE: Lip Closure - No labial escape/anterior loss of bolus Tongue Control During Bolus Hold - Cohesive bolus between tongue to palatal seal Bolus prep/mastication - Timely and efficient mastication skills Bolus transport/lingual motion - Brisk tongue motion for A-P movement of the bolus Oral residue - Complete oral clearance   PHARYNGEAL PHASE: Initiation of pharyngeal swallow - Bolus head at posterior angle of ramus Soft palate elevation - No bolus between soft palate/pharyngeal wall Laryngeal elevation - Complete superior movement of thyroid cartilage with contact of arytenoids to epiglottic petiole Anterior hyoid excursion - Complete anterior movement Epiglottic movement - Complete inversion Laryngeal vestibule closure - Complete - no air/contrast in laryngeal vestibule Pharyngeal stripping wave - Complete Pharyngeal contraction (A/P view) - Complete Pharyngoesophageal segment opening - Complete distension and complete duration/no obstruction of flow of bolus Tongue base retraction - No bolus between tongue base and posterior pharyngeal wall Pharyngeal residue - Complete pharyngeal clearance   ESOPHAGEAL PHASE: Esophageal clearance - Complete clearance   SLP Impressions with Severity Rating: Pt presents  with no oropharyngeal dysphagia upon completion of modified barium swallow study this date. Swallowing physiology is detailed above. No impairments impacting swallowing safety and efficiency.Patient demonstrated no penetration or aspiration with any consistency.   *Of note: The A-P bolus follow-through is not intended to be utilized as a diagnostic assessment of the esophagus, rather a tool to observe the biomechanical aspects of the swallow continuum and to inform the need for further evaluation by medical specialists, as applicable.   Strategies attempted- not indicated   OUTCOME MEASURES: Functional Oral Intake Scale Functional Oral Intake Scale: Level 7        total oral diet with no restrictions   Rosenbek's Penetration Aspiration Scale Thin Liquids: 1. NO ASPIRATION & NO PENETRATION - no aspiration, contrast does not enter airway Winner Thick Liquids: 1. NO ASPIRATION & NO PENETRATION - no aspiration, contrast does not enter airway Puree: 1. NO ASPIRATION & NO PENETRATION - no aspiration, contrast does not enter airway Solids: 1. NO ASPIRATION & NO PENETRATION - no aspiration, contrast does not enter airway Speech Therapy section of this report signed by Ashley Grimm MA-CCC/SLP on 1/3/2025 at 1:39 pm.   RADIOLOGY FINDINGS: None   Radiology section of this report signed by Patience Palacios M.D..       Please refer to the speech therapist's findings and recommendations.   MACRO: None   Signed by: Patience Palacios 1/3/2025 1:53 PM Dictation workstation:   [id number]       Assessment/Plan   Assessment & Plan  COPD exacerbation (Multi)  Titrate O2 as needed to maintain SaO2   Consult pulmonology - appreciate recs   IV steroids   Duonebs scheduled and PRN   CT PE negative for PE, pneumothorax, consolidation  Mucinex   Cough suppressant   HTN (hypertension)  Continue home losartan dose   Malignant neoplasm of upper lobe of right lung (Multi)  Following with [facility] Santillan.   Received 1st treatments 12/17  Biopsy 12/31  Acute  respiratory failure with hypoxia (Multi)  Titrate O2 as needed - on high flow   Inhalers as above   Admit to SDU   Consult pulmonology   Speech therapy consult to rule out aspiration - MBS negative for aspiration   Pneumonia due to infectious organism  Unlikely bacterial pneumonia   Per pulmonology - stop IV antibiotics   Monitor blood cultures - negative x 3 days     DVT prophylaxis   Lovenox     Elisha Dubois, APRN-CNP

## 2025-01-04 NOTE — ASSESSMENT & PLAN NOTE
Unlikely bacterial pneumonia   Per pulmonology - stop IV antibiotics   Monitor blood cultures - negative x 3 days

## 2025-01-05 ENCOUNTER — APPOINTMENT (OUTPATIENT)
Dept: RADIOLOGY | Facility: HOSPITAL | Age: 70
DRG: 190 | End: 2025-01-05
Payer: MEDICARE

## 2025-01-05 ENCOUNTER — HOSPITAL ENCOUNTER (OUTPATIENT)
Dept: CARDIOLOGY | Facility: HOSPITAL | Age: 70
Discharge: HOME | DRG: 190 | End: 2025-01-05
Payer: MEDICARE

## 2025-01-05 VITALS
BODY MASS INDEX: 19.34 KG/M2 | RESPIRATION RATE: 18 BRPM | HEIGHT: 63 IN | TEMPERATURE: 97.5 F | HEART RATE: 100 BPM | SYSTOLIC BLOOD PRESSURE: 118 MMHG | DIASTOLIC BLOOD PRESSURE: 73 MMHG | WEIGHT: 109.13 LBS | OXYGEN SATURATION: 93 %

## 2025-01-05 PROBLEM — J44.1 COPD EXACERBATION (MULTI): Status: RESOLVED | Noted: 2024-12-31 | Resolved: 2025-01-05

## 2025-01-05 PROBLEM — J18.9 PNEUMONIA DUE TO INFECTIOUS ORGANISM: Status: RESOLVED | Noted: 2024-12-31 | Resolved: 2025-01-05

## 2025-01-05 LAB
ANION GAP SERPL CALCULATED.3IONS-SCNC: 10 MMOL/L (ref 10–20)
BUN SERPL-MCNC: 14 MG/DL (ref 6–23)
CALCIUM SERPL-MCNC: 8.7 MG/DL (ref 8.6–10.3)
CHLORIDE SERPL-SCNC: 90 MMOL/L (ref 98–107)
CO2 SERPL-SCNC: 35 MMOL/L (ref 21–32)
CREAT SERPL-MCNC: 0.38 MG/DL (ref 0.5–1.05)
EGFRCR SERPLBLD CKD-EPI 2021: >90 ML/MIN/1.73M*2
ERYTHROCYTE [DISTWIDTH] IN BLOOD BY AUTOMATED COUNT: 13.6 % (ref 11.5–14.5)
GLUCOSE BLD MANUAL STRIP-MCNC: 167 MG/DL (ref 74–99)
GLUCOSE BLD MANUAL STRIP-MCNC: 94 MG/DL (ref 74–99)
GLUCOSE SERPL-MCNC: 100 MG/DL (ref 74–99)
HCT VFR BLD AUTO: 38.5 % (ref 36–46)
HGB BLD-MCNC: 12.9 G/DL (ref 12–16)
MCH RBC QN AUTO: 31.6 PG (ref 26–34)
MCHC RBC AUTO-ENTMCNC: 33.5 G/DL (ref 32–36)
MCV RBC AUTO: 94 FL (ref 80–100)
NRBC BLD-RTO: 0 /100 WBCS (ref 0–0)
PLATELET # BLD AUTO: 406 X10*3/UL (ref 150–450)
POTASSIUM SERPL-SCNC: 4.6 MMOL/L (ref 3.5–5.3)
RBC # BLD AUTO: 4.08 X10*6/UL (ref 4–5.2)
SODIUM SERPL-SCNC: 130 MMOL/L (ref 136–145)
WBC # BLD AUTO: 14.7 X10*3/UL (ref 4.4–11.3)

## 2025-01-05 PROCEDURE — 80048 BASIC METABOLIC PNL TOTAL CA: CPT | Performed by: NURSE PRACTITIONER

## 2025-01-05 PROCEDURE — 71045 X-RAY EXAM CHEST 1 VIEW: CPT

## 2025-01-05 PROCEDURE — 2500000005 HC RX 250 GENERAL PHARMACY W/O HCPCS: Performed by: INTERNAL MEDICINE

## 2025-01-05 PROCEDURE — 99239 HOSP IP/OBS DSCHRG MGMT >30: CPT | Performed by: NURSE PRACTITIONER

## 2025-01-05 PROCEDURE — 2500000002 HC RX 250 W HCPCS SELF ADMINISTERED DRUGS (ALT 637 FOR MEDICARE OP, ALT 636 FOR OP/ED): Performed by: INTERNAL MEDICINE

## 2025-01-05 PROCEDURE — 82947 ASSAY GLUCOSE BLOOD QUANT: CPT

## 2025-01-05 PROCEDURE — 93010 ELECTROCARDIOGRAM REPORT: CPT | Performed by: INTERNAL MEDICINE

## 2025-01-05 PROCEDURE — 93005 ELECTROCARDIOGRAM TRACING: CPT

## 2025-01-05 PROCEDURE — 2500000001 HC RX 250 WO HCPCS SELF ADMINISTERED DRUGS (ALT 637 FOR MEDICARE OP): Performed by: NURSE PRACTITIONER

## 2025-01-05 PROCEDURE — 36415 COLL VENOUS BLD VENIPUNCTURE: CPT | Performed by: NURSE PRACTITIONER

## 2025-01-05 PROCEDURE — 71045 X-RAY EXAM CHEST 1 VIEW: CPT | Performed by: RADIOLOGY

## 2025-01-05 PROCEDURE — 85027 COMPLETE CBC AUTOMATED: CPT | Performed by: NURSE PRACTITIONER

## 2025-01-05 PROCEDURE — 2500000004 HC RX 250 GENERAL PHARMACY W/ HCPCS (ALT 636 FOR OP/ED): Performed by: NURSE PRACTITIONER

## 2025-01-05 PROCEDURE — 94640 AIRWAY INHALATION TREATMENT: CPT

## 2025-01-05 RX ORDER — ALBUTEROL SULFATE 0.83 MG/ML
2.5 SOLUTION RESPIRATORY (INHALATION) EVERY 6 HOURS PRN
Qty: 75 ML | Refills: 3 | Status: SHIPPED | OUTPATIENT
Start: 2025-01-05

## 2025-01-05 RX ORDER — GUAIFENESIN 600 MG/1
600 TABLET, EXTENDED RELEASE ORAL 2 TIMES DAILY
Qty: 14 TABLET | Refills: 0 | Status: SHIPPED | OUTPATIENT
Start: 2025-01-05

## 2025-01-05 RX ORDER — DOCUSATE SODIUM 100 MG/1
100 CAPSULE, LIQUID FILLED ORAL 2 TIMES DAILY
Qty: 60 CAPSULE | Refills: 0 | Status: SHIPPED | OUTPATIENT
Start: 2025-01-05

## 2025-01-05 RX ORDER — PREDNISONE 10 MG/1
TABLET ORAL
Qty: 46 TABLET | Refills: 0 | Status: SHIPPED | OUTPATIENT
Start: 2025-01-06 | End: 2025-01-25

## 2025-01-05 RX ORDER — BENZONATATE 100 MG/1
100 CAPSULE ORAL 3 TIMES DAILY PRN
Qty: 20 CAPSULE | Refills: 0 | Status: SHIPPED | OUTPATIENT
Start: 2025-01-05

## 2025-01-05 RX ADMIN — IPRATROPIUM BROMIDE AND ALBUTEROL SULFATE 3 ML: 2.5; .5 SOLUTION RESPIRATORY (INHALATION) at 12:11

## 2025-01-05 RX ADMIN — Medication 4 L/MIN: at 09:35

## 2025-01-05 RX ADMIN — DOCUSATE SODIUM 100 MG: 100 CAPSULE, LIQUID FILLED ORAL at 09:12

## 2025-01-05 RX ADMIN — GUAIFENESIN 600 MG: 600 TABLET, MULTILAYER, EXTENDED RELEASE ORAL at 09:12

## 2025-01-05 RX ADMIN — PREDNISONE 40 MG: 20 TABLET ORAL at 09:12

## 2025-01-05 RX ADMIN — LOSARTAN POTASSIUM 25 MG: 25 TABLET, FILM COATED ORAL at 09:12

## 2025-01-05 RX ADMIN — Medication 3 L/MIN: at 07:31

## 2025-01-05 RX ADMIN — BENZONATATE 100 MG: 100 CAPSULE ORAL at 12:10

## 2025-01-05 RX ADMIN — IPRATROPIUM BROMIDE AND ALBUTEROL SULFATE 3 ML: 2.5; .5 SOLUTION RESPIRATORY (INHALATION) at 07:31

## 2025-01-05 NOTE — CARE PLAN
"Pt discharged home, no needs. Pt will have home 02.  Phoned pt on her cell phone, pt said that she does not need a C nurse. She is \"fine\"    DISCHARGE PLAN: HOME  "

## 2025-01-05 NOTE — CARE PLAN
Problem: Respiratory  Goal: Clear secretions with interventions this shift  Outcome: Progressing  Goal: No signs of respiratory distress (eg. Use of accessory muscles. Peds grunting)  Outcome: Progressing  Goal: Verbalize decreased shortness of breath this shift  Outcome: Progressing  Goal: Wean oxygen to maintain O2 saturation per order/standard this shift  Outcome: Progressing

## 2025-01-05 NOTE — SIGNIFICANT EVENT
Home Oxygen certification per YARIEL Dubois:    On RA at rest, the pt's SPO2 dropped to 87% and she required NC 4L to increase her SPO2 to 93%.     During exertion on NC 4L, the pt maintained an SPO2 of 91% or greater, no complications.     The RN and YARIEL Dubois were notified.

## 2025-01-05 NOTE — DISCHARGE SUMMARY
Discharge Diagnosis  COPD exacerbation (Multi)    Issues Requiring Follow-Up      Discharge Meds     Medication List      START taking these medications     benzonatate 100 mg capsule; Commonly known as: Tessalon; Take 1 capsule   (100 mg) by mouth 3 times a day as needed for cough. Do not crush or chew.   docusate sodium 100 mg capsule; Commonly known as: Colace; Take 1   capsule (100 mg) by mouth 2 times a day.   guaiFENesin 600 mg 12 hr tablet; Commonly known as: Mucinex; Take 1   tablet (600 mg) by mouth 2 times a day. Do not crush, chew, or split.   predniSONE 10 mg tablet; Commonly known as: Deltasone; Take 4 tablets   (40 mg) by mouth once daily for 4 days, THEN 3 tablets (30 mg) once daily   for 5 days, THEN 2 tablets (20 mg) once daily for 5 days, THEN 1 tablet   (10 mg) once daily for 5 days.; Start taking on: January 6, 2025     CHANGE how you take these medications     * albuterol 90 mcg/actuation inhaler; USE 1 TO 2 INHALATIONS BY  MOUTH   EVERY 4 TO 6 HOURS AS NEEDED; What changed: Another medication with the   same name was added. Make sure you understand how and when to take each.   * albuterol 2.5 mg /3 mL (0.083 %) nebulizer solution; Take 3 mL (2.5   mg) by nebulization every 6 hours if needed for wheezing.; What changed:   You were already taking a medication with the same name, and this   prescription was added. Make sure you understand how and when to take   each.  * This list has 2 medication(s) that are the same as other medications   prescribed for you. Read the directions carefully, and ask your doctor or   other care provider to review them with you.     CONTINUE taking these medications     alendronate 70 mg tablet; Commonly known as: Fosamax; Take 1 tablet (70   mg) by mouth 1 (one) time per week. Take in the morning with a full glass   of water, on an empty stomach, and do not take anything else by mouth or   lie down for the next 30 min.   calcium citrate-vitamin D2 250 mg-2.5 mcg (100  unit) tablet   dexAMETHasone 4 mg tablet; Commonly known as: Decadron; Take 2 tablets   (8 mg) by mouth once daily. For 3 days starting the day after treatment.   losartan 25 mg tablet; Commonly known as: Cozaar; TAKE ONE TABLET BY   MOUTH ONCE DAILY   multivitamin with minerals tablet   OLANZapine 5 mg tablet; Commonly known as: ZyPREXA; Take 1 tablet (5 mg)   by mouth once daily at bedtime. For 4 days starting the evening of   treatment.   ondansetron 8 mg tablet; Commonly known as: Zofran; Take 1 tablet (8 mg)   by mouth every 8 hours if needed for nausea or vomiting.   prochlorperazine 10 mg tablet; Commonly known as: Compazine; Take 1   tablet (10 mg) by mouth every 6 hours if needed for nausea or vomiting.   Spiriva with HandiHaler 18 mcg inhalation capsule; Generic drug:   tiotropium; place ONE CAPSULE into inhaler AND inhale ONCE DAILY       Test Results Pending At Discharge  Pending Labs       No current pending labs.            Hospital Course   Presented to ER 12/31 after lung biopsy in interventional radiology. She developed shortness of breath and acute hypoxia after procedure and was taken to ER. Imaging negative for PE, pneumonia. X-ray showing known mass in right lung. Pulmonology consulted. IV antibiotics stopped when pneumonia ruled out. IV steroids and nebulizer treatments ordered. O2 as high as 65% via high flow nasal cannula. Weaned to 4 lpm and steroids weaned to 40 mg oral daily. Cleared for discharge by pulmonology with close follow up and home O2. Home O2 certification done and prescription sent to [facility]. Confirmation from patient's daughter that O2 delivered to patient's house day of discharge. Home nebulizer given to patient by respiratory therapy and O2 tanks given for transport to home.     Pertinent Physical Exam At Time of Discharge  Physical Exam  Constitutional:       Appearance: Normal appearance.   HENT:      Head: Normocephalic and atraumatic.      Mouth/Throat:       Mouth: Mucous membranes are moist.      Pharynx: Oropharynx is clear.   Eyes:      Extraocular Movements: Extraocular movements intact.      Conjunctiva/sclera: Conjunctivae normal.      Pupils: Pupils are equal, round, and reactive to light.   Cardiovascular:      Rate and Rhythm: Regular rhythm. Tachycardia present.      Pulses: Normal pulses.      Heart sounds: Normal heart sounds.   Pulmonary:      Effort: Tachypnea present.      Breath sounds: Examination of the right-middle field reveals decreased breath sounds. Examination of the right-lower field reveals decreased breath sounds. Decreased breath sounds present.   Abdominal:      General: Bowel sounds are normal.      Palpations: Abdomen is soft.      Tenderness: There is no abdominal tenderness.   Musculoskeletal:         General: Normal range of motion.      Cervical back: Normal range of motion and neck supple.   Skin:     General: Skin is warm and dry.      Capillary Refill: Capillary refill takes less than 2 seconds.   Neurological:      General: No focal deficit present.      Mental Status: She is alert and oriented to person, place, and time.   Psychiatric:         Mood and Affect: Mood normal.         Behavior: Behavior normal.         Outpatient Follow-Up  Future Appointments   Date Time Provider Department Center   1/7/2025  8:30 AM INF 02 [facility] CON[facility]   1/7/2025  9:15 AM Judith Santillan MD [id number] [facility]   1/15/2025 11:00 AM  BB CT SIMULATOR [id number] [facility]   1/28/2025  8:00 AM INF 04 [facility]   3/25/2025 11:00 AM Puja Rae, APRN-CNP [id number] [facility]         Elisha Dubois APRN-CNP

## 2025-01-06 LAB
ATRIAL RATE: 116 BPM
P OFFSET: 216 MS
P ONSET: 159 MS
PR INTERVAL: 136 MS
Q ONSET: 227 MS
QRS COUNT: 19 BEATS
QRS DURATION: 74 MS
QT INTERVAL: 306 MS
QTC CALCULATION(BAZETT): 425 MS
QTC FREDERICIA: 381 MS
R AXIS: 82 DEGREES
T AXIS: 35 DEGREES
T OFFSET: 380 MS
VENTRICULAR RATE: 116 BPM

## 2025-01-06 NOTE — PROGRESS NOTES
Treatment Pre-Review for Date of Service: 1/6/25    Diagnosis:  Malignant neoplasm of upper lobe of right lung (Multi), Clinical: cT1b, cN2      Regimen: Opdivo + Paclitaxel + Carboplatin every 21 days     Current Cycle/Day: Cycle 2 Day 1    Treatment Date Appropriate: Yes; Last Treatment: 12/17/24  Date change required: none    Dose or Regimen Modifications: Yes:    Starting carboplatin dose AUC 6 -> 5     Med Rec/Drug Interactions: None noted during pre-review    Growth Factor: none    Financial Clearance/Authorization: Yes    Echo:  Transthoracic Echo (TTE) Complete 01/03/2025    Lifetime Dose Tracking   No doses have been documented on this patient for the following tracked chemicals: doxorubicin, epirubicin, idarubicin, daunorubicin, mitoxantrone, bleomycin, mitomycin, carmustine, doxorubicin HCl pegylated liposomal, daunorubicin citrate liposomal     Comments: none    I Axel White, PharmD hereby acknowledge that the treatment plan indicated above has been verified for correctness to the best of my ability on 1/6/2025 at 3:38 PM.

## 2025-01-07 ENCOUNTER — PATIENT OUTREACH (OUTPATIENT)
Dept: PRIMARY CARE | Facility: CLINIC | Age: 70
End: 2025-01-07

## 2025-01-07 ENCOUNTER — INFUSION (OUTPATIENT)
Dept: HEMATOLOGY/ONCOLOGY | Facility: HOSPITAL | Age: 70
End: 2025-01-07
Payer: MEDICARE

## 2025-01-07 ENCOUNTER — OFFICE VISIT (OUTPATIENT)
Dept: HEMATOLOGY/ONCOLOGY | Facility: HOSPITAL | Age: 70
End: 2025-01-07
Payer: MEDICARE

## 2025-01-07 VITALS
RESPIRATION RATE: 20 BRPM | WEIGHT: 100.3 LBS | BODY MASS INDEX: 17.77 KG/M2 | TEMPERATURE: 96.8 F | HEART RATE: 126 BPM | SYSTOLIC BLOOD PRESSURE: 105 MMHG | DIASTOLIC BLOOD PRESSURE: 72 MMHG | OXYGEN SATURATION: 95 %

## 2025-01-07 VITALS
OXYGEN SATURATION: 96 % | BODY MASS INDEX: 17.77 KG/M2 | RESPIRATION RATE: 18 BRPM | DIASTOLIC BLOOD PRESSURE: 76 MMHG | HEART RATE: 108 BPM | SYSTOLIC BLOOD PRESSURE: 125 MMHG | WEIGHT: 100.3 LBS | TEMPERATURE: 97.7 F

## 2025-01-07 DIAGNOSIS — I10 HTN (HYPERTENSION): ICD-10-CM

## 2025-01-07 DIAGNOSIS — C34.11 MALIGNANT NEOPLASM OF UPPER LOBE OF RIGHT LUNG (MULTI): Primary | ICD-10-CM

## 2025-01-07 LAB
ALBUMIN SERPL BCP-MCNC: 4 G/DL (ref 3.4–5)
ALP SERPL-CCNC: 55 U/L (ref 33–136)
ALT SERPL W P-5'-P-CCNC: 18 U/L (ref 7–45)
ANION GAP SERPL CALC-SCNC: 8 MMOL/L (ref 10–20)
AST SERPL W P-5'-P-CCNC: 16 U/L (ref 9–39)
BASOPHILS # BLD AUTO: 0.01 X10*3/UL (ref 0–0.1)
BASOPHILS NFR BLD AUTO: 0.1 %
BILIRUB SERPL-MCNC: 0.4 MG/DL (ref 0–1.2)
BUN SERPL-MCNC: 19 MG/DL (ref 6–23)
CALCIUM SERPL-MCNC: 9.6 MG/DL (ref 8.6–10.3)
CHLORIDE SERPL-SCNC: 89 MMOL/L (ref 98–107)
CO2 SERPL-SCNC: 40 MMOL/L (ref 21–32)
CORTIS AM PEAK SERPL-MSCNC: 59 UG/DL (ref 5–20)
CREAT SERPL-MCNC: 0.48 MG/DL (ref 0.5–1.05)
EGFRCR SERPLBLD CKD-EPI 2021: >90 ML/MIN/1.73M*2
EOSINOPHIL # BLD AUTO: 0.05 X10*3/UL (ref 0–0.4)
EOSINOPHIL NFR BLD AUTO: 0.4 %
ERYTHROCYTE [DISTWIDTH] IN BLOOD BY AUTOMATED COUNT: 13.9 % (ref 11.5–14.5)
GLUCOSE SERPL-MCNC: 125 MG/DL (ref 74–99)
HCT VFR BLD AUTO: 42.2 % (ref 36–46)
HGB BLD-MCNC: 13.3 G/DL (ref 12–16)
IMM GRANULOCYTES # BLD AUTO: 0.07 X10*3/UL (ref 0–0.5)
IMM GRANULOCYTES NFR BLD AUTO: 0.5 % (ref 0–0.9)
LYMPHOCYTES # BLD AUTO: 2.25 X10*3/UL (ref 0.8–3)
LYMPHOCYTES NFR BLD AUTO: 15.8 %
MCH RBC QN AUTO: 31.4 PG (ref 26–34)
MCHC RBC AUTO-ENTMCNC: 31.5 G/DL (ref 32–36)
MCV RBC AUTO: 100 FL (ref 80–100)
MONOCYTES # BLD AUTO: 0.68 X10*3/UL (ref 0.05–0.8)
MONOCYTES NFR BLD AUTO: 4.8 %
NEUTROPHILS # BLD AUTO: 11.16 X10*3/UL (ref 1.6–5.5)
NEUTROPHILS NFR BLD AUTO: 78.4 %
NRBC BLD-RTO: ABNORMAL /100{WBCS}
PLATELET # BLD AUTO: 394 X10*3/UL (ref 150–450)
POTASSIUM SERPL-SCNC: 4.3 MMOL/L (ref 3.5–5.3)
PROT SERPL-MCNC: 6.9 G/DL (ref 6.4–8.2)
RBC # BLD AUTO: 4.23 X10*6/UL (ref 4–5.2)
SODIUM SERPL-SCNC: 133 MMOL/L (ref 136–145)
TSH SERPL-ACNC: 0.5 MIU/L (ref 0.44–3.98)
WBC # BLD AUTO: 14.2 X10*3/UL (ref 4.4–11.3)

## 2025-01-07 PROCEDURE — 99215 OFFICE O/P EST HI 40 MIN: CPT | Mod: 25 | Performed by: INTERNAL MEDICINE

## 2025-01-07 PROCEDURE — 1126F AMNT PAIN NOTED NONE PRSNT: CPT | Performed by: INTERNAL MEDICINE

## 2025-01-07 PROCEDURE — 1159F MED LIST DOCD IN RCRD: CPT | Performed by: INTERNAL MEDICINE

## 2025-01-07 PROCEDURE — 96415 CHEMO IV INFUSION ADDL HR: CPT

## 2025-01-07 PROCEDURE — 96375 TX/PRO/DX INJ NEW DRUG ADDON: CPT | Mod: INF

## 2025-01-07 PROCEDURE — 84443 ASSAY THYROID STIM HORMONE: CPT

## 2025-01-07 PROCEDURE — 82024 ASSAY OF ACTH: CPT

## 2025-01-07 PROCEDURE — 3074F SYST BP LT 130 MM HG: CPT | Performed by: INTERNAL MEDICINE

## 2025-01-07 PROCEDURE — 2500000004 HC RX 250 GENERAL PHARMACY W/ HCPCS (ALT 636 FOR OP/ED): Performed by: INTERNAL MEDICINE

## 2025-01-07 PROCEDURE — 80053 COMPREHEN METABOLIC PANEL: CPT

## 2025-01-07 PROCEDURE — 82533 TOTAL CORTISOL: CPT | Mod: CONLAB

## 2025-01-07 PROCEDURE — 99215 OFFICE O/P EST HI 40 MIN: CPT | Performed by: INTERNAL MEDICINE

## 2025-01-07 PROCEDURE — 2500000001 HC RX 250 WO HCPCS SELF ADMINISTERED DRUGS (ALT 637 FOR MEDICARE OP): Performed by: INTERNAL MEDICINE

## 2025-01-07 PROCEDURE — 85025 COMPLETE CBC W/AUTO DIFF WBC: CPT

## 2025-01-07 PROCEDURE — G2211 COMPLEX E/M VISIT ADD ON: HCPCS | Performed by: INTERNAL MEDICINE

## 2025-01-07 PROCEDURE — 1111F DSCHRG MED/CURRENT MED MERGE: CPT | Performed by: INTERNAL MEDICINE

## 2025-01-07 PROCEDURE — 96417 CHEMO IV INFUS EACH ADDL SEQ: CPT

## 2025-01-07 PROCEDURE — 96367 TX/PROPH/DG ADDL SEQ IV INF: CPT | Mod: INF

## 2025-01-07 PROCEDURE — 3078F DIAST BP <80 MM HG: CPT | Performed by: INTERNAL MEDICINE

## 2025-01-07 PROCEDURE — 96413 CHEMO IV INFUSION 1 HR: CPT

## 2025-01-07 RX ORDER — PROCHLORPERAZINE MALEATE 5 MG
10 TABLET ORAL EVERY 6 HOURS PRN
OUTPATIENT
Start: 2025-01-28

## 2025-01-07 RX ORDER — DIPHENHYDRAMINE HCL 50 MG
50 CAPSULE ORAL ONCE
OUTPATIENT
Start: 2025-01-28

## 2025-01-07 RX ORDER — PROCHLORPERAZINE EDISYLATE 5 MG/ML
10 INJECTION INTRAMUSCULAR; INTRAVENOUS EVERY 6 HOURS PRN
Status: DISCONTINUED | OUTPATIENT
Start: 2025-01-07 | End: 2025-01-07 | Stop reason: HOSPADM

## 2025-01-07 RX ORDER — DIPHENHYDRAMINE HYDROCHLORIDE 50 MG/ML
50 INJECTION INTRAMUSCULAR; INTRAVENOUS AS NEEDED
OUTPATIENT
Start: 2025-01-28

## 2025-01-07 RX ORDER — DEXAMETHASONE 4 MG/1
20 TABLET ORAL ONCE
Status: COMPLETED | OUTPATIENT
Start: 2025-01-07 | End: 2025-01-07

## 2025-01-07 RX ORDER — FAMOTIDINE 10 MG/ML
20 INJECTION INTRAVENOUS ONCE
OUTPATIENT
Start: 2025-01-28

## 2025-01-07 RX ORDER — FAMOTIDINE 10 MG/ML
20 INJECTION INTRAVENOUS ONCE
Status: COMPLETED | OUTPATIENT
Start: 2025-01-07 | End: 2025-01-07

## 2025-01-07 RX ORDER — FAMOTIDINE 10 MG/ML
20 INJECTION INTRAVENOUS ONCE AS NEEDED
OUTPATIENT
Start: 2025-01-28

## 2025-01-07 RX ORDER — HEPARIN SODIUM,PORCINE/PF 10 UNIT/ML
50 SYRINGE (ML) INTRAVENOUS AS NEEDED
OUTPATIENT
Start: 2025-01-07

## 2025-01-07 RX ORDER — EPINEPHRINE 0.3 MG/.3ML
0.3 INJECTION SUBCUTANEOUS EVERY 5 MIN PRN
OUTPATIENT
Start: 2025-01-28

## 2025-01-07 RX ORDER — PROCHLORPERAZINE MALEATE 5 MG
10 TABLET ORAL EVERY 6 HOURS PRN
Status: DISCONTINUED | OUTPATIENT
Start: 2025-01-07 | End: 2025-01-07 | Stop reason: HOSPADM

## 2025-01-07 RX ORDER — HEPARIN 100 UNIT/ML
500 SYRINGE INTRAVENOUS AS NEEDED
OUTPATIENT
Start: 2025-01-07

## 2025-01-07 RX ORDER — DIPHENHYDRAMINE HCL 25 MG
50 CAPSULE ORAL ONCE
Status: COMPLETED | OUTPATIENT
Start: 2025-01-07 | End: 2025-01-07

## 2025-01-07 RX ORDER — PALONOSETRON 0.05 MG/ML
0.25 INJECTION, SOLUTION INTRAVENOUS ONCE
OUTPATIENT
Start: 2025-01-28

## 2025-01-07 RX ORDER — DEXAMETHASONE IN 0.9 % SOD CHL 20 MG/50ML
20 INTRAVENOUS SOLUTION, PIGGYBACK (ML) INTRAVENOUS ONCE
Status: DISCONTINUED | OUTPATIENT
Start: 2025-01-07 | End: 2025-01-07

## 2025-01-07 RX ORDER — PALONOSETRON 0.05 MG/ML
0.25 INJECTION, SOLUTION INTRAVENOUS ONCE
Status: COMPLETED | OUTPATIENT
Start: 2025-01-07 | End: 2025-01-07

## 2025-01-07 RX ORDER — PROCHLORPERAZINE EDISYLATE 5 MG/ML
10 INJECTION INTRAMUSCULAR; INTRAVENOUS EVERY 6 HOURS PRN
OUTPATIENT
Start: 2025-01-28

## 2025-01-07 RX ORDER — ALBUTEROL SULFATE 0.83 MG/ML
3 SOLUTION RESPIRATORY (INHALATION) AS NEEDED
OUTPATIENT
Start: 2025-01-28

## 2025-01-07 RX ADMIN — DIPHENHYDRAMINE HYDROCHLORIDE 50 MG: 25 CAPSULE ORAL at 11:05

## 2025-01-07 RX ADMIN — SODIUM CHLORIDE 360 MG: 9 INJECTION, SOLUTION INTRAVENOUS at 11:44

## 2025-01-07 RX ADMIN — PALONOSETRON HYDROCHLORIDE 250 MCG: 0.25 INJECTION INTRAVENOUS at 11:34

## 2025-01-07 RX ADMIN — FOSAPREPITANT 150 MG: 150 INJECTION, POWDER, LYOPHILIZED, FOR SOLUTION INTRAVENOUS at 10:53

## 2025-01-07 RX ADMIN — FAMOTIDINE 20 MG: 10 INJECTION, SOLUTION INTRAVENOUS at 11:08

## 2025-01-07 RX ADMIN — CARBOPLATIN 440 MG: 10 INJECTION, SOLUTION INTRAVENOUS at 15:46

## 2025-01-07 RX ADMIN — DEXAMETHASONE 20 MG: 4 TABLET ORAL at 11:05

## 2025-01-07 RX ADMIN — PACLITAXEL 288 MG: 6 INJECTION, SOLUTION, CONCENTRATE INTRAVENOUS at 12:30

## 2025-01-07 ASSESSMENT — ENCOUNTER SYMPTOMS
CONSTITUTIONAL NEGATIVE: 1
CARDIOVASCULAR NEGATIVE: 1
RESPIRATORY NEGATIVE: 1
GASTROINTESTINAL NEGATIVE: 1

## 2025-01-07 ASSESSMENT — PAIN SCALES - GENERAL
PAINLEVEL_OUTOF10: 0-NO PAIN

## 2025-01-07 NOTE — PROGRESS NOTES
[facility] [place]   Discharge Diagnosis  COPD exacerbation (Multi)  Admission Date: 12/31/24  Discharge Date: 1/5/25     PCP Appointment Date: Message routed to office for scheduling     Specialist Appointment Date: Infusion Tuesday Jan 7, 2025 8:30 AM  Hospital Encounter and Summary Linked: Yes  See discharge assessment below for further details    Engagement  Call Start Time: 0943 (Call completed with Billie) (1/7/2025  9:42 AM)    Medications  Medications reviewed with patient/caregiver?: Yes (1/7/2025  9:42 AM)  Is the patient having any side effects they believe may be caused by any medication additions or changes?: No (1/7/2025  9:42 AM)  Does the patient have all medications ordered at discharge?: Yes (1/7/2025  9:42 AM)  Care Management Interventions: No intervention needed (1/7/2025  9:42 AM)  Prescription Comments: START taking: benzonatate (Tessalon) docusate sodium (Colace) guaiFENesin (Mucinex) predniSONE (Deltasone) Start taking on: January 6, 2025 (1/7/2025  9:42 AM)  Is the patient taking all medications as directed (includes completed medication regime)?: Yes (1/7/2025  9:42 AM)  Medication Comments: Patient verbalized all new medications, albuterol change (1/7/2025  9:42 AM)    Appointments  Does the patient have a primary care provider?: Yes (1/7/2025  9:42 AM)  Care Management Interventions: Advised patient to make appointment (1/7/2025  9:42 AM)  Has the patient kept scheduled appointments due by today?: Yes (1/7/2025  9:42 AM)  Care Management Interventions: Advised patient to keep appointment (1/7/2025  9:42 AM)    Self Management  What is the home health agency?: n/a (1/7/2025  9:42 AM)  Has home health visited the patient within 72 hours of discharge?: Not applicable (1/7/2025  9:42 AM)  What Durable Medical Equipment (DME) was ordered?: Home nebulizer, 4L O2 continuous (1/7/2025  9:42 AM)  Has all Durable Medical Equipment (DME) been delivered?: Yes (1/7/2025  9:42 AM)    Patient  Teaching  Does the patient have access to their discharge instructions?: Yes (1/7/2025  9:42 AM)  Care Management Interventions: Reviewed instructions with patient (1/7/2025  9:42 AM)  What is the patient's perception of their health status since discharge?: Same (1/7/2025  9:42 AM)  Is the patient/caregiver able to teach back the hierarchy of who to call/visit for symptoms/problems? PCP, Specialist, Home Health nurse, Urgent Care, ED, 911: Yes (1/7/2025  9:42 AM)  Patient/Caregiver Education Comments: Pt. reports she is doing well. She is currently at [facility] for her infusion. She denies any quesitons/concners at this time. Verbalized she would call with any new questions/concers (1/7/2025  9:42 AM)

## 2025-01-07 NOTE — PROGRESS NOTES
January 7, 2025 at 10:05 AM    Patient has no known allergies.    Billie Hernadez is a 69 y.o. female   Blood pressure 105/72, pulse (!) 122, temperature 36 °C (96.8 °F), temperature source Temporal, resp. rate 20, weight 45.5 kg (100 lb 4.8 oz), SpO2 95%.  Body surface area is 1.42 meters squared.    Past Medical History:   Diagnosis Date    Cancer (Multi)     Chronic obstructive pulmonary disease with (acute) exacerbation (Multi) 08/08/2017    COPD exacerbation    Hypertension     Other specified health status 03/12/2015    No known problems    Personal history of other venous thrombosis and embolism 03/12/2015    History of deep venous thrombosis    Pneumonia        Encounter Diagnoses   Name Primary?    Malignant neoplasm of upper lobe of right lung (Multi) Yes    HTN (hypertension)        Has the entire regimen been authorized by financial clearance (pre-certification)?  Yes     Prior to Admission medications    Medication Sig Start Date End Date Taking? Authorizing Provider   albuterol 2.5 mg /3 mL (0.083 %) nebulizer solution Take 3 mL (2.5 mg) by nebulization every 6 hours if needed for wheezing. 1/5/25   MOR Kirk   albuterol 90 mcg/actuation inhaler USE 1 TO 2 INHALATIONS BY  MOUTH EVERY 4 TO 6 HOURS AS NEEDED 12/18/23   MOR Holliday   alendronate (Fosamax) 70 mg tablet Take 1 tablet (70 mg) by mouth 1 (one) time per week. Take in the morning with a full glass of water, on an empty stomach, and do not take anything else by mouth or lie down for the next 30 min. 12/18/23   MOR Holliday   benzonatate (Tessalon) 100 mg capsule Take 1 capsule (100 mg) by mouth 3 times a day as needed for cough. Do not crush or chew. 1/5/25   MOR Kirk   calcium citrate-vitamin D2 250 mg-2.5 mcg (100 unit) tablet Take 1 tablet by mouth once daily.    Historical Provider, MD   dexAMETHasone (Decadron) 4 mg tablet Take 2 tablets (8 mg) by mouth once daily. For 3  days starting the day after treatment. 12/3/24   Judith Santillan MD   docusate sodium (Colace) 100 mg capsule Take 1 capsule (100 mg) by mouth 2 times a day. 1/5/25   MOR Kirk   guaiFENesin (Mucinex) 600 mg 12 hr tablet Take 1 tablet (600 mg) by mouth 2 times a day. Do not crush, chew, or split. 1/5/25   MOR Kirk   losartan (Cozaar) 25 mg tablet TAKE ONE TABLET BY MOUTH ONCE DAILY 1/22/24   MOR Holliday   multivitamin with minerals tablet Take 1 tablet by mouth once daily.    Historical Provider, MD   OLANZapine (ZyPREXA) 5 mg tablet Take 1 tablet (5 mg) by mouth once daily at bedtime. For 4 days starting the evening of treatment. 12/3/24   Judith Santillan MD   ondansetron (Zofran) 8 mg tablet Take 1 tablet (8 mg) by mouth every 8 hours if needed for nausea or vomiting. 12/3/24   Judith Santillan MD   predniSONE (Deltasone) 10 mg tablet Take 4 tablets (40 mg) by mouth once daily for 4 days, THEN 3 tablets (30 mg) once daily for 5 days, THEN 2 tablets (20 mg) once daily for 5 days, THEN 1 tablet (10 mg) once daily for 5 days. 1/6/25 1/25/25  MOR Kirk   prochlorperazine (Compazine) 10 mg tablet Take 1 tablet (10 mg) by mouth every 6 hours if needed for nausea or vomiting. 12/3/24   Judith Santillan MD   Spiriva with HandiHaler 18 mcg inhalation capsule place ONE CAPSULE into inhaler AND inhale ONCE DAILY 6/24/24   MOR Holliday       Reviewed documented list of home medications.  No significant drug interactions identified between home medications and chemotherapy regimen.    Yes    WBC   Date Value Ref Range Status   01/07/2025 14.2 (H) 4.4 - 11.3 x10*3/uL Final   01/05/2025 14.7 (H) 4.4 - 11.3 x10*3/uL Final   01/04/2025 12.1 (H) 4.4 - 11.3 x10*3/uL Final     Hemoglobin   Date Value Ref Range Status   01/07/2025 13.3 12.0 - 16.0 g/dL Final   01/05/2025 12.9 12.0 - 16.0 g/dL Final   01/04/2025 11.9 (L) 12.0 - 16.0 g/dL  Final     Hematocrit   Date Value Ref Range Status   01/07/2025 42.2 36.0 - 46.0 % Final   01/05/2025 38.5 36.0 - 46.0 % Final   01/04/2025 36.0 36.0 - 46.0 % Final     Neutrophils Absolute   Date Value Ref Range Status   01/07/2025 11.16 (H) 1.60 - 5.50 x10*3/uL Final     Comment:     Percent differential counts (%) should be interpreted in the context of the absolute cell counts (cells/uL).   12/31/2024 2.94 1.60 - 5.50 x10*3/uL Final     Comment:     Percent differential counts (%) should be interpreted in the context of the absolute cell counts (cells/uL).   12/03/2024 7.09 (H) 1.60 - 5.50 x10*3/uL Final     Comment:     Percent differential counts (%) should be interpreted in the context of the absolute cell counts (cells/uL).     Platelets   Date Value Ref Range Status   01/07/2025 394 150 - 450 x10*3/uL Final   01/05/2025 406 150 - 450 x10*3/uL Final   01/04/2025 385 150 - 450 x10*3/uL Final     Creatinine   Date Value Ref Range Status   01/07/2025 0.48 (L) 0.50 - 1.05 mg/dL Final   01/05/2025 0.38 (L) 0.50 - 1.05 mg/dL Final   01/04/2025 0.42 (L) 0.50 - 1.05 mg/dL Final     Alkaline Phosphatase   Date Value Ref Range Status   01/07/2025 55 33 - 136 U/L Final   12/31/2024 67 33 - 136 U/L Final   12/03/2024 66 33 - 136 U/L Final     AST   Date Value Ref Range Status   01/07/2025 16 9 - 39 U/L Final   12/31/2024 21 9 - 39 U/L Final   12/03/2024 24 9 - 39 U/L Final     ALT   Date Value Ref Range Status   01/07/2025 18 7 - 45 U/L Final     Comment:     Patients treated with Sulfasalazine may generate falsely decreased results for ALT.   12/31/2024 15 7 - 45 U/L Final     Comment:     Patients treated with Sulfasalazine may generate falsely decreased results for ALT.   12/03/2024 16 7 - 45 U/L Final     Comment:     Patients treated with Sulfasalazine may generate falsely decreased results for ALT.     Bilirubin, Total   Date Value Ref Range Status   01/07/2025 0.4 0.0 - 1.2 mg/dL Final   12/31/2024 0.4 0.0 - 1.2  mg/dL Final   12/03/2024 1.1 0.0 - 1.2 mg/dL Final         Does the patient meet the treatment conditions?  Yes    ANC >= 1.5  Plt >= 100  AST/ALT <= 3 x ULN  Bili <= 1.5 x ULN  sCr <= 1.5 x ULN    Are dosage calculations correct?  Yes    NOTE: Calculated carboplatin dose is 389 mg --provider uses other calculator with sCr min of 0.6. Provider requesting to keep ordered dose of 440 mg    Are doses the same as previous cycle?   Yes    Were any doses modified?  No    If appropriate, was the Creatinine Clearance independently calculated based on [facility] standards?   Not Applicable      Comments:      I Axel White, PharmD hereby acknowledge that the treatment plan indicated above has been verified for correctness to the best of my ability on 1/7/2025 at 10:05 AM.

## 2025-01-07 NOTE — SIGNIFICANT EVENT
01/07/25 0945   Prechemo Checklist   Has the patient been in the hospital, ED, or urgent care since last date of service Yes   Chemo/Immuno Consent Completed and Signed Yes   Protocol/Indications Verified Yes   Confirmed to previous date/time of medication Yes   Compared to previous dose Yes   All medications are dated accurately Yes   Pregnancy Test Negative Not applicable   Parameters Met Yes   BSA/Weight-Height Verified Yes   Dose Calculations Verified (current, total, cumulative) Yes

## 2025-01-09 ENCOUNTER — TELEPHONE (OUTPATIENT)
Dept: HEMATOLOGY/ONCOLOGY | Facility: HOSPITAL | Age: 70
End: 2025-01-09
Payer: MEDICARE

## 2025-01-09 LAB — ACTH PLAS-MCNC: 9.6 PG/ML (ref 7.2–63.3)

## 2025-01-09 NOTE — TELEPHONE ENCOUNTER
"Previous IV site on Left arm looks normal per pt \"No redness, no swelling, no pain\". Tolerated Cycle 2 chemo well, so far. The only side effect reported is fatigue.   "

## 2025-01-10 NOTE — DOCUMENTATION CLARIFICATION NOTE
"    PATIENT:               ESTELA NORIEGA  ACCT #:                  [id number]  MRN:                       [id number]  :                       1955  ADMIT DATE:       2024 1:14 PM  DISCH DATE:        2025 2:05 PM  RESPONDING PROVIDER #:        [id number]          PROVIDER RESPONSE TEXT:    Acute Hypoxic Respiratory Failure related to Right middle lobe collapse and right lower lobe atelectasis    CDI QUERY TEXT:    Clarification    Instruction:    Based on your assessment of the patient and the clinical information, please provide the requested documentation by clicking on the appropriate radio button and enter any additional information if prompted.    Question: Please clarify if a relationship exists between    When answering this query, please exercise your independent professional judgment. The fact that a question is being asked, does not imply that any particular answer is desired or expected.    The patient's clinical indicators include:  Clinical Information: 69 Y/F presents post IR lung biopsy with acute hypoxic respiratory failure    Clinical Indicators:     CT angio chest: \"A spiculated nodules/masses in the right upper lobe measures 2.0 x 1.7 cm, enlarged from prior imaging previously...There is a new 9 mm nodule in the anterior right lower lobe and a new 7 mm nodule in the posterior right lower lobe... Right basilar opacities are progressed from prior imaging and may also obscure additional nodules. There is progression of fluid in the bilateral bronchi with expansile mucous plugging,  greater on the right. Redemonstrated right middle lobe atelectasis with opacification of the central bronchi. No sizable pleural effusion or pneumothorax.\"  1/3 Pulmonary Progress Note: \" laboratory studies do not signify an infectious process currently...concern for possible lower lobe aspiration versus atelectasis  Recommendations are as follows:  -Titrate FiO2 to SpO2 greater than 88%  -Currently on 4 L " "nasal cannula  -Aggressive bronchopulmonary hygiene with I-S, flutter valve, and MetaNebs  -Scheduled nebulized bronchodilators every 6 hours  -Continue prednisone 40 mg twice daily\"    Treatment: High flow O2, CTA, pulmonary consult, speech therapy consult and MBS, Azithromycin 500mg IVx1, Zosyn 3.375g IV q6H, Solumedrol IV 125mg then 40mg q12H titrated to oral prednisone, Duo-Nebs, f/u cxr, pneumonia work up labs    Risk Factors: Known progression of right non-small cell lung cancer  Options provided:  -- Acute Hypoxic Respiratory Failure related to Right middle lobe collapse and right lower lobe atelectasis  -- Acute Hypoxic Respiratory Failure unrelated to Right middle lobe collapse and  right lower lobe atelectasis  -- Other - I will add my own diagnosis  -- Refer to Clinical Documentation Reviewer    Query created by: Ekta Davis on 1/10/2025 12:40 PM      Electronically signed by:  LEWIS JONES-CNP 1/10/2025 1:28 PM          "

## 2025-01-10 NOTE — DOCUMENTATION CLARIFICATION NOTE
"    PATIENT:               ESTELA NORIEGA  ACCT #:                  [id number]  MRN:                       [id number]  :                       1955  ADMIT DATE:       2024 1:14 PM  DISCH DATE:        2025 2:05 PM  RESPONDING PROVIDER #:        [id number]          PROVIDER RESPONSE TEXT:    Underweight    CDI QUERY TEXT:    Clarification    Instruction:    Based on your assessment of the patient and the clinical information, please provide the requested documentation by clicking on the appropriate radio button and enter any additional information if prompted.    Question: Is there a diagnosis associated with a BMI of 19.34 appropriate for this patient    When answering this query, please exercise your independent professional judgment. The fact that a question is being asked, does not imply that any particular answer is desired or expected.    The patient's clinical indicators include:  Clinical Information: 69 Y/F admitted with acute hypoxic respiratory failure.    Clinical Indicators:    1/3 VS: Height: 160cm, Weight: 49.5 kg, BMI: 19.34     ED Provider: \"frail-appearing\"    Treatment: speech therapy consult, MBS, regular diet    Risk Factors: non-small cell lung cancer, COPD  Options provided:  -- Underweight  -- Cachexia  -- Normal body habitus  -- Other - I will add my own diagnosis  -- Refer to Clinical Documentation Reviewer    Query created by: Ekta Davis on 1/10/2025 1:06 PM      Electronically signed by:  LEWIS JONES-CNP 1/10/2025 1:28 PM          "

## 2025-01-13 DIAGNOSIS — J43.9 PULMONARY EMPHYSEMA, UNSPECIFIED EMPHYSEMA TYPE (MULTI): Primary | ICD-10-CM

## 2025-01-14 ENCOUNTER — TELEPHONE (OUTPATIENT)
Dept: RADIATION ONCOLOGY | Facility: CLINIC | Age: 70
End: 2025-01-14
Payer: MEDICARE

## 2025-01-14 NOTE — TELEPHONE ENCOUNTER
Telephone call completed by this RN for reminder call prior to CT/Sim. Provided education on arriving 30 minutes early.

## 2025-01-15 ENCOUNTER — HOSPITAL ENCOUNTER (OUTPATIENT)
Dept: RADIATION ONCOLOGY | Facility: CLINIC | Age: 70
Setting detail: RADIATION/ONCOLOGY SERIES
Discharge: HOME | End: 2025-01-15
Payer: MEDICARE

## 2025-01-15 ENCOUNTER — HOSPITAL ENCOUNTER (OUTPATIENT)
Dept: RADIOLOGY | Facility: EXTERNAL LOCATION | Age: 70
Discharge: HOME | End: 2025-01-15

## 2025-01-15 VITALS
HEART RATE: 105 BPM | BODY MASS INDEX: 18.86 KG/M2 | SYSTOLIC BLOOD PRESSURE: 121 MMHG | OXYGEN SATURATION: 92 % | RESPIRATION RATE: 18 BRPM | DIASTOLIC BLOOD PRESSURE: 72 MMHG | WEIGHT: 106.48 LBS

## 2025-01-15 DIAGNOSIS — C34.11 MALIGNANT NEOPLASM OF UPPER LOBE OF RIGHT LUNG (MULTI): ICD-10-CM

## 2025-01-15 PROCEDURE — 77470 SPECIAL RADIATION TREATMENT: CPT | Performed by: STUDENT IN AN ORGANIZED HEALTH CARE EDUCATION/TRAINING PROGRAM

## 2025-01-15 ASSESSMENT — PAIN SCALES - GENERAL: PAINLEVEL_OUTOF10: 5

## 2025-01-20 ENCOUNTER — TELEPHONE (OUTPATIENT)
Dept: HEMATOLOGY/ONCOLOGY | Facility: HOSPITAL | Age: 70
End: 2025-01-20
Payer: MEDICARE

## 2025-01-20 NOTE — TELEPHONE ENCOUNTER
Patient called this morning regarding wanting a port placed. Staff message sent over to Dr. Santillan and Nurse partner Ivet DODSON.

## 2025-01-21 ENCOUNTER — PATIENT OUTREACH (OUTPATIENT)
Dept: PRIMARY CARE | Facility: CLINIC | Age: 70
End: 2025-01-21
Payer: MEDICARE

## 2025-01-21 NOTE — PROGRESS NOTES
Call regarding appt. with PCP on ( n/a ) after hospitalization.  At time of outreach call the patient feels as if their condition has improved since last hospital visit.     Pt. Denies questions/concerns at this time.     Next PCP 3/25/25

## 2025-01-22 ENCOUNTER — HOSPITAL ENCOUNTER (OUTPATIENT)
Dept: RADIATION ONCOLOGY | Facility: CLINIC | Age: 70
Setting detail: RADIATION/ONCOLOGY SERIES
Discharge: HOME | End: 2025-01-22
Payer: MEDICARE

## 2025-01-22 PROCEDURE — 77293 RESPIRATOR MOTION MGMT SIMUL: CPT | Performed by: STUDENT IN AN ORGANIZED HEALTH CARE EDUCATION/TRAINING PROGRAM

## 2025-01-22 PROCEDURE — 77301 RADIOTHERAPY DOSE PLAN IMRT: CPT | Performed by: STUDENT IN AN ORGANIZED HEALTH CARE EDUCATION/TRAINING PROGRAM

## 2025-01-22 PROCEDURE — 77338 DESIGN MLC DEVICE FOR IMRT: CPT | Performed by: STUDENT IN AN ORGANIZED HEALTH CARE EDUCATION/TRAINING PROGRAM

## 2025-01-22 PROCEDURE — 77300 RADIATION THERAPY DOSE PLAN: CPT | Performed by: STUDENT IN AN ORGANIZED HEALTH CARE EDUCATION/TRAINING PROGRAM

## 2025-01-25 DIAGNOSIS — C34.11 MALIGNANT NEOPLASM OF UPPER LOBE OF RIGHT LUNG (MULTI): ICD-10-CM

## 2025-01-27 ENCOUNTER — LAB (OUTPATIENT)
Dept: LAB | Facility: HOSPITAL | Age: 70
End: 2025-01-27
Payer: MEDICARE

## 2025-01-27 DIAGNOSIS — C34.11 MALIGNANT NEOPLASM OF UPPER LOBE OF RIGHT LUNG (MULTI): ICD-10-CM

## 2025-01-27 LAB
ALBUMIN SERPL BCP-MCNC: 3.9 G/DL (ref 3.4–5)
ALP SERPL-CCNC: 58 U/L (ref 33–136)
ALT SERPL W P-5'-P-CCNC: 16 U/L (ref 7–45)
ANION GAP SERPL CALC-SCNC: 10 MMOL/L (ref 10–20)
AST SERPL W P-5'-P-CCNC: 18 U/L (ref 9–39)
BASOPHILS # BLD AUTO: 0.04 X10*3/UL (ref 0–0.1)
BASOPHILS NFR BLD AUTO: 0.4 %
BILIRUB SERPL-MCNC: 0.5 MG/DL (ref 0–1.2)
BUN SERPL-MCNC: 8 MG/DL (ref 6–23)
CALCIUM SERPL-MCNC: 8.8 MG/DL (ref 8.6–10.3)
CHLORIDE SERPL-SCNC: 94 MMOL/L (ref 98–107)
CO2 SERPL-SCNC: 31 MMOL/L (ref 21–32)
CORTIS AM PEAK SERPL-MSCNC: 16.1 UG/DL (ref 5–20)
CREAT SERPL-MCNC: 0.52 MG/DL (ref 0.5–1.05)
EGFRCR SERPLBLD CKD-EPI 2021: >90 ML/MIN/1.73M*2
EOSINOPHIL # BLD AUTO: 0.06 X10*3/UL (ref 0–0.4)
EOSINOPHIL NFR BLD AUTO: 0.7 %
ERYTHROCYTE [DISTWIDTH] IN BLOOD BY AUTOMATED COUNT: 14.9 % (ref 11.5–14.5)
GLUCOSE SERPL-MCNC: 111 MG/DL (ref 74–99)
HCT VFR BLD AUTO: 39.1 % (ref 36–46)
HGB BLD-MCNC: 12.4 G/DL (ref 12–16)
IMM GRANULOCYTES # BLD AUTO: 0.04 X10*3/UL (ref 0–0.5)
IMM GRANULOCYTES NFR BLD AUTO: 0.4 % (ref 0–0.9)
LYMPHOCYTES # BLD AUTO: 1.74 X10*3/UL (ref 0.8–3)
LYMPHOCYTES NFR BLD AUTO: 18.9 %
MCH RBC QN AUTO: 31.2 PG (ref 26–34)
MCHC RBC AUTO-ENTMCNC: 31.7 G/DL (ref 32–36)
MCV RBC AUTO: 98 FL (ref 80–100)
MONOCYTES # BLD AUTO: 0.65 X10*3/UL (ref 0.05–0.8)
MONOCYTES NFR BLD AUTO: 7.1 %
NEUTROPHILS # BLD AUTO: 6.67 X10*3/UL (ref 1.6–5.5)
NEUTROPHILS NFR BLD AUTO: 72.5 %
NRBC BLD-RTO: 0 /100 WBCS (ref 0–0)
PLATELET # BLD AUTO: 353 X10*3/UL (ref 150–450)
POTASSIUM SERPL-SCNC: 4.5 MMOL/L (ref 3.5–5.3)
PROT SERPL-MCNC: 6.4 G/DL (ref 6.4–8.2)
RBC # BLD AUTO: 3.98 X10*6/UL (ref 4–5.2)
SODIUM SERPL-SCNC: 130 MMOL/L (ref 136–145)
TSH SERPL-ACNC: 0.45 MIU/L (ref 0.44–3.98)
WBC # BLD AUTO: 9.2 X10*3/UL (ref 4.4–11.3)

## 2025-01-27 PROCEDURE — 36415 COLL VENOUS BLD VENIPUNCTURE: CPT

## 2025-01-27 PROCEDURE — 80053 COMPREHEN METABOLIC PANEL: CPT

## 2025-01-27 PROCEDURE — 85025 COMPLETE CBC W/AUTO DIFF WBC: CPT

## 2025-01-27 PROCEDURE — 84443 ASSAY THYROID STIM HORMONE: CPT

## 2025-01-27 PROCEDURE — 82024 ASSAY OF ACTH: CPT

## 2025-01-27 PROCEDURE — 82533 TOTAL CORTISOL: CPT | Mod: CONLAB

## 2025-01-27 PROCEDURE — 82533 TOTAL CORTISOL: CPT

## 2025-01-28 ENCOUNTER — INFUSION (OUTPATIENT)
Dept: HEMATOLOGY/ONCOLOGY | Facility: HOSPITAL | Age: 70
End: 2025-01-28
Payer: MEDICARE

## 2025-01-28 ENCOUNTER — OFFICE VISIT (OUTPATIENT)
Dept: HEMATOLOGY/ONCOLOGY | Facility: HOSPITAL | Age: 70
End: 2025-01-28
Payer: MEDICARE

## 2025-01-28 VITALS
DIASTOLIC BLOOD PRESSURE: 71 MMHG | HEART RATE: 93 BPM | OXYGEN SATURATION: 94 % | RESPIRATION RATE: 17 BRPM | SYSTOLIC BLOOD PRESSURE: 119 MMHG | TEMPERATURE: 98.8 F | HEIGHT: 62 IN | WEIGHT: 101.1 LBS | BODY MASS INDEX: 18.61 KG/M2

## 2025-01-28 VITALS
SYSTOLIC BLOOD PRESSURE: 109 MMHG | TEMPERATURE: 98.4 F | BODY MASS INDEX: 17.91 KG/M2 | OXYGEN SATURATION: 100 % | DIASTOLIC BLOOD PRESSURE: 66 MMHG | RESPIRATION RATE: 19 BRPM | HEART RATE: 120 BPM | WEIGHT: 101.1 LBS

## 2025-01-28 DIAGNOSIS — J43.9 PULMONARY EMPHYSEMA, UNSPECIFIED EMPHYSEMA TYPE (MULTI): ICD-10-CM

## 2025-01-28 DIAGNOSIS — C34.11 MALIGNANT NEOPLASM OF UPPER LOBE OF RIGHT LUNG (MULTI): ICD-10-CM

## 2025-01-28 DIAGNOSIS — C34.11 MALIGNANT NEOPLASM OF UPPER LOBE OF RIGHT LUNG (MULTI): Primary | ICD-10-CM

## 2025-01-28 PROCEDURE — 2500000004 HC RX 250 GENERAL PHARMACY W/ HCPCS (ALT 636 FOR OP/ED): Performed by: INTERNAL MEDICINE

## 2025-01-28 PROCEDURE — 96415 CHEMO IV INFUSION ADDL HR: CPT

## 2025-01-28 PROCEDURE — 99215 OFFICE O/P EST HI 40 MIN: CPT | Performed by: INTERNAL MEDICINE

## 2025-01-28 PROCEDURE — 2500000001 HC RX 250 WO HCPCS SELF ADMINISTERED DRUGS (ALT 637 FOR MEDICARE OP): Performed by: INTERNAL MEDICINE

## 2025-01-28 PROCEDURE — 96375 TX/PRO/DX INJ NEW DRUG ADDON: CPT | Mod: INF

## 2025-01-28 PROCEDURE — 96413 CHEMO IV INFUSION 1 HR: CPT

## 2025-01-28 PROCEDURE — 3078F DIAST BP <80 MM HG: CPT | Performed by: INTERNAL MEDICINE

## 2025-01-28 PROCEDURE — 1111F DSCHRG MED/CURRENT MED MERGE: CPT | Performed by: INTERNAL MEDICINE

## 2025-01-28 PROCEDURE — 96417 CHEMO IV INFUS EACH ADDL SEQ: CPT

## 2025-01-28 PROCEDURE — 1159F MED LIST DOCD IN RCRD: CPT | Performed by: INTERNAL MEDICINE

## 2025-01-28 PROCEDURE — G2211 COMPLEX E/M VISIT ADD ON: HCPCS | Performed by: INTERNAL MEDICINE

## 2025-01-28 PROCEDURE — 3074F SYST BP LT 130 MM HG: CPT | Performed by: INTERNAL MEDICINE

## 2025-01-28 PROCEDURE — 1126F AMNT PAIN NOTED NONE PRSNT: CPT | Performed by: INTERNAL MEDICINE

## 2025-01-28 PROCEDURE — 99215 OFFICE O/P EST HI 40 MIN: CPT | Mod: 25 | Performed by: INTERNAL MEDICINE

## 2025-01-28 PROCEDURE — 96367 TX/PROPH/DG ADDL SEQ IV INF: CPT | Mod: INF

## 2025-01-28 RX ORDER — HEPARIN 100 UNIT/ML
500 SYRINGE INTRAVENOUS AS NEEDED
OUTPATIENT
Start: 2025-01-28

## 2025-01-28 RX ORDER — FAMOTIDINE 10 MG/ML
20 INJECTION INTRAVENOUS ONCE
Status: COMPLETED | OUTPATIENT
Start: 2025-01-28 | End: 2025-01-28

## 2025-01-28 RX ORDER — OLANZAPINE 5 MG/1
TABLET ORAL
Qty: 4 TABLET | Refills: 2 | Status: SHIPPED | OUTPATIENT
Start: 2025-01-28

## 2025-01-28 RX ORDER — FAMOTIDINE 10 MG/ML
20 INJECTION INTRAVENOUS ONCE AS NEEDED
Status: DISCONTINUED | OUTPATIENT
Start: 2025-01-28 | End: 2025-01-28 | Stop reason: HOSPADM

## 2025-01-28 RX ORDER — DEXAMETHASONE 4 MG/1
TABLET ORAL
Qty: 6 TABLET | Refills: 2 | Status: SHIPPED | OUTPATIENT
Start: 2025-01-28

## 2025-01-28 RX ORDER — HEPARIN SODIUM,PORCINE/PF 10 UNIT/ML
50 SYRINGE (ML) INTRAVENOUS AS NEEDED
OUTPATIENT
Start: 2025-01-28

## 2025-01-28 RX ORDER — BUDESONIDE AND FORMOTEROL FUMARATE DIHYDRATE 80; 4.5 UG/1; UG/1
2 AEROSOL RESPIRATORY (INHALATION)
Qty: 10.2 G | Refills: 11 | Status: SHIPPED | OUTPATIENT
Start: 2025-01-28 | End: 2026-01-28

## 2025-01-28 RX ORDER — PROCHLORPERAZINE EDISYLATE 5 MG/ML
10 INJECTION INTRAMUSCULAR; INTRAVENOUS EVERY 6 HOURS PRN
Status: DISCONTINUED | OUTPATIENT
Start: 2025-01-28 | End: 2025-01-28 | Stop reason: HOSPADM

## 2025-01-28 RX ORDER — DEXAMETHASONE IN 0.9 % SOD CHL 20 MG/50ML
20 INTRAVENOUS SOLUTION, PIGGYBACK (ML) INTRAVENOUS ONCE
Status: DISCONTINUED | OUTPATIENT
Start: 2025-01-28 | End: 2025-01-28

## 2025-01-28 RX ORDER — PALONOSETRON 0.05 MG/ML
0.25 INJECTION, SOLUTION INTRAVENOUS ONCE
Status: COMPLETED | OUTPATIENT
Start: 2025-01-28 | End: 2025-01-28

## 2025-01-28 RX ORDER — DIPHENHYDRAMINE HYDROCHLORIDE 50 MG/ML
50 INJECTION INTRAMUSCULAR; INTRAVENOUS AS NEEDED
Status: DISCONTINUED | OUTPATIENT
Start: 2025-01-28 | End: 2025-01-28 | Stop reason: HOSPADM

## 2025-01-28 RX ORDER — DIPHENHYDRAMINE HCL 25 MG
50 CAPSULE ORAL ONCE
Status: COMPLETED | OUTPATIENT
Start: 2025-01-28 | End: 2025-01-28

## 2025-01-28 RX ORDER — ALBUTEROL SULFATE 0.83 MG/ML
3 SOLUTION RESPIRATORY (INHALATION) AS NEEDED
Status: DISCONTINUED | OUTPATIENT
Start: 2025-01-28 | End: 2025-01-28 | Stop reason: HOSPADM

## 2025-01-28 RX ORDER — DEXAMETHASONE 4 MG/1
20 TABLET ORAL ONCE
Status: COMPLETED | OUTPATIENT
Start: 2025-01-28 | End: 2025-01-28

## 2025-01-28 RX ORDER — PROCHLORPERAZINE MALEATE 5 MG
10 TABLET ORAL EVERY 6 HOURS PRN
Status: DISCONTINUED | OUTPATIENT
Start: 2025-01-28 | End: 2025-01-28 | Stop reason: HOSPADM

## 2025-01-28 RX ORDER — EPINEPHRINE 0.3 MG/.3ML
0.3 INJECTION SUBCUTANEOUS EVERY 5 MIN PRN
Status: DISCONTINUED | OUTPATIENT
Start: 2025-01-28 | End: 2025-01-28 | Stop reason: HOSPADM

## 2025-01-28 RX ADMIN — DIPHENHYDRAMINE HYDROCHLORIDE 50 MG: 25 CAPSULE ORAL at 09:50

## 2025-01-28 RX ADMIN — FOSAPREPITANT 150 MG: 150 INJECTION, POWDER, LYOPHILIZED, FOR SOLUTION INTRAVENOUS at 10:25

## 2025-01-28 RX ADMIN — PALONOSETRON HYDROCHLORIDE 250 MCG: 0.25 INJECTION INTRAVENOUS at 09:48

## 2025-01-28 RX ADMIN — CARBOPLATIN 440 MG: 10 INJECTION, SOLUTION INTRAVENOUS at 15:06

## 2025-01-28 RX ADMIN — FAMOTIDINE 20 MG: 10 INJECTION, SOLUTION INTRAVENOUS at 09:52

## 2025-01-28 RX ADMIN — PACLITAXEL 288 MG: 6 INJECTION, SOLUTION, CONCENTRATE INTRAVENOUS at 11:46

## 2025-01-28 RX ADMIN — SODIUM CHLORIDE 360 MG: 9 INJECTION, SOLUTION INTRAVENOUS at 11:09

## 2025-01-28 RX ADMIN — DEXAMETHASONE 20 MG: 4 TABLET ORAL at 09:50

## 2025-01-28 ASSESSMENT — ENCOUNTER SYMPTOMS
RESPIRATORY NEGATIVE: 1
CONSTITUTIONAL NEGATIVE: 1
CARDIOVASCULAR NEGATIVE: 1
GASTROINTESTINAL NEGATIVE: 1

## 2025-01-28 ASSESSMENT — PAIN SCALES - GENERAL
PAINLEVEL_OUTOF10: 0-NO PAIN

## 2025-01-28 NOTE — PROGRESS NOTES
Patient ID: Billie Hernadez is a 69 y.o. female.  Referring Physician: Judith Santillan MD  [address] [place] [address]  Primary Care Provider: KAREN Holliday-CNP  Referral Reason: Lung mass    Subjective:  Returns for follow up for lung cancer. Was admitted to hospital last week due to dyspnea. Now feels better.    Heme/Onc History:  - CT c screening (Oct 2024): RUL nodule  - PET/CT: RUL lung nodule with uptake. Small R hilar, mediastinal, and a supraclavicular LN with moderate uptake. L adrenal nodule with low uptake (could not see that one myself)  - Bronch (11/22/24): NSCLC. C/w adenosquamous. PD-L1 10%. NGS pending. Level 7 LN aspiration positive for malignant cells. Level 4R, 11R, 11L negative.   - Started Carbo/taxol/nivo on 12/17/24 => Admitted to hospital with worsening dyspnea on 12/31 likely 2/2 COPD exacerbation => Steroid taper  - Supraclavicular LN bx (12/30/24): Positive for malignancy.     Assessment/Plan:  ? NSCLC: At least stage III with biopsy proven mediastinal LAPs. Could be considered to have stage IV with biopsy proven R supraclavicular LAP. L adrenal nodule may be adenoma (very mild uptake on PET).     After a long conversation with the patient, we had decided to go ahead with the following plan:   A. Start chemoimmunotherapy with CARBO-PACLitaxel + nivolumab  B. Repeat PET/CT after C3. If disease is controlled, start chemoRT to include R supraclavicular lymph node.   C. Meanwhile, if L adrenal gland is c/w metastasis, would get radiation to the adrenal as well.     The fact that supraclavicular LN is positive for malignancy is a poor sign. Patient understands that. We will continue with the above plan and decide on management based on PET/CT results after C3. Unless there is progressive disease (unlikely as she has done well regarding her breathing), we will plan to continue with chemoRT after C3 with carbo-taxol weekly. Rad Onc indicated that she will get RT to the  supraclavicular LN. I will see her next week after the PET/CT.     COPD: I will add symbicort to her regimen. She no longer requires continuous nasal oxygen.     I provide longitudinal care for Ms. Hernadez for her lung cancer    Review Of Systems:  Review of Systems   Constitutional: Negative.    HENT:  Negative.     Respiratory: Negative.     Cardiovascular: Negative.    Gastrointestinal: Negative.        Physical Exam:  /66 (BP Location: Left arm, Patient Position: Sitting)   Pulse (!) 120   Temp 36.9 °C (98.4 °F) (Temporal)   Resp 19   Wt 45.9 kg (101 lb 1.6 oz)   SpO2 100%   BMI 17.91 kg/m²   BSA: 1.43 meters squared  Performance Status: Asymptomatic  Physical Exam  HENT:      Head: Normocephalic and atraumatic.   Eyes:      General: No scleral icterus.  Pulmonary:      Effort: Pulmonary effort is normal.   Musculoskeletal:         General: Normal range of motion.   Skin:     Coloration: Skin is not jaundiced.   Neurological:      General: No focal deficit present.      Mental Status: She is alert and oriented to person, place, and time.         Results:  Diagnostic Results   Lab Results   Component Value Date    WBC 9.2 01/27/2025    HGB 12.4 01/27/2025    HCT 39.1 01/27/2025    MCV 98 01/27/2025     01/27/2025     Lab Results   Component Value Date    CALCIUM 8.8 01/27/2025     (L) 01/27/2025    K 4.5 01/27/2025    CO2 31 01/27/2025    CL 94 (L) 01/27/2025    BUN 8 01/27/2025    CREATININE 0.52 01/27/2025    ALT 16 01/27/2025    AST 18 01/27/2025       Current Outpatient Medications:     albuterol 2.5 mg /3 mL (0.083 %) nebulizer solution, Take 3 mL (2.5 mg) by nebulization every 6 hours if needed for wheezing., Disp: 75 mL, Rfl: 3    albuterol 90 mcg/actuation inhaler, USE 1 TO 2 INHALATIONS BY  MOUTH EVERY 4 TO 6 HOURS AS NEEDED, Disp: 51 g, Rfl: 2    alendronate (Fosamax) 70 mg tablet, Take 1 tablet (70 mg) by mouth 1 (one) time per week. Take in the morning with a full glass of  water, on an empty stomach, and do not take anything else by mouth or lie down for the next 30 min., Disp: 12 tablet, Rfl: 3    benzonatate (Tessalon) 100 mg capsule, Take 1 capsule (100 mg) by mouth 3 times a day as needed for cough. Do not crush or chew., Disp: 20 capsule, Rfl: 0    budesonide-formoteroL (Symbicort) 80-4.5 mcg/actuation inhaler, Inhale 2 puffs 2 times a day. Rinse mouth with water after use to reduce aftertaste and incidence of candidiasis. Do not swallow., Disp: 10.2 g, Rfl: 11    calcium citrate-vitamin D2 250 mg-2.5 mcg (100 unit) tablet, Take 1 tablet by mouth once daily., Disp: , Rfl:     dexAMETHasone (Decadron) 4 mg tablet, TAKE TWO TABLETS BY MOUTH ONCE DAILY. start THE DAY AFTER treatment FOR THREE DAYS, Disp: 6 tablet, Rfl: 2    docusate sodium (Colace) 100 mg capsule, Take 1 capsule (100 mg) by mouth 2 times a day., Disp: 60 capsule, Rfl: 0    guaiFENesin (Mucinex) 600 mg 12 hr tablet, Take 1 tablet (600 mg) by mouth 2 times a day. Do not crush, chew, or split., Disp: 14 tablet, Rfl: 0    losartan (Cozaar) 25 mg tablet, TAKE ONE TABLET BY MOUTH ONCE DAILY, Disp: 90 tablet, Rfl: 3    multivitamin with minerals tablet, Take 1 tablet by mouth once daily., Disp: , Rfl:     OLANZapine (ZyPREXA) 5 mg tablet, TAKE ONE TABLET BY MOUTH AT BEDTIME FOR FOUR DAYS STARTING THE evening of treatment., Disp: 4 tablet, Rfl: 2    ondansetron (Zofran) 8 mg tablet, Take 1 tablet (8 mg) by mouth every 8 hours if needed for nausea or vomiting., Disp: 30 tablet, Rfl: 5    prochlorperazine (Compazine) 10 mg tablet, Take 1 tablet (10 mg) by mouth every 6 hours if needed for nausea or vomiting., Disp: 30 tablet, Rfl: 5    Spiriva with HandiHaler 18 mcg inhalation capsule, place ONE CAPSULE into inhaler AND inhale ONCE DAILY, Disp: 90 capsule, Rfl: 3  No current facility-administered medications for this visit.    Facility-Administered Medications Ordered in Other Visits:     albuterol 2.5 mg /3 mL (0.083 %)  nebulizer solution 3 mL, 3 mL, nebulization, PRN, Judith Santillan MD    CARBOplatin (Paraplatin) 440 mg in sodium chloride 0.9% 154 mL IV, 440 mg, intravenous, Once, Judith Santillan MD    dextrose 5 % in water (D5W) bolus 500 mL, 500 mL, intravenous, PRN, Judith Santillan MD    diphenhydrAMINE (BENADryl) injection 50 mg, 50 mg, intravenous, PRN, Judith Santillan MD    EPINEPHrine (Epipen) injection syringe 0.3 mg, 0.3 mg, intramuscular, q5 min PRN, Judith Santillan MD    famotidine PF (Pepcid) injection 20 mg, 20 mg, intravenous, Once PRN, Judith Santillan MD    fosaprepitant (Emend) 150 mg in sodium chloride 0.9% 250 mL IV, 150 mg, intravenous, Once, Judith Santillan MD    methylPREDNISolone sod succinate (SOLU-Medrol) 40 mg/mL injection 40 mg, 40 mg, intravenous, PRN, Judith Santillan MD    nivolumab (Opdivo) 360 mg in sodium chloride 0.9% 146 mL IV, 360 mg, intravenous, Once, Judith Santillan MD    PACLitaxeL (Taxol) 288 mg in dextrose 5% 318 mL IV, 200 mg/m2 (Treatment Plan Recorded), intravenous, Once, Judith Santillan MD    prochlorperazine (Compazine) injection 10 mg, 10 mg, intravenous, q6h PRN, Judith Santillan MD    prochlorperazine (Compazine) tablet 10 mg, 10 mg, oral, q6h PRN, Judith Santillan MD    sodium chloride 0.9 % bolus 500 mL, 500 mL, intravenous, PRN, Judith Santillan MD     Past Surgical History:   Procedure Laterality Date    EXCISION / BIOPSY BREAST / NIPPLE / DUCT Left 05/28/2014    OTHER SURGICAL HISTORY  03/12/2015    Open Treatment Of Tibial Shaft Fracture With Implant    TONSILLECTOMY       No family history on file.   reports that she has been smoking cigarettes. She started smoking about 52 years ago. She has a 26 pack-year smoking history. She has never used smokeless tobacco.  Social History     Socioeconomic History    Marital status:      Spouse name: Not on file    Number of children: Not on file    Years of education: Not on file    Highest education level:  Not on file   Occupational History    Not on file   Tobacco Use    Smoking status: Every Day     Current packs/day: 0.50     Average packs/day: 0.5 packs/day for 52.1 years (26.0 ttl pk-yrs)     Types: Cigarettes     Start date: 1973    Smokeless tobacco: Never   Vaping Use    Vaping status: Never Used   Substance and Sexual Activity    Alcohol use: Yes     Alcohol/week: 2.0 standard drinks of alcohol     Types: 2 Cans of beer per week     Comment: 1-3 beers a week    Drug use: Never    Sexual activity: Defer   Other Topics Concern    Not on file   Social History Narrative    Not on file     Social Drivers of Health     Financial Resource Strain: Low Risk  (1/2/2025)    Overall Financial Resource Strain (CARDIA)     Difficulty of Paying Living Expenses: Not very hard   Food Insecurity: No Food Insecurity (1/1/2025)    Hunger Vital Sign     Worried About Running Out of Food in the Last Year: Never true     Ran Out of Food in the Last Year: Never true   Recent Concern: Food Insecurity - High Risk (12/18/2024)    Received from [facility] SDOH Screening     Does the member feel like he/she gets enough food most days?: Yes     Summarize member's perception of social determinants of health including living situation, food insecurity, financial needs, transportation, learning, and technology.  identify barriers to include ...: Member resides in ltc and has all care needs met at facility. no food, financial, or transportati...   Transportation Needs: No Transportation Needs (1/2/2025)    PRAPARE - Transportation     Lack of Transportation (Medical): No     Lack of Transportation (Non-Medical): No   Recent Concern: Transportation Needs - High Risk (12/18/2024)    Received from [facility] SDOH Screening     The following questions pertain to transportation, utilities, employment, and financial or legal concerns: Select next question     Does the member need help with any of the following activities?:  Getting transportation   Physical Activity: Insufficiently Active (1/1/2025)    Exercise Vital Sign     Days of Exercise per Week: 3 days     Minutes of Exercise per Session: 40 min   Stress: No Stress Concern Present (1/28/2025)    [employer] [place] [employer] - Occupational Stress Questionnaire     Feeling of Stress : Not at all   Recent Concern: Stress - Stress Concern Present (1/7/2025)    [employer] [place] [employer] - Occupational Stress Questionnaire     Feeling of Stress : To some extent   Social Connections: Not on file   Intimate Partner Violence: Not At Risk (1/1/2025)    Humiliation, Afraid, Rape, and Kick questionnaire     Fear of Current or Ex-Partner: No     Emotionally Abused: No     Physically Abused: No     Sexually Abused: No   Housing Stability: Low Risk  (1/2/2025)    Housing Stability Vital Sign     Unable to Pay for Housing in the Last Year: No     Number of Times Moved in the Last Year: 0     Homeless in the Last Year: No       Diagnoses and all orders for this visit:  Malignant neoplasm of upper lobe of right lung (Multi)  -     Clinic Appointment Request  -     Clinic Appointment Request  -     budesonide-formoteroL (Symbicort) 80-4.5 mcg/actuation inhaler; Inhale 2 puffs 2 times a day. Rinse mouth with water after use to reduce aftertaste and incidence of candidiasis. Do not swallow.  Pulmonary emphysema, unspecified emphysema type (Multi)  -     budesonide-formoteroL (Symbicort) 80-4.5 mcg/actuation inhaler; Inhale 2 puffs 2 times a day. Rinse mouth with water after use to reduce aftertaste and incidence of candidiasis. Do not swallow.  Other orders  -     Treatment Conditions - OK to Treat       Judith Santillan MD

## 2025-01-28 NOTE — SIGNIFICANT EVENT

## 2025-01-28 NOTE — PROGRESS NOTES
"January 28, 2025 at 9:26 AM    Patient has no known allergies.    Billie Hernadez is a 69 y.o. female   Blood pressure 112/74, pulse (!) 118, temperature 36.6 °C (97.9 °F), temperature source Temporal, resp. rate 16, height 1.58 m (5' 2.21\"), weight 45.9 kg (101 lb 1.6 oz), SpO2 94%.  Body surface area is 1.42 meters squared.    Past Medical History:   Diagnosis Date    Cancer (Multi)     Chronic obstructive pulmonary disease with (acute) exacerbation (Multi) 08/08/2017    COPD exacerbation    Hypertension     Other specified health status 03/12/2015    No known problems    Personal history of other venous thrombosis and embolism 03/12/2015    History of deep venous thrombosis    Pneumonia        Encounter Diagnosis   Name Primary?    Malignant neoplasm of upper lobe of right lung (Multi)        Has the entire regimen been authorized by financial clearance (pre-certification)?  Yes     Prior to Admission medications    Medication Sig Start Date End Date Taking? Authorizing Provider   albuterol 2.5 mg /3 mL (0.083 %) nebulizer solution Take 3 mL (2.5 mg) by nebulization every 6 hours if needed for wheezing. 1/5/25   MOR Kirk   albuterol 90 mcg/actuation inhaler USE 1 TO 2 INHALATIONS BY  MOUTH EVERY 4 TO 6 HOURS AS NEEDED 12/18/23   KAREN Holliday-CNP   alendronate (Fosamax) 70 mg tablet Take 1 tablet (70 mg) by mouth 1 (one) time per week. Take in the morning with a full glass of water, on an empty stomach, and do not take anything else by mouth or lie down for the next 30 min. 12/18/23   MOR Holliday   benzonatate (Tessalon) 100 mg capsule Take 1 capsule (100 mg) by mouth 3 times a day as needed for cough. Do not crush or chew. 1/5/25   MOR Kirk   budesonide-formoteroL (Symbicort) 80-4.5 mcg/actuation inhaler Inhale 2 puffs 2 times a day. Rinse mouth with water after use to reduce aftertaste and incidence of candidiasis. Do not swallow. 1/28/25 1/28/26  " Judith Santillan MD   calcium citrate-vitamin D2 250 mg-2.5 mcg (100 unit) tablet Take 1 tablet by mouth once daily.    Historical Provider, MD   dexAMETHasone (Decadron) 4 mg tablet TAKE TWO TABLETS BY MOUTH ONCE DAILY. start THE DAY AFTER treatment FOR THREE DAYS 1/28/25   Judith Santlilan MD   docusate sodium (Colace) 100 mg capsule Take 1 capsule (100 mg) by mouth 2 times a day. 1/5/25   MOR Kirk   guaiFENesin (Mucinex) 600 mg 12 hr tablet Take 1 tablet (600 mg) by mouth 2 times a day. Do not crush, chew, or split. 1/5/25   MOR Kirk   losartan (Cozaar) 25 mg tablet TAKE ONE TABLET BY MOUTH ONCE DAILY 1/22/24   MOR Holliday   multivitamin with minerals tablet Take 1 tablet by mouth once daily.    Historical Provider, MD   OLANZapine (ZyPREXA) 5 mg tablet TAKE ONE TABLET BY MOUTH AT BEDTIME FOR FOUR DAYS STARTING THE evening of treatment. 1/28/25   Judith Santillan MD   ondansetron (Zofran) 8 mg tablet Take 1 tablet (8 mg) by mouth every 8 hours if needed for nausea or vomiting. 12/3/24   Judith Santillan MD   predniSONE (Deltasone) 10 mg tablet Take 4 tablets (40 mg) by mouth once daily for 4 days, THEN 3 tablets (30 mg) once daily for 5 days, THEN 2 tablets (20 mg) once daily for 5 days, THEN 1 tablet (10 mg) once daily for 5 days. 1/6/25 1/25/25  MOR Kirk   prochlorperazine (Compazine) 10 mg tablet Take 1 tablet (10 mg) by mouth every 6 hours if needed for nausea or vomiting. 12/3/24   Judith Santillan MD   Spiriva with HandiHaler 18 mcg inhalation capsule place ONE CAPSULE into inhaler AND inhale ONCE DAILY 6/24/24   MOR Holliday   dexAMETHasone (Decadron) 4 mg tablet Take 2 tablets (8 mg) by mouth once daily. For 3 days starting the day after treatment. 12/3/24 1/28/25  Judith Santillan MD   OLANZapine (ZyPREXA) 5 mg tablet Take 1 tablet (5 mg) by mouth once daily at bedtime. For 4 days starting the evening of  treatment. 12/3/24 1/28/25  Judith Santillan MD       Reviewed documented list of home medications.  No significant drug interactions identified between home medications and chemotherapy regimen.    Yes    WBC   Date Value Ref Range Status   01/27/2025 9.2 4.4 - 11.3 x10*3/uL Final   01/07/2025 14.2 (H) 4.4 - 11.3 x10*3/uL Final   01/05/2025 14.7 (H) 4.4 - 11.3 x10*3/uL Final     Hemoglobin   Date Value Ref Range Status   01/27/2025 12.4 12.0 - 16.0 g/dL Final   01/07/2025 13.3 12.0 - 16.0 g/dL Final   01/05/2025 12.9 12.0 - 16.0 g/dL Final     Hematocrit   Date Value Ref Range Status   01/27/2025 39.1 36.0 - 46.0 % Final   01/07/2025 42.2 36.0 - 46.0 % Final   01/05/2025 38.5 36.0 - 46.0 % Final     Neutrophils Absolute   Date Value Ref Range Status   01/27/2025 6.67 (H) 1.60 - 5.50 x10*3/uL Final     Comment:     Percent differential counts (%) should be interpreted in the context of the absolute cell counts (cells/uL).   01/07/2025 11.16 (H) 1.60 - 5.50 x10*3/uL Final     Comment:     Percent differential counts (%) should be interpreted in the context of the absolute cell counts (cells/uL).   12/31/2024 2.94 1.60 - 5.50 x10*3/uL Final     Comment:     Percent differential counts (%) should be interpreted in the context of the absolute cell counts (cells/uL).     Platelets   Date Value Ref Range Status   01/27/2025 353 150 - 450 x10*3/uL Final   01/07/2025 394 150 - 450 x10*3/uL Final   01/05/2025 406 150 - 450 x10*3/uL Final     Creatinine   Date Value Ref Range Status   01/27/2025 0.52 0.50 - 1.05 mg/dL Final   01/07/2025 0.48 (L) 0.50 - 1.05 mg/dL Final   01/05/2025 0.38 (L) 0.50 - 1.05 mg/dL Final     Alkaline Phosphatase   Date Value Ref Range Status   01/27/2025 58 33 - 136 U/L Final   01/07/2025 55 33 - 136 U/L Final   12/31/2024 67 33 - 136 U/L Final     AST   Date Value Ref Range Status   01/27/2025 18 9 - 39 U/L Final   01/07/2025 16 9 - 39 U/L Final   12/31/2024 21 9 - 39 U/L Final     ALT   Date  Value Ref Range Status   01/27/2025 16 7 - 45 U/L Final     Comment:     Patients treated with Sulfasalazine may generate falsely decreased results for ALT.   01/07/2025 18 7 - 45 U/L Final     Comment:     Patients treated with Sulfasalazine may generate falsely decreased results for ALT.   12/31/2024 15 7 - 45 U/L Final     Comment:     Patients treated with Sulfasalazine may generate falsely decreased results for ALT.     Bilirubin, Total   Date Value Ref Range Status   01/27/2025 0.5 0.0 - 1.2 mg/dL Final   01/07/2025 0.4 0.0 - 1.2 mg/dL Final   12/31/2024 0.4 0.0 - 1.2 mg/dL Final         Does the patient meet the treatment conditions?  Yes    ANC > 1.5  Plt > 100  AST/ALT < 3 x ULN  Bili < 1.5 x ULN  sCr < 1.5 x ULN    Are dosage calculations correct?  Yes    Verifying carboplatin dose with provider    Are doses the same as previous cycle?   Yes    Were any doses modified?  No    If appropriate, was the Creatinine Clearance independently calculated based on [facility] standards?   Not Applicable      Comments:      I Axel White, PharmD hereby acknowledge that the treatment plan indicated above has been verified for correctness to the best of my ability on 1/28/2025 at 9:26 AM.

## 2025-01-28 NOTE — PROGRESS NOTES
Treatment Pre-Review for Date of Service: 1/28/25    Diagnosis:  Malignant neoplasm of upper lobe of right lung (Multi), Clinical: cT1b, cN2      Regimen: Opdivo + Paclitaxel + Carboplatin every 21 days     Current Cycle/Day: Cycle 3   Treatment Date Appropriate: Yes; Last Treatment: 1/7/25  Date change required: none    Dose or Regimen Modifications: Yes:    Starting carboplatin dose AUC 6 -> 5     Med Rec/Drug Interactions: None noted during pre-review    Growth Factor: none    Financial Clearance/Authorization: Yes    Echo:  Transthoracic Echo (TTE) Complete 01/03/2025    Lifetime Dose Tracking   No doses have been documented on this patient for the following tracked chemicals: doxorubicin, epirubicin, idarubicin, daunorubicin, mitoxantrone, bleomycin, mitomycin, carmustine, doxorubicin HCl pegylated liposomal, daunorubicin citrate liposomal     Comments: none    I Axel White, PharmD hereby acknowledge that the treatment plan indicated above has been verified for correctness to the best of my ability on 1/28/2025 at 8:07 AM.

## 2025-01-29 LAB — ACTH PLAS-MCNC: 17.5 PG/ML (ref 7.2–63.3)

## 2025-01-31 ENCOUNTER — APPOINTMENT (OUTPATIENT)
Dept: LAB | Facility: HOSPITAL | Age: 70
End: 2025-01-31
Payer: MEDICARE

## 2025-01-31 ENCOUNTER — PRE-ADMISSION TESTING (OUTPATIENT)
Dept: PREADMISSION TESTING | Facility: HOSPITAL | Age: 70
End: 2025-01-31
Payer: MEDICARE

## 2025-01-31 ENCOUNTER — APPOINTMENT (OUTPATIENT)
Dept: SURGERY | Facility: CLINIC | Age: 70
End: 2025-01-31
Payer: MEDICARE

## 2025-01-31 VITALS
SYSTOLIC BLOOD PRESSURE: 95 MMHG | WEIGHT: 104 LBS | BODY MASS INDEX: 18.43 KG/M2 | HEART RATE: 117 BPM | HEIGHT: 63 IN | DIASTOLIC BLOOD PRESSURE: 61 MMHG | TEMPERATURE: 97.8 F

## 2025-01-31 VITALS — WEIGHT: 104 LBS | HEIGHT: 63 IN | BODY MASS INDEX: 18.43 KG/M2

## 2025-01-31 DIAGNOSIS — I87.8 VENOFIBROSIS: Primary | ICD-10-CM

## 2025-01-31 DIAGNOSIS — C34.11 MALIGNANT NEOPLASM OF UPPER LOBE OF RIGHT LUNG (MULTI): ICD-10-CM

## 2025-01-31 DIAGNOSIS — Z01.818 PREOP TESTING: ICD-10-CM

## 2025-01-31 DIAGNOSIS — R19.04 LEFT LOWER QUADRANT ABDOMINAL MASS: Primary | ICD-10-CM

## 2025-01-31 LAB
ERYTHROCYTE [DISTWIDTH] IN BLOOD BY AUTOMATED COUNT: 15.3 % (ref 11.5–14.5)
HCT VFR BLD AUTO: 36.3 % (ref 36–46)
HGB BLD-MCNC: 11.8 G/DL (ref 12–16)
MCH RBC QN AUTO: 32.3 PG (ref 26–34)
MCHC RBC AUTO-ENTMCNC: 32.5 G/DL (ref 32–36)
MCV RBC AUTO: 100 FL (ref 80–100)
NRBC BLD-RTO: 0 /100 WBCS (ref 0–0)
PLATELET # BLD AUTO: 302 X10*3/UL (ref 150–450)
RBC # BLD AUTO: 3.65 X10*6/UL (ref 4–5.2)
WBC # BLD AUTO: 15.9 X10*3/UL (ref 4.4–11.3)

## 2025-01-31 PROCEDURE — 87081 CULTURE SCREEN ONLY: CPT | Mod: GENLAB

## 2025-01-31 PROCEDURE — 85027 COMPLETE CBC AUTOMATED: CPT

## 2025-01-31 RX ORDER — ACETAMINOPHEN 325 MG/1
975 TABLET ORAL ONCE
OUTPATIENT
Start: 2025-01-31 | End: 2025-01-31

## 2025-01-31 RX ORDER — CHLORHEXIDINE GLUCONATE ORAL RINSE 1.2 MG/ML
15 SOLUTION DENTAL DAILY
Qty: 30 ML | Refills: 0 | Status: SHIPPED | OUTPATIENT
Start: 2025-01-31 | End: 2025-02-04 | Stop reason: HOSPADM

## 2025-01-31 RX ORDER — CHLORHEXIDINE GLUCONATE 40 MG/ML
SOLUTION TOPICAL DAILY
Qty: 354 ML | Refills: 0 | Status: SHIPPED | OUTPATIENT
Start: 2025-01-31 | End: 2025-02-04 | Stop reason: HOSPADM

## 2025-01-31 RX ORDER — CELECOXIB 50 MG/1
200 CAPSULE ORAL ONCE
OUTPATIENT
Start: 2025-01-31 | End: 2025-01-31

## 2025-01-31 RX ORDER — CEFAZOLIN SODIUM 2 G/100ML
2 INJECTION, SOLUTION INTRAVENOUS ONCE
OUTPATIENT
Start: 2025-01-31 | End: 2025-01-31

## 2025-01-31 ASSESSMENT — DUKE ACTIVITY SCORE INDEX (DASI)
CAN YOU WALK A BLOCK OR TWO ON LEVEL GROUND: YES
CAN YOU DO LIGHT WORK AROUND THE HOUSE LIKE DUSTING OR WASHING DISHES: YES
CAN YOU RUN A SHORT DISTANCE: NO
CAN YOU TAKE CARE OF YOURSELF (EAT, DRESS, BATHE, OR USE TOILET): YES
CAN YOU PARTICIPATE IN MODERATE RECREATIONAL ACTIVITIES LIKE GOLF, BOWLING, DANCING, DOUBLES TENNIS OR THROWING A BASEBALL OR FOOTBALL: NO
CAN YOU PARTICIPATE IN STRENOUS SPORTS LIKE SWIMMING, SINGLES TENNIS, FOOTBALL, BASKETBALL, OR SKIING: NO
CAN YOU DO HEAVY WORK AROUND THE HOUSE LIKE SCRUBBING FLOORS OR LIFTING AND MOVING HEAVY FURNITURE: NO
CAN YOU WALK INDOORS, SUCH AS AROUND YOUR HOUSE: YES
CAN YOU CLIMB A FLIGHT OF STAIRS OR WALK UP A HILL: NO
CAN YOU DO YARD WORK LIKE RAKING LEAVES, WEEDING OR PUSHING A MOWER: NO
CAN YOU DO MODERATE WORK AROUND THE HOUSE LIKE VACUUMING, SWEEPING FLOORS OR CARRYING GROCERIES: NO

## 2025-01-31 NOTE — PATIENT INSTRUCTIONS
We will place a tunneled Mediport for you on 2/4/2025.  My office will provide you with preprocedure instructions.

## 2025-01-31 NOTE — H&P (VIEW-ONLY)
Subjective   Patient ID: Billie Hernadez is a 69 y.o. female who presents for discussion for port placement.  New diagnosis of lung cancer    HPI   This a patient recently diagnosed with lung cancer who has venofibrosis she is here for discussion for port placement  Past Medical History:   Diagnosis Date    Cancer (Multi)     Chronic obstructive pulmonary disease with (acute) exacerbation (Multi) 08/08/2017    COPD exacerbation    Hypertension     Other specified health status 03/12/2015    No known problems    Personal history of other venous thrombosis and embolism 03/12/2015    History of deep venous thrombosis    Pneumonia         Current Outpatient Medications on File Prior to Visit   Medication Sig Dispense Refill    albuterol 2.5 mg /3 mL (0.083 %) nebulizer solution Take 3 mL (2.5 mg) by nebulization every 6 hours if needed for wheezing. 75 mL 3    albuterol 90 mcg/actuation inhaler USE 1 TO 2 INHALATIONS BY  MOUTH EVERY 4 TO 6 HOURS AS NEEDED 51 g 2    alendronate (Fosamax) 70 mg tablet Take 1 tablet (70 mg) by mouth 1 (one) time per week. Take in the morning with a full glass of water, on an empty stomach, and do not take anything else by mouth or lie down for the next 30 min. 12 tablet 3    benzonatate (Tessalon) 100 mg capsule Take 1 capsule (100 mg) by mouth 3 times a day as needed for cough. Do not crush or chew. 20 capsule 0    budesonide-formoteroL (Symbicort) 80-4.5 mcg/actuation inhaler Inhale 2 puffs 2 times a day. Rinse mouth with water after use to reduce aftertaste and incidence of candidiasis. Do not swallow. 10.2 g 11    calcium citrate-vitamin D2 250 mg-2.5 mcg (100 unit) tablet Take 1 tablet by mouth once daily.      dexAMETHasone (Decadron) 4 mg tablet TAKE TWO TABLETS BY MOUTH ONCE DAILY. start THE DAY AFTER treatment FOR THREE DAYS 6 tablet 2    docusate sodium (Colace) 100 mg capsule Take 1 capsule (100 mg) by mouth 2 times a day. 60 capsule 0    guaiFENesin (Mucinex) 600 mg 12 hr tablet  Take 1 tablet (600 mg) by mouth 2 times a day. Do not crush, chew, or split. 14 tablet 0    losartan (Cozaar) 25 mg tablet TAKE ONE TABLET BY MOUTH ONCE DAILY 90 tablet 3    multivitamin with minerals tablet Take 1 tablet by mouth once daily.      OLANZapine (ZyPREXA) 5 mg tablet TAKE ONE TABLET BY MOUTH AT BEDTIME FOR FOUR DAYS STARTING THE evening of treatment. 4 tablet 2    ondansetron (Zofran) 8 mg tablet Take 1 tablet (8 mg) by mouth every 8 hours if needed for nausea or vomiting. 30 tablet 5    [] predniSONE (Deltasone) 10 mg tablet Take 4 tablets (40 mg) by mouth once daily for 4 days, THEN 3 tablets (30 mg) once daily for 5 days, THEN 2 tablets (20 mg) once daily for 5 days, THEN 1 tablet (10 mg) once daily for 5 days. 46 tablet 0    prochlorperazine (Compazine) 10 mg tablet Take 1 tablet (10 mg) by mouth every 6 hours if needed for nausea or vomiting. 30 tablet 5    Spiriva with HandiHaler 18 mcg inhalation capsule place ONE CAPSULE into inhaler AND inhale ONCE DAILY 90 capsule 3    [DISCONTINUED] dexAMETHasone (Decadron) 4 mg tablet Take 2 tablets (8 mg) by mouth once daily. For 3 days starting the day after treatment. 6 tablet 2    [DISCONTINUED] OLANZapine (ZyPREXA) 5 mg tablet Take 1 tablet (5 mg) by mouth once daily at bedtime. For 4 days starting the evening of treatment. 4 tablet 2     No current facility-administered medications on file prior to visit.        Review of Systems   All other systems reviewed and are negative.      Vitals:    25 1043   BP: 95/61   Pulse: (!) 117   Temp: 36.6 °C (97.8 °F)        Objective     Physical Exam  Constitutional:       Appearance: She is cachectic.   Cardiovascular:      Heart sounds: Normal heart sounds.   Pulmonary:      Breath sounds: Normal air entry. Decreased breath sounds present.   Abdominal:      General: Abdomen is flat.      Palpations: Abdomen is soft.      Tenderness: There is no abdominal tenderness.   Neurological:      Mental  Status: She is alert.         Problem List Items Addressed This Visit       Malignant neoplasm of upper lobe of right lung (Multi)    Venofibrosis - Primary        Assessment/Plan   Placement of tunneled Mediport 2/4/2025  Risks include, but not limited to pain, infection, bleeding, cardiac, pulmonary, neurologic, locomotor, anesthetic events and other unforeseen complications, including death and pneumothorax      Juventino Mckenzie MD

## 2025-01-31 NOTE — PREPROCEDURE INSTRUCTIONS
Medication List            Accurate as of January 31, 2025 11:43 AM. Always use your most recent med list.                * albuterol 90 mcg/actuation inhaler  USE 1 TO 2 INHALATIONS BY  MOUTH EVERY 4 TO 6 HOURS AS NEEDED  Medication Adjustments for Surgery: Take on the morning of surgery     * albuterol 2.5 mg /3 mL (0.083 %) nebulizer solution  Take 3 mL (2.5 mg) by nebulization every 6 hours if needed for wheezing.  Medication Adjustments for Surgery: Take on the morning of surgery     alendronate 70 mg tablet  Commonly known as: Fosamax  Take 1 tablet (70 mg) by mouth 1 (one) time per week. Take in the morning with a full glass of water, on an empty stomach, and do not take anything else by mouth or lie down for the next 30 min.  Medication Adjustments for Surgery: Take/Use as prescribed     benzonatate 100 mg capsule  Commonly known as: Tessalon  Take 1 capsule (100 mg) by mouth 3 times a day as needed for cough. Do not crush or chew.  Medication Adjustments for Surgery: Take/Use as prescribed     budesonide-formoteroL 80-4.5 mcg/actuation inhaler  Commonly known as: Symbicort  Inhale 2 puffs 2 times a day. Rinse mouth with water after use to reduce aftertaste and incidence of candidiasis. Do not swallow.  Medication Adjustments for Surgery: Take on the morning of surgery     calcium citrate-vitamin D2 250 mg-2.5 mcg (100 unit) tablet  Medication Adjustments for Surgery: Take last dose 1 day (24 hours) before surgery     * chlorhexidine 4 % external liquid  Commonly known as: Hibiclens  Apply topically once daily for 5 days.  Medication Adjustments for Surgery: Take/Use as prescribed     * chlorhexidine 0.12 % solution  Commonly known as: Peridex  Use 15 mL in the mouth or throat once daily for 2 days. Once the night before surgery and once the morning of  Medication Adjustments for Surgery: Take/Use as prescribed     dexAMETHasone 4 mg tablet  Commonly known as: Decadron  TAKE TWO TABLETS BY MOUTH ONCE  DAILY. start THE DAY AFTER treatment FOR THREE DAYS  Medication Adjustments for Surgery: Take/Use as prescribed     docusate sodium 100 mg capsule  Commonly known as: Colace  Take 1 capsule (100 mg) by mouth 2 times a day.  Medication Adjustments for Surgery: Take last dose 1 day (24 hours) before surgery     guaiFENesin 600 mg 12 hr tablet  Commonly known as: Mucinex  Take 1 tablet (600 mg) by mouth 2 times a day. Do not crush, chew, or split.  Medication Adjustments for Surgery: Take/Use as prescribed     losartan 25 mg tablet  Commonly known as: Cozaar  TAKE ONE TABLET BY MOUTH ONCE DAILY  Medication Adjustments for Surgery: Take on the morning of surgery     multivitamin with minerals tablet  Medication Adjustments for Surgery: Take last dose 1 day (24 hours) before surgery     OLANZapine 5 mg tablet  Commonly known as: ZyPREXA  TAKE ONE TABLET BY MOUTH AT BEDTIME FOR FOUR DAYS STARTING THE evening of treatment.  Medication Adjustments for Surgery: Take last dose 1 day (24 hours) before surgery     ondansetron 8 mg tablet  Commonly known as: Zofran  Take 1 tablet (8 mg) by mouth every 8 hours if needed for nausea or vomiting.  Medication Adjustments for Surgery: Take/Use as prescribed     prochlorperazine 10 mg tablet  Commonly known as: Compazine  Take 1 tablet (10 mg) by mouth every 6 hours if needed for nausea or vomiting.     Spiriva with HandiHaler 18 mcg inhalation capsule  Generic drug: tiotropium  place ONE CAPSULE into inhaler AND inhale ONCE DAILY  Medication Adjustments for Surgery: Take on the morning of surgery           * This list has 4 medication(s) that are the same as other medications prescribed for you. Read the directions carefully, and ask your doctor or other care provider to review them with you.                                  NPO Instructions:    Do not eat any food after midnight the night before your surgery/procedure.  May have clears up to 3 hours preop. Water, Black coffee, tea,  gatorade, and clear soda. No dairy or non-dairy products added.   Remember to stop drinking by mouth 3 hours prior to procedure. No gum, candy, mints or smoking.      Additional Instructions:     Review your medication instructions, take indicated medications  Wear  comfortable loose fitting clothing  All jewelry and valuables should be left at home    Park in back of hospital by ER. Come up to Second floor-Outpt dept to check in.  Bring Photo ID and Insurance card,   You MUST have a  with you.  No more than 2 visitors with you please.      If you get ill at all before your procedure- CALL YOUR DOCTOR/SURGEON.  We want you in the best shape that is possible. Any sickness might lead to your procedure being delayed.      Call Outpatient dept at [phone] the night before your procedure (Friday for Monday procedure), between 1-3 pm.      You will have 2 prescriptions called into your pharmacy today- Hibiclens (chlorhexidine) Dental Rinse and liquid soap.  Use soap with your shower once a day x 5 days-including day of procedure- Get skin wet, apply soap- leave on for 3 minutes then rinse. No lotion, powder in that area.  Use dental rinse night before and morning of procedure. 1 capful for 30 seconds, gargle and spit out. Do not rinse with water afterwards.

## 2025-02-02 LAB — STAPHYLOCOCCUS SPEC CULT: NORMAL

## 2025-02-03 RX ORDER — OXYCODONE HYDROCHLORIDE 5 MG/1
5 TABLET ORAL EVERY 4 HOURS PRN
Status: CANCELLED | OUTPATIENT
Start: 2025-02-03

## 2025-02-03 RX ORDER — FENTANYL CITRATE 50 UG/ML
25 INJECTION, SOLUTION INTRAMUSCULAR; INTRAVENOUS EVERY 5 MIN PRN
Status: CANCELLED | OUTPATIENT
Start: 2025-02-03

## 2025-02-03 RX ORDER — ACETAMINOPHEN 325 MG/1
650 TABLET ORAL EVERY 4 HOURS PRN
Status: CANCELLED | OUTPATIENT
Start: 2025-02-03

## 2025-02-03 RX ORDER — ONDANSETRON HYDROCHLORIDE 2 MG/ML
4 INJECTION, SOLUTION INTRAVENOUS ONCE AS NEEDED
Status: CANCELLED | OUTPATIENT
Start: 2025-02-03

## 2025-02-04 ENCOUNTER — HOSPITAL ENCOUNTER (OUTPATIENT)
Facility: HOSPITAL | Age: 70
Setting detail: OUTPATIENT SURGERY
Discharge: HOME | End: 2025-02-04
Attending: SURGERY | Admitting: SURGERY
Payer: MEDICARE

## 2025-02-04 ENCOUNTER — PATIENT OUTREACH (OUTPATIENT)
Dept: PRIMARY CARE | Facility: CLINIC | Age: 70
End: 2025-02-04

## 2025-02-04 ENCOUNTER — APPOINTMENT (OUTPATIENT)
Dept: RADIOLOGY | Facility: HOSPITAL | Age: 70
End: 2025-02-04
Payer: MEDICARE

## 2025-02-04 ENCOUNTER — ANESTHESIA EVENT (OUTPATIENT)
Dept: OPERATING ROOM | Facility: HOSPITAL | Age: 70
End: 2025-02-04
Payer: MEDICARE

## 2025-02-04 ENCOUNTER — ANESTHESIA (OUTPATIENT)
Dept: OPERATING ROOM | Facility: HOSPITAL | Age: 70
End: 2025-02-04
Payer: MEDICARE

## 2025-02-04 VITALS
HEIGHT: 63 IN | HEART RATE: 94 BPM | SYSTOLIC BLOOD PRESSURE: 92 MMHG | WEIGHT: 103.62 LBS | TEMPERATURE: 97.8 F | BODY MASS INDEX: 18.36 KG/M2 | OXYGEN SATURATION: 93 % | DIASTOLIC BLOOD PRESSURE: 52 MMHG | RESPIRATION RATE: 16 BRPM

## 2025-02-04 DIAGNOSIS — I87.8 VENOFIBROSIS: Primary | ICD-10-CM

## 2025-02-04 DIAGNOSIS — C34.11 MALIGNANT NEOPLASM OF UPPER LOBE OF RIGHT LUNG (MULTI): ICD-10-CM

## 2025-02-04 PROCEDURE — 7100000009 HC PHASE TWO TIME - INITIAL BASE CHARGE: Performed by: SURGERY

## 2025-02-04 PROCEDURE — 2780000003 HC OR 278 NO HCPCS: Performed by: SURGERY

## 2025-02-04 PROCEDURE — 7100000010 HC PHASE TWO TIME - EACH INCREMENTAL 1 MINUTE: Performed by: SURGERY

## 2025-02-04 PROCEDURE — C1788 PORT, INDWELLING, IMP: HCPCS | Performed by: SURGERY

## 2025-02-04 PROCEDURE — 3600000007 HC OR TIME - EACH INCREMENTAL 1 MINUTE - PROCEDURE LEVEL TWO: Performed by: SURGERY

## 2025-02-04 PROCEDURE — C1894 INTRO/SHEATH, NON-LASER: HCPCS | Performed by: SURGERY

## 2025-02-04 PROCEDURE — 2500000004 HC RX 250 GENERAL PHARMACY W/ HCPCS (ALT 636 FOR OP/ED): Performed by: PHYSICIAN ASSISTANT

## 2025-02-04 PROCEDURE — 2500000004 HC RX 250 GENERAL PHARMACY W/ HCPCS (ALT 636 FOR OP/ED): Performed by: NURSE ANESTHETIST, CERTIFIED REGISTERED

## 2025-02-04 PROCEDURE — 2500000004 HC RX 250 GENERAL PHARMACY W/ HCPCS (ALT 636 FOR OP/ED): Performed by: SURGERY

## 2025-02-04 PROCEDURE — 77001 FLUOROGUIDE FOR VEIN DEVICE: CPT

## 2025-02-04 PROCEDURE — 77001 FLUOROGUIDE FOR VEIN DEVICE: CPT | Performed by: SURGERY

## 2025-02-04 PROCEDURE — 3600000002 HC OR TIME - INITIAL BASE CHARGE - PROCEDURE LEVEL TWO: Performed by: SURGERY

## 2025-02-04 PROCEDURE — 76937 US GUIDE VASCULAR ACCESS: CPT | Performed by: SURGERY

## 2025-02-04 PROCEDURE — 2500000005 HC RX 250 GENERAL PHARMACY W/O HCPCS

## 2025-02-04 PROCEDURE — 36561 INSERT TUNNELED CV CATH: CPT | Performed by: SURGERY

## 2025-02-04 PROCEDURE — 2500000001 HC RX 250 WO HCPCS SELF ADMINISTERED DRUGS (ALT 637 FOR MEDICARE OP): Performed by: SURGERY

## 2025-02-04 PROCEDURE — 71045 X-RAY EXAM CHEST 1 VIEW: CPT

## 2025-02-04 PROCEDURE — 3700000002 HC GENERAL ANESTHESIA TIME - EACH INCREMENTAL 1 MINUTE: Performed by: SURGERY

## 2025-02-04 PROCEDURE — 2720000007 HC OR 272 NO HCPCS: Performed by: SURGERY

## 2025-02-04 PROCEDURE — 71045 X-RAY EXAM CHEST 1 VIEW: CPT | Performed by: RADIOLOGY

## 2025-02-04 PROCEDURE — 3700000001 HC GENERAL ANESTHESIA TIME - INITIAL BASE CHARGE: Performed by: SURGERY

## 2025-02-04 RX ORDER — LABETALOL HYDROCHLORIDE 5 MG/ML
5 INJECTION, SOLUTION INTRAVENOUS ONCE AS NEEDED
Status: DISCONTINUED | OUTPATIENT
Start: 2025-02-04 | End: 2025-02-04 | Stop reason: HOSPADM

## 2025-02-04 RX ORDER — LIDOCAINE HYDROCHLORIDE 10 MG/ML
0.1 INJECTION, SOLUTION EPIDURAL; INFILTRATION; INTRACAUDAL; PERINEURAL ONCE
Status: DISCONTINUED | OUTPATIENT
Start: 2025-02-04 | End: 2025-02-04 | Stop reason: HOSPADM

## 2025-02-04 RX ORDER — PHENYLEPHRINE HYDROCHLORIDE 10 MG/ML
INJECTION INTRAVENOUS AS NEEDED
Status: DISCONTINUED | OUTPATIENT
Start: 2025-02-04 | End: 2025-02-04

## 2025-02-04 RX ORDER — HYDRALAZINE HYDROCHLORIDE 20 MG/ML
5 INJECTION INTRAMUSCULAR; INTRAVENOUS EVERY 30 MIN PRN
Status: DISCONTINUED | OUTPATIENT
Start: 2025-02-04 | End: 2025-02-04 | Stop reason: HOSPADM

## 2025-02-04 RX ORDER — SODIUM CHLORIDE, SODIUM LACTATE, POTASSIUM CHLORIDE, CALCIUM CHLORIDE 600; 310; 30; 20 MG/100ML; MG/100ML; MG/100ML; MG/100ML
100 INJECTION, SOLUTION INTRAVENOUS CONTINUOUS
Status: DISCONTINUED | OUTPATIENT
Start: 2025-02-04 | End: 2025-02-04 | Stop reason: HOSPADM

## 2025-02-04 RX ORDER — ALBUTEROL SULFATE 0.83 MG/ML
2.5 SOLUTION RESPIRATORY (INHALATION) ONCE AS NEEDED
Status: DISCONTINUED | OUTPATIENT
Start: 2025-02-04 | End: 2025-02-04 | Stop reason: HOSPADM

## 2025-02-04 RX ORDER — ONDANSETRON HYDROCHLORIDE 2 MG/ML
4 INJECTION, SOLUTION INTRAVENOUS ONCE AS NEEDED
Status: DISCONTINUED | OUTPATIENT
Start: 2025-02-04 | End: 2025-02-04 | Stop reason: HOSPADM

## 2025-02-04 RX ORDER — CELECOXIB 100 MG/1
200 CAPSULE ORAL ONCE
Status: COMPLETED | OUTPATIENT
Start: 2025-02-04 | End: 2025-02-04

## 2025-02-04 RX ORDER — FENTANYL CITRATE 50 UG/ML
INJECTION, SOLUTION INTRAMUSCULAR; INTRAVENOUS AS NEEDED
Status: DISCONTINUED | OUTPATIENT
Start: 2025-02-04 | End: 2025-02-04

## 2025-02-04 RX ORDER — LIDOCAINE HYDROCHLORIDE 10 MG/ML
INJECTION, SOLUTION INFILTRATION; PERINEURAL AS NEEDED
Status: DISCONTINUED | OUTPATIENT
Start: 2025-02-04 | End: 2025-02-04

## 2025-02-04 RX ORDER — ACETAMINOPHEN 325 MG/1
650 TABLET ORAL EVERY 8 HOURS
COMMUNITY
Start: 2025-02-04 | End: 2025-02-09

## 2025-02-04 RX ORDER — IBUPROFEN 600 MG/1
600 TABLET ORAL EVERY 6 HOURS PRN
COMMUNITY
Start: 2025-02-04 | End: 2025-02-14

## 2025-02-04 RX ORDER — SODIUM CHLORIDE 9 MG/ML
INJECTION, SOLUTION INTRAMUSCULAR; INTRAVENOUS; SUBCUTANEOUS AS NEEDED
Status: DISCONTINUED | OUTPATIENT
Start: 2025-02-04 | End: 2025-02-04 | Stop reason: HOSPADM

## 2025-02-04 RX ORDER — PROPOFOL 10 MG/ML
INJECTION, EMULSION INTRAVENOUS CONTINUOUS PRN
Status: DISCONTINUED | OUTPATIENT
Start: 2025-02-04 | End: 2025-02-04

## 2025-02-04 RX ORDER — CEFAZOLIN SODIUM 2 G/50ML
2 SOLUTION INTRAVENOUS ONCE
Status: COMPLETED | OUTPATIENT
Start: 2025-02-04 | End: 2025-02-04

## 2025-02-04 RX ORDER — ONDANSETRON HYDROCHLORIDE 2 MG/ML
INJECTION, SOLUTION INTRAVENOUS AS NEEDED
Status: DISCONTINUED | OUTPATIENT
Start: 2025-02-04 | End: 2025-02-04

## 2025-02-04 RX ORDER — HEPARIN 100 UNIT/ML
SYRINGE INTRAVENOUS AS NEEDED
Status: DISCONTINUED | OUTPATIENT
Start: 2025-02-04 | End: 2025-02-04 | Stop reason: HOSPADM

## 2025-02-04 RX ORDER — ACETAMINOPHEN 325 MG/1
975 TABLET ORAL ONCE
Status: COMPLETED | OUTPATIENT
Start: 2025-02-04 | End: 2025-02-04

## 2025-02-04 RX ADMIN — FENTANYL CITRATE 25 MCG: 50 INJECTION, SOLUTION INTRAMUSCULAR; INTRAVENOUS at 08:55

## 2025-02-04 RX ADMIN — FENTANYL CITRATE 25 MCG: 50 INJECTION, SOLUTION INTRAMUSCULAR; INTRAVENOUS at 09:05

## 2025-02-04 RX ADMIN — FENTANYL CITRATE 25 MCG: 50 INJECTION, SOLUTION INTRAMUSCULAR; INTRAVENOUS at 09:36

## 2025-02-04 RX ADMIN — ONDANSETRON 4 MG: 2 INJECTION, SOLUTION INTRAMUSCULAR; INTRAVENOUS at 09:00

## 2025-02-04 RX ADMIN — CEFAZOLIN SODIUM 2 G: 2 SOLUTION INTRAVENOUS at 08:52

## 2025-02-04 RX ADMIN — PROPOFOL 20 MG: 10 INJECTION, EMULSION INTRAVENOUS at 09:10

## 2025-02-04 RX ADMIN — FENTANYL CITRATE 25 MCG: 50 INJECTION, SOLUTION INTRAMUSCULAR; INTRAVENOUS at 09:27

## 2025-02-04 RX ADMIN — POVIDONE-IODINE 1 APPLICATION: 5 SOLUTION TOPICAL at 08:39

## 2025-02-04 RX ADMIN — SODIUM CHLORIDE 500 ML: 900 INJECTION, SOLUTION INTRAVENOUS at 10:50

## 2025-02-04 RX ADMIN — LIDOCAINE HYDROCHLORIDE 20 MG: 10 INJECTION, SOLUTION INFILTRATION; PERINEURAL at 09:00

## 2025-02-04 RX ADMIN — PHENYLEPHRINE HYDROCHLORIDE 100 MCG: 10 INJECTION INTRAVENOUS at 09:28

## 2025-02-04 RX ADMIN — PROPOFOL 20 MG: 10 INJECTION, EMULSION INTRAVENOUS at 09:05

## 2025-02-04 RX ADMIN — PROPOFOL 50 MCG/KG/MIN: 10 INJECTION, EMULSION INTRAVENOUS at 09:00

## 2025-02-04 RX ADMIN — PROPOFOL 20 MG: 10 INJECTION, EMULSION INTRAVENOUS at 09:27

## 2025-02-04 RX ADMIN — PROPOFOL 20 MG: 10 INJECTION, EMULSION INTRAVENOUS at 09:01

## 2025-02-04 RX ADMIN — PROPOFOL 20 MG: 10 INJECTION, EMULSION INTRAVENOUS at 09:21

## 2025-02-04 RX ADMIN — PHENYLEPHRINE HYDROCHLORIDE 100 MCG: 10 INJECTION INTRAVENOUS at 09:22

## 2025-02-04 RX ADMIN — PHENYLEPHRINE HYDROCHLORIDE 200 MCG: 10 INJECTION INTRAVENOUS at 09:33

## 2025-02-04 RX ADMIN — PHENYLEPHRINE HYDROCHLORIDE 100 MCG: 10 INJECTION INTRAVENOUS at 09:29

## 2025-02-04 RX ADMIN — CELECOXIB 200 MG: 100 CAPSULE ORAL at 08:22

## 2025-02-04 RX ADMIN — SODIUM CHLORIDE, POTASSIUM CHLORIDE, SODIUM LACTATE AND CALCIUM CHLORIDE: 600; 310; 30; 20 INJECTION, SOLUTION INTRAVENOUS at 08:52

## 2025-02-04 RX ADMIN — PHENYLEPHRINE HYDROCHLORIDE 100 MCG: 10 INJECTION INTRAVENOUS at 09:18

## 2025-02-04 RX ADMIN — ACETAMINOPHEN 975 MG: 325 TABLET, FILM COATED ORAL at 08:22

## 2025-02-04 RX ADMIN — PROPOFOL 20 MG: 10 INJECTION, EMULSION INTRAVENOUS at 09:33

## 2025-02-04 SDOH — HEALTH STABILITY: MENTAL HEALTH: CURRENT SMOKER: 0

## 2025-02-04 ASSESSMENT — PAIN - FUNCTIONAL ASSESSMENT

## 2025-02-04 ASSESSMENT — PAIN SCALES - GENERAL

## 2025-02-04 NOTE — NURSING NOTE
1030- secure chat Choco Watkinsrien that patient's SBP has been sustained in the 70s. Patient is alert and asymptomatic. 500ml normal saline ordered.

## 2025-02-04 NOTE — ANESTHESIA POSTPROCEDURE EVALUATION
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])    Surgeons: Juventino Mckenzie MD Responsible Provider: AURA Cuello    Anesthesia Type: MAC ASA Status: 4            Anesthesia Type: MAC    Vitals Value Taken Time   BP 92/56 02/04/25 1000   Temp 36.2 °C (97.1 °F) 02/04/25 0952   Pulse 90 02/04/25 1000   Resp 16 02/04/25 1000   SpO2 92 % 02/04/25 1000       Anesthesia Post Evaluation    Patient location during evaluation: PACU  Patient participation: complete - patient participated  Level of consciousness: awake and alert  Pain management: satisfactory to patient  Multimodal analgesia pain management approach  Airway patency: patent  Cardiovascular status: acceptable  Respiratory status: acceptable  Hydration status: acceptable  Postoperative Nausea and Vomiting: none        There were no known notable events for this encounter.

## 2025-02-04 NOTE — ANESTHESIA PREPROCEDURE EVALUATION
Patient: Billie Hernadez    Procedure Information       Date/Time: 02/04/25 0815    Procedure: INSERTION, CENTRAL VENOUS ACCESS DEVICE, WITH SUBCUTANEOUS PORT    Location: [facility] / [facility]    Surgeons: Juventino Mckenzie MD            Relevant Problems   Anesthesia (within normal limits)      Cardiac   (+) HTN (hypertension)   (+) Hyperlipidemia      Pulmonary  4L o2 at night.     Productive cough noted, baseline per patient.    Recently hospitalized after lung biopsy for hypoxia.   (+) COPD (chronic obstructive pulmonary disease) (Multi)   (+) Malignant neoplasm of upper lobe of right lung (Multi)      Neuro (within normal limits)      GI (within normal limits)      /Renal (within normal limits)      Liver (within normal limits)      Endocrine (within normal limits)      Hematology (within normal limits)      Musculoskeletal (within normal limits)      HEENT (within normal limits)      ID (within normal limits)      Skin (within normal limits)      GYN (within normal limits)      Circulatory   (+) Elevated blood pressure reading   (+) Venofibrosis     There were no vitals filed for this visit.    Past Surgical History:   Procedure Laterality Date   • EXCISION / BIOPSY BREAST / NIPPLE / DUCT Left 05/28/2014   • LUNG BIOPSY  12/31/2024    Bronchoscopy and needle biopsy   • OTHER SURGICAL HISTORY Left 03/12/2015    Open Treatment Of Tibial Shaft Fracture With Implant   • TONSILLECTOMY       Past Medical History:   Diagnosis Date   • Cancer (Multi)    • Chronic obstructive pulmonary disease with (acute) exacerbation (Multi) 08/08/2017    COPD exacerbation   • Hypertension    • Lung cancer (Multi)    • Other specified health status 03/12/2015    No known problems   • Personal history of other venous thrombosis and embolism 03/12/2015    History of deep venous thrombosis   • Pneumonia      No current facility-administered medications for this encounter.    Current Outpatient Medications:   •  albuterol 2.5 mg /3  mL (0.083 %) nebulizer solution, Take 3 mL (2.5 mg) by nebulization every 6 hours if needed for wheezing., Disp: 75 mL, Rfl: 3  •  albuterol 90 mcg/actuation inhaler, USE 1 TO 2 INHALATIONS BY  MOUTH EVERY 4 TO 6 HOURS AS NEEDED, Disp: 51 g, Rfl: 2  •  alendronate (Fosamax) 70 mg tablet, Take 1 tablet (70 mg) by mouth 1 (one) time per week. Take in the morning with a full glass of water, on an empty stomach, and do not take anything else by mouth or lie down for the next 30 min., Disp: 12 tablet, Rfl: 3  •  benzonatate (Tessalon) 100 mg capsule, Take 1 capsule (100 mg) by mouth 3 times a day as needed for cough. Do not crush or chew., Disp: 20 capsule, Rfl: 0  •  budesonide-formoteroL (Symbicort) 80-4.5 mcg/actuation inhaler, Inhale 2 puffs 2 times a day. Rinse mouth with water after use to reduce aftertaste and incidence of candidiasis. Do not swallow., Disp: 10.2 g, Rfl: 11  •  calcium citrate-vitamin D2 250 mg-2.5 mcg (100 unit) tablet, Take 1 tablet by mouth once daily., Disp: , Rfl:   •  chlorhexidine (Hibiclens) 4 % external liquid, Apply topically once daily for 5 days., Disp: 354 mL, Rfl: 0  •  dexAMETHasone (Decadron) 4 mg tablet, TAKE TWO TABLETS BY MOUTH ONCE DAILY. start THE DAY AFTER treatment FOR THREE DAYS, Disp: 6 tablet, Rfl: 2  •  docusate sodium (Colace) 100 mg capsule, Take 1 capsule (100 mg) by mouth 2 times a day., Disp: 60 capsule, Rfl: 0  •  guaiFENesin (Mucinex) 600 mg 12 hr tablet, Take 1 tablet (600 mg) by mouth 2 times a day. Do not crush, chew, or split., Disp: 14 tablet, Rfl: 0  •  losartan (Cozaar) 25 mg tablet, TAKE ONE TABLET BY MOUTH ONCE DAILY, Disp: 90 tablet, Rfl: 3  •  multivitamin with minerals tablet, Take 1 tablet by mouth once daily., Disp: , Rfl:   •  OLANZapine (ZyPREXA) 5 mg tablet, TAKE ONE TABLET BY MOUTH AT BEDTIME FOR FOUR DAYS STARTING THE evening of treatment., Disp: 4 tablet, Rfl: 2  •  ondansetron (Zofran) 8 mg tablet, Take 1 tablet (8 mg) by mouth every 8 hours  if needed for nausea or vomiting., Disp: 30 tablet, Rfl: 5  •  prochlorperazine (Compazine) 10 mg tablet, Take 1 tablet (10 mg) by mouth every 6 hours if needed for nausea or vomiting., Disp: 30 tablet, Rfl: 5  •  Spiriva with HandiHaler 18 mcg inhalation capsule, place ONE CAPSULE into inhaler AND inhale ONCE DAILY, Disp: 90 capsule, Rfl: 3  Prior to Admission medications    Medication Sig Start Date End Date Taking? Authorizing Provider   albuterol 2.5 mg /3 mL (0.083 %) nebulizer solution Take 3 mL (2.5 mg) by nebulization every 6 hours if needed for wheezing. 1/5/25   MOR Kirk   albuterol 90 mcg/actuation inhaler USE 1 TO 2 INHALATIONS BY  MOUTH EVERY 4 TO 6 HOURS AS NEEDED 12/18/23   MOR Holliday   alendronate (Fosamax) 70 mg tablet Take 1 tablet (70 mg) by mouth 1 (one) time per week. Take in the morning with a full glass of water, on an empty stomach, and do not take anything else by mouth or lie down for the next 30 min. 12/18/23   MOR Holliday   benzonatate (Tessalon) 100 mg capsule Take 1 capsule (100 mg) by mouth 3 times a day as needed for cough. Do not crush or chew. 1/5/25   MOR Kirk   budesonide-formoteroL (Symbicort) 80-4.5 mcg/actuation inhaler Inhale 2 puffs 2 times a day. Rinse mouth with water after use to reduce aftertaste and incidence of candidiasis. Do not swallow. 1/28/25 1/28/26  Judith Santillan MD   calcium citrate-vitamin D2 250 mg-2.5 mcg (100 unit) tablet Take 1 tablet by mouth once daily.    Historical Provider, MD   chlorhexidine (Hibiclens) 4 % external liquid Apply topically once daily for 5 days. 1/31/25 2/5/25  Lissy Covarrubias PA-C   chlorhexidine (Peridex) 0.12 % solution Use 15 mL in the mouth or throat once daily for 2 days. Once the night before surgery and once the morning of 1/31/25 2/2/25  Lissy Covarrubias PA-C   dexAMETHasone (Decadron) 4 mg tablet TAKE TWO TABLETS BY MOUTH ONCE DAILY. start THE DAY  AFTER treatment FOR THREE DAYS 25   Judith Santillan MD   docusate sodium (Colace) 100 mg capsule Take 1 capsule (100 mg) by mouth 2 times a day. 25   MOR Kirk   guaiFENesin (Mucinex) 600 mg 12 hr tablet Take 1 tablet (600 mg) by mouth 2 times a day. Do not crush, chew, or split. 25   MOR Kirk   losartan (Cozaar) 25 mg tablet TAKE ONE TABLET BY MOUTH ONCE DAILY 24   MOR Holliday   multivitamin with minerals tablet Take 1 tablet by mouth once daily.    Historical Provider, MD   OLANZapine (ZyPREXA) 5 mg tablet TAKE ONE TABLET BY MOUTH AT BEDTIME FOR FOUR DAYS STARTING THE evening of treatment. 25   Judith Santillan MD   ondansetron (Zofran) 8 mg tablet Take 1 tablet (8 mg) by mouth every 8 hours if needed for nausea or vomiting. 12/3/24   Judith Santillan MD   prochlorperazine (Compazine) 10 mg tablet Take 1 tablet (10 mg) by mouth every 6 hours if needed for nausea or vomiting. 12/3/24   Judith Santillan MD   Spiriva with HandiHaler 18 mcg inhalation capsule place ONE CAPSULE into inhaler AND inhale ONCE DAILY 24   MOR Holliday     No Known Allergies  Social History     Tobacco Use   • Smoking status: Former     Current packs/day: 0.00     Average packs/day: 0.5 packs/day for 51.1 years (25.5 ttl pk-yrs)     Types: Cigarettes     Start date:      Quit date: 2024     Years since quittin.0   • Smokeless tobacco: Never   Substance Use Topics   • Alcohol use: Yes     Alcohol/week: 2.0 standard drinks of alcohol     Types: 2 Cans of beer per week     Comment: 1 every other month         Chemistry    Lab Results   Component Value Date/Time     (L) 2025 0944    K 4.5 2025 0944    CL 94 (L) 2025 0944    CO2 31 2025 0944    BUN 8 2025 0944    CREATININE 0.52 2025    Lab Results   Component Value Date/Time    CALCIUM 8.8 2025    ALKPHOS 58 2025     AST 18 01/27/2025 0944    ALT 16 01/27/2025 0944    BILITOT 0.5 01/27/2025 0944          Lab Results   Component Value Date/Time    WBC 15.9 (H) 01/31/2025 1228    HGB 11.8 (L) 01/31/2025 1228    HCT 36.3 01/31/2025 1228     01/31/2025 1228     Lab Results   Component Value Date/Time    PROTIME 12.0 12/31/2024 0922    INR 1.0 12/31/2024 0922     Encounter Date: 12/31/24   ECG 12 Lead   Result Value    Ventricular Rate 116    Atrial Rate 116    VA Interval 136    QRS Duration 74    QT Interval 306    QTC Calculation(Bazett) 425    R Axis 82    T Axis 35    QRS Count 19    Q Onset 227    P Onset 159    P Offset 216    T Offset 380    QTC Fredericia 381    Narrative    Sinus tachycardia with Premature atrial complexes  Otherwise normal ECG  No previous ECGs available  Confirmed by Nathan Washington ([id number]) on 1/6/2025 4:31:01 PM     No results found for this or any previous visit from the past 1095 days.    Clinical information reviewed:                   NPO Detail:  No data recorded     Physical Exam    Airway  Mallampati: II  TM distance: >3 FB  Neck ROM: full     Cardiovascular - normal exam     Dental - normal exam     Pulmonary - normal exam     Abdominal - normal exam         Anesthesia Plan    History of general anesthesia?: yes  History of complications of general anesthesia?: no    ASA 4     MAC     The patient is not a current smoker.  Patient was not previously instructed to abstain from smoking on day of procedure.  Patient did not smoke on day of procedure.    intravenous induction   Anesthetic plan and risks discussed with patient.  Use of blood products discussed with patient who consented to blood products.    Plan discussed with CRNA.

## 2025-02-04 NOTE — PROGRESS NOTES
Successful outreach to patient regarding hospitalization as patient continues TCM program.   At time of outreach call the patient feels as if their condition has (improved/worsened/returned to baseline) since initial visit with PCP or specialist.  Questions or concerns addressed at this time with patient.   Provided contact information to patient if any further non-emergent needs arise.

## 2025-02-04 NOTE — OP NOTE
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  ASA: IV  Estimated Blood Loss: 1 mL  Intra-op Medications:   Administrations occurring from 0815 to 0940 on 25:   Medication Name Total Dose   BUPivacaine HCl (Marcaine) 0.5 % (5 mg/mL) 12 mL, lidocaine (Xylocaine) 12 mL in sodium chloride 0.9% 10 mL syringe Cannot be calculated   heparin flush 100 unit/mL syringe 10 mL   dexmedeTOMIDine (Precedex) bolus from bag 10 mcg   fentaNYL PF 0.05 mg/mL 100 mcg   lactated Ringer's infusion 100 mL   lidocaine (Xylocaine) injection 1 % 20 mg   ondansetron (Zofran) 2 mg/mL injection 4 mg   phenylephrine (Deni-Synephrine) injection 600 mcg   propofol (Diprivan) injection 10 mg/mL 199.9 mg   acetaminophen (Tylenol) tablet 975 mg 975 mg   ceFAZolin (Ancef) 2 g in dextrose (iso) IV 50 mL 2 g   celecoxib (CeleBREX) capsule 200 mg 200 mg   povidone-iodine 5 % kit kit 1 Application              Anesthesia Record               Intraprocedure I/O Totals          Intake    Dexmedetomidine 0.00 mL    The total shown is the total volume documented since Anesthesia Start was filed.    Total  Intake 0 mL          Specimen: No specimens collected              Drains and/or Catheters: * None in log *    Tourniquet Times:         Implants:     Findings: Right internal jugular vein identified with high-frequency ultrasound transducer.  Guidewire placed successfully and localized in the superior vena cava with fluoroscopy.  Port placed successfully with good blood return from the port.    Indications: Billie Hernadez is an 69 y.o. 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  The patient requires intravenous chemotherapy and requires port placement.    The patient was seen in the preoperative area. The risks, benefits, complications, treatment options, non-operative alternatives, expected recovery and outcomes were discussed with the patient. The possibilities of reaction to medication, pulmonary aspiration, injury to surrounding structures, bleeding, recurrent infection, the need for additional procedures, failure to diagnose a condition, and creating a complication requiring transfusion or operation were discussed with the patient. The patient concurred with the proposed plan, giving informed consent.  The site of surgery was properly noted/marked if necessary per policy. The patient has been actively warmed in preoperative area. Preoperative antibiotics have been ordered and given within 1 hours of incision. Venous thrombosis prophylaxis have been ordered including bilateral sequential compression devices    Procedure Details: After obtaining informed consent for the patient and discussing all the risks which include, but not limited to pain, infection, bleeding, cardiac, pulmonary, neurologic, locomotor, anesthetic events and other unforeseen complications, including death and pneumothorax the right neck was interrogated with a high-frequency ultrasound transducer.  The internal jugular and was clearly identified.  The right neck and  chest wall was prepped and draped in aseptic fashion.  The internal jugular vein was cannulated and the guidewire was passed.  Fluoroscopy is used to confirm the presence of the guidewire in the superior vena cava.  Next a pocket was created in the anterior chest wall after instillation of local anesthetic.  The patient was extremely frail and was receiving a MAC sedation.  There was complete hemostasis the pocket.  The port fed nicely in the pocket.  A small incision was made at the insertion site of the guidewire and the tunneler was able to be passed from the pocket up to the insertion site of the guidewire.  The catheter was attached to the tunneler and this was brought through the tunnel.  The catheter was attached to the port and the port was sewn in place in the pocket.  3 Vicryl sutures were used.  The cath was then transected to 7 fingerbreadths below the manubrium.  The peel-away sheath and dilator were passed over the guidewire under direct fluoroscopic guidance the dilator and guidewire removed and the catheter was passed through the peel-away sheath.  The peel-away sheath was removed and the catheter lay nicely in the superior vena cava under fluoroscopy.  There is good blood return.  There was complete hemostasis.The subcutaneous tissue was closed with 3-0 Vicryl suture.  The skin was closed with 4 Monocryl suture.  Dressings were placed.  All sponge and instrument counts were correct x 2.  The patient tolerated the procedure well and was discharged to the recovery room in stable condition.    Complications:  None; patient tolerated the procedure well.    Disposition: PACU - hemodynamically stable.  Condition: stable     The surgical assistant assisted with positioning, preparation of the surgical site, tissue retraction, suctioning, due to the nature of the case and the difficulty of the case.  The surgical assistant performed a primary closure and dressing application.    Additional Details: Time  equals 35 minutes.  Wound classification 1.    Attending Attestation: I performed the procedure.    Juventino Mckenzie  Phone Number: [phone]

## 2025-02-05 ENCOUNTER — APPOINTMENT (OUTPATIENT)
Dept: HEMATOLOGY/ONCOLOGY | Facility: HOSPITAL | Age: 70
End: 2025-02-05
Payer: MEDICARE

## 2025-02-05 ENCOUNTER — APPOINTMENT (OUTPATIENT)
Dept: RADIOLOGY | Facility: HOSPITAL | Age: 70
End: 2025-02-05
Payer: MEDICARE

## 2025-02-05 ASSESSMENT — PAIN SCALES - GENERAL: PAINLEVEL_OUTOF10: 0 - NO PAIN

## 2025-02-11 ENCOUNTER — INFUSION (OUTPATIENT)
Dept: HEMATOLOGY/ONCOLOGY | Facility: HOSPITAL | Age: 70
End: 2025-02-11
Payer: MEDICARE

## 2025-02-11 ENCOUNTER — HOSPITAL ENCOUNTER (OUTPATIENT)
Dept: RADIATION ONCOLOGY | Facility: CLINIC | Age: 70
Setting detail: RADIATION/ONCOLOGY SERIES
Discharge: HOME | End: 2025-02-11
Payer: MEDICARE

## 2025-02-11 VITALS
SYSTOLIC BLOOD PRESSURE: 120 MMHG | DIASTOLIC BLOOD PRESSURE: 70 MMHG | RESPIRATION RATE: 18 BRPM | OXYGEN SATURATION: 94 % | TEMPERATURE: 96.8 F | HEART RATE: 74 BPM

## 2025-02-11 VITALS
RESPIRATION RATE: 18 BRPM | HEART RATE: 94 BPM | OXYGEN SATURATION: 94 % | DIASTOLIC BLOOD PRESSURE: 72 MMHG | SYSTOLIC BLOOD PRESSURE: 116 MMHG | TEMPERATURE: 97.9 F

## 2025-02-11 DIAGNOSIS — C34.11 MALIGNANT NEOPLASM OF UPPER LOBE, RIGHT BRONCHUS OR LUNG: ICD-10-CM

## 2025-02-11 DIAGNOSIS — C34.11 MALIGNANT NEOPLASM OF UPPER LOBE OF RIGHT LUNG (MULTI): ICD-10-CM

## 2025-02-11 DIAGNOSIS — Z51.0 ENCOUNTER FOR ANTINEOPLASTIC RADIATION THERAPY: ICD-10-CM

## 2025-02-11 LAB
ALBUMIN SERPL BCP-MCNC: 3.6 G/DL (ref 3.4–5)
ALP SERPL-CCNC: 55 U/L (ref 33–136)
ALT SERPL W P-5'-P-CCNC: 11 U/L (ref 7–45)
ANION GAP SERPL CALC-SCNC: 10 MMOL/L (ref 10–20)
AST SERPL W P-5'-P-CCNC: 17 U/L (ref 9–39)
BASOPHILS # BLD AUTO: 0.03 X10*3/UL (ref 0–0.1)
BASOPHILS NFR BLD AUTO: 0.6 %
BILIRUB SERPL-MCNC: 0.2 MG/DL (ref 0–1.2)
BUN SERPL-MCNC: 9 MG/DL (ref 6–23)
CALCIUM SERPL-MCNC: 9.1 MG/DL (ref 8.6–10.3)
CHLORIDE SERPL-SCNC: 95 MMOL/L (ref 98–107)
CO2 SERPL-SCNC: 31 MMOL/L (ref 21–32)
CREAT SERPL-MCNC: 0.38 MG/DL (ref 0.5–1.05)
EGFRCR SERPLBLD CKD-EPI 2021: >90 ML/MIN/1.73M*2
EOSINOPHIL # BLD AUTO: 0.03 X10*3/UL (ref 0–0.4)
EOSINOPHIL NFR BLD AUTO: 0.6 %
ERYTHROCYTE [DISTWIDTH] IN BLOOD BY AUTOMATED COUNT: 15.6 % (ref 11.5–14.5)
GLUCOSE SERPL-MCNC: 101 MG/DL (ref 74–99)
HCT VFR BLD AUTO: 28.6 % (ref 36–46)
HGB BLD-MCNC: 9.1 G/DL (ref 12–16)
IMM GRANULOCYTES # BLD AUTO: 0.04 X10*3/UL (ref 0–0.5)
IMM GRANULOCYTES NFR BLD AUTO: 0.8 % (ref 0–0.9)
LYMPHOCYTES # BLD AUTO: 1.6 X10*3/UL (ref 0.8–3)
LYMPHOCYTES NFR BLD AUTO: 30.5 %
MAGNESIUM SERPL-MCNC: 1.06 MG/DL (ref 1.6–2.4)
MCH RBC QN AUTO: 31.3 PG (ref 26–34)
MCHC RBC AUTO-ENTMCNC: 31.8 G/DL (ref 32–36)
MCV RBC AUTO: 98 FL (ref 80–100)
MONOCYTES # BLD AUTO: 0.74 X10*3/UL (ref 0.05–0.8)
MONOCYTES NFR BLD AUTO: 14.1 %
NEUTROPHILS # BLD AUTO: 2.81 X10*3/UL (ref 1.6–5.5)
NEUTROPHILS NFR BLD AUTO: 53.4 %
NRBC BLD-RTO: 0 /100 WBCS (ref 0–0)
PLATELET # BLD AUTO: 273 X10*3/UL (ref 150–450)
POTASSIUM SERPL-SCNC: 4 MMOL/L (ref 3.5–5.3)
PROT SERPL-MCNC: 6.5 G/DL (ref 6.4–8.2)
RAD ONC MSQ ACTUAL FRACTIONS DELIVERED: 1
RAD ONC MSQ ACTUAL SESSION DELIVERED DOSE: 200 CGRAY
RAD ONC MSQ ACTUAL TOTAL DOSE: 200 CGRAY
RAD ONC MSQ ELAPSED DAYS: 0
RAD ONC MSQ LAST DATE: NORMAL
RAD ONC MSQ PRESCRIBED FRACTIONAL DOSE: 200 CGRAY
RAD ONC MSQ PRESCRIBED NUMBER OF FRACTIONS: 30
RAD ONC MSQ PRESCRIBED TECHNIQUE: NORMAL
RAD ONC MSQ PRESCRIBED TOTAL DOSE: 6000 CGRAY
RAD ONC MSQ PRESCRIPTION PATTERN COMMENT: NORMAL
RAD ONC MSQ START DATE: NORMAL
RAD ONC MSQ TREATMENT COURSE NUMBER: 1
RAD ONC MSQ TREATMENT SITE: NORMAL
RBC # BLD AUTO: 2.91 X10*6/UL (ref 4–5.2)
SODIUM SERPL-SCNC: 132 MMOL/L (ref 136–145)
WBC # BLD AUTO: 5.3 X10*3/UL (ref 4.4–11.3)

## 2025-02-11 PROCEDURE — 2500000004 HC RX 250 GENERAL PHARMACY W/ HCPCS (ALT 636 FOR OP/ED): Performed by: INTERNAL MEDICINE

## 2025-02-11 PROCEDURE — 96366 THER/PROPH/DIAG IV INF ADDON: CPT

## 2025-02-11 PROCEDURE — 96365 THER/PROPH/DIAG IV INF INIT: CPT | Mod: INF

## 2025-02-11 PROCEDURE — 36591 DRAW BLOOD OFF VENOUS DEVICE: CPT

## 2025-02-11 PROCEDURE — 85025 COMPLETE CBC W/AUTO DIFF WBC: CPT

## 2025-02-11 PROCEDURE — 80053 COMPREHEN METABOLIC PANEL: CPT

## 2025-02-11 PROCEDURE — 77336 RADIATION PHYSICS CONSULT: CPT | Performed by: STUDENT IN AN ORGANIZED HEALTH CARE EDUCATION/TRAINING PROGRAM

## 2025-02-11 PROCEDURE — 83735 ASSAY OF MAGNESIUM: CPT

## 2025-02-11 PROCEDURE — 77386 HC INTENSITY-MODULATED RADIATION THERAPY (IMRT), COMPLEX: CPT | Performed by: STUDENT IN AN ORGANIZED HEALTH CARE EDUCATION/TRAINING PROGRAM

## 2025-02-11 RX ORDER — HEPARIN 100 UNIT/ML
500 SYRINGE INTRAVENOUS AS NEEDED
Status: CANCELLED | OUTPATIENT
Start: 2025-02-11

## 2025-02-11 RX ORDER — MAGNESIUM SULFATE HEPTAHYDRATE 40 MG/ML
4 INJECTION, SOLUTION INTRAVENOUS ONCE
Status: COMPLETED | OUTPATIENT
Start: 2025-02-11 | End: 2025-02-11

## 2025-02-11 RX ORDER — HEPARIN 100 UNIT/ML
500 SYRINGE INTRAVENOUS AS NEEDED
Status: DISCONTINUED | OUTPATIENT
Start: 2025-02-11 | End: 2025-02-11 | Stop reason: HOSPADM

## 2025-02-11 RX ORDER — HEPARIN SODIUM,PORCINE/PF 10 UNIT/ML
50 SYRINGE (ML) INTRAVENOUS AS NEEDED
Status: CANCELLED | OUTPATIENT
Start: 2025-02-11

## 2025-02-11 RX ADMIN — MAGNESIUM SULFATE HEPTAHYDRATE 4 G: 40 INJECTION, SOLUTION INTRAVENOUS at 13:39

## 2025-02-11 RX ADMIN — HEPARIN 500 UNITS: 100 SYRINGE at 10:25

## 2025-02-11 ASSESSMENT — PAIN SCALES - GENERAL
PAINLEVEL_OUTOF10: 0-NO PAIN
PAINLEVEL_OUTOF10: 0-NO PAIN

## 2025-02-12 ENCOUNTER — HOSPITAL ENCOUNTER (OUTPATIENT)
Dept: RADIATION ONCOLOGY | Facility: CLINIC | Age: 70
Setting detail: RADIATION/ONCOLOGY SERIES
Discharge: HOME | End: 2025-02-12
Payer: MEDICARE

## 2025-02-12 VITALS
SYSTOLIC BLOOD PRESSURE: 120 MMHG | HEART RATE: 105 BPM | DIASTOLIC BLOOD PRESSURE: 62 MMHG | RESPIRATION RATE: 18 BRPM | WEIGHT: 105.38 LBS | TEMPERATURE: 97.3 F | OXYGEN SATURATION: 90 % | BODY MASS INDEX: 18.67 KG/M2

## 2025-02-12 DIAGNOSIS — Z51.0 ENCOUNTER FOR ANTINEOPLASTIC RADIATION THERAPY: ICD-10-CM

## 2025-02-12 DIAGNOSIS — K20.90 ESOPHAGITIS: Primary | ICD-10-CM

## 2025-02-12 DIAGNOSIS — C34.11 MALIGNANT NEOPLASM OF UPPER LOBE, RIGHT BRONCHUS OR LUNG: ICD-10-CM

## 2025-02-12 LAB
RAD ONC MSQ ACTUAL FRACTIONS DELIVERED: 2
RAD ONC MSQ ACTUAL SESSION DELIVERED DOSE: 200 CGRAY
RAD ONC MSQ ACTUAL TOTAL DOSE: 400 CGRAY
RAD ONC MSQ ELAPSED DAYS: 1
RAD ONC MSQ LAST DATE: NORMAL
RAD ONC MSQ PRESCRIBED FRACTIONAL DOSE: 200 CGRAY
RAD ONC MSQ PRESCRIBED NUMBER OF FRACTIONS: 30
RAD ONC MSQ PRESCRIBED TECHNIQUE: NORMAL
RAD ONC MSQ PRESCRIBED TOTAL DOSE: 6000 CGRAY
RAD ONC MSQ PRESCRIPTION PATTERN COMMENT: NORMAL
RAD ONC MSQ START DATE: NORMAL
RAD ONC MSQ TREATMENT COURSE NUMBER: 1
RAD ONC MSQ TREATMENT SITE: NORMAL

## 2025-02-12 PROCEDURE — 77386 HC INTENSITY-MODULATED RADIATION THERAPY (IMRT), COMPLEX: CPT | Performed by: STUDENT IN AN ORGANIZED HEALTH CARE EDUCATION/TRAINING PROGRAM

## 2025-02-12 RX ORDER — PANTOPRAZOLE SODIUM 20 MG/1
20 TABLET, DELAYED RELEASE ORAL DAILY
Qty: 30 TABLET | Refills: 2 | Status: SHIPPED | OUTPATIENT
Start: 2025-02-12 | End: 2025-05-13

## 2025-02-12 ASSESSMENT — PAIN SCALES - GENERAL: PAINLEVEL_OUTOF10: 0-NO PAIN

## 2025-02-12 NOTE — PROGRESS NOTES
Radiation Oncology On Treatment Visit    Patient Name:  Billie Hernadez  MRN:  [id number]  :  1955    Referring Provider: Abimael Bridges MD  Primary Care Provider: MOR Holliday  Care Team: Patient Care Team:  MOR Holliday as PCP - General  Fredi Nolen MD as PCP - [employer] PCP  Judith Santillan MD as Medical Oncologist (Hematology and Oncology)  Bess Goins LPN as Care Manager (Case Management)    Date of Service: 2025     Diagnosis:   Specialty Problems          Radiation Oncology Problems    Malignant neoplasm of upper lobe of right lung (Multi)         Treatment Summary:  IMRT: Right Thorax, Midline Mediastinum, Right Supraclavicular fossa    Treatment Period Technique Fraction Dose Fractions Total Dose   Course 1 2025-2025  (days elapsed: 1)         RUL_Hil_Med 2025-2025 VMAT 200 / 200 cGy  400 / 6,000 cGy     SUBJECTIVE: Low energy improved with rest, baseline cough and shortness of breath.  The patient is scheduled for repeat imaging soon for evaluation after systemic therapy.      OBJECTIVE:   Vital Signs:  /62 (Patient Position: Sitting)   Pulse 105   Temp 36.3 °C (97.3 °F)   Resp 18   Wt 47.8 kg (105 lb 6.1 oz)   SpO2 90%   BMI 18.67 kg/m²     Other Pertinent Findings:     Toxicity Assessment          2025    10:41   Toxicity Assessment   Treatment Site Thoracic   Anorexia Grade 0   Anxiety Grade 0   Dehydration Grade 0   Depression Grade 0   Dermatitis Radiation Grade 0   Diarrhea Grade 0   Fatigue Grade 1   Fibrosis Deep Connective Tissue Grade 0   Fracture Grade 0   Nausea Grade 0   Pain Grade 0   Treatment Related Secondary Malignancy Grade 0   Tumor Pain Grade 0   Vomiting Grade 0   Constipation Grade 0   Dyspepsia Grade 0   Dysphagia Grade 0   Esophagitis Grade 0   Mucositis Oral Grade 0   Upper Gastrointestinal Hemorrhage Grade 0   Peripheral Sensory Neuropathy Grade 0   Brachial Plexopathy  Grade 0   Pneumonitis Grade 0   Aspiration Grade 0   Hoarseness Grade 1   Laryngeal Edema Grade 0   Myocardial Infarction Grade 0   Pericardial Effusion Grade 0   Pericarditis Grade 0   Esophageal Fistula Grade 0   Esophageal Obstruction Grade 0   Esophageal Pain Grade 0   Esophageal Stenosis Grade 0   Esophageal Ulcer Grade 0   Bronchial Obstruction Grade 0   Cough Grade 1   Dyspnea Grade 2   Epistaxis Grade 0   Hiccups Grade 0   Hypoxia Grade 0   Pulmonary Fibrosis Grade 0   Lymphedema Grade 0   Thromboembolic Event Grade 0   Hot Flashes Grade 0          Concurrent systemic therapy: fosaprepitant (Emend) 150 mg in sodium chloride 0.9% 150 mL IV, 150 mg, intravenous, Once, 3 of 3 cycles    Administration: 150 mg (12/17/2024), 150 mg (1/7/2025), 150 mg (1/28/2025)        CARBOplatin (Paraplatin) 440 mg in sodium chloride 0.9% 154 mL IV, 440 mg, intravenous, Once, 3 of 3 cycles    Administration: 440 mg (12/17/2024), 440 mg (1/7/2025), 440 mg (1/28/2025)        PACLitaxeL (Taxol) 288 mg in dextrose 5% 318 mL IV, 200 mg/m2 = 288 mg, intravenous, Once, 3 of 3 cycles    Administration: 288 mg (12/17/2024), 288 mg (1/7/2025), 288 mg (1/28/2025)        methylPREDNISolone sod succinate (SOLU-Medrol) 40 mg/mL injection 40 mg, 40 mg, intravenous, As needed, 3 of 3 cycles        palonosetron (Aloxi) injection 250 mcg, 250 mcg, intravenous, Once, 3 of 3 cycles    Administration: 250 mcg (12/17/2024), 250 mcg (1/7/2025), 250 mcg (1/28/2025)        nivolumab (Opdivo) 360 mg in sodium chloride 0.9% 146 mL IV, 360 mg, intravenous, Once, 3 of 3 cycles    Administration: 360 mg (12/17/2024), 360 mg (1/7/2025), 360 mg (1/28/2025)      Labs:   WBC   Date Value Ref Range Status   02/11/2025 5.3 4.4 - 11.3 x10*3/uL Final   01/31/2025 15.9 (H) 4.4 - 11.3 x10*3/uL Final     Hemoglobin   Date Value Ref Range Status   02/11/2025 9.1 (L) 12.0 - 16.0 g/dL Final   01/31/2025 11.8 (L) 12.0 - 16.0 g/dL Final     Hematocrit   Date Value Ref Range  Status   02/11/2025 28.6 (L) 36.0 - 46.0 % Final   01/31/2025 36.3 36.0 - 46.0 % Final     Neutrophils Absolute   Date Value Ref Range Status   02/11/2025 2.81 1.60 - 5.50 x10*3/uL Final     Comment:     Percent differential counts (%) should be interpreted in the context of the absolute cell counts (cells/uL).   01/27/2025 6.67 (H) 1.60 - 5.50 x10*3/uL Final     Comment:     Percent differential counts (%) should be interpreted in the context of the absolute cell counts (cells/uL).     Platelets   Date Value Ref Range Status   02/11/2025 273 150 - 450 x10*3/uL Final   01/31/2025 302 150 - 450 x10*3/uL Final     Alkaline Phosphatase   Date Value Ref Range Status   02/11/2025 55 33 - 136 U/L Final   01/27/2025 58 33 - 136 U/L Final     AST   Date Value Ref Range Status   02/11/2025 17 9 - 39 U/L Final   01/27/2025 18 9 - 39 U/L Final     ALT   Date Value Ref Range Status   02/11/2025 11 7 - 45 U/L Final     Comment:     Patients treated with Sulfasalazine may generate falsely decreased results for ALT.   01/27/2025 16 7 - 45 U/L Final     Comment:     Patients treated with Sulfasalazine may generate falsely decreased results for ALT.     Bilirubin, Total   Date Value Ref Range Status   02/11/2025 0.2 0.0 - 1.2 mg/dL Final   01/27/2025 0.5 0.0 - 1.2 mg/dL Final     Glucose   Date Value Ref Range Status   02/11/2025 101 (H) 74 - 99 mg/dL Final   01/27/2025 111 (H) 74 - 99 mg/dL Final     Calcium   Date Value Ref Range Status   02/11/2025 9.1 8.6 - 10.3 mg/dL Final   01/27/2025 8.8 8.6 - 10.3 mg/dL Final     Sodium   Date Value Ref Range Status   02/11/2025 132 (L) 136 - 145 mmol/L Final   01/27/2025 130 (L) 136 - 145 mmol/L Final     Potassium   Date Value Ref Range Status   02/11/2025 4.0 3.5 - 5.3 mmol/L Final   01/27/2025 4.5 3.5 - 5.3 mmol/L Final     Bicarbonate   Date Value Ref Range Status   02/11/2025 31 21 - 32 mmol/L Final   01/27/2025 31 21 - 32 mmol/L Final     Chloride   Date Value Ref Range Status    02/11/2025 95 (L) 98 - 107 mmol/L Final   01/27/2025 94 (L) 98 - 107 mmol/L Final     Urea Nitrogen   Date Value Ref Range Status   02/11/2025 9 6 - 23 mg/dL Final   01/27/2025 8 6 - 23 mg/dL Final     Creatinine   Date Value Ref Range Status   02/11/2025 0.38 (L) 0.50 - 1.05 mg/dL Final   01/27/2025 0.52 0.50 - 1.05 mg/dL Final         Exam: Resting comfortably in chair.     Assessment / Plan:  The patient is tolerating radiation therapy as anticipated.  Continue per current treatment plan.       Therapies: Rest as needed for fatigue.  Prescription sent for pantoprazole to decrease irritation of esophagitis from gastric acid production.      Side effects reviewed with patient. Images, chart and labs reviewed.    Abimael Bridges MD

## 2025-02-13 ENCOUNTER — HOSPITAL ENCOUNTER (OUTPATIENT)
Dept: RADIOLOGY | Facility: HOSPITAL | Age: 70
Discharge: HOME | End: 2025-02-13
Payer: MEDICARE

## 2025-02-13 ENCOUNTER — INFUSION (OUTPATIENT)
Dept: HEMATOLOGY/ONCOLOGY | Facility: HOSPITAL | Age: 70
End: 2025-02-13
Payer: MEDICARE

## 2025-02-13 ENCOUNTER — HOSPITAL ENCOUNTER (OUTPATIENT)
Dept: RADIATION ONCOLOGY | Facility: CLINIC | Age: 70
Setting detail: RADIATION/ONCOLOGY SERIES
Discharge: HOME | End: 2025-02-13
Payer: MEDICARE

## 2025-02-13 VITALS
DIASTOLIC BLOOD PRESSURE: 58 MMHG | HEART RATE: 109 BPM | TEMPERATURE: 98.1 F | RESPIRATION RATE: 20 BRPM | OXYGEN SATURATION: 93 % | SYSTOLIC BLOOD PRESSURE: 95 MMHG

## 2025-02-13 DIAGNOSIS — C34.11 MALIGNANT NEOPLASM OF UPPER LOBE, RIGHT BRONCHUS OR LUNG: ICD-10-CM

## 2025-02-13 DIAGNOSIS — C34.11 MALIGNANT NEOPLASM OF UPPER LOBE OF RIGHT LUNG (MULTI): ICD-10-CM

## 2025-02-13 DIAGNOSIS — Z51.0 ENCOUNTER FOR ANTINEOPLASTIC RADIATION THERAPY: ICD-10-CM

## 2025-02-13 LAB
RAD ONC MSQ ACTUAL FRACTIONS DELIVERED: 3
RAD ONC MSQ ACTUAL SESSION DELIVERED DOSE: 200 CGRAY
RAD ONC MSQ ACTUAL TOTAL DOSE: 600 CGRAY
RAD ONC MSQ ELAPSED DAYS: 2
RAD ONC MSQ LAST DATE: NORMAL
RAD ONC MSQ PRESCRIBED FRACTIONAL DOSE: 200 CGRAY
RAD ONC MSQ PRESCRIBED NUMBER OF FRACTIONS: 30
RAD ONC MSQ PRESCRIBED TECHNIQUE: NORMAL
RAD ONC MSQ PRESCRIBED TOTAL DOSE: 6000 CGRAY
RAD ONC MSQ PRESCRIPTION PATTERN COMMENT: NORMAL
RAD ONC MSQ START DATE: NORMAL
RAD ONC MSQ TREATMENT COURSE NUMBER: 1
RAD ONC MSQ TREATMENT SITE: NORMAL

## 2025-02-13 PROCEDURE — 77386 HC INTENSITY-MODULATED RADIATION THERAPY (IMRT), COMPLEX: CPT | Performed by: STUDENT IN AN ORGANIZED HEALTH CARE EDUCATION/TRAINING PROGRAM

## 2025-02-13 PROCEDURE — 71260 CT THORAX DX C+: CPT

## 2025-02-13 PROCEDURE — 96523 IRRIG DRUG DELIVERY DEVICE: CPT

## 2025-02-13 PROCEDURE — 2550000001 HC RX 255 CONTRASTS: Performed by: INTERNAL MEDICINE

## 2025-02-13 PROCEDURE — 2500000004 HC RX 250 GENERAL PHARMACY W/ HCPCS (ALT 636 FOR OP/ED): Performed by: INTERNAL MEDICINE

## 2025-02-13 RX ORDER — HEPARIN SODIUM,PORCINE/PF 10 UNIT/ML
50 SYRINGE (ML) INTRAVENOUS AS NEEDED
OUTPATIENT
Start: 2025-02-13

## 2025-02-13 RX ORDER — HEPARIN 100 UNIT/ML
500 SYRINGE INTRAVENOUS AS NEEDED
Status: DISCONTINUED | OUTPATIENT
Start: 2025-02-13 | End: 2025-02-13 | Stop reason: HOSPADM

## 2025-02-13 RX ORDER — HEPARIN 100 UNIT/ML
500 SYRINGE INTRAVENOUS AS NEEDED
OUTPATIENT
Start: 2025-02-13

## 2025-02-13 RX ADMIN — IOHEXOL 72 ML: 350 INJECTION, SOLUTION INTRAVENOUS at 09:55

## 2025-02-13 RX ADMIN — HEPARIN 500 UNITS: 100 SYRINGE at 09:59

## 2025-02-13 ASSESSMENT — PAIN SCALES - GENERAL: PAINLEVEL_OUTOF10: 0-NO PAIN

## 2025-02-14 ENCOUNTER — HOSPITAL ENCOUNTER (OUTPATIENT)
Dept: RADIATION ONCOLOGY | Facility: CLINIC | Age: 70
Setting detail: RADIATION/ONCOLOGY SERIES
Discharge: HOME | End: 2025-02-14
Payer: MEDICARE

## 2025-02-14 DIAGNOSIS — C34.11 MALIGNANT NEOPLASM OF UPPER LOBE, RIGHT BRONCHUS OR LUNG: ICD-10-CM

## 2025-02-14 DIAGNOSIS — Z51.0 ENCOUNTER FOR ANTINEOPLASTIC RADIATION THERAPY: ICD-10-CM

## 2025-02-14 LAB
RAD ONC MSQ ACTUAL FRACTIONS DELIVERED: 4
RAD ONC MSQ ACTUAL SESSION DELIVERED DOSE: 200 CGRAY
RAD ONC MSQ ACTUAL TOTAL DOSE: 800 CGRAY
RAD ONC MSQ ELAPSED DAYS: 3
RAD ONC MSQ LAST DATE: NORMAL
RAD ONC MSQ PRESCRIBED FRACTIONAL DOSE: 200 CGRAY
RAD ONC MSQ PRESCRIBED NUMBER OF FRACTIONS: 30
RAD ONC MSQ PRESCRIBED TECHNIQUE: NORMAL
RAD ONC MSQ PRESCRIBED TOTAL DOSE: 6000 CGRAY
RAD ONC MSQ PRESCRIPTION PATTERN COMMENT: NORMAL
RAD ONC MSQ START DATE: NORMAL
RAD ONC MSQ TREATMENT COURSE NUMBER: 1
RAD ONC MSQ TREATMENT SITE: NORMAL

## 2025-02-14 PROCEDURE — 77386 HC INTENSITY-MODULATED RADIATION THERAPY (IMRT), COMPLEX: CPT | Performed by: STUDENT IN AN ORGANIZED HEALTH CARE EDUCATION/TRAINING PROGRAM

## 2025-02-17 ENCOUNTER — HOSPITAL ENCOUNTER (OUTPATIENT)
Dept: RADIATION ONCOLOGY | Facility: CLINIC | Age: 70
Setting detail: RADIATION/ONCOLOGY SERIES
Discharge: HOME | End: 2025-02-17
Payer: MEDICARE

## 2025-02-17 DIAGNOSIS — Z51.0 ENCOUNTER FOR ANTINEOPLASTIC RADIATION THERAPY: ICD-10-CM

## 2025-02-17 DIAGNOSIS — C34.11 MALIGNANT NEOPLASM OF UPPER LOBE, RIGHT BRONCHUS OR LUNG: ICD-10-CM

## 2025-02-17 LAB

## 2025-02-17 PROCEDURE — 77386 HC INTENSITY-MODULATED RADIATION THERAPY (IMRT), COMPLEX: CPT | Performed by: STUDENT IN AN ORGANIZED HEALTH CARE EDUCATION/TRAINING PROGRAM

## 2025-02-18 ENCOUNTER — INFUSION (OUTPATIENT)
Dept: HEMATOLOGY/ONCOLOGY | Facility: HOSPITAL | Age: 70
End: 2025-02-18
Payer: MEDICARE

## 2025-02-18 ENCOUNTER — HOSPITAL ENCOUNTER (OUTPATIENT)
Dept: RADIATION ONCOLOGY | Facility: CLINIC | Age: 70
Setting detail: RADIATION/ONCOLOGY SERIES
Discharge: HOME | End: 2025-02-18
Payer: MEDICARE

## 2025-02-18 ENCOUNTER — OFFICE VISIT (OUTPATIENT)
Dept: HEMATOLOGY/ONCOLOGY | Facility: HOSPITAL | Age: 70
End: 2025-02-18
Payer: MEDICARE

## 2025-02-18 VITALS
SYSTOLIC BLOOD PRESSURE: 100 MMHG | DIASTOLIC BLOOD PRESSURE: 73 MMHG | TEMPERATURE: 97.7 F | RESPIRATION RATE: 18 BRPM | HEART RATE: 121 BPM | OXYGEN SATURATION: 94 %

## 2025-02-18 VITALS
DIASTOLIC BLOOD PRESSURE: 73 MMHG | SYSTOLIC BLOOD PRESSURE: 100 MMHG | BODY MASS INDEX: 18.58 KG/M2 | TEMPERATURE: 97.7 F | RESPIRATION RATE: 18 BRPM | WEIGHT: 104.9 LBS | OXYGEN SATURATION: 94 % | HEART RATE: 121 BPM

## 2025-02-18 DIAGNOSIS — C34.11 MALIGNANT NEOPLASM OF UPPER LOBE OF RIGHT LUNG (MULTI): Primary | ICD-10-CM

## 2025-02-18 DIAGNOSIS — E78.49 OTHER HYPERLIPIDEMIA: ICD-10-CM

## 2025-02-18 DIAGNOSIS — J96.01 ACUTE RESPIRATORY FAILURE WITH HYPOXIA (MULTI): ICD-10-CM

## 2025-02-18 DIAGNOSIS — C34.11 MALIGNANT NEOPLASM OF UPPER LOBE, RIGHT BRONCHUS OR LUNG: ICD-10-CM

## 2025-02-18 DIAGNOSIS — C34.11 MALIGNANT NEOPLASM OF UPPER LOBE OF RIGHT LUNG (MULTI): ICD-10-CM

## 2025-02-18 DIAGNOSIS — Z51.0 ENCOUNTER FOR ANTINEOPLASTIC RADIATION THERAPY: ICD-10-CM

## 2025-02-18 LAB
ALBUMIN SERPL BCP-MCNC: 3.7 G/DL (ref 3.4–5)
ALP SERPL-CCNC: 65 U/L (ref 33–136)
ALT SERPL W P-5'-P-CCNC: 11 U/L (ref 7–45)
ANION GAP SERPL CALC-SCNC: 8 MMOL/L (ref 10–20)
AST SERPL W P-5'-P-CCNC: 17 U/L (ref 9–39)
BASOPHILS # BLD AUTO: 0.04 X10*3/UL (ref 0–0.1)
BASOPHILS NFR BLD AUTO: 0.5 %
BILIRUB SERPL-MCNC: 0.5 MG/DL (ref 0–1.2)
BUN SERPL-MCNC: 8 MG/DL (ref 6–23)
CALCIUM SERPL-MCNC: 8.5 MG/DL (ref 8.6–10.3)
CHLORIDE SERPL-SCNC: 95 MMOL/L (ref 98–107)
CO2 SERPL-SCNC: 32 MMOL/L (ref 21–32)
CREAT SERPL-MCNC: 0.43 MG/DL (ref 0.5–1.05)
EGFRCR SERPLBLD CKD-EPI 2021: >90 ML/MIN/1.73M*2
EOSINOPHIL # BLD AUTO: 0.03 X10*3/UL (ref 0–0.4)
EOSINOPHIL NFR BLD AUTO: 0.4 %
ERYTHROCYTE [DISTWIDTH] IN BLOOD BY AUTOMATED COUNT: 16.6 % (ref 11.5–14.5)
GLUCOSE SERPL-MCNC: 100 MG/DL (ref 74–99)
HCT VFR BLD AUTO: 30 % (ref 36–46)
HGB BLD-MCNC: 9.3 G/DL (ref 12–16)
IMM GRANULOCYTES # BLD AUTO: 0.09 X10*3/UL (ref 0–0.5)
IMM GRANULOCYTES NFR BLD AUTO: 1.2 % (ref 0–0.9)
LYMPHOCYTES # BLD AUTO: 0.97 X10*3/UL (ref 0.8–3)
LYMPHOCYTES NFR BLD AUTO: 13 %
MAGNESIUM SERPL-MCNC: 1.21 MG/DL (ref 1.6–2.4)
MCH RBC QN AUTO: 31.4 PG (ref 26–34)
MCHC RBC AUTO-ENTMCNC: 31 G/DL (ref 32–36)
MCV RBC AUTO: 101 FL (ref 80–100)
MONOCYTES # BLD AUTO: 0.76 X10*3/UL (ref 0.05–0.8)
MONOCYTES NFR BLD AUTO: 10.2 %
NEUTROPHILS # BLD AUTO: 5.56 X10*3/UL (ref 1.6–5.5)
NEUTROPHILS NFR BLD AUTO: 74.7 %
NRBC BLD-RTO: 0 /100 WBCS (ref 0–0)
PLATELET # BLD AUTO: 318 X10*3/UL (ref 150–450)
POTASSIUM SERPL-SCNC: 4.1 MMOL/L (ref 3.5–5.3)
PROT SERPL-MCNC: 6.5 G/DL (ref 6.4–8.2)
RAD ONC MSQ ACTUAL FRACTIONS DELIVERED: 6
RAD ONC MSQ ACTUAL SESSION DELIVERED DOSE: 200 CGRAY
RAD ONC MSQ ACTUAL TOTAL DOSE: 1200 CGRAY
RAD ONC MSQ ELAPSED DAYS: 7
RAD ONC MSQ LAST DATE: NORMAL
RAD ONC MSQ PRESCRIBED FRACTIONAL DOSE: 200 CGRAY
RAD ONC MSQ PRESCRIBED NUMBER OF FRACTIONS: 30
RAD ONC MSQ PRESCRIBED TECHNIQUE: NORMAL
RAD ONC MSQ PRESCRIBED TOTAL DOSE: 6000 CGRAY
RAD ONC MSQ PRESCRIPTION PATTERN COMMENT: NORMAL
RAD ONC MSQ START DATE: NORMAL
RAD ONC MSQ TREATMENT COURSE NUMBER: 1
RAD ONC MSQ TREATMENT SITE: NORMAL
RBC # BLD AUTO: 2.96 X10*6/UL (ref 4–5.2)
SODIUM SERPL-SCNC: 131 MMOL/L (ref 136–145)
WBC # BLD AUTO: 7.5 X10*3/UL (ref 4.4–11.3)

## 2025-02-18 PROCEDURE — 36591 DRAW BLOOD OFF VENOUS DEVICE: CPT

## 2025-02-18 PROCEDURE — 3074F SYST BP LT 130 MM HG: CPT | Performed by: INTERNAL MEDICINE

## 2025-02-18 PROCEDURE — 1159F MED LIST DOCD IN RCRD: CPT | Performed by: INTERNAL MEDICINE

## 2025-02-18 PROCEDURE — 1126F AMNT PAIN NOTED NONE PRSNT: CPT | Performed by: INTERNAL MEDICINE

## 2025-02-18 PROCEDURE — 99215 OFFICE O/P EST HI 40 MIN: CPT | Performed by: INTERNAL MEDICINE

## 2025-02-18 PROCEDURE — 77386 HC INTENSITY-MODULATED RADIATION THERAPY (IMRT), COMPLEX: CPT | Performed by: STUDENT IN AN ORGANIZED HEALTH CARE EDUCATION/TRAINING PROGRAM

## 2025-02-18 PROCEDURE — 85025 COMPLETE CBC W/AUTO DIFF WBC: CPT

## 2025-02-18 PROCEDURE — 84520 ASSAY OF UREA NITROGEN: CPT

## 2025-02-18 PROCEDURE — 83735 ASSAY OF MAGNESIUM: CPT

## 2025-02-18 PROCEDURE — 3078F DIAST BP <80 MM HG: CPT | Performed by: INTERNAL MEDICINE

## 2025-02-18 PROCEDURE — 2500000004 HC RX 250 GENERAL PHARMACY W/ HCPCS (ALT 636 FOR OP/ED): Performed by: INTERNAL MEDICINE

## 2025-02-18 PROCEDURE — 77336 RADIATION PHYSICS CONSULT: CPT | Performed by: STUDENT IN AN ORGANIZED HEALTH CARE EDUCATION/TRAINING PROGRAM

## 2025-02-18 RX ORDER — EPINEPHRINE 0.3 MG/.3ML
0.3 INJECTION SUBCUTANEOUS EVERY 5 MIN PRN
OUTPATIENT
Start: 2025-03-19

## 2025-02-18 RX ORDER — ALBUTEROL SULFATE 0.83 MG/ML
3 SOLUTION RESPIRATORY (INHALATION) AS NEEDED
Status: CANCELLED | OUTPATIENT
Start: 2025-02-19

## 2025-02-18 RX ORDER — FAMOTIDINE 10 MG/ML
20 INJECTION, SOLUTION INTRAVENOUS ONCE AS NEEDED
OUTPATIENT
Start: 2025-03-19

## 2025-02-18 RX ORDER — DIPHENHYDRAMINE HYDROCHLORIDE 50 MG/ML
50 INJECTION INTRAMUSCULAR; INTRAVENOUS AS NEEDED
OUTPATIENT
Start: 2025-03-12

## 2025-02-18 RX ORDER — PALONOSETRON 0.05 MG/ML
0.25 INJECTION, SOLUTION INTRAVENOUS ONCE
OUTPATIENT
Start: 2025-04-02

## 2025-02-18 RX ORDER — EPINEPHRINE 0.3 MG/.3ML
0.3 INJECTION SUBCUTANEOUS EVERY 5 MIN PRN
OUTPATIENT
Start: 2025-02-26

## 2025-02-18 RX ORDER — FAMOTIDINE 10 MG/ML
20 INJECTION, SOLUTION INTRAVENOUS ONCE AS NEEDED
OUTPATIENT
Start: 2025-04-02

## 2025-02-18 RX ORDER — FAMOTIDINE 10 MG/ML
20 INJECTION, SOLUTION INTRAVENOUS ONCE
OUTPATIENT
Start: 2025-03-12

## 2025-02-18 RX ORDER — PALONOSETRON 0.05 MG/ML
0.25 INJECTION, SOLUTION INTRAVENOUS ONCE
OUTPATIENT
Start: 2025-03-12

## 2025-02-18 RX ORDER — ALBUTEROL SULFATE 0.83 MG/ML
3 SOLUTION RESPIRATORY (INHALATION) AS NEEDED
OUTPATIENT
Start: 2025-03-26

## 2025-02-18 RX ORDER — EPINEPHRINE 0.3 MG/.3ML
0.3 INJECTION SUBCUTANEOUS EVERY 5 MIN PRN
OUTPATIENT
Start: 2025-03-12

## 2025-02-18 RX ORDER — EPINEPHRINE 0.3 MG/.3ML
0.3 INJECTION SUBCUTANEOUS EVERY 5 MIN PRN
OUTPATIENT
Start: 2025-03-26

## 2025-02-18 RX ORDER — PROCHLORPERAZINE EDISYLATE 5 MG/ML
10 INJECTION INTRAMUSCULAR; INTRAVENOUS EVERY 6 HOURS PRN
Status: CANCELLED | OUTPATIENT
Start: 2025-02-19

## 2025-02-18 RX ORDER — PROCHLORPERAZINE MALEATE 5 MG
10 TABLET ORAL EVERY 6 HOURS PRN
OUTPATIENT
Start: 2025-04-02

## 2025-02-18 RX ORDER — ALENDRONATE SODIUM 70 MG/1
TABLET ORAL
Qty: 12 TABLET | Refills: 3 | Status: SHIPPED | OUTPATIENT
Start: 2025-02-18

## 2025-02-18 RX ORDER — PROCHLORPERAZINE MALEATE 5 MG
10 TABLET ORAL EVERY 6 HOURS PRN
OUTPATIENT
Start: 2025-02-26

## 2025-02-18 RX ORDER — PALONOSETRON 0.05 MG/ML
0.25 INJECTION, SOLUTION INTRAVENOUS ONCE
OUTPATIENT
Start: 2025-03-26

## 2025-02-18 RX ORDER — EPINEPHRINE 0.3 MG/.3ML
0.3 INJECTION SUBCUTANEOUS EVERY 5 MIN PRN
OUTPATIENT
Start: 2025-03-05

## 2025-02-18 RX ORDER — BENZONATATE 100 MG/1
100 CAPSULE ORAL 3 TIMES DAILY PRN
Qty: 40 CAPSULE | Refills: 0 | Status: SHIPPED | OUTPATIENT
Start: 2025-02-18

## 2025-02-18 RX ORDER — DIPHENHYDRAMINE HCL 50 MG
50 CAPSULE ORAL ONCE
OUTPATIENT
Start: 2025-03-26

## 2025-02-18 RX ORDER — ALBUTEROL SULFATE 0.83 MG/ML
3 SOLUTION RESPIRATORY (INHALATION) AS NEEDED
OUTPATIENT
Start: 2025-03-12

## 2025-02-18 RX ORDER — FAMOTIDINE 10 MG/ML
20 INJECTION, SOLUTION INTRAVENOUS ONCE
OUTPATIENT
Start: 2025-03-05

## 2025-02-18 RX ORDER — PALONOSETRON 0.05 MG/ML
0.25 INJECTION, SOLUTION INTRAVENOUS ONCE
Status: CANCELLED | OUTPATIENT
Start: 2025-02-19

## 2025-02-18 RX ORDER — HEPARIN SODIUM,PORCINE/PF 10 UNIT/ML
50 SYRINGE (ML) INTRAVENOUS AS NEEDED
Status: CANCELLED | OUTPATIENT
Start: 2025-02-18

## 2025-02-18 RX ORDER — DIPHENHYDRAMINE HYDROCHLORIDE 50 MG/ML
50 INJECTION INTRAMUSCULAR; INTRAVENOUS AS NEEDED
OUTPATIENT
Start: 2025-03-19

## 2025-02-18 RX ORDER — ALBUTEROL SULFATE 0.83 MG/ML
3 SOLUTION RESPIRATORY (INHALATION) AS NEEDED
OUTPATIENT
Start: 2025-02-26

## 2025-02-18 RX ORDER — EPINEPHRINE 0.3 MG/.3ML
0.3 INJECTION SUBCUTANEOUS EVERY 5 MIN PRN
OUTPATIENT
Start: 2025-04-02

## 2025-02-18 RX ORDER — DIPHENHYDRAMINE HCL 50 MG
50 CAPSULE ORAL ONCE
Status: CANCELLED | OUTPATIENT
Start: 2025-02-19

## 2025-02-18 RX ORDER — DIPHENHYDRAMINE HCL 50 MG
50 CAPSULE ORAL ONCE
OUTPATIENT
Start: 2025-02-26

## 2025-02-18 RX ORDER — FAMOTIDINE 10 MG/ML
20 INJECTION, SOLUTION INTRAVENOUS ONCE
OUTPATIENT
Start: 2025-03-19

## 2025-02-18 RX ORDER — EPINEPHRINE 0.3 MG/.3ML
0.3 INJECTION SUBCUTANEOUS EVERY 5 MIN PRN
Status: CANCELLED | OUTPATIENT
Start: 2025-02-19

## 2025-02-18 RX ORDER — FAMOTIDINE 10 MG/ML
20 INJECTION, SOLUTION INTRAVENOUS ONCE
OUTPATIENT
Start: 2025-02-26

## 2025-02-18 RX ORDER — FAMOTIDINE 10 MG/ML
20 INJECTION, SOLUTION INTRAVENOUS ONCE AS NEEDED
OUTPATIENT
Start: 2025-03-26

## 2025-02-18 RX ORDER — LANOLIN ALCOHOL/MO/W.PET/CERES
400 CREAM (GRAM) TOPICAL 2 TIMES DAILY
Qty: 60 TABLET | Refills: 1 | Status: SHIPPED | OUTPATIENT
Start: 2025-02-18 | End: 2025-04-19

## 2025-02-18 RX ORDER — HEPARIN 100 UNIT/ML
500 SYRINGE INTRAVENOUS AS NEEDED
Status: DISCONTINUED | OUTPATIENT
Start: 2025-02-18 | End: 2025-02-18 | Stop reason: HOSPADM

## 2025-02-18 RX ORDER — DIPHENHYDRAMINE HYDROCHLORIDE 50 MG/ML
50 INJECTION INTRAMUSCULAR; INTRAVENOUS AS NEEDED
OUTPATIENT
Start: 2025-03-26

## 2025-02-18 RX ORDER — FAMOTIDINE 10 MG/ML
20 INJECTION, SOLUTION INTRAVENOUS ONCE AS NEEDED
Status: CANCELLED | OUTPATIENT
Start: 2025-02-19

## 2025-02-18 RX ORDER — PROCHLORPERAZINE MALEATE 5 MG
10 TABLET ORAL EVERY 6 HOURS PRN
Status: CANCELLED | OUTPATIENT
Start: 2025-02-19

## 2025-02-18 RX ORDER — ALBUTEROL SULFATE 0.83 MG/ML
3 SOLUTION RESPIRATORY (INHALATION) AS NEEDED
OUTPATIENT
Start: 2025-03-05

## 2025-02-18 RX ORDER — DIPHENHYDRAMINE HCL 50 MG
50 CAPSULE ORAL ONCE
OUTPATIENT
Start: 2025-04-02

## 2025-02-18 RX ORDER — PROCHLORPERAZINE MALEATE 5 MG
10 TABLET ORAL EVERY 6 HOURS PRN
OUTPATIENT
Start: 2025-03-19

## 2025-02-18 RX ORDER — PALONOSETRON 0.05 MG/ML
0.25 INJECTION, SOLUTION INTRAVENOUS ONCE
OUTPATIENT
Start: 2025-03-19

## 2025-02-18 RX ORDER — FAMOTIDINE 10 MG/ML
20 INJECTION, SOLUTION INTRAVENOUS ONCE AS NEEDED
OUTPATIENT
Start: 2025-02-26

## 2025-02-18 RX ORDER — DIPHENHYDRAMINE HYDROCHLORIDE 50 MG/ML
50 INJECTION INTRAMUSCULAR; INTRAVENOUS AS NEEDED
OUTPATIENT
Start: 2025-04-02

## 2025-02-18 RX ORDER — PROCHLORPERAZINE MALEATE 5 MG
10 TABLET ORAL EVERY 6 HOURS PRN
OUTPATIENT
Start: 2025-03-12

## 2025-02-18 RX ORDER — PROCHLORPERAZINE EDISYLATE 5 MG/ML
10 INJECTION INTRAMUSCULAR; INTRAVENOUS EVERY 6 HOURS PRN
OUTPATIENT
Start: 2025-03-26

## 2025-02-18 RX ORDER — DIPHENHYDRAMINE HYDROCHLORIDE 50 MG/ML
50 INJECTION INTRAMUSCULAR; INTRAVENOUS AS NEEDED
OUTPATIENT
Start: 2025-02-26

## 2025-02-18 RX ORDER — PROCHLORPERAZINE EDISYLATE 5 MG/ML
10 INJECTION INTRAMUSCULAR; INTRAVENOUS EVERY 6 HOURS PRN
OUTPATIENT
Start: 2025-03-12

## 2025-02-18 RX ORDER — PROCHLORPERAZINE EDISYLATE 5 MG/ML
10 INJECTION INTRAMUSCULAR; INTRAVENOUS EVERY 6 HOURS PRN
OUTPATIENT
Start: 2025-02-26

## 2025-02-18 RX ORDER — DIPHENHYDRAMINE HYDROCHLORIDE 50 MG/ML
50 INJECTION INTRAMUSCULAR; INTRAVENOUS AS NEEDED
OUTPATIENT
Start: 2025-03-05

## 2025-02-18 RX ORDER — PROCHLORPERAZINE MALEATE 5 MG
10 TABLET ORAL EVERY 6 HOURS PRN
OUTPATIENT
Start: 2025-03-26

## 2025-02-18 RX ORDER — ALBUTEROL SULFATE 0.83 MG/ML
3 SOLUTION RESPIRATORY (INHALATION) AS NEEDED
OUTPATIENT
Start: 2025-04-02

## 2025-02-18 RX ORDER — DIPHENHYDRAMINE HCL 50 MG
50 CAPSULE ORAL ONCE
OUTPATIENT
Start: 2025-03-05

## 2025-02-18 RX ORDER — PROCHLORPERAZINE EDISYLATE 5 MG/ML
10 INJECTION INTRAMUSCULAR; INTRAVENOUS EVERY 6 HOURS PRN
OUTPATIENT
Start: 2025-04-02

## 2025-02-18 RX ORDER — PROCHLORPERAZINE EDISYLATE 5 MG/ML
10 INJECTION INTRAMUSCULAR; INTRAVENOUS EVERY 6 HOURS PRN
OUTPATIENT
Start: 2025-03-05

## 2025-02-18 RX ORDER — FAMOTIDINE 10 MG/ML
20 INJECTION, SOLUTION INTRAVENOUS ONCE
Status: CANCELLED | OUTPATIENT
Start: 2025-02-19

## 2025-02-18 RX ORDER — FAMOTIDINE 10 MG/ML
20 INJECTION, SOLUTION INTRAVENOUS ONCE
OUTPATIENT
Start: 2025-03-26

## 2025-02-18 RX ORDER — DIPHENHYDRAMINE HCL 50 MG
50 CAPSULE ORAL ONCE
OUTPATIENT
Start: 2025-03-12

## 2025-02-18 RX ORDER — DIPHENHYDRAMINE HCL 50 MG
50 CAPSULE ORAL ONCE
OUTPATIENT
Start: 2025-03-19

## 2025-02-18 RX ORDER — ALBUTEROL SULFATE 0.83 MG/ML
3 SOLUTION RESPIRATORY (INHALATION) AS NEEDED
OUTPATIENT
Start: 2025-03-19

## 2025-02-18 RX ORDER — PROCHLORPERAZINE MALEATE 5 MG
10 TABLET ORAL EVERY 6 HOURS PRN
OUTPATIENT
Start: 2025-03-05

## 2025-02-18 RX ORDER — HEPARIN 100 UNIT/ML
500 SYRINGE INTRAVENOUS AS NEEDED
Status: CANCELLED | OUTPATIENT
Start: 2025-02-18

## 2025-02-18 RX ORDER — FAMOTIDINE 10 MG/ML
20 INJECTION, SOLUTION INTRAVENOUS ONCE AS NEEDED
OUTPATIENT
Start: 2025-03-12

## 2025-02-18 RX ORDER — FAMOTIDINE 10 MG/ML
20 INJECTION, SOLUTION INTRAVENOUS ONCE AS NEEDED
OUTPATIENT
Start: 2025-03-05

## 2025-02-18 RX ORDER — PROCHLORPERAZINE EDISYLATE 5 MG/ML
10 INJECTION INTRAMUSCULAR; INTRAVENOUS EVERY 6 HOURS PRN
OUTPATIENT
Start: 2025-03-19

## 2025-02-18 RX ORDER — PALONOSETRON 0.05 MG/ML
0.25 INJECTION, SOLUTION INTRAVENOUS ONCE
OUTPATIENT
Start: 2025-02-26

## 2025-02-18 RX ORDER — DIPHENHYDRAMINE HYDROCHLORIDE 50 MG/ML
50 INJECTION INTRAMUSCULAR; INTRAVENOUS AS NEEDED
Status: CANCELLED | OUTPATIENT
Start: 2025-02-19

## 2025-02-18 RX ORDER — FAMOTIDINE 10 MG/ML
20 INJECTION, SOLUTION INTRAVENOUS ONCE
OUTPATIENT
Start: 2025-04-02

## 2025-02-18 RX ORDER — PALONOSETRON 0.05 MG/ML
0.25 INJECTION, SOLUTION INTRAVENOUS ONCE
OUTPATIENT
Start: 2025-03-05

## 2025-02-18 RX ADMIN — HEPARIN 500 UNITS: 100 SYRINGE at 10:51

## 2025-02-18 ASSESSMENT — ENCOUNTER SYMPTOMS
GASTROINTESTINAL NEGATIVE: 1
CONSTITUTIONAL NEGATIVE: 1
RESPIRATORY NEGATIVE: 1
CARDIOVASCULAR NEGATIVE: 1

## 2025-02-18 ASSESSMENT — PAIN SCALES - GENERAL
PAINLEVEL_OUTOF10: 0-NO PAIN
PAINLEVEL_OUTOF10: 0-NO PAIN

## 2025-02-18 NOTE — PROGRESS NOTES
Patient ID: Billie Hernadez is a 69 y.o. female.    Subjective:  Returns for follow up for lung cancer. Feels short of breath intermittently. Denies fever. Cough+/     Heme/Onc History:  - CT c screening (Oct 2024): RUL nodule  - PET/CT: RUL lung nodule with uptake. Small R hilar, mediastinal, and a supraclavicular LN with moderate uptake. L adrenal nodule with low uptake (could not see that one myself)  - Bronch (11/22/24): NSCLC. C/w adenosquamous. PD-L1 10%. NGS pending. Level 7 LN aspiration positive for malignant cells. Level 4R, 11R, 11L negative.   - Started Carbo/taxol/nivo on 12/17/24 => Admitted to hospital with worsening dyspnea on 12/31 likely 2/2 COPD exacerbation => Steroid taper  - Supraclavicular LN bx (12/30/24): Positive for malignancy.   - CT c/a/p (Feb 2025, after C3): Stable RUL lung mass. Stable hilar, mediastinal LAPs. Cannot visualize supraclavicular LAP well. New small R pleural effusion, too small to tap. L adrenal nodule stable.    Assessment/Plan:  ? NSCLC: At least stage III with biopsy proven mediastinal LAPs. Could be considered to have stage IV with biopsy proven R supraclavicular LAP. L adrenal nodule may be adenoma (very mild uptake on PET).     After a long conversation with the patient, we had decided to go ahead with the following plan:   A. Start chemoimmunotherapy with CARBO-PACLitaxel + nivolumab (completed)  B. Repeat PET/CT after C3. If disease is controlled, start chemoRT to include R supraclavicular lymph node. (Ongoing)  C. Meanwhile, if L adrenal gland is c/w metastasis, would get radiation to the adrenal as well.     The fact that supraclavicular LN is positive for malignancy is a poor sign. Patient understands that. I reviewed the recent CT report and images. It looks like she has stable disease. Still, immunotherapy may have late responses. She had started radiation last week. Will give concomitant weekly carbo-paclitaxel with it. Will plan to repeat scans (preferable  PET/CT) 6-8 weeks after radiation. Will start adjuvant immunotherapy after chemoRT.    Although pleural effusion may be malignant, it is very small and cannot be taped. Radiologist commented that it may be post-infectious. Will monitor.    COPD: I will add symbicort to her regimen. She no longer requires continuous nasal oxygen.     Low Mg: Will give IV magnesium and start PO magnesium as well    I provide longitudinal care for Ms. Hernadez for her lung cancer    Review Of Systems:  Review of Systems   Constitutional: Negative.    HENT:  Negative.     Respiratory: Negative.     Cardiovascular: Negative.    Gastrointestinal: Negative.        Physical Exam:  /73 (BP Location: Left arm, Patient Position: Sitting)   Pulse (!) 121   Temp 36.5 °C (97.7 °F) (Temporal)   Resp 18   Wt 47.6 kg (104 lb 14.4 oz)   SpO2 94%   BMI 18.58 kg/m²   BSA: 1.45 meters squared  Performance Status: Asymptomatic  Physical Exam  HENT:      Head: Normocephalic and atraumatic.   Eyes:      General: No scleral icterus.  Pulmonary:      Effort: Pulmonary effort is normal.   Musculoskeletal:         General: Normal range of motion.   Skin:     Coloration: Skin is not jaundiced.   Neurological:      General: No focal deficit present.      Mental Status: She is alert and oriented to person, place, and time.         Results:  Diagnostic Results   Lab Results   Component Value Date    WBC 7.5 02/18/2025    HGB 9.3 (L) 02/18/2025    HCT 30.0 (L) 02/18/2025     (H) 02/18/2025     02/18/2025     Lab Results   Component Value Date    CALCIUM 8.5 (L) 02/18/2025     (L) 02/18/2025    K 4.1 02/18/2025    CO2 32 02/18/2025    CL 95 (L) 02/18/2025    BUN 8 02/18/2025    CREATININE 0.43 (L) 02/18/2025    ALT 11 02/18/2025    AST 17 02/18/2025       Current Outpatient Medications:     albuterol 2.5 mg /3 mL (0.083 %) nebulizer solution, Take 3 mL (2.5 mg) by nebulization every 6 hours if needed for wheezing., Disp: 75 mL, Rfl: 3     albuterol 90 mcg/actuation inhaler, USE 1 TO 2 INHALATIONS BY  MOUTH EVERY 4 TO 6 HOURS AS NEEDED, Disp: 51 g, Rfl: 2    alendronate (Fosamax) 70 mg tablet, Take 1 tablet (70 mg) by mouth 1 (one) time per week. Take in the morning with a full glass of water, on an empty stomach, and do not take anything else by mouth or lie down for the next 30 min., Disp: 12 tablet, Rfl: 3    benzonatate (Tessalon) 100 mg capsule, Take 1 capsule (100 mg) by mouth 3 times a day as needed for cough. Do not crush or chew., Disp: 20 capsule, Rfl: 0    budesonide-formoteroL (Symbicort) 80-4.5 mcg/actuation inhaler, Inhale 2 puffs 2 times a day. Rinse mouth with water after use to reduce aftertaste and incidence of candidiasis. Do not swallow., Disp: 10.2 g, Rfl: 11    calcium citrate-vitamin D2 250 mg-2.5 mcg (100 unit) tablet, Take 1 tablet by mouth once daily., Disp: , Rfl:     dexAMETHasone (Decadron) 4 mg tablet, TAKE TWO TABLETS BY MOUTH ONCE DAILY. start THE DAY AFTER treatment FOR THREE DAYS, Disp: 6 tablet, Rfl: 2    docusate sodium (Colace) 100 mg capsule, Take 1 capsule (100 mg) by mouth 2 times a day., Disp: 60 capsule, Rfl: 0    guaiFENesin (Mucinex) 600 mg 12 hr tablet, Take 1 tablet (600 mg) by mouth 2 times a day. Do not crush, chew, or split., Disp: 14 tablet, Rfl: 0    losartan (Cozaar) 25 mg tablet, TAKE ONE TABLET BY MOUTH ONCE DAILY, Disp: 90 tablet, Rfl: 3    multivitamin with minerals tablet, Take 1 tablet by mouth once daily., Disp: , Rfl:     OLANZapine (ZyPREXA) 5 mg tablet, TAKE ONE TABLET BY MOUTH AT BEDTIME FOR FOUR DAYS STARTING THE evening of treatment., Disp: 4 tablet, Rfl: 2    ondansetron (Zofran) 8 mg tablet, Take 1 tablet (8 mg) by mouth every 8 hours if needed for nausea or vomiting., Disp: 30 tablet, Rfl: 5    pantoprazole (Protonix) 20 mg EC tablet, Take 1 tablet (20 mg) by mouth once daily. Do not crush, chew, or split., Disp: 30 tablet, Rfl: 2    prochlorperazine (Compazine) 10 mg tablet, Take 1  tablet (10 mg) by mouth every 6 hours if needed for nausea or vomiting., Disp: 30 tablet, Rfl: 5    Spiriva with HandiHaler 18 mcg inhalation capsule, place ONE CAPSULE into inhaler AND inhale ONCE DAILY, Disp: 90 capsule, Rfl: 3  No current facility-administered medications for this visit.    Facility-Administered Medications Ordered in Other Visits:     heparin flush 100 unit/mL syringe 500 Units, 500 Units, intra-catheter, PRN, Judith Santillan MD, 500 Units at 25 [id number]     Past Surgical History:   Procedure Laterality Date    EXCISION / BIOPSY BREAST / NIPPLE / DUCT Left 2014    LUNG BIOPSY  2024    Bronchoscopy and needle biopsy    OTHER SURGICAL HISTORY Left 2015    Open Treatment Of Tibial Shaft Fracture With Implant    PORTACATH PLACEMENT N/A     TONSILLECTOMY       No family history on file.   reports that she quit smoking about 12 months ago. Her smoking use included cigarettes. She started smoking about 52 years ago. She has a 25.5 pack-year smoking history. She has never used smokeless tobacco.  Social History     Socioeconomic History    Marital status:      Spouse name: Not on file    Number of children: Not on file    Years of education: Not on file    Highest education level: Not on file   Occupational History    Not on file   Tobacco Use    Smoking status: Former     Current packs/day: 0.00     Average packs/day: 0.5 packs/day for 51.1 years (25.5 ttl pk-yrs)     Types: Cigarettes     Start date:      Quit date: 2024     Years since quittin.0    Smokeless tobacco: Never   Vaping Use    Vaping status: Never Used   Substance and Sexual Activity    Alcohol use: Yes     Alcohol/week: 2.0 standard drinks of alcohol     Types: 2 Cans of beer per week     Comment: 1 every other month    Drug use: Never    Sexual activity: Defer   Other Topics Concern    Not on file   Social History Narrative    Not on file     Social Drivers of Health     Financial Resource  Strain: Low Risk  (1/2/2025)    Overall Financial Resource Strain (CARDIA)     Difficulty of Paying Living Expenses: Not very hard   Food Insecurity: No Food Insecurity (1/1/2025)    Hunger Vital Sign     Worried About Running Out of Food in the Last Year: Never true     Ran Out of Food in the Last Year: Never true   Recent Concern: Food Insecurity - High Risk (12/18/2024)    Received from [facility] SDOH Screening     Does the member feel like he/she gets enough food most days?: Yes     Summarize member's perception of social determinants of health including living situation, food insecurity, financial needs, transportation, learning, and technology.  identify barriers to include ...: Member resides in ltc and has all care needs met at facility. no food, financial, or transportati...   Transportation Needs: No Transportation Needs (1/2/2025)    PRAPARE - Transportation     Lack of Transportation (Medical): No     Lack of Transportation (Non-Medical): No   Recent Concern: Transportation Needs - High Risk (12/18/2024)    Received from [facility] SDOH Screening     The following questions pertain to transportation, utilities, employment, and financial or legal concerns: Select next question     Does the member need help with any of the following activities?: Getting transportation   Physical Activity: Insufficiently Active (1/1/2025)    Exercise Vital Sign     Days of Exercise per Week: 3 days     Minutes of Exercise per Session: 40 min   Stress: No Stress Concern Present (2/18/2025)    [employer] - Occupational Stress Questionnaire     Feeling of Stress : Only a little   Recent Concern: Stress - Stress Concern Present (1/7/2025)    [employer] - Occupational Stress Questionnaire     Feeling of Stress : To some extent   Social Connections: Not on file   Intimate Partner Violence: Not At Risk (1/1/2025)    Humiliation, Afraid, Rape,  and Kick questionnaire     Fear of Current or Ex-Partner: No     Emotionally Abused: No     Physically Abused: No     Sexually Abused: No   Housing Stability: Low Risk  (1/2/2025)    Housing Stability Vital Sign     Unable to Pay for Housing in the Last Year: No     Number of Times Moved in the Last Year: 0     Homeless in the Last Year: No       Diagnoses and all orders for this visit:  Malignant neoplasm of upper lobe of right lung (Multi)  -     CBC and Auto Differential; Future  -     Comprehensive Metabolic Panel; Future  -     Magnesium; Future  -     Clinic Appointment Request; Future  -     CBC and Auto Differential; Future  -     Comprehensive Metabolic Panel; Future  -     Magnesium; Future  -     Clinic Appointment Request; Future  -     Infusion Appointment Request; Future  -     CBC and Auto Differential; Future  -     Comprehensive metabolic panel; Future  -     Clinic Appointment Request; Future  -     Infusion Appointment Request; Future  -     CBC and Auto Differential; Future  -     Comprehensive metabolic panel; Future  -     Clinic Appointment Request; Future  -     Infusion Appointment Request; Future  -     CBC and Auto Differential; Future  -     Comprehensive metabolic panel; Future  -     Clinic Appointment Request; Future  -     Infusion Appointment Request; Future  -     CBC and Auto Differential; Future  -     Comprehensive metabolic panel; Future  -     Clinic Appointment Request; Future  -     Infusion Appointment Request; Future  -     CBC and Auto Differential; Future  -     Comprehensive metabolic panel; Future  -     Clinic Appointment Request; Future  -     Infusion Appointment Request; Future  -     CBC and Auto Differential; Future  -     Comprehensive metabolic panel; Future  -     Clinic Appointment Request; Future  -     Infusion Appointment Request; Future  -     CBC and Auto Differential; Future  -     Comprehensive metabolic panel; Future  Other orders  -     dexAMETHasone  (Decadron) 12 mg in dextrose 5% 50 mL IV  -     diphenhydrAMINE (BENADryl) capsule 50 mg  -     famotidine PF (Pepcid) injection 20 mg  -     palonosetron (Aloxi) injection 250 mcg  -     prochlorperazine (Compazine) tablet 10 mg  -     prochlorperazine (Compazine) injection 10 mg  -     PACLitaxeL (Taxol) 65.4 mg in dextrose 5% 510.9 mL IV  -     CARBOplatin (Paraplatin) 205 mg in sodium chloride 0.9% 120.5 mL IV  -     sodium chloride 0.9 % bolus 500 mL  -     dextrose 5 % in water (D5W) bolus 500 mL  -     diphenhydrAMINE (BENADryl) injection 50 mg  -     methylPREDNISolone sod succinate (SOLU-Medrol) 40 mg/mL injection 40 mg  -     famotidine PF (Pepcid) injection 20 mg  -     EPINEPHrine (Epipen) injection syringe 0.3 mg  -     albuterol 2.5 mg /3 mL (0.083 %) nebulizer solution 3 mL  -     dexAMETHasone (Decadron) 12 mg in dextrose 5% 50 mL IV  -     diphenhydrAMINE (BENADryl) capsule 50 mg  -     famotidine PF (Pepcid) injection 20 mg  -     palonosetron (Aloxi) injection 250 mcg  -     prochlorperazine (Compazine) tablet 10 mg  -     prochlorperazine (Compazine) injection 10 mg  -     PACLitaxeL (Taxol) 65.4 mg in dextrose 5% 510.9 mL IV  -     CARBOplatin (Paraplatin) 205 mg in sodium chloride 0.9% 120.5 mL IV  -     sodium chloride 0.9 % bolus 500 mL  -     dextrose 5 % in water (D5W) bolus 500 mL  -     diphenhydrAMINE (BENADryl) injection 50 mg  -     methylPREDNISolone sod succinate (SOLU-Medrol) 40 mg/mL injection 40 mg  -     famotidine PF (Pepcid) injection 20 mg  -     EPINEPHrine (Epipen) injection syringe 0.3 mg  -     albuterol 2.5 mg /3 mL (0.083 %) nebulizer solution 3 mL  -     dexAMETHasone (Decadron) 12 mg in dextrose 5% 50 mL IV  -     diphenhydrAMINE (BENADryl) capsule 50 mg  -     famotidine PF (Pepcid) injection 20 mg  -     palonosetron (Aloxi) injection 250 mcg  -     prochlorperazine (Compazine) tablet 10 mg  -     prochlorperazine (Compazine) injection 10 mg  -     PACLitaxeL (Taxol)  65.4 mg in dextrose 5% 510.9 mL IV  -     CARBOplatin (Paraplatin) 205 mg in sodium chloride 0.9% 120.5 mL IV  -     sodium chloride 0.9 % bolus 500 mL  -     dextrose 5 % in water (D5W) bolus 500 mL  -     diphenhydrAMINE (BENADryl) injection 50 mg  -     methylPREDNISolone sod succinate (SOLU-Medrol) 40 mg/mL injection 40 mg  -     famotidine PF (Pepcid) injection 20 mg  -     EPINEPHrine (Epipen) injection syringe 0.3 mg  -     albuterol 2.5 mg /3 mL (0.083 %) nebulizer solution 3 mL  -     dexAMETHasone (Decadron) 12 mg in dextrose 5% 50 mL IV  -     diphenhydrAMINE (BENADryl) capsule 50 mg  -     famotidine PF (Pepcid) injection 20 mg  -     palonosetron (Aloxi) injection 250 mcg  -     prochlorperazine (Compazine) tablet 10 mg  -     prochlorperazine (Compazine) injection 10 mg  -     PACLitaxeL (Taxol) 65.4 mg in dextrose 5% 510.9 mL IV  -     CARBOplatin (Paraplatin) 205 mg in sodium chloride 0.9% 120.5 mL IV  -     sodium chloride 0.9 % bolus 500 mL  -     dextrose 5 % in water (D5W) bolus 500 mL  -     diphenhydrAMINE (BENADryl) injection 50 mg  -     methylPREDNISolone sod succinate (SOLU-Medrol) 40 mg/mL injection 40 mg  -     famotidine PF (Pepcid) injection 20 mg  -     EPINEPHrine (Epipen) injection syringe 0.3 mg  -     albuterol 2.5 mg /3 mL (0.083 %) nebulizer solution 3 mL  -     dexAMETHasone (Decadron) 12 mg in dextrose 5% 50 mL IV  -     diphenhydrAMINE (BENADryl) capsule 50 mg  -     famotidine PF (Pepcid) injection 20 mg  -     palonosetron (Aloxi) injection 250 mcg  -     prochlorperazine (Compazine) tablet 10 mg  -     prochlorperazine (Compazine) injection 10 mg  -     PACLitaxeL (Taxol) 65.4 mg in dextrose 5% 510.9 mL IV  -     CARBOplatin (Paraplatin) 205 mg in sodium chloride 0.9% 120.5 mL IV  -     sodium chloride 0.9 % bolus 500 mL  -     dextrose 5 % in water (D5W) bolus 500 mL  -     diphenhydrAMINE (BENADryl) injection 50 mg  -     methylPREDNISolone sod succinate (SOLU-Medrol) 40  mg/mL injection 40 mg  -     famotidine PF (Pepcid) injection 20 mg  -     EPINEPHrine (Epipen) injection syringe 0.3 mg  -     albuterol 2.5 mg /3 mL (0.083 %) nebulizer solution 3 mL  -     dexAMETHasone (Decadron) 12 mg in dextrose 5% 50 mL IV  -     diphenhydrAMINE (BENADryl) capsule 50 mg  -     famotidine PF (Pepcid) injection 20 mg  -     palonosetron (Aloxi) injection 250 mcg  -     prochlorperazine (Compazine) tablet 10 mg  -     prochlorperazine (Compazine) injection 10 mg  -     PACLitaxeL (Taxol) 65.4 mg in dextrose 5% 510.9 mL IV  -     CARBOplatin (Paraplatin) 205 mg in sodium chloride 0.9% 120.5 mL IV  -     sodium chloride 0.9 % bolus 500 mL  -     dextrose 5 % in water (D5W) bolus 500 mL  -     diphenhydrAMINE (BENADryl) injection 50 mg  -     methylPREDNISolone sod succinate (SOLU-Medrol) 40 mg/mL injection 40 mg  -     famotidine PF (Pepcid) injection 20 mg  -     EPINEPHrine (Epipen) injection syringe 0.3 mg  -     albuterol 2.5 mg /3 mL (0.083 %) nebulizer solution 3 mL  -     dexAMETHasone (Decadron) 12 mg in dextrose 5% 50 mL IV  -     diphenhydrAMINE (BENADryl) capsule 50 mg  -     famotidine PF (Pepcid) injection 20 mg  -     palonosetron (Aloxi) injection 250 mcg  -     prochlorperazine (Compazine) tablet 10 mg  -     prochlorperazine (Compazine) injection 10 mg  -     PACLitaxeL (Taxol) 65.4 mg in dextrose 5% 510.9 mL IV  -     CARBOplatin (Paraplatin) 205 mg in sodium chloride 0.9% 120.5 mL IV  -     sodium chloride 0.9 % bolus 500 mL  -     dextrose 5 % in water (D5W) bolus 500 mL  -     diphenhydrAMINE (BENADryl) injection 50 mg  -     methylPREDNISolone sod succinate (SOLU-Medrol) 40 mg/mL injection 40 mg  -     famotidine PF (Pepcid) injection 20 mg  -     EPINEPHrine (Epipen) injection syringe 0.3 mg  -     albuterol 2.5 mg /3 mL (0.083 %) nebulizer solution 3 mL       Judith Santillan MD

## 2025-02-19 ENCOUNTER — HOSPITAL ENCOUNTER (OUTPATIENT)
Dept: RADIATION ONCOLOGY | Facility: CLINIC | Age: 70
Setting detail: RADIATION/ONCOLOGY SERIES
Discharge: HOME | End: 2025-02-19
Payer: MEDICARE

## 2025-02-19 ENCOUNTER — INFUSION (OUTPATIENT)
Dept: HEMATOLOGY/ONCOLOGY | Facility: HOSPITAL | Age: 70
End: 2025-02-19
Payer: MEDICARE

## 2025-02-19 VITALS
HEART RATE: 121 BPM | TEMPERATURE: 96.8 F | OXYGEN SATURATION: 90 % | DIASTOLIC BLOOD PRESSURE: 60 MMHG | RESPIRATION RATE: 18 BRPM | BODY MASS INDEX: 18.47 KG/M2 | SYSTOLIC BLOOD PRESSURE: 111 MMHG | WEIGHT: 104.28 LBS

## 2025-02-19 VITALS
OXYGEN SATURATION: 93 % | DIASTOLIC BLOOD PRESSURE: 75 MMHG | RESPIRATION RATE: 18 BRPM | HEART RATE: 100 BPM | TEMPERATURE: 97.7 F | SYSTOLIC BLOOD PRESSURE: 100 MMHG

## 2025-02-19 DIAGNOSIS — Z51.0 ENCOUNTER FOR ANTINEOPLASTIC RADIATION THERAPY: ICD-10-CM

## 2025-02-19 DIAGNOSIS — C34.11 MALIGNANT NEOPLASM OF UPPER LOBE OF RIGHT LUNG (MULTI): ICD-10-CM

## 2025-02-19 DIAGNOSIS — C34.11 MALIGNANT NEOPLASM OF UPPER LOBE, RIGHT BRONCHUS OR LUNG: ICD-10-CM

## 2025-02-19 LAB
RAD ONC MSQ ACTUAL FRACTIONS DELIVERED: 7
RAD ONC MSQ ACTUAL SESSION DELIVERED DOSE: 200 CGRAY
RAD ONC MSQ ACTUAL TOTAL DOSE: 1400 CGRAY
RAD ONC MSQ ELAPSED DAYS: 8
RAD ONC MSQ LAST DATE: NORMAL
RAD ONC MSQ PRESCRIBED FRACTIONAL DOSE: 200 CGRAY
RAD ONC MSQ PRESCRIBED NUMBER OF FRACTIONS: 30
RAD ONC MSQ PRESCRIBED TECHNIQUE: NORMAL
RAD ONC MSQ PRESCRIBED TOTAL DOSE: 6000 CGRAY
RAD ONC MSQ PRESCRIPTION PATTERN COMMENT: NORMAL
RAD ONC MSQ START DATE: NORMAL
RAD ONC MSQ TREATMENT COURSE NUMBER: 1
RAD ONC MSQ TREATMENT SITE: NORMAL

## 2025-02-19 PROCEDURE — 96366 THER/PROPH/DIAG IV INF ADDON: CPT

## 2025-02-19 PROCEDURE — 2500000004 HC RX 250 GENERAL PHARMACY W/ HCPCS (ALT 636 FOR OP/ED)

## 2025-02-19 PROCEDURE — 2500000004 HC RX 250 GENERAL PHARMACY W/ HCPCS (ALT 636 FOR OP/ED): Performed by: INTERNAL MEDICINE

## 2025-02-19 PROCEDURE — 96365 THER/PROPH/DIAG IV INF INIT: CPT | Mod: INF

## 2025-02-19 PROCEDURE — 77386 HC INTENSITY-MODULATED RADIATION THERAPY (IMRT), COMPLEX: CPT | Performed by: STUDENT IN AN ORGANIZED HEALTH CARE EDUCATION/TRAINING PROGRAM

## 2025-02-19 RX ORDER — HEPARIN 100 UNIT/ML
500 SYRINGE INTRAVENOUS AS NEEDED
Status: DISCONTINUED | OUTPATIENT
Start: 2025-02-19 | End: 2025-02-19 | Stop reason: HOSPADM

## 2025-02-19 RX ORDER — HEPARIN SODIUM,PORCINE/PF 10 UNIT/ML
50 SYRINGE (ML) INTRAVENOUS AS NEEDED
Status: CANCELLED | OUTPATIENT
Start: 2025-02-19

## 2025-02-19 RX ORDER — MAGNESIUM SULFATE HEPTAHYDRATE 40 MG/ML
4 INJECTION, SOLUTION INTRAVENOUS ONCE
Status: COMPLETED | OUTPATIENT
Start: 2025-02-19 | End: 2025-02-19

## 2025-02-19 RX ORDER — MAGNESIUM SULFATE HEPTAHYDRATE 40 MG/ML
INJECTION, SOLUTION INTRAVENOUS
Status: COMPLETED
Start: 2025-02-19 | End: 2025-02-19

## 2025-02-19 RX ORDER — HEPARIN 100 UNIT/ML
500 SYRINGE INTRAVENOUS AS NEEDED
Status: CANCELLED | OUTPATIENT
Start: 2025-02-19

## 2025-02-19 RX ADMIN — HEPARIN 500 UNITS: 100 SYRINGE at 13:58

## 2025-02-19 RX ADMIN — MAGNESIUM SULFATE HEPTAHYDRATE 4 G: 40 INJECTION, SOLUTION INTRAVENOUS at 11:52

## 2025-02-19 ASSESSMENT — PATIENT HEALTH QUESTIONNAIRE - PHQ9
SUM OF ALL RESPONSES TO PHQ9 QUESTIONS 1 AND 2: 0
1. LITTLE INTEREST OR PLEASURE IN DOING THINGS: NOT AT ALL
2. FEELING DOWN, DEPRESSED OR HOPELESS: NOT AT ALL

## 2025-02-19 ASSESSMENT — ENCOUNTER SYMPTOMS
DEPRESSION: 0
OCCASIONAL FEELINGS OF UNSTEADINESS: 0
LOSS OF SENSATION IN FEET: 0

## 2025-02-19 ASSESSMENT — PAIN SCALES - GENERAL
PAINLEVEL_OUTOF10: 0-NO PAIN
PAINLEVEL_OUTOF10: 0-NO PAIN

## 2025-02-19 NOTE — PROGRESS NOTES
Radiation Oncology On Treatment Visit    Patient Name:  Billie Hernadez  MRN:  [id number]  :  1955    Referring Provider: Abimael Bridges MD  Primary Care Provider: MOR Holliday  Care Team: Patient Care Team:  MOR Holliday as PCP - General  Fredi Nolen MD as PCP - [employer] PCP  Judith Santillan MD as Medical Oncologist (Hematology and Oncology)  Bess Goins LPN as Care Manager (Case Management)    Date of Service: 2025     Diagnosis:   Specialty Problems          Radiation Oncology Problems    Malignant neoplasm of upper lobe of right lung (Multi)         Treatment Summary:  IMRT: Right Thorax, Midline Mediastinum, Right Supraclavicular fossa    Treatment Period Technique Fraction Dose Fractions Total Dose   Course 1 2025-2025  (days elapsed: 8)         RUL_Hil_Med 2025-2025 VMAT 200 / 200 cGy  1400 / 6,000 cGy     SUBJECTIVE: No significant issues with following.  Recent CT chest for reimaging shows no progression of disease.      OBJECTIVE:   Vital Signs:  /60   Pulse (!) 121   Temp 36 °C (96.8 °F) (Temporal)   Resp 18   Wt 47.3 kg (104 lb 4.4 oz)   SpO2 90%   BMI 18.47 kg/m²     Other Pertinent Findings:     Toxicity Assessment          2025    10:41 2025    09:19   Toxicity Assessment   Treatment Site Thoracic Thoracic   Anorexia Grade 0 Grade 0   Anxiety Grade 0 Grade 0   Dehydration Grade 0 Grade 0   Depression Grade 0 Grade 0   Dermatitis Radiation Grade 0 Grade 0   Diarrhea Grade 0 Grade 0   Fatigue Grade 1 Grade 0   Fibrosis Deep Connective Tissue Grade 0 Grade 0   Fracture Grade 0 Grade 0   Nausea Grade 0 Grade 0   Pain Grade 0 Grade 0   Treatment Related Secondary Malignancy Grade 0 Grade 0   Tumor Pain Grade 0 Grade 0   Vomiting Grade 0 Grade 0   Constipation Grade 0 Grade 0   Dyspepsia Grade 0 Grade 0   Dysphagia Grade 0 Grade 0   Esophagitis Grade 0 Grade 0   Mucositis Oral Grade 0 Grade 0    Upper Gastrointestinal Hemorrhage Grade 0 Grade 0   Peripheral Sensory Neuropathy Grade 0    Brachial Plexopathy Grade 0    Pneumonitis Grade 0 Grade 0   Aspiration Grade 0 Grade 0   Hoarseness Grade 1 Grade 0   Laryngeal Edema Grade 0 Grade 0   Myocardial Infarction Grade 0 Grade 0   Pericardial Effusion Grade 0 Grade 0   Pericarditis Grade 0 Grade 0   Esophageal Fistula Grade 0 Grade 0   Esophageal Obstruction Grade 0 Grade 0   Esophageal Pain Grade 0 Grade 0   Esophageal Stenosis Grade 0 Grade 0   Esophageal Ulcer Grade 0 Grade 0   Bronchial Obstruction Grade 0 Grade 0   Cough Grade 1 Grade 1   Dyspnea Grade 2 Grade 1   Epistaxis Grade 0 Grade 0   Hiccups Grade 0 Grade 0   Hypoxia Grade 0 Grade 0   Pulmonary Fibrosis Grade 0 Grade 0   Lymphedema Grade 0 Grade 0   Thromboembolic Event Grade 0 Grade 0   Hot Flashes Grade 0         Concurrent systemic therapy: fosaprepitant (Emend) 150 mg in sodium chloride 0.9% 150 mL IV, 150 mg, intravenous, Once, 3 of 3 cycles    Administration: 150 mg (12/17/2024), 150 mg (1/7/2025), 150 mg (1/28/2025)        CARBOplatin (Paraplatin) 440 mg in sodium chloride 0.9% 154 mL IV, 440 mg, intravenous, Once, 3 of 3 cycles    Administration: 440 mg (12/17/2024), 440 mg (1/7/2025), 440 mg (1/28/2025)        PACLitaxeL (Taxol) 288 mg in dextrose 5% 318 mL IV, 200 mg/m2 = 288 mg, intravenous, Once, 3 of 3 cycles    Administration: 288 mg (12/17/2024), 288 mg (1/7/2025), 288 mg (1/28/2025)        methylPREDNISolone sod succinate (SOLU-Medrol) 40 mg/mL injection 40 mg, 40 mg, intravenous, As needed, 3 of 3 cycles        palonosetron (Aloxi) injection 250 mcg, 250 mcg, intravenous, Once, 3 of 3 cycles    Administration: 250 mcg (12/17/2024), 250 mcg (1/7/2025), 250 mcg (1/28/2025)        nivolumab (Opdivo) 360 mg in sodium chloride 0.9% 146 mL IV, 360 mg, intravenous, Once, 3 of 3 cycles    Administration: 360 mg (12/17/2024), 360 mg (1/7/2025), 360 mg (1/28/2025)    CARBOplatin  (Paraplatin) 205 mg in sodium chloride 0.9% 120.5 mL IV, 205 mg, intravenous, Once, 0 of 1 cycle        PACLitaxeL (Taxol) 65.4 mg in dextrose 5% 110.9 mL IV, 45 mg/m2 = 65.4 mg, intravenous, Once, 0 of 1 cycle        methylPREDNISolone sod succinate (SOLU-Medrol) 40 mg/mL injection 40 mg, 40 mg, intravenous, As needed, 0 of 1 cycle        palonosetron (Aloxi) injection 250 mcg, 250 mcg, intravenous, Once, 0 of 1 cycle      Labs:   WBC   Date Value Ref Range Status   02/18/2025 7.5 4.4 - 11.3 x10*3/uL Final   02/11/2025 5.3 4.4 - 11.3 x10*3/uL Final     Hemoglobin   Date Value Ref Range Status   02/18/2025 9.3 (L) 12.0 - 16.0 g/dL Final   02/11/2025 9.1 (L) 12.0 - 16.0 g/dL Final     Hematocrit   Date Value Ref Range Status   02/18/2025 30.0 (L) 36.0 - 46.0 % Final   02/11/2025 28.6 (L) 36.0 - 46.0 % Final     Neutrophils Absolute   Date Value Ref Range Status   02/18/2025 5.56 (H) 1.60 - 5.50 x10*3/uL Final     Comment:     Percent differential counts (%) should be interpreted in the context of the absolute cell counts (cells/uL).   02/11/2025 2.81 1.60 - 5.50 x10*3/uL Final     Comment:     Percent differential counts (%) should be interpreted in the context of the absolute cell counts (cells/uL).     Platelets   Date Value Ref Range Status   02/18/2025 318 150 - 450 x10*3/uL Final   02/11/2025 273 150 - 450 x10*3/uL Final     Alkaline Phosphatase   Date Value Ref Range Status   02/18/2025 65 33 - 136 U/L Final   02/11/2025 55 33 - 136 U/L Final     AST   Date Value Ref Range Status   02/18/2025 17 9 - 39 U/L Final   02/11/2025 17 9 - 39 U/L Final     ALT   Date Value Ref Range Status   02/18/2025 11 7 - 45 U/L Final     Comment:     Patients treated with Sulfasalazine may generate falsely decreased results for ALT.   02/11/2025 11 7 - 45 U/L Final     Comment:     Patients treated with Sulfasalazine may generate falsely decreased results for ALT.     Bilirubin, Total   Date Value Ref Range Status   02/18/2025 0.5  0.0 - 1.2 mg/dL Final   02/11/2025 0.2 0.0 - 1.2 mg/dL Final     Glucose   Date Value Ref Range Status   02/18/2025 100 (H) 74 - 99 mg/dL Final   02/11/2025 101 (H) 74 - 99 mg/dL Final     Calcium   Date Value Ref Range Status   02/18/2025 8.5 (L) 8.6 - 10.3 mg/dL Final   02/11/2025 9.1 8.6 - 10.3 mg/dL Final     Sodium   Date Value Ref Range Status   02/18/2025 131 (L) 136 - 145 mmol/L Final   02/11/2025 132 (L) 136 - 145 mmol/L Final     Potassium   Date Value Ref Range Status   02/18/2025 4.1 3.5 - 5.3 mmol/L Final   02/11/2025 4.0 3.5 - 5.3 mmol/L Final     Bicarbonate   Date Value Ref Range Status   02/18/2025 32 21 - 32 mmol/L Final   02/11/2025 31 21 - 32 mmol/L Final     Chloride   Date Value Ref Range Status   02/18/2025 95 (L) 98 - 107 mmol/L Final   02/11/2025 95 (L) 98 - 107 mmol/L Final     Urea Nitrogen   Date Value Ref Range Status   02/18/2025 8 6 - 23 mg/dL Final   02/11/2025 9 6 - 23 mg/dL Final     Creatinine   Date Value Ref Range Status   02/18/2025 0.43 (L) 0.50 - 1.05 mg/dL Final   02/11/2025 0.38 (L) 0.50 - 1.05 mg/dL Final         Exam: Resting comfortably in chair.     Assessment / Plan:  The patient is tolerating radiation therapy as anticipated.  Continue per current treatment plan.       Therapies: Will monitor hyponatremia and hypochloremia.  On PPI.  Will monitor esophageal symptoms.    Side effects reviewed with patient. Images, chart and labs reviewed.    Abimael Bridges MD

## 2025-02-20 ENCOUNTER — INFUSION (OUTPATIENT)
Dept: HEMATOLOGY/ONCOLOGY | Facility: CLINIC | Age: 70
End: 2025-02-20
Payer: MEDICARE

## 2025-02-20 ENCOUNTER — HOSPITAL ENCOUNTER (OUTPATIENT)
Dept: RADIATION ONCOLOGY | Facility: CLINIC | Age: 70
Setting detail: RADIATION/ONCOLOGY SERIES
Discharge: HOME | End: 2025-02-20
Payer: MEDICARE

## 2025-02-20 VITALS
OXYGEN SATURATION: 93 % | BODY MASS INDEX: 18.3 KG/M2 | SYSTOLIC BLOOD PRESSURE: 98 MMHG | TEMPERATURE: 99 F | HEART RATE: 109 BPM | RESPIRATION RATE: 18 BRPM | DIASTOLIC BLOOD PRESSURE: 61 MMHG | WEIGHT: 103.29 LBS

## 2025-02-20 DIAGNOSIS — C34.11 MALIGNANT NEOPLASM OF UPPER LOBE, RIGHT BRONCHUS OR LUNG: ICD-10-CM

## 2025-02-20 DIAGNOSIS — Z51.0 ENCOUNTER FOR ANTINEOPLASTIC RADIATION THERAPY: ICD-10-CM

## 2025-02-20 DIAGNOSIS — C34.11 MALIGNANT NEOPLASM OF UPPER LOBE OF RIGHT LUNG (MULTI): ICD-10-CM

## 2025-02-20 LAB
RAD ONC MSQ ACTUAL FRACTIONS DELIVERED: 8
RAD ONC MSQ ACTUAL SESSION DELIVERED DOSE: 200 CGRAY
RAD ONC MSQ ACTUAL TOTAL DOSE: 1600 CGRAY
RAD ONC MSQ ELAPSED DAYS: 9
RAD ONC MSQ LAST DATE: NORMAL
RAD ONC MSQ PRESCRIBED FRACTIONAL DOSE: 200 CGRAY
RAD ONC MSQ PRESCRIBED NUMBER OF FRACTIONS: 30
RAD ONC MSQ PRESCRIBED TECHNIQUE: NORMAL
RAD ONC MSQ PRESCRIBED TOTAL DOSE: 6000 CGRAY
RAD ONC MSQ PRESCRIPTION PATTERN COMMENT: NORMAL
RAD ONC MSQ START DATE: NORMAL
RAD ONC MSQ TREATMENT COURSE NUMBER: 1
RAD ONC MSQ TREATMENT SITE: NORMAL

## 2025-02-20 PROCEDURE — 96413 CHEMO IV INFUSION 1 HR: CPT

## 2025-02-20 PROCEDURE — 96375 TX/PRO/DX INJ NEW DRUG ADDON: CPT | Mod: INF

## 2025-02-20 PROCEDURE — 2500000001 HC RX 250 WO HCPCS SELF ADMINISTERED DRUGS (ALT 637 FOR MEDICARE OP): Performed by: INTERNAL MEDICINE

## 2025-02-20 PROCEDURE — 77386 HC INTENSITY-MODULATED RADIATION THERAPY (IMRT), COMPLEX: CPT | Performed by: STUDENT IN AN ORGANIZED HEALTH CARE EDUCATION/TRAINING PROGRAM

## 2025-02-20 PROCEDURE — 96417 CHEMO IV INFUS EACH ADDL SEQ: CPT

## 2025-02-20 PROCEDURE — 2500000004 HC RX 250 GENERAL PHARMACY W/ HCPCS (ALT 636 FOR OP/ED): Performed by: INTERNAL MEDICINE

## 2025-02-20 RX ORDER — HEPARIN 100 UNIT/ML
500 SYRINGE INTRAVENOUS AS NEEDED
OUTPATIENT
Start: 2025-02-20

## 2025-02-20 RX ORDER — PALONOSETRON 0.05 MG/ML
0.25 INJECTION, SOLUTION INTRAVENOUS ONCE
Status: COMPLETED | OUTPATIENT
Start: 2025-02-20 | End: 2025-02-20

## 2025-02-20 RX ORDER — HEPARIN SODIUM,PORCINE/PF 10 UNIT/ML
50 SYRINGE (ML) INTRAVENOUS AS NEEDED
Status: DISCONTINUED | OUTPATIENT
Start: 2025-02-20 | End: 2025-02-20 | Stop reason: HOSPADM

## 2025-02-20 RX ORDER — DIPHENHYDRAMINE HCL 25 MG
50 CAPSULE ORAL ONCE
Status: COMPLETED | OUTPATIENT
Start: 2025-02-20 | End: 2025-02-20

## 2025-02-20 RX ORDER — DEXAMETHASONE 6 MG/1
12 TABLET ORAL ONCE
OUTPATIENT
Start: 2025-03-05 | End: 2025-03-05

## 2025-02-20 RX ORDER — DEXAMETHASONE 6 MG/1
12 TABLET ORAL ONCE
OUTPATIENT
Start: 2025-03-12 | End: 2025-03-12

## 2025-02-20 RX ORDER — DEXAMETHASONE 6 MG/1
12 TABLET ORAL ONCE
OUTPATIENT
Start: 2025-04-02 | End: 2025-04-02

## 2025-02-20 RX ORDER — DEXAMETHASONE 6 MG/1
12 TABLET ORAL ONCE
OUTPATIENT
Start: 2025-03-26 | End: 2025-03-26

## 2025-02-20 RX ORDER — DIPHENHYDRAMINE HYDROCHLORIDE 50 MG/ML
50 INJECTION INTRAMUSCULAR; INTRAVENOUS AS NEEDED
Status: DISCONTINUED | OUTPATIENT
Start: 2025-02-20 | End: 2025-02-20 | Stop reason: HOSPADM

## 2025-02-20 RX ORDER — DEXAMETHASONE 6 MG/1
12 TABLET ORAL ONCE
OUTPATIENT
Start: 2025-03-19 | End: 2025-03-19

## 2025-02-20 RX ORDER — EPINEPHRINE 0.3 MG/.3ML
0.3 INJECTION SUBCUTANEOUS EVERY 5 MIN PRN
Status: DISCONTINUED | OUTPATIENT
Start: 2025-02-20 | End: 2025-02-20 | Stop reason: HOSPADM

## 2025-02-20 RX ORDER — FAMOTIDINE 10 MG/ML
20 INJECTION, SOLUTION INTRAVENOUS ONCE AS NEEDED
Status: DISCONTINUED | OUTPATIENT
Start: 2025-02-20 | End: 2025-02-20 | Stop reason: HOSPADM

## 2025-02-20 RX ORDER — HEPARIN 100 UNIT/ML
500 SYRINGE INTRAVENOUS AS NEEDED
Status: DISCONTINUED | OUTPATIENT
Start: 2025-02-20 | End: 2025-02-20 | Stop reason: HOSPADM

## 2025-02-20 RX ORDER — PROCHLORPERAZINE MALEATE 10 MG
10 TABLET ORAL EVERY 6 HOURS PRN
Status: DISCONTINUED | OUTPATIENT
Start: 2025-02-20 | End: 2025-02-20 | Stop reason: HOSPADM

## 2025-02-20 RX ORDER — HEPARIN SODIUM,PORCINE/PF 10 UNIT/ML
50 SYRINGE (ML) INTRAVENOUS AS NEEDED
OUTPATIENT
Start: 2025-02-20

## 2025-02-20 RX ORDER — DEXAMETHASONE 6 MG/1
12 TABLET ORAL ONCE
Status: COMPLETED | OUTPATIENT
Start: 2025-02-20 | End: 2025-02-20

## 2025-02-20 RX ORDER — FAMOTIDINE 10 MG/ML
20 INJECTION, SOLUTION INTRAVENOUS ONCE
Status: COMPLETED | OUTPATIENT
Start: 2025-02-20 | End: 2025-02-20

## 2025-02-20 RX ORDER — ALBUTEROL SULFATE 0.83 MG/ML
3 SOLUTION RESPIRATORY (INHALATION) AS NEEDED
Status: DISCONTINUED | OUTPATIENT
Start: 2025-02-20 | End: 2025-02-20 | Stop reason: HOSPADM

## 2025-02-20 RX ORDER — PROCHLORPERAZINE EDISYLATE 5 MG/ML
10 INJECTION INTRAMUSCULAR; INTRAVENOUS EVERY 6 HOURS PRN
Status: DISCONTINUED | OUTPATIENT
Start: 2025-02-20 | End: 2025-02-20 | Stop reason: HOSPADM

## 2025-02-20 RX ORDER — DEXAMETHASONE 6 MG/1
12 TABLET ORAL ONCE
OUTPATIENT
Start: 2025-02-26 | End: 2025-02-26

## 2025-02-20 RX ADMIN — DEXAMETHASONE 12 MG: 6 TABLET ORAL at 08:05

## 2025-02-20 RX ADMIN — CARBOPLATIN 160 MG: 10 INJECTION, SOLUTION INTRAVENOUS at 09:27

## 2025-02-20 RX ADMIN — DIPHENHYDRAMINE HYDROCHLORIDE 50 MG: 25 CAPSULE ORAL at 08:05

## 2025-02-20 RX ADMIN — PACLITAXEL 65.4 MG: 6 INJECTION, SOLUTION INTRAVENOUS at 08:25

## 2025-02-20 RX ADMIN — FAMOTIDINE 20 MG: 10 INJECTION INTRAVENOUS at 08:06

## 2025-02-20 RX ADMIN — PALONOSETRON HYDROCHLORIDE 250 MCG: 0.25 INJECTION INTRAVENOUS at 08:06

## 2025-02-20 RX ADMIN — HEPARIN 500 UNITS: 100 SYRINGE at 10:00

## 2025-02-20 ASSESSMENT — PAIN SCALES - GENERAL: PAINLEVEL_OUTOF10: 0-NO PAIN

## 2025-02-21 ENCOUNTER — HOSPITAL ENCOUNTER (OUTPATIENT)
Dept: RADIATION ONCOLOGY | Facility: CLINIC | Age: 70
Setting detail: RADIATION/ONCOLOGY SERIES
Discharge: HOME | End: 2025-02-21
Payer: MEDICARE

## 2025-02-21 DIAGNOSIS — C34.11 MALIGNANT NEOPLASM OF UPPER LOBE, RIGHT BRONCHUS OR LUNG: ICD-10-CM

## 2025-02-21 DIAGNOSIS — Z51.0 ENCOUNTER FOR ANTINEOPLASTIC RADIATION THERAPY: ICD-10-CM

## 2025-02-21 LAB
RAD ONC MSQ ACTUAL FRACTIONS DELIVERED: 9
RAD ONC MSQ ACTUAL SESSION DELIVERED DOSE: 200 CGRAY
RAD ONC MSQ ACTUAL TOTAL DOSE: 1800 CGRAY
RAD ONC MSQ ELAPSED DAYS: 10
RAD ONC MSQ LAST DATE: NORMAL
RAD ONC MSQ PRESCRIBED FRACTIONAL DOSE: 200 CGRAY
RAD ONC MSQ PRESCRIBED NUMBER OF FRACTIONS: 30
RAD ONC MSQ PRESCRIBED TECHNIQUE: NORMAL
RAD ONC MSQ PRESCRIBED TOTAL DOSE: 6000 CGRAY
RAD ONC MSQ PRESCRIPTION PATTERN COMMENT: NORMAL
RAD ONC MSQ START DATE: NORMAL
RAD ONC MSQ TREATMENT COURSE NUMBER: 1
RAD ONC MSQ TREATMENT SITE: NORMAL

## 2025-02-21 PROCEDURE — 77386 HC INTENSITY-MODULATED RADIATION THERAPY (IMRT), COMPLEX: CPT | Performed by: STUDENT IN AN ORGANIZED HEALTH CARE EDUCATION/TRAINING PROGRAM

## 2025-02-24 ENCOUNTER — HOSPITAL ENCOUNTER (OUTPATIENT)
Dept: RADIATION ONCOLOGY | Facility: CLINIC | Age: 70
Setting detail: RADIATION/ONCOLOGY SERIES
Discharge: HOME | End: 2025-02-24
Payer: MEDICARE

## 2025-02-24 DIAGNOSIS — Z51.0 ENCOUNTER FOR ANTINEOPLASTIC RADIATION THERAPY: ICD-10-CM

## 2025-02-24 DIAGNOSIS — C34.11 MALIGNANT NEOPLASM OF UPPER LOBE, RIGHT BRONCHUS OR LUNG: ICD-10-CM

## 2025-02-24 LAB
RAD ONC MSQ ACTUAL FRACTIONS DELIVERED: 10
RAD ONC MSQ ACTUAL SESSION DELIVERED DOSE: 200 CGRAY
RAD ONC MSQ ACTUAL TOTAL DOSE: 2000 CGRAY
RAD ONC MSQ ELAPSED DAYS: 13
RAD ONC MSQ LAST DATE: NORMAL
RAD ONC MSQ PRESCRIBED FRACTIONAL DOSE: 200 CGRAY
RAD ONC MSQ PRESCRIBED NUMBER OF FRACTIONS: 30
RAD ONC MSQ PRESCRIBED TECHNIQUE: NORMAL
RAD ONC MSQ PRESCRIBED TOTAL DOSE: 6000 CGRAY
RAD ONC MSQ PRESCRIPTION PATTERN COMMENT: NORMAL
RAD ONC MSQ START DATE: NORMAL
RAD ONC MSQ TREATMENT COURSE NUMBER: 1
RAD ONC MSQ TREATMENT SITE: NORMAL

## 2025-02-24 PROCEDURE — 77386 HC INTENSITY-MODULATED RADIATION THERAPY (IMRT), COMPLEX: CPT | Performed by: STUDENT IN AN ORGANIZED HEALTH CARE EDUCATION/TRAINING PROGRAM

## 2025-02-25 ENCOUNTER — HOSPITAL ENCOUNTER (OUTPATIENT)
Dept: RADIATION ONCOLOGY | Facility: CLINIC | Age: 70
Setting detail: RADIATION/ONCOLOGY SERIES
Discharge: HOME | End: 2025-02-25
Payer: MEDICARE

## 2025-02-25 DIAGNOSIS — Z51.0 ENCOUNTER FOR ANTINEOPLASTIC RADIATION THERAPY: ICD-10-CM

## 2025-02-25 DIAGNOSIS — C34.11 MALIGNANT NEOPLASM OF UPPER LOBE, RIGHT BRONCHUS OR LUNG: ICD-10-CM

## 2025-02-25 LAB
RAD ONC MSQ ACTUAL FRACTIONS DELIVERED: 11
RAD ONC MSQ ACTUAL SESSION DELIVERED DOSE: 200 CGRAY
RAD ONC MSQ ACTUAL TOTAL DOSE: 2200 CGRAY
RAD ONC MSQ ELAPSED DAYS: 14
RAD ONC MSQ LAST DATE: NORMAL
RAD ONC MSQ PRESCRIBED FRACTIONAL DOSE: 200 CGRAY
RAD ONC MSQ PRESCRIBED NUMBER OF FRACTIONS: 30
RAD ONC MSQ PRESCRIBED TECHNIQUE: NORMAL
RAD ONC MSQ PRESCRIBED TOTAL DOSE: 6000 CGRAY
RAD ONC MSQ PRESCRIPTION PATTERN COMMENT: NORMAL
RAD ONC MSQ START DATE: NORMAL
RAD ONC MSQ TREATMENT COURSE NUMBER: 1
RAD ONC MSQ TREATMENT SITE: NORMAL

## 2025-02-25 PROCEDURE — 77386 HC INTENSITY-MODULATED RADIATION THERAPY (IMRT), COMPLEX: CPT | Performed by: STUDENT IN AN ORGANIZED HEALTH CARE EDUCATION/TRAINING PROGRAM

## 2025-02-25 PROCEDURE — 77336 RADIATION PHYSICS CONSULT: CPT | Performed by: STUDENT IN AN ORGANIZED HEALTH CARE EDUCATION/TRAINING PROGRAM

## 2025-02-26 ENCOUNTER — HOSPITAL ENCOUNTER (OUTPATIENT)
Dept: RADIATION ONCOLOGY | Facility: CLINIC | Age: 70
Setting detail: RADIATION/ONCOLOGY SERIES
Discharge: HOME | End: 2025-02-26
Payer: MEDICARE

## 2025-02-26 ENCOUNTER — INFUSION (OUTPATIENT)
Dept: HEMATOLOGY/ONCOLOGY | Facility: CLINIC | Age: 70
End: 2025-02-26
Payer: MEDICARE

## 2025-02-26 VITALS
OXYGEN SATURATION: 92 % | RESPIRATION RATE: 18 BRPM | DIASTOLIC BLOOD PRESSURE: 65 MMHG | TEMPERATURE: 98.2 F | SYSTOLIC BLOOD PRESSURE: 112 MMHG | HEART RATE: 123 BPM | BODY MASS INDEX: 18.92 KG/M2 | WEIGHT: 106.81 LBS

## 2025-02-26 DIAGNOSIS — C34.11 MALIGNANT NEOPLASM OF UPPER LOBE OF RIGHT LUNG (MULTI): ICD-10-CM

## 2025-02-26 DIAGNOSIS — Z51.0 ENCOUNTER FOR ANTINEOPLASTIC RADIATION THERAPY: ICD-10-CM

## 2025-02-26 DIAGNOSIS — C34.11 MALIGNANT NEOPLASM OF UPPER LOBE, RIGHT BRONCHUS OR LUNG: ICD-10-CM

## 2025-02-26 DIAGNOSIS — E83.42 HYPOMAGNESEMIA: Primary | ICD-10-CM

## 2025-02-26 LAB
ALBUMIN SERPL BCP-MCNC: 3.6 G/DL (ref 3.4–5)
ALP SERPL-CCNC: 65 U/L (ref 33–136)
ALT SERPL W P-5'-P-CCNC: 12 U/L (ref 7–45)
ANION GAP SERPL CALC-SCNC: 11 MMOL/L (ref 10–20)
AST SERPL W P-5'-P-CCNC: 19 U/L (ref 9–39)
BASOPHILS # BLD MANUAL: 0.07 X10*3/UL (ref 0–0.1)
BASOPHILS NFR BLD MANUAL: 2 %
BILIRUB SERPL-MCNC: 0.4 MG/DL (ref 0–1.2)
BUN SERPL-MCNC: 6 MG/DL (ref 6–23)
CALCIUM SERPL-MCNC: 9.1 MG/DL (ref 8.6–10.3)
CHLORIDE SERPL-SCNC: 93 MMOL/L (ref 98–107)
CO2 SERPL-SCNC: 35 MMOL/L (ref 21–32)
CREAT SERPL-MCNC: 0.48 MG/DL (ref 0.5–1.05)
EGFRCR SERPLBLD CKD-EPI 2021: >90 ML/MIN/1.73M*2
EOSINOPHIL # BLD MANUAL: 0.22 X10*3/UL (ref 0–0.4)
EOSINOPHIL NFR BLD MANUAL: 6 %
ERYTHROCYTE [DISTWIDTH] IN BLOOD BY AUTOMATED COUNT: 17.1 % (ref 11.5–14.5)
GLUCOSE SERPL-MCNC: 105 MG/DL (ref 74–99)
HCT VFR BLD AUTO: 29.4 % (ref 36–46)
HGB BLD-MCNC: 9.3 G/DL (ref 12–16)
HYPOCHROMIA BLD QL SMEAR: ABNORMAL
IMM GRANULOCYTES # BLD AUTO: 0.04 X10*3/UL (ref 0–0.5)
IMM GRANULOCYTES NFR BLD AUTO: 1.1 % (ref 0–0.9)
LYMPHOCYTES # BLD MANUAL: 0.33 X10*3/UL (ref 0.8–3)
LYMPHOCYTES NFR BLD MANUAL: 9 %
MCH RBC QN AUTO: 31.7 PG (ref 26–34)
MCHC RBC AUTO-ENTMCNC: 31.6 G/DL (ref 32–36)
MCV RBC AUTO: 100 FL (ref 80–100)
MONOCYTES # BLD MANUAL: 0.33 X10*3/UL (ref 0.05–0.8)
MONOCYTES NFR BLD MANUAL: 9 %
NEUTROPHILS # BLD MANUAL: 2.7 X10*3/UL (ref 1.6–5.5)
NEUTS BAND # BLD MANUAL: 0.07 X10*3/UL (ref 0–0.5)
NEUTS BAND NFR BLD MANUAL: 2 %
NEUTS SEG # BLD MANUAL: 2.63 X10*3/UL (ref 1.6–5)
NEUTS SEG NFR BLD MANUAL: 71 %
NRBC BLD-RTO: 0 /100 WBCS (ref 0–0)
PLATELET # BLD AUTO: 246 X10*3/UL (ref 150–450)
POLYCHROMASIA BLD QL SMEAR: ABNORMAL
POTASSIUM SERPL-SCNC: 4.5 MMOL/L (ref 3.5–5.3)
PROT SERPL-MCNC: 6.5 G/DL (ref 6.4–8.2)
RAD ONC MSQ ACTUAL FRACTIONS DELIVERED: 12
RAD ONC MSQ ACTUAL SESSION DELIVERED DOSE: 200 CGRAY
RAD ONC MSQ ACTUAL TOTAL DOSE: 2400 CGRAY
RAD ONC MSQ ELAPSED DAYS: 15
RAD ONC MSQ LAST DATE: NORMAL
RAD ONC MSQ PRESCRIBED FRACTIONAL DOSE: 200 CGRAY
RAD ONC MSQ PRESCRIBED NUMBER OF FRACTIONS: 30
RAD ONC MSQ PRESCRIBED TECHNIQUE: NORMAL
RAD ONC MSQ PRESCRIBED TOTAL DOSE: 6000 CGRAY
RAD ONC MSQ PRESCRIPTION PATTERN COMMENT: NORMAL
RAD ONC MSQ START DATE: NORMAL
RAD ONC MSQ TREATMENT COURSE NUMBER: 1
RAD ONC MSQ TREATMENT SITE: NORMAL
RBC # BLD AUTO: 2.93 X10*6/UL (ref 4–5.2)
RBC MORPH BLD: ABNORMAL
SODIUM SERPL-SCNC: 134 MMOL/L (ref 136–145)
TARGETS BLD QL SMEAR: ABNORMAL
TOTAL CELLS COUNTED BLD: 100
VARIANT LYMPHS # BLD MANUAL: 0.04 X10*3/UL (ref 0–0.3)
VARIANT LYMPHS NFR BLD: 1 %
WBC # BLD AUTO: 3.7 X10*3/UL (ref 4.4–11.3)

## 2025-02-26 PROCEDURE — 2500000004 HC RX 250 GENERAL PHARMACY W/ HCPCS (ALT 636 FOR OP/ED): Performed by: INTERNAL MEDICINE

## 2025-02-26 PROCEDURE — 80053 COMPREHEN METABOLIC PANEL: CPT

## 2025-02-26 PROCEDURE — 85027 COMPLETE CBC AUTOMATED: CPT

## 2025-02-26 PROCEDURE — 85007 BL SMEAR W/DIFF WBC COUNT: CPT

## 2025-02-26 PROCEDURE — 77386 HC INTENSITY-MODULATED RADIATION THERAPY (IMRT), COMPLEX: CPT | Performed by: STUDENT IN AN ORGANIZED HEALTH CARE EDUCATION/TRAINING PROGRAM

## 2025-02-26 PROCEDURE — 36591 DRAW BLOOD OFF VENOUS DEVICE: CPT

## 2025-02-26 RX ORDER — HEPARIN SODIUM,PORCINE/PF 10 UNIT/ML
50 SYRINGE (ML) INTRAVENOUS AS NEEDED
Status: DISCONTINUED | OUTPATIENT
Start: 2025-02-26 | End: 2025-02-26 | Stop reason: HOSPADM

## 2025-02-26 RX ORDER — HEPARIN 100 UNIT/ML
500 SYRINGE INTRAVENOUS AS NEEDED
Status: CANCELLED | OUTPATIENT
Start: 2025-02-26

## 2025-02-26 RX ORDER — HEPARIN SODIUM,PORCINE/PF 10 UNIT/ML
50 SYRINGE (ML) INTRAVENOUS AS NEEDED
Status: CANCELLED | OUTPATIENT
Start: 2025-02-26

## 2025-02-26 RX ORDER — HEPARIN 100 UNIT/ML
500 SYRINGE INTRAVENOUS AS NEEDED
Status: DISCONTINUED | OUTPATIENT
Start: 2025-02-26 | End: 2025-02-26 | Stop reason: HOSPADM

## 2025-02-26 RX ADMIN — HEPARIN 500 UNITS: 100 SYRINGE at 08:26

## 2025-02-26 ASSESSMENT — PAIN SCALES - GENERAL: PAINLEVEL_OUTOF10: 0-NO PAIN

## 2025-02-26 NOTE — PROGRESS NOTES
Radiation Oncology On Treatment Visit    Patient Name:  Billie Hernadez  MRN:  [id number]  :  1955    Referring Provider: Abimael Bridges MD  Primary Care Provider: MOR Holliday  Care Team: Patient Care Team:  MOR Holliday as PCP - General  Fredi Nolen MD as PCP - [employer] PCP  Judith Santillan MD as Medical Oncologist (Hematology and Oncology)  Bess Goins LPN as Care Manager (Case Management)  Jose Miguel Santillan MD as Medical Oncologist (Hematology and Oncology)    Date of Service: 2025     Diagnosis:   Specialty Problems          Radiation Oncology Problems    Malignant neoplasm of upper lobe of right lung (Multi)         Treatment Summary:  IMRT: Right Thorax, Midline Mediastinum, Right Supraclavicular fossa    Treatment Period Technique Fraction Dose Fractions Total Dose   Course 1 2025-2025  (days elapsed: 15)         RUL_Hil_Med 2025-2025 VMAT 200 / 200 cGy  2400 / 6,000 cGy     SUBJECTIVE: Worsening esophagitis.  Taking Tessalon pearls and Mucinex for productive cough.  On magnesium supplementation for hypomagnesemia.      OBJECTIVE:   Vital Signs:  /65 (Patient Position: Sitting)   Pulse (!) 123   Temp 36.8 °C (98.2 °F)   Resp 18   Wt 48.5 kg (106 lb 13 oz)   SpO2 92%   BMI 18.92 kg/m²     Other Pertinent Findings:     Toxicity Assessment          2025    10:41 2025    09:19 2025    09:24   Toxicity Assessment   Treatment Site Thoracic Thoracic Thoracic   Anorexia Grade 0 Grade 0 Grade 0   Anxiety Grade 0 Grade 0 Grade 0   Dehydration Grade 0 Grade 0 Grade 0   Depression Grade 0 Grade 0 Grade 0   Dermatitis Radiation Grade 0 Grade 0 Grade 0   Diarrhea Grade 0 Grade 0 Grade 0   Fatigue Grade 1 Grade 0 Grade 1   Fibrosis Deep Connective Tissue Grade 0 Grade 0 Grade 0   Fracture Grade 0 Grade 0 Grade 0   Nausea Grade 0 Grade 0 Grade 0   Pain Grade 0 Grade 0 Grade 0   Treatment Related Secondary  Malignancy Grade 0 Grade 0 Grade 0   Tumor Pain Grade 0 Grade 0 Grade 0   Vomiting Grade 0 Grade 0 Grade 0   Constipation Grade 0 Grade 0 Grade 0   Dyspepsia Grade 0 Grade 0 Grade 0   Dysphagia Grade 0 Grade 0 Grade 1       sore throat   Esophagitis Grade 0 Grade 0 Grade 0   Mucositis Oral Grade 0 Grade 0 Grade 0   Upper Gastrointestinal Hemorrhage Grade 0 Grade 0 Grade 0   Peripheral Sensory Neuropathy Grade 0     Brachial Plexopathy Grade 0     Pneumonitis Grade 0 Grade 0 Grade 0   Aspiration Grade 0 Grade 0 Grade 0   Hoarseness Grade 1 Grade 0 Grade 1   Laryngeal Edema Grade 0 Grade 0 Grade 0   Myocardial Infarction Grade 0 Grade 0 Grade 0   Pericardial Effusion Grade 0 Grade 0 Grade 0   Pericarditis Grade 0 Grade 0 Grade 0   Esophageal Fistula Grade 0 Grade 0 Grade 0   Esophageal Obstruction Grade 0 Grade 0 Grade 0   Esophageal Pain Grade 0 Grade 0 Grade 0   Esophageal Stenosis Grade 0 Grade 0 Grade 0   Esophageal Ulcer Grade 0 Grade 0 Grade 0   Bronchial Obstruction Grade 0 Grade 0 Grade 0   Cough Grade 1 Grade 1 Grade 1       dry   Dyspnea Grade 2 Grade 1 Grade 1       with activity   Epistaxis Grade 0 Grade 0 Grade 0   Hiccups Grade 0 Grade 0 Grade 0   Hypoxia Grade 0 Grade 0 Grade 0   Pulmonary Fibrosis Grade 0 Grade 0 Grade 0   Lymphedema Grade 0 Grade 0 Grade 0   Thromboembolic Event Grade 0 Grade 0 Grade 0   Hot Flashes Grade 0            Concurrent systemic therapy: fosaprepitant (Emend) 150 mg in sodium chloride 0.9% 150 mL IV, 150 mg, intravenous, Once, 3 of 3 cycles    Administration: 150 mg (12/17/2024), 150 mg (1/7/2025), 150 mg (1/28/2025)        CARBOplatin (Paraplatin) 440 mg in sodium chloride 0.9% 154 mL IV, 440 mg, intravenous, Once, 3 of 3 cycles    Administration: 440 mg (12/17/2024), 440 mg (1/7/2025), 440 mg (1/28/2025)        PACLitaxeL (Taxol) 288 mg in dextrose 5% 318 mL IV, 200 mg/m2 = 288 mg, intravenous, Once, 3 of 3 cycles    Administration: 288 mg (12/17/2024), 288 mg (1/7/2025),  288 mg (1/28/2025)        methylPREDNISolone sod succinate (SOLU-Medrol) 40 mg/mL injection 40 mg, 40 mg, intravenous, As needed, 3 of 3 cycles        palonosetron (Aloxi) injection 250 mcg, 250 mcg, intravenous, Once, 3 of 3 cycles    Administration: 250 mcg (12/17/2024), 250 mcg (1/7/2025), 250 mcg (1/28/2025)        nivolumab (Opdivo) 360 mg in sodium chloride 0.9% 146 mL IV, 360 mg, intravenous, Once, 3 of 3 cycles    Administration: 360 mg (12/17/2024), 360 mg (1/7/2025), 360 mg (1/28/2025)    CARBOplatin (Paraplatin) 160 mg in sodium chloride 0.9% 126 mL IV, 160 mg, intravenous, Once, 1 of 1 cycle    Administration: 160 mg (2/20/2025)        PACLitaxeL (Taxol) 65.4 mg in dextrose 5% 120.9 mL IV, 45 mg/m2 = 65.4 mg, intravenous, Once, 1 of 1 cycle    Administration: 65.4 mg (2/20/2025)        methylPREDNISolone sod succinate (SOLU-Medrol) 40 mg/mL injection 40 mg, 40 mg, intravenous, As needed, 1 of 1 cycle        palonosetron (Aloxi) injection 250 mcg, 250 mcg, intravenous, Once, 1 of 1 cycle    Administration: 250 mcg (2/20/2025)      Labs:   WBC   Date Value Ref Range Status   02/26/2025 3.7 (L) 4.4 - 11.3 x10*3/uL Final   02/18/2025 7.5 4.4 - 11.3 x10*3/uL Final     Hemoglobin   Date Value Ref Range Status   02/26/2025 9.3 (L) 12.0 - 16.0 g/dL Final   02/18/2025 9.3 (L) 12.0 - 16.0 g/dL Final     Hematocrit   Date Value Ref Range Status   02/26/2025 29.4 (L) 36.0 - 46.0 % Final   02/18/2025 30.0 (L) 36.0 - 46.0 % Final     Neutrophils Absolute   Date Value Ref Range Status   02/18/2025 5.56 (H) 1.60 - 5.50 x10*3/uL Final     Comment:     Percent differential counts (%) should be interpreted in the context of the absolute cell counts (cells/uL).   02/11/2025 2.81 1.60 - 5.50 x10*3/uL Final     Comment:     Percent differential counts (%) should be interpreted in the context of the absolute cell counts (cells/uL).     Platelets   Date Value Ref Range Status   02/26/2025 246 150 - 450 x10*3/uL Final    02/18/2025 318 150 - 450 x10*3/uL Final     Alkaline Phosphatase   Date Value Ref Range Status   02/26/2025 65 33 - 136 U/L Final   02/18/2025 65 33 - 136 U/L Final     AST   Date Value Ref Range Status   02/26/2025 19 9 - 39 U/L Final   02/18/2025 17 9 - 39 U/L Final     ALT   Date Value Ref Range Status   02/26/2025 12 7 - 45 U/L Final     Comment:     Patients treated with Sulfasalazine may generate falsely decreased results for ALT.   02/18/2025 11 7 - 45 U/L Final     Comment:     Patients treated with Sulfasalazine may generate falsely decreased results for ALT.     Bilirubin, Total   Date Value Ref Range Status   02/26/2025 0.4 0.0 - 1.2 mg/dL Final   02/18/2025 0.5 0.0 - 1.2 mg/dL Final     Glucose   Date Value Ref Range Status   02/26/2025 105 (H) 74 - 99 mg/dL Final   02/18/2025 100 (H) 74 - 99 mg/dL Final     Calcium   Date Value Ref Range Status   02/26/2025 9.1 8.6 - 10.3 mg/dL Final   02/18/2025 8.5 (L) 8.6 - 10.3 mg/dL Final     Sodium   Date Value Ref Range Status   02/26/2025 134 (L) 136 - 145 mmol/L Final   02/18/2025 131 (L) 136 - 145 mmol/L Final     Potassium   Date Value Ref Range Status   02/26/2025 4.5 3.5 - 5.3 mmol/L Final   02/18/2025 4.1 3.5 - 5.3 mmol/L Final     Bicarbonate   Date Value Ref Range Status   02/26/2025 35 (H) 21 - 32 mmol/L Final   02/18/2025 32 21 - 32 mmol/L Final     Chloride   Date Value Ref Range Status   02/26/2025 93 (L) 98 - 107 mmol/L Final   02/18/2025 95 (L) 98 - 107 mmol/L Final     Urea Nitrogen   Date Value Ref Range Status   02/26/2025 6 6 - 23 mg/dL Final   02/18/2025 8 6 - 23 mg/dL Final     Creatinine   Date Value Ref Range Status   02/26/2025 0.48 (L) 0.50 - 1.05 mg/dL Final   02/18/2025 0.43 (L) 0.50 - 1.05 mg/dL Final         Exam: Resting comfortably in chair.     Assessment / Plan:  The patient is tolerating radiation therapy as anticipated.  Continue per current treatment plan.       Therapies: Monitor esophagitis, prescription sent for Magic  mouthwash.  Continue mucinex and tessalon pearls for coughs.  Repeat magnesium ordered, monitor hypomagnesemia on magnesium supplementation.    Side effects reviewed with patient. Images, chart and labs reviewed.    Abimael Bridges MD

## 2025-02-27 ENCOUNTER — INFUSION (OUTPATIENT)
Dept: HEMATOLOGY/ONCOLOGY | Facility: CLINIC | Age: 70
End: 2025-02-27
Payer: MEDICARE

## 2025-02-27 ENCOUNTER — HOSPITAL ENCOUNTER (OUTPATIENT)
Dept: RADIATION ONCOLOGY | Facility: CLINIC | Age: 70
Setting detail: RADIATION/ONCOLOGY SERIES
Discharge: HOME | End: 2025-02-27
Payer: MEDICARE

## 2025-02-27 VITALS
RESPIRATION RATE: 18 BRPM | BODY MASS INDEX: 18.76 KG/M2 | SYSTOLIC BLOOD PRESSURE: 107 MMHG | WEIGHT: 105.93 LBS | HEART RATE: 129 BPM | DIASTOLIC BLOOD PRESSURE: 69 MMHG | TEMPERATURE: 98.4 F | OXYGEN SATURATION: 99 %

## 2025-02-27 DIAGNOSIS — Z51.0 ENCOUNTER FOR ANTINEOPLASTIC RADIATION THERAPY: ICD-10-CM

## 2025-02-27 DIAGNOSIS — C34.11 MALIGNANT NEOPLASM OF UPPER LOBE OF RIGHT LUNG (MULTI): ICD-10-CM

## 2025-02-27 DIAGNOSIS — C34.11 MALIGNANT NEOPLASM OF UPPER LOBE, RIGHT BRONCHUS OR LUNG: ICD-10-CM

## 2025-02-27 LAB
RAD ONC MSQ ACTUAL FRACTIONS DELIVERED: 13
RAD ONC MSQ ACTUAL SESSION DELIVERED DOSE: 200 CGRAY
RAD ONC MSQ ACTUAL TOTAL DOSE: 2600 CGRAY
RAD ONC MSQ ELAPSED DAYS: 16
RAD ONC MSQ LAST DATE: NORMAL
RAD ONC MSQ PRESCRIBED FRACTIONAL DOSE: 200 CGRAY
RAD ONC MSQ PRESCRIBED NUMBER OF FRACTIONS: 30
RAD ONC MSQ PRESCRIBED TECHNIQUE: NORMAL
RAD ONC MSQ PRESCRIBED TOTAL DOSE: 6000 CGRAY
RAD ONC MSQ PRESCRIPTION PATTERN COMMENT: NORMAL
RAD ONC MSQ START DATE: NORMAL
RAD ONC MSQ TREATMENT COURSE NUMBER: 1
RAD ONC MSQ TREATMENT SITE: NORMAL

## 2025-02-27 PROCEDURE — 2500000004 HC RX 250 GENERAL PHARMACY W/ HCPCS (ALT 636 FOR OP/ED): Performed by: INTERNAL MEDICINE

## 2025-02-27 PROCEDURE — 96375 TX/PRO/DX INJ NEW DRUG ADDON: CPT | Mod: INF

## 2025-02-27 PROCEDURE — 96413 CHEMO IV INFUSION 1 HR: CPT

## 2025-02-27 PROCEDURE — 77386 HC INTENSITY-MODULATED RADIATION THERAPY (IMRT), COMPLEX: CPT | Performed by: STUDENT IN AN ORGANIZED HEALTH CARE EDUCATION/TRAINING PROGRAM

## 2025-02-27 PROCEDURE — 2500000001 HC RX 250 WO HCPCS SELF ADMINISTERED DRUGS (ALT 637 FOR MEDICARE OP): Performed by: INTERNAL MEDICINE

## 2025-02-27 PROCEDURE — 96417 CHEMO IV INFUS EACH ADDL SEQ: CPT

## 2025-02-27 RX ORDER — HEPARIN 100 UNIT/ML
500 SYRINGE INTRAVENOUS AS NEEDED
Status: DISCONTINUED | OUTPATIENT
Start: 2025-02-27 | End: 2025-02-27 | Stop reason: HOSPADM

## 2025-02-27 RX ORDER — ALBUTEROL SULFATE 0.83 MG/ML
3 SOLUTION RESPIRATORY (INHALATION) AS NEEDED
OUTPATIENT
Start: 2025-02-27

## 2025-02-27 RX ORDER — PROCHLORPERAZINE EDISYLATE 5 MG/ML
10 INJECTION INTRAMUSCULAR; INTRAVENOUS EVERY 6 HOURS PRN
Status: DISCONTINUED | OUTPATIENT
Start: 2025-02-27 | End: 2025-02-27 | Stop reason: HOSPADM

## 2025-02-27 RX ORDER — EPINEPHRINE 0.3 MG/.3ML
0.3 INJECTION SUBCUTANEOUS EVERY 5 MIN PRN
Status: DISCONTINUED | OUTPATIENT
Start: 2025-02-27 | End: 2025-02-27 | Stop reason: HOSPADM

## 2025-02-27 RX ORDER — PALONOSETRON 0.05 MG/ML
0.25 INJECTION, SOLUTION INTRAVENOUS ONCE
Status: COMPLETED | OUTPATIENT
Start: 2025-02-27 | End: 2025-02-27

## 2025-02-27 RX ORDER — EPINEPHRINE 0.3 MG/.3ML
0.3 INJECTION SUBCUTANEOUS EVERY 5 MIN PRN
Status: CANCELLED | OUTPATIENT
Start: 2025-02-27

## 2025-02-27 RX ORDER — DIPHENHYDRAMINE HYDROCHLORIDE 50 MG/ML
50 INJECTION INTRAMUSCULAR; INTRAVENOUS AS NEEDED
Status: CANCELLED | OUTPATIENT
Start: 2025-02-27

## 2025-02-27 RX ORDER — ALBUTEROL SULFATE 0.83 MG/ML
3 SOLUTION RESPIRATORY (INHALATION) AS NEEDED
Status: CANCELLED | OUTPATIENT
Start: 2025-02-27

## 2025-02-27 RX ORDER — SODIUM CHLORIDE 9 MG/ML
1000 INJECTION, SOLUTION INTRAVENOUS CONTINUOUS
Status: CANCELLED | OUTPATIENT
Start: 2025-02-27

## 2025-02-27 RX ORDER — FAMOTIDINE 10 MG/ML
20 INJECTION, SOLUTION INTRAVENOUS ONCE AS NEEDED
OUTPATIENT
Start: 2025-02-27

## 2025-02-27 RX ORDER — PROCHLORPERAZINE MALEATE 10 MG
10 TABLET ORAL EVERY 6 HOURS PRN
Status: DISCONTINUED | OUTPATIENT
Start: 2025-02-27 | End: 2025-02-27 | Stop reason: HOSPADM

## 2025-02-27 RX ORDER — ALBUTEROL SULFATE 0.83 MG/ML
3 SOLUTION RESPIRATORY (INHALATION) AS NEEDED
Status: DISCONTINUED | OUTPATIENT
Start: 2025-02-27 | End: 2025-02-27 | Stop reason: HOSPADM

## 2025-02-27 RX ORDER — FAMOTIDINE 10 MG/ML
20 INJECTION, SOLUTION INTRAVENOUS ONCE
Status: COMPLETED | OUTPATIENT
Start: 2025-02-27 | End: 2025-02-27

## 2025-02-27 RX ORDER — HEPARIN 100 UNIT/ML
500 SYRINGE INTRAVENOUS AS NEEDED
OUTPATIENT
Start: 2025-02-27

## 2025-02-27 RX ORDER — DIPHENHYDRAMINE HCL 25 MG
50 CAPSULE ORAL ONCE
Status: COMPLETED | OUTPATIENT
Start: 2025-02-27 | End: 2025-02-27

## 2025-02-27 RX ORDER — HEPARIN SODIUM,PORCINE/PF 10 UNIT/ML
50 SYRINGE (ML) INTRAVENOUS AS NEEDED
OUTPATIENT
Start: 2025-02-27

## 2025-02-27 RX ORDER — DIPHENHYDRAMINE HYDROCHLORIDE 50 MG/ML
50 INJECTION INTRAMUSCULAR; INTRAVENOUS AS NEEDED
OUTPATIENT
Start: 2025-02-27

## 2025-02-27 RX ORDER — DIPHENHYDRAMINE HYDROCHLORIDE 50 MG/ML
50 INJECTION INTRAMUSCULAR; INTRAVENOUS AS NEEDED
Status: DISCONTINUED | OUTPATIENT
Start: 2025-02-27 | End: 2025-02-27 | Stop reason: HOSPADM

## 2025-02-27 RX ORDER — EPINEPHRINE 0.3 MG/.3ML
0.3 INJECTION SUBCUTANEOUS EVERY 5 MIN PRN
OUTPATIENT
Start: 2025-02-27

## 2025-02-27 RX ORDER — SODIUM CHLORIDE 9 MG/ML
1000 INJECTION, SOLUTION INTRAVENOUS CONTINUOUS
Status: ACTIVE | OUTPATIENT
Start: 2025-02-27 | End: 2025-02-27

## 2025-02-27 RX ORDER — FAMOTIDINE 10 MG/ML
20 INJECTION, SOLUTION INTRAVENOUS ONCE AS NEEDED
Status: DISCONTINUED | OUTPATIENT
Start: 2025-02-27 | End: 2025-02-27 | Stop reason: HOSPADM

## 2025-02-27 RX ORDER — DEXAMETHASONE 6 MG/1
12 TABLET ORAL ONCE
Status: COMPLETED | OUTPATIENT
Start: 2025-02-27 | End: 2025-02-27

## 2025-02-27 RX ORDER — FAMOTIDINE 10 MG/ML
20 INJECTION, SOLUTION INTRAVENOUS ONCE AS NEEDED
Status: CANCELLED | OUTPATIENT
Start: 2025-02-27

## 2025-02-27 RX ADMIN — PACLITAXEL 65.4 MG: 6 INJECTION, SOLUTION INTRAVENOUS at 08:47

## 2025-02-27 RX ADMIN — DIPHENHYDRAMINE HYDROCHLORIDE 50 MG: 25 CAPSULE ORAL at 08:23

## 2025-02-27 RX ADMIN — FAMOTIDINE 20 MG: 10 INJECTION INTRAVENOUS at 08:22

## 2025-02-27 RX ADMIN — SODIUM CHLORIDE 1000 ML/HR: 9 INJECTION, SOLUTION INTRAVENOUS at 08:30

## 2025-02-27 RX ADMIN — CARBOPLATIN 160 MG: 10 INJECTION, SOLUTION INTRAVENOUS at 09:44

## 2025-02-27 RX ADMIN — HEPARIN 500 UNITS: 100 SYRINGE at 10:19

## 2025-02-27 RX ADMIN — PALONOSETRON HYDROCHLORIDE 250 MCG: 0.25 INJECTION INTRAVENOUS at 08:23

## 2025-02-27 RX ADMIN — DEXAMETHASONE 12 MG: 6 TABLET ORAL at 08:23

## 2025-02-27 ASSESSMENT — PAIN SCALES - GENERAL: PAINLEVEL_OUTOF10: 0-NO PAIN

## 2025-02-27 NOTE — PROGRESS NOTES
Patient . Dr. MAXI Santillan contacted, covering Dr. DESIRAE Santillan. MD ordered IVF. Patient received IVF with treatment.

## 2025-02-28 ENCOUNTER — HOSPITAL ENCOUNTER (OUTPATIENT)
Dept: RADIATION ONCOLOGY | Facility: CLINIC | Age: 70
Setting detail: RADIATION/ONCOLOGY SERIES
Discharge: HOME | End: 2025-02-28
Payer: MEDICARE

## 2025-02-28 DIAGNOSIS — C34.11 MALIGNANT NEOPLASM OF UPPER LOBE, RIGHT BRONCHUS OR LUNG: ICD-10-CM

## 2025-02-28 DIAGNOSIS — Z51.0 ENCOUNTER FOR ANTINEOPLASTIC RADIATION THERAPY: ICD-10-CM

## 2025-02-28 LAB
RAD ONC MSQ ACTUAL FRACTIONS DELIVERED: 14
RAD ONC MSQ ACTUAL SESSION DELIVERED DOSE: 200 CGRAY
RAD ONC MSQ ACTUAL TOTAL DOSE: 2800 CGRAY
RAD ONC MSQ ELAPSED DAYS: 17
RAD ONC MSQ LAST DATE: NORMAL
RAD ONC MSQ PRESCRIBED FRACTIONAL DOSE: 200 CGRAY
RAD ONC MSQ PRESCRIBED NUMBER OF FRACTIONS: 30
RAD ONC MSQ PRESCRIBED TECHNIQUE: NORMAL
RAD ONC MSQ PRESCRIBED TOTAL DOSE: 6000 CGRAY
RAD ONC MSQ PRESCRIPTION PATTERN COMMENT: NORMAL
RAD ONC MSQ START DATE: NORMAL
RAD ONC MSQ TREATMENT COURSE NUMBER: 1
RAD ONC MSQ TREATMENT SITE: NORMAL

## 2025-02-28 PROCEDURE — 77386 HC INTENSITY-MODULATED RADIATION THERAPY (IMRT), COMPLEX: CPT | Performed by: STUDENT IN AN ORGANIZED HEALTH CARE EDUCATION/TRAINING PROGRAM

## 2025-03-01 DIAGNOSIS — J43.9 PULMONARY EMPHYSEMA, UNSPECIFIED EMPHYSEMA TYPE (MULTI): ICD-10-CM

## 2025-03-03 ENCOUNTER — HOSPITAL ENCOUNTER (OUTPATIENT)
Dept: RADIATION ONCOLOGY | Facility: CLINIC | Age: 70
Setting detail: RADIATION/ONCOLOGY SERIES
Discharge: HOME | End: 2025-03-03
Payer: MEDICARE

## 2025-03-03 DIAGNOSIS — Z51.0 ENCOUNTER FOR ANTINEOPLASTIC RADIATION THERAPY: ICD-10-CM

## 2025-03-03 DIAGNOSIS — C34.11 MALIGNANT NEOPLASM OF UPPER LOBE, RIGHT BRONCHUS OR LUNG: ICD-10-CM

## 2025-03-03 LAB
RAD ONC MSQ ACTUAL FRACTIONS DELIVERED: 15
RAD ONC MSQ ACTUAL SESSION DELIVERED DOSE: 200 CGRAY
RAD ONC MSQ ACTUAL TOTAL DOSE: 3000 CGRAY
RAD ONC MSQ ELAPSED DAYS: 20
RAD ONC MSQ LAST DATE: NORMAL
RAD ONC MSQ PRESCRIBED FRACTIONAL DOSE: 200 CGRAY
RAD ONC MSQ PRESCRIBED NUMBER OF FRACTIONS: 30
RAD ONC MSQ PRESCRIBED TECHNIQUE: NORMAL
RAD ONC MSQ PRESCRIBED TOTAL DOSE: 6000 CGRAY
RAD ONC MSQ PRESCRIPTION PATTERN COMMENT: NORMAL
RAD ONC MSQ START DATE: NORMAL
RAD ONC MSQ TREATMENT COURSE NUMBER: 1
RAD ONC MSQ TREATMENT SITE: NORMAL

## 2025-03-03 PROCEDURE — 77386 HC INTENSITY-MODULATED RADIATION THERAPY (IMRT), COMPLEX: CPT | Performed by: STUDENT IN AN ORGANIZED HEALTH CARE EDUCATION/TRAINING PROGRAM

## 2025-03-03 RX ORDER — ALBUTEROL SULFATE 90 UG/1
INHALANT RESPIRATORY (INHALATION)
Qty: 51 G | Refills: 2 | Status: SHIPPED | OUTPATIENT
Start: 2025-03-03

## 2025-03-04 ENCOUNTER — HOSPITAL ENCOUNTER (OUTPATIENT)
Dept: RADIATION ONCOLOGY | Facility: CLINIC | Age: 70
Setting detail: RADIATION/ONCOLOGY SERIES
Discharge: HOME | End: 2025-03-04
Payer: MEDICARE

## 2025-03-04 DIAGNOSIS — Z51.0 ENCOUNTER FOR ANTINEOPLASTIC RADIATION THERAPY: ICD-10-CM

## 2025-03-04 DIAGNOSIS — C34.11 MALIGNANT NEOPLASM OF UPPER LOBE, RIGHT BRONCHUS OR LUNG: ICD-10-CM

## 2025-03-04 LAB
RAD ONC MSQ ACTUAL FRACTIONS DELIVERED: 16
RAD ONC MSQ ACTUAL SESSION DELIVERED DOSE: 200 CGRAY
RAD ONC MSQ ACTUAL TOTAL DOSE: 3200 CGRAY
RAD ONC MSQ ELAPSED DAYS: 21
RAD ONC MSQ LAST DATE: NORMAL
RAD ONC MSQ PRESCRIBED FRACTIONAL DOSE: 200 CGRAY
RAD ONC MSQ PRESCRIBED NUMBER OF FRACTIONS: 30
RAD ONC MSQ PRESCRIBED TECHNIQUE: NORMAL
RAD ONC MSQ PRESCRIBED TOTAL DOSE: 6000 CGRAY
RAD ONC MSQ PRESCRIPTION PATTERN COMMENT: NORMAL
RAD ONC MSQ START DATE: NORMAL
RAD ONC MSQ TREATMENT COURSE NUMBER: 1
RAD ONC MSQ TREATMENT SITE: NORMAL

## 2025-03-04 PROCEDURE — 77386 HC INTENSITY-MODULATED RADIATION THERAPY (IMRT), COMPLEX: CPT | Performed by: STUDENT IN AN ORGANIZED HEALTH CARE EDUCATION/TRAINING PROGRAM

## 2025-03-04 PROCEDURE — 77336 RADIATION PHYSICS CONSULT: CPT | Performed by: STUDENT IN AN ORGANIZED HEALTH CARE EDUCATION/TRAINING PROGRAM

## 2025-03-05 ENCOUNTER — HOSPITAL ENCOUNTER (OUTPATIENT)
Dept: RADIATION ONCOLOGY | Facility: CLINIC | Age: 70
Setting detail: RADIATION/ONCOLOGY SERIES
Discharge: HOME | End: 2025-03-05
Payer: MEDICARE

## 2025-03-05 ENCOUNTER — INFUSION (OUTPATIENT)
Dept: HEMATOLOGY/ONCOLOGY | Facility: CLINIC | Age: 70
End: 2025-03-05
Payer: MEDICARE

## 2025-03-05 VITALS
RESPIRATION RATE: 18 BRPM | WEIGHT: 106.7 LBS | BODY MASS INDEX: 18.9 KG/M2 | SYSTOLIC BLOOD PRESSURE: 104 MMHG | OXYGEN SATURATION: 92 % | DIASTOLIC BLOOD PRESSURE: 66 MMHG | HEART RATE: 128 BPM | TEMPERATURE: 97.2 F

## 2025-03-05 DIAGNOSIS — K20.90 ESOPHAGITIS: Primary | ICD-10-CM

## 2025-03-05 DIAGNOSIS — Z51.0 ENCOUNTER FOR ANTINEOPLASTIC RADIATION THERAPY: ICD-10-CM

## 2025-03-05 DIAGNOSIS — C34.11 MALIGNANT NEOPLASM OF UPPER LOBE OF RIGHT LUNG (MULTI): ICD-10-CM

## 2025-03-05 DIAGNOSIS — C34.11 MALIGNANT NEOPLASM OF UPPER LOBE, RIGHT BRONCHUS OR LUNG: ICD-10-CM

## 2025-03-05 DIAGNOSIS — I10 PRIMARY HYPERTENSION: ICD-10-CM

## 2025-03-05 LAB
ALBUMIN SERPL BCP-MCNC: 3.5 G/DL (ref 3.4–5)
ALP SERPL-CCNC: 65 U/L (ref 33–136)
ALT SERPL W P-5'-P-CCNC: 12 U/L (ref 7–45)
ANION GAP SERPL CALC-SCNC: 11 MMOL/L (ref 10–20)
AST SERPL W P-5'-P-CCNC: 17 U/L (ref 9–39)
BASOPHILS # BLD MANUAL: 0.04 X10*3/UL (ref 0–0.1)
BASOPHILS NFR BLD MANUAL: 1 %
BILIRUB SERPL-MCNC: 0.3 MG/DL (ref 0–1.2)
BITE CELLS BLD QL SMEAR: PRESENT
BUN SERPL-MCNC: 11 MG/DL (ref 6–23)
BURR CELLS BLD QL SMEAR: ABNORMAL
CALCIUM SERPL-MCNC: 8.8 MG/DL (ref 8.6–10.3)
CHLORIDE SERPL-SCNC: 95 MMOL/L (ref 98–107)
CO2 SERPL-SCNC: 33 MMOL/L (ref 21–32)
CREAT SERPL-MCNC: 0.47 MG/DL (ref 0.5–1.05)
DACRYOCYTES BLD QL SMEAR: ABNORMAL
EGFRCR SERPLBLD CKD-EPI 2021: >90 ML/MIN/1.73M*2
EOSINOPHIL # BLD MANUAL: 0.04 X10*3/UL (ref 0–0.4)
EOSINOPHIL NFR BLD MANUAL: 1 %
ERYTHROCYTE [DISTWIDTH] IN BLOOD BY AUTOMATED COUNT: 17.6 % (ref 11.5–14.5)
GIANT PLATELETS BLD QL SMEAR: ABNORMAL
GLUCOSE SERPL-MCNC: 122 MG/DL (ref 74–99)
HCT VFR BLD AUTO: 27.1 % (ref 36–46)
HGB BLD-MCNC: 8.8 G/DL (ref 12–16)
IMM GRANULOCYTES # BLD AUTO: 0.06 X10*3/UL (ref 0–0.5)
IMM GRANULOCYTES NFR BLD AUTO: 1.6 % (ref 0–0.9)
LYMPHOCYTES # BLD MANUAL: 0.23 X10*3/UL (ref 0.8–3)
LYMPHOCYTES NFR BLD MANUAL: 6 %
MCH RBC QN AUTO: 32.2 PG (ref 26–34)
MCHC RBC AUTO-ENTMCNC: 32.5 G/DL (ref 32–36)
MCV RBC AUTO: 99 FL (ref 80–100)
METAMYELOCYTES # BLD MANUAL: 0.04 X10*3/UL
METAMYELOCYTES NFR BLD MANUAL: 1 %
MONOCYTES # BLD MANUAL: 0.34 X10*3/UL (ref 0.05–0.8)
MONOCYTES NFR BLD MANUAL: 9 %
MYELOCYTES # BLD MANUAL: 0.04 X10*3/UL
MYELOCYTES NFR BLD MANUAL: 1 %
NEUTROPHILS # BLD MANUAL: 3.08 X10*3/UL (ref 1.6–5.5)
NEUTS BAND # BLD MANUAL: 0.15 X10*3/UL (ref 0–0.5)
NEUTS BAND NFR BLD MANUAL: 4 %
NEUTS SEG # BLD MANUAL: 2.93 X10*3/UL (ref 1.6–5)
NEUTS SEG NFR BLD MANUAL: 77 %
NRBC BLD-RTO: 0.5 /100 WBCS (ref 0–0)
PLATELET # BLD AUTO: 192 X10*3/UL (ref 150–450)
POLYCHROMASIA BLD QL SMEAR: ABNORMAL
POTASSIUM SERPL-SCNC: 4.2 MMOL/L (ref 3.5–5.3)
PROT SERPL-MCNC: 6.2 G/DL (ref 6.4–8.2)
RAD ONC MSQ ACTUAL FRACTIONS DELIVERED: 17
RAD ONC MSQ ACTUAL SESSION DELIVERED DOSE: 200 CGRAY
RAD ONC MSQ ACTUAL TOTAL DOSE: 3400 CGRAY
RAD ONC MSQ ELAPSED DAYS: 22
RAD ONC MSQ LAST DATE: NORMAL
RAD ONC MSQ PRESCRIBED FRACTIONAL DOSE: 200 CGRAY
RAD ONC MSQ PRESCRIBED NUMBER OF FRACTIONS: 30
RAD ONC MSQ PRESCRIBED TECHNIQUE: NORMAL
RAD ONC MSQ PRESCRIBED TOTAL DOSE: 6000 CGRAY
RAD ONC MSQ PRESCRIPTION PATTERN COMMENT: NORMAL
RAD ONC MSQ START DATE: NORMAL
RAD ONC MSQ TREATMENT COURSE NUMBER: 1
RAD ONC MSQ TREATMENT SITE: NORMAL
RBC # BLD AUTO: 2.73 X10*6/UL (ref 4–5.2)
RBC MORPH BLD: ABNORMAL
SODIUM SERPL-SCNC: 135 MMOL/L (ref 136–145)
TOTAL CELLS COUNTED BLD: 100
WBC # BLD AUTO: 3.8 X10*3/UL (ref 4.4–11.3)

## 2025-03-05 PROCEDURE — 2500000004 HC RX 250 GENERAL PHARMACY W/ HCPCS (ALT 636 FOR OP/ED): Performed by: INTERNAL MEDICINE

## 2025-03-05 PROCEDURE — 36591 DRAW BLOOD OFF VENOUS DEVICE: CPT

## 2025-03-05 PROCEDURE — 77386 HC INTENSITY-MODULATED RADIATION THERAPY (IMRT), COMPLEX: CPT | Performed by: STUDENT IN AN ORGANIZED HEALTH CARE EDUCATION/TRAINING PROGRAM

## 2025-03-05 PROCEDURE — 85007 BL SMEAR W/DIFF WBC COUNT: CPT

## 2025-03-05 PROCEDURE — 84075 ASSAY ALKALINE PHOSPHATASE: CPT

## 2025-03-05 PROCEDURE — 85027 COMPLETE CBC AUTOMATED: CPT

## 2025-03-05 RX ORDER — HEPARIN 100 UNIT/ML
500 SYRINGE INTRAVENOUS AS NEEDED
Status: DISCONTINUED | OUTPATIENT
Start: 2025-03-05 | End: 2025-03-05 | Stop reason: HOSPADM

## 2025-03-05 RX ORDER — HEPARIN SODIUM,PORCINE/PF 10 UNIT/ML
50 SYRINGE (ML) INTRAVENOUS AS NEEDED
Status: DISCONTINUED | OUTPATIENT
Start: 2025-03-05 | End: 2025-03-05 | Stop reason: HOSPADM

## 2025-03-05 RX ORDER — HEPARIN 100 UNIT/ML
500 SYRINGE INTRAVENOUS AS NEEDED
Status: CANCELLED | OUTPATIENT
Start: 2025-03-05

## 2025-03-05 RX ORDER — HEPARIN SODIUM,PORCINE/PF 10 UNIT/ML
50 SYRINGE (ML) INTRAVENOUS AS NEEDED
Status: CANCELLED | OUTPATIENT
Start: 2025-03-05

## 2025-03-05 RX ORDER — LOSARTAN POTASSIUM 25 MG/1
25 TABLET ORAL DAILY
Qty: 100 TABLET | Refills: 3 | Status: SHIPPED | OUTPATIENT
Start: 2025-03-05

## 2025-03-05 RX ORDER — SUCRALFATE 1 G/10ML
1 SUSPENSION ORAL
Qty: 473 ML | Refills: 3 | Status: SHIPPED | OUTPATIENT
Start: 2025-03-05 | End: 2025-04-21

## 2025-03-05 RX ADMIN — HEPARIN 500 UNITS: 100 SYRINGE at 08:20

## 2025-03-05 ASSESSMENT — ENCOUNTER SYMPTOMS
DEPRESSION: 0
OCCASIONAL FEELINGS OF UNSTEADINESS: 0
LOSS OF SENSATION IN FEET: 0

## 2025-03-05 ASSESSMENT — PAIN SCALES - GENERAL: PAINLEVEL_OUTOF10: 0-NO PAIN

## 2025-03-05 NOTE — PROGRESS NOTES
Radiation Oncology On Treatment Visit    Patient Name:  Billie Hernadez  MRN:  [id number]  :  1955    Referring Provider: Abimael Bridges MD  Primary Care Provider: MOR Holliday  Care Team: Patient Care Team:  MOR Holliday as PCP - General  Fredi Nolen MD as PCP - [employer] PCP  Judith Santillan MD as Medical Oncologist (Hematology and Oncology)  Bess Goins LPN as Care Manager (Case Management)  Jose Miguel Santillan MD as Medical Oncologist (Hematology and Oncology)    Date of Service: 3/5/2025     Diagnosis:   Specialty Problems          Radiation Oncology Problems    Malignant neoplasm of upper lobe of right lung (Multi)         Treatment Summary:  IMRT: Right Thorax, Midline Mediastinum, Right Supraclavicular fossa    Treatment Period Technique Fraction Dose Fractions Total Dose   Course 1 2025-3/5/2025  (days elapsed: 22)         R[facility]_Hil_Med 2025-3/5/2025 VMAT 200 / 200 cGy  3400 / 6,000 cGy     SUBJECTIVE: Worsening esophagitis without marked improvement with BMX. Stable coughs and SOB.     OBJECTIVE:   Vital Signs:  /66 (Patient Position: Sitting, BP Cuff Size: Small adult)   Pulse (!) 128   Temp 36.2 °C (97.2 °F) (Temporal)   Resp 18   Wt 48.4 kg (106 lb 11.2 oz)   SpO2 92%   BMI 18.90 kg/m²     Other Pertinent Findings:     Toxicity Assessment          2025    10:41 2025    09:19 2025    09:24 3/5/2025    09:31   Toxicity Assessment   Treatment Site Thoracic Thoracic Thoracic Thoracic   Anorexia Grade 0 Grade 0 Grade 0 Grade 0   Anxiety Grade 0 Grade 0 Grade 0 Grade 0   Dehydration Grade 0 Grade 0 Grade 0 Grade 0   Depression Grade 0 Grade 0 Grade 0 Grade 0   Dermatitis Radiation Grade 0 Grade 0 Grade 0 Grade 0   Diarrhea Grade 0 Grade 0 Grade 0 Grade 0   Fatigue Grade 1 Grade 0 Grade 1 Grade 1   Fibrosis Deep Connective Tissue Grade 0 Grade 0 Grade 0 Grade 0   Fracture Grade 0 Grade 0 Grade 0 Grade 0   Nausea  Grade 0 Grade 0 Grade 0 Grade 0   Pain Grade 0 Grade 0 Grade 0 Grade 0   Treatment Related Secondary Malignancy Grade 0 Grade 0 Grade 0 Grade 0   Tumor Pain Grade 0 Grade 0 Grade 0 Grade 0   Vomiting Grade 0 Grade 0 Grade 0 Grade 0   Constipation Grade 0 Grade 0 Grade 0 Grade 0   Dyspepsia Grade 0 Grade 0 Grade 0 Grade 0   Dysphagia Grade 0 Grade 0 Grade 1       sore throat Grade 0   Esophagitis Grade 0 Grade 0 Grade 0 Grade 0   Mucositis Oral Grade 0 Grade 0 Grade 0 Grade 0   Upper Gastrointestinal Hemorrhage Grade 0 Grade 0 Grade 0 Grade 0   Peripheral Sensory Neuropathy Grade 0   Grade 0   Brachial Plexopathy Grade 0      Pneumonitis Grade 0 Grade 0 Grade 0 Grade 0   Aspiration Grade 0 Grade 0 Grade 0 Grade 0   Hoarseness Grade 1 Grade 0 Grade 1 Grade 1   Laryngeal Edema Grade 0 Grade 0 Grade 0 Grade 0   Myocardial Infarction Grade 0 Grade 0 Grade 0 Grade 0   Pericardial Effusion Grade 0 Grade 0 Grade 0 Grade 0   Pericarditis Grade 0 Grade 0 Grade 0 Grade 0   Esophageal Fistula Grade 0 Grade 0 Grade 0 Grade 0   Esophageal Obstruction Grade 0 Grade 0 Grade 0 Grade 0   Esophageal Pain Grade 0 Grade 0 Grade 0 Grade 0   Esophageal Stenosis Grade 0 Grade 0 Grade 0 Grade 0   Esophageal Ulcer Grade 0 Grade 0 Grade 0 Grade 0   Bronchial Obstruction Grade 0 Grade 0 Grade 0 Grade 0   Cough Grade 1 Grade 1 Grade 1       dry Grade 0   Dyspnea Grade 2 Grade 1 Grade 1       with activity Grade 1   Epistaxis Grade 0 Grade 0 Grade 0 Grade 0   Hiccups Grade 0 Grade 0 Grade 0 Grade 0   Hypoxia Grade 0 Grade 0 Grade 0 Grade 0   Pulmonary Fibrosis Grade 0 Grade 0 Grade 0 Grade 0   Lymphedema Grade 0 Grade 0 Grade 0 Grade 0   Thromboembolic Event Grade 0 Grade 0 Grade 0 Grade 0   Hot Flashes Grade 0             Concurrent systemic therapy: fosaprepitant (Emend) 150 mg in sodium chloride 0.9% 150 mL IV, 150 mg, intravenous, Once, 3 of 3 cycles    Administration: 150 mg (12/17/2024), 150 mg (1/7/2025), 150 mg  (1/28/2025)        CARBOplatin (Paraplatin) 440 mg in sodium chloride 0.9% 154 mL IV, 440 mg, intravenous, Once, 3 of 3 cycles    Administration: 440 mg (12/17/2024), 440 mg (1/7/2025), 440 mg (1/28/2025)        PACLitaxeL (Taxol) 288 mg in dextrose 5% 318 mL IV, 200 mg/m2 = 288 mg, intravenous, Once, 3 of 3 cycles    Administration: 288 mg (12/17/2024), 288 mg (1/7/2025), 288 mg (1/28/2025)        methylPREDNISolone sod succinate (SOLU-Medrol) 40 mg/mL injection 40 mg, 40 mg, intravenous, As needed, 3 of 3 cycles        palonosetron (Aloxi) injection 250 mcg, 250 mcg, intravenous, Once, 3 of 3 cycles    Administration: 250 mcg (12/17/2024), 250 mcg (1/7/2025), 250 mcg (1/28/2025)        nivolumab (Opdivo) 360 mg in sodium chloride 0.9% 146 mL IV, 360 mg, intravenous, Once, 3 of 3 cycles    Administration: 360 mg (12/17/2024), 360 mg (1/7/2025), 360 mg (1/28/2025)    CARBOplatin (Paraplatin) 160 mg in sodium chloride 0.9% 126 mL IV, 160 mg, intravenous, Once, 1 of 1 cycle    Administration: 160 mg (2/20/2025), 160 mg (2/27/2025)        PACLitaxeL (Taxol) 65.4 mg in dextrose 5% 120.9 mL IV, 45 mg/m2 = 65.4 mg, intravenous, Once, 1 of 1 cycle    Administration: 65.4 mg (2/20/2025), 65.4 mg (2/27/2025)        methylPREDNISolone sod succinate (SOLU-Medrol) 40 mg/mL injection 40 mg, 40 mg, intravenous, As needed, 1 of 1 cycle        palonosetron (Aloxi) injection 250 mcg, 250 mcg, intravenous, Once, 1 of 1 cycle    Administration: 250 mcg (2/20/2025), 250 mcg (2/27/2025)      Labs:   WBC   Date Value Ref Range Status   03/05/2025 3.8 (L) 4.4 - 11.3 x10*3/uL Final   02/26/2025 3.7 (L) 4.4 - 11.3 x10*3/uL Final     Hemoglobin   Date Value Ref Range Status   03/05/2025 8.8 (L) 12.0 - 16.0 g/dL Final   02/26/2025 9.3 (L) 12.0 - 16.0 g/dL Final     Hematocrit   Date Value Ref Range Status   03/05/2025 27.1 (L) 36.0 - 46.0 % Final   02/26/2025 29.4 (L) 36.0 - 46.0 % Final     Neutrophils Absolute   Date Value Ref Range Status    02/18/2025 5.56 (H) 1.60 - 5.50 x10*3/uL Final     Comment:     Percent differential counts (%) should be interpreted in the context of the absolute cell counts (cells/uL).   02/11/2025 2.81 1.60 - 5.50 x10*3/uL Final     Comment:     Percent differential counts (%) should be interpreted in the context of the absolute cell counts (cells/uL).     Platelets   Date Value Ref Range Status   03/05/2025 192 150 - 450 x10*3/uL Final   02/26/2025 246 150 - 450 x10*3/uL Final     Alkaline Phosphatase   Date Value Ref Range Status   03/05/2025 65 33 - 136 U/L Final   02/26/2025 65 33 - 136 U/L Final     AST   Date Value Ref Range Status   03/05/2025 17 9 - 39 U/L Final   02/26/2025 19 9 - 39 U/L Final     ALT   Date Value Ref Range Status   03/05/2025 12 7 - 45 U/L Final     Comment:     Patients treated with Sulfasalazine may generate falsely decreased results for ALT.   02/26/2025 12 7 - 45 U/L Final     Comment:     Patients treated with Sulfasalazine may generate falsely decreased results for ALT.     Bilirubin, Total   Date Value Ref Range Status   03/05/2025 0.3 0.0 - 1.2 mg/dL Final   02/26/2025 0.4 0.0 - 1.2 mg/dL Final     Glucose   Date Value Ref Range Status   03/05/2025 122 (H) 74 - 99 mg/dL Final   02/26/2025 105 (H) 74 - 99 mg/dL Final     Calcium   Date Value Ref Range Status   03/05/2025 8.8 8.6 - 10.3 mg/dL Final   02/26/2025 9.1 8.6 - 10.3 mg/dL Final     Sodium   Date Value Ref Range Status   03/05/2025 135 (L) 136 - 145 mmol/L Final   02/26/2025 134 (L) 136 - 145 mmol/L Final     Potassium   Date Value Ref Range Status   03/05/2025 4.2 3.5 - 5.3 mmol/L Final   02/26/2025 4.5 3.5 - 5.3 mmol/L Final     Bicarbonate   Date Value Ref Range Status   03/05/2025 33 (H) 21 - 32 mmol/L Final   02/26/2025 35 (H) 21 - 32 mmol/L Final     Chloride   Date Value Ref Range Status   03/05/2025 95 (L) 98 - 107 mmol/L Final   02/26/2025 93 (L) 98 - 107 mmol/L Final     Urea Nitrogen   Date Value Ref Range Status    03/05/2025 11 6 - 23 mg/dL Final   02/26/2025 6 6 - 23 mg/dL Final     Creatinine   Date Value Ref Range Status   03/05/2025 0.47 (L) 0.50 - 1.05 mg/dL Final   02/26/2025 0.48 (L) 0.50 - 1.05 mg/dL Final         Exam: No evidence of oral thrush.    Assessment / Plan:  The patient is tolerating radiation therapy as anticipated.  Continue per current treatment plan.       Therapies: Addition of carafate to BMX for esophagitis. Rest as needed for fatigue..      Side effects reviewed with patient. Images, chart and labs reviewed.    Abimael Bridges MD

## 2025-03-06 ENCOUNTER — INFUSION (OUTPATIENT)
Dept: HEMATOLOGY/ONCOLOGY | Facility: CLINIC | Age: 70
End: 2025-03-06
Payer: MEDICARE

## 2025-03-06 ENCOUNTER — HOSPITAL ENCOUNTER (OUTPATIENT)
Dept: RADIATION ONCOLOGY | Facility: CLINIC | Age: 70
Setting detail: RADIATION/ONCOLOGY SERIES
Discharge: HOME | End: 2025-03-06
Payer: MEDICARE

## 2025-03-06 VITALS
SYSTOLIC BLOOD PRESSURE: 100 MMHG | TEMPERATURE: 98.2 F | WEIGHT: 106.48 LBS | DIASTOLIC BLOOD PRESSURE: 63 MMHG | HEART RATE: 108 BPM | OXYGEN SATURATION: 91 % | BODY MASS INDEX: 18.86 KG/M2 | RESPIRATION RATE: 18 BRPM

## 2025-03-06 DIAGNOSIS — C34.11 MALIGNANT NEOPLASM OF UPPER LOBE, RIGHT BRONCHUS OR LUNG: ICD-10-CM

## 2025-03-06 DIAGNOSIS — Z51.0 ENCOUNTER FOR ANTINEOPLASTIC RADIATION THERAPY: ICD-10-CM

## 2025-03-06 DIAGNOSIS — C34.11 MALIGNANT NEOPLASM OF UPPER LOBE OF RIGHT LUNG (MULTI): ICD-10-CM

## 2025-03-06 LAB
RAD ONC MSQ ACTUAL FRACTIONS DELIVERED: 18
RAD ONC MSQ ACTUAL SESSION DELIVERED DOSE: 200 CGRAY
RAD ONC MSQ ACTUAL TOTAL DOSE: 3600 CGRAY
RAD ONC MSQ ELAPSED DAYS: 23
RAD ONC MSQ LAST DATE: NORMAL
RAD ONC MSQ PRESCRIBED FRACTIONAL DOSE: 200 CGRAY
RAD ONC MSQ PRESCRIBED NUMBER OF FRACTIONS: 30
RAD ONC MSQ PRESCRIBED TECHNIQUE: NORMAL
RAD ONC MSQ PRESCRIBED TOTAL DOSE: 6000 CGRAY
RAD ONC MSQ PRESCRIPTION PATTERN COMMENT: NORMAL
RAD ONC MSQ START DATE: NORMAL
RAD ONC MSQ TREATMENT COURSE NUMBER: 1
RAD ONC MSQ TREATMENT SITE: NORMAL

## 2025-03-06 PROCEDURE — 96417 CHEMO IV INFUS EACH ADDL SEQ: CPT

## 2025-03-06 PROCEDURE — 2500000004 HC RX 250 GENERAL PHARMACY W/ HCPCS (ALT 636 FOR OP/ED): Performed by: INTERNAL MEDICINE

## 2025-03-06 PROCEDURE — 96413 CHEMO IV INFUSION 1 HR: CPT

## 2025-03-06 PROCEDURE — 96375 TX/PRO/DX INJ NEW DRUG ADDON: CPT | Mod: INF

## 2025-03-06 PROCEDURE — 77386 HC INTENSITY-MODULATED RADIATION THERAPY (IMRT), COMPLEX: CPT | Performed by: STUDENT IN AN ORGANIZED HEALTH CARE EDUCATION/TRAINING PROGRAM

## 2025-03-06 PROCEDURE — 2500000001 HC RX 250 WO HCPCS SELF ADMINISTERED DRUGS (ALT 637 FOR MEDICARE OP): Performed by: INTERNAL MEDICINE

## 2025-03-06 RX ORDER — ALBUTEROL SULFATE 0.83 MG/ML
3 SOLUTION RESPIRATORY (INHALATION) AS NEEDED
Status: DISCONTINUED | OUTPATIENT
Start: 2025-03-06 | End: 2025-03-06 | Stop reason: HOSPADM

## 2025-03-06 RX ORDER — HEPARIN 100 UNIT/ML
500 SYRINGE INTRAVENOUS AS NEEDED
OUTPATIENT
Start: 2025-03-06

## 2025-03-06 RX ORDER — DIPHENHYDRAMINE HYDROCHLORIDE 50 MG/ML
50 INJECTION INTRAMUSCULAR; INTRAVENOUS AS NEEDED
Status: DISCONTINUED | OUTPATIENT
Start: 2025-03-06 | End: 2025-03-06 | Stop reason: HOSPADM

## 2025-03-06 RX ORDER — HEPARIN 100 UNIT/ML
500 SYRINGE INTRAVENOUS AS NEEDED
Status: DISCONTINUED | OUTPATIENT
Start: 2025-03-06 | End: 2025-03-06 | Stop reason: HOSPADM

## 2025-03-06 RX ORDER — HEPARIN SODIUM,PORCINE/PF 10 UNIT/ML
50 SYRINGE (ML) INTRAVENOUS AS NEEDED
Status: DISCONTINUED | OUTPATIENT
Start: 2025-03-06 | End: 2025-03-06 | Stop reason: HOSPADM

## 2025-03-06 RX ORDER — FAMOTIDINE 10 MG/ML
20 INJECTION, SOLUTION INTRAVENOUS ONCE
Status: COMPLETED | OUTPATIENT
Start: 2025-03-06 | End: 2025-03-06

## 2025-03-06 RX ORDER — DIPHENHYDRAMINE HCL 25 MG
50 CAPSULE ORAL ONCE
Status: COMPLETED | OUTPATIENT
Start: 2025-03-06 | End: 2025-03-06

## 2025-03-06 RX ORDER — PROCHLORPERAZINE EDISYLATE 5 MG/ML
10 INJECTION INTRAMUSCULAR; INTRAVENOUS EVERY 6 HOURS PRN
Status: DISCONTINUED | OUTPATIENT
Start: 2025-03-06 | End: 2025-03-06 | Stop reason: HOSPADM

## 2025-03-06 RX ORDER — EPINEPHRINE 0.3 MG/.3ML
0.3 INJECTION SUBCUTANEOUS EVERY 5 MIN PRN
Status: DISCONTINUED | OUTPATIENT
Start: 2025-03-06 | End: 2025-03-06 | Stop reason: HOSPADM

## 2025-03-06 RX ORDER — FAMOTIDINE 10 MG/ML
20 INJECTION, SOLUTION INTRAVENOUS ONCE AS NEEDED
Status: DISCONTINUED | OUTPATIENT
Start: 2025-03-06 | End: 2025-03-06 | Stop reason: HOSPADM

## 2025-03-06 RX ORDER — HEPARIN SODIUM,PORCINE/PF 10 UNIT/ML
50 SYRINGE (ML) INTRAVENOUS AS NEEDED
OUTPATIENT
Start: 2025-03-06

## 2025-03-06 RX ORDER — PALONOSETRON 0.05 MG/ML
0.25 INJECTION, SOLUTION INTRAVENOUS ONCE
Status: COMPLETED | OUTPATIENT
Start: 2025-03-06 | End: 2025-03-06

## 2025-03-06 RX ORDER — DEXAMETHASONE 6 MG/1
12 TABLET ORAL ONCE
Status: COMPLETED | OUTPATIENT
Start: 2025-03-06 | End: 2025-03-06

## 2025-03-06 RX ORDER — PROCHLORPERAZINE MALEATE 10 MG
10 TABLET ORAL EVERY 6 HOURS PRN
Status: DISCONTINUED | OUTPATIENT
Start: 2025-03-06 | End: 2025-03-06 | Stop reason: HOSPADM

## 2025-03-06 RX ADMIN — DIPHENHYDRAMINE HYDROCHLORIDE 50 MG: 25 CAPSULE ORAL at 08:18

## 2025-03-06 RX ADMIN — CARBOPLATIN 160 MG: 10 INJECTION, SOLUTION INTRAVENOUS at 09:31

## 2025-03-06 RX ADMIN — HEPARIN 500 UNITS: 100 SYRINGE at 10:14

## 2025-03-06 RX ADMIN — PACLITAXEL 65.4 MG: 6 INJECTION, SOLUTION INTRAVENOUS at 08:28

## 2025-03-06 RX ADMIN — FAMOTIDINE 20 MG: 10 INJECTION INTRAVENOUS at 08:18

## 2025-03-06 RX ADMIN — PALONOSETRON HYDROCHLORIDE 0.25 MG: 0.25 INJECTION INTRAVENOUS at 08:18

## 2025-03-06 RX ADMIN — DEXAMETHASONE 12 MG: 6 TABLET ORAL at 08:18

## 2025-03-06 ASSESSMENT — PAIN SCALES - GENERAL: PAINLEVEL_OUTOF10: 0-NO PAIN

## 2025-03-07 ENCOUNTER — HOSPITAL ENCOUNTER (OUTPATIENT)
Dept: RADIATION ONCOLOGY | Facility: CLINIC | Age: 70
Setting detail: RADIATION/ONCOLOGY SERIES
Discharge: HOME | End: 2025-03-07
Payer: MEDICARE

## 2025-03-07 DIAGNOSIS — Z51.0 ENCOUNTER FOR ANTINEOPLASTIC RADIATION THERAPY: ICD-10-CM

## 2025-03-07 DIAGNOSIS — C34.11 MALIGNANT NEOPLASM OF UPPER LOBE, RIGHT BRONCHUS OR LUNG: ICD-10-CM

## 2025-03-07 LAB
RAD ONC MSQ ACTUAL FRACTIONS DELIVERED: 19
RAD ONC MSQ ACTUAL SESSION DELIVERED DOSE: 200 CGRAY
RAD ONC MSQ ACTUAL TOTAL DOSE: 3800 CGRAY
RAD ONC MSQ ELAPSED DAYS: 24
RAD ONC MSQ LAST DATE: NORMAL
RAD ONC MSQ PRESCRIBED FRACTIONAL DOSE: 200 CGRAY
RAD ONC MSQ PRESCRIBED NUMBER OF FRACTIONS: 30
RAD ONC MSQ PRESCRIBED TECHNIQUE: NORMAL
RAD ONC MSQ PRESCRIBED TOTAL DOSE: 6000 CGRAY
RAD ONC MSQ PRESCRIPTION PATTERN COMMENT: NORMAL
RAD ONC MSQ START DATE: NORMAL
RAD ONC MSQ TREATMENT COURSE NUMBER: 1
RAD ONC MSQ TREATMENT SITE: NORMAL

## 2025-03-07 PROCEDURE — 77386 HC INTENSITY-MODULATED RADIATION THERAPY (IMRT), COMPLEX: CPT | Performed by: STUDENT IN AN ORGANIZED HEALTH CARE EDUCATION/TRAINING PROGRAM

## 2025-03-10 ENCOUNTER — HOSPITAL ENCOUNTER (OUTPATIENT)
Dept: RADIATION ONCOLOGY | Facility: CLINIC | Age: 70
Setting detail: RADIATION/ONCOLOGY SERIES
Discharge: HOME | End: 2025-03-10
Payer: MEDICARE

## 2025-03-10 DIAGNOSIS — Z51.0 ENCOUNTER FOR ANTINEOPLASTIC RADIATION THERAPY: ICD-10-CM

## 2025-03-10 DIAGNOSIS — C34.11 MALIGNANT NEOPLASM OF UPPER LOBE, RIGHT BRONCHUS OR LUNG: ICD-10-CM

## 2025-03-10 LAB
RAD ONC MSQ ACTUAL FRACTIONS DELIVERED: 20
RAD ONC MSQ ACTUAL SESSION DELIVERED DOSE: 200 CGRAY
RAD ONC MSQ ACTUAL TOTAL DOSE: 4000 CGRAY
RAD ONC MSQ ELAPSED DAYS: 27
RAD ONC MSQ LAST DATE: NORMAL
RAD ONC MSQ PRESCRIBED FRACTIONAL DOSE: 200 CGRAY
RAD ONC MSQ PRESCRIBED NUMBER OF FRACTIONS: 30
RAD ONC MSQ PRESCRIBED TECHNIQUE: NORMAL
RAD ONC MSQ PRESCRIBED TOTAL DOSE: 6000 CGRAY
RAD ONC MSQ PRESCRIPTION PATTERN COMMENT: NORMAL
RAD ONC MSQ START DATE: NORMAL
RAD ONC MSQ TREATMENT COURSE NUMBER: 1
RAD ONC MSQ TREATMENT SITE: NORMAL

## 2025-03-10 PROCEDURE — 77386 HC INTENSITY-MODULATED RADIATION THERAPY (IMRT), COMPLEX: CPT | Performed by: STUDENT IN AN ORGANIZED HEALTH CARE EDUCATION/TRAINING PROGRAM

## 2025-03-11 ENCOUNTER — HOSPITAL ENCOUNTER (OUTPATIENT)
Dept: RADIATION ONCOLOGY | Facility: CLINIC | Age: 70
Setting detail: RADIATION/ONCOLOGY SERIES
Discharge: HOME | End: 2025-03-11
Payer: MEDICARE

## 2025-03-11 ENCOUNTER — OFFICE VISIT (OUTPATIENT)
Dept: HEMATOLOGY/ONCOLOGY | Facility: HOSPITAL | Age: 70
End: 2025-03-11
Payer: MEDICARE

## 2025-03-11 VITALS
BODY MASS INDEX: 18.92 KG/M2 | WEIGHT: 106.8 LBS | SYSTOLIC BLOOD PRESSURE: 113 MMHG | HEART RATE: 117 BPM | DIASTOLIC BLOOD PRESSURE: 74 MMHG | OXYGEN SATURATION: 94 % | RESPIRATION RATE: 19 BRPM | TEMPERATURE: 97.2 F

## 2025-03-11 DIAGNOSIS — C34.11 MALIGNANT NEOPLASM OF UPPER LOBE OF RIGHT LUNG (MULTI): Primary | ICD-10-CM

## 2025-03-11 DIAGNOSIS — C34.11 MALIGNANT NEOPLASM OF UPPER LOBE, RIGHT BRONCHUS OR LUNG: ICD-10-CM

## 2025-03-11 DIAGNOSIS — Z51.0 ENCOUNTER FOR ANTINEOPLASTIC RADIATION THERAPY: ICD-10-CM

## 2025-03-11 LAB
RAD ONC MSQ ACTUAL FRACTIONS DELIVERED: 21
RAD ONC MSQ ACTUAL SESSION DELIVERED DOSE: 200 CGRAY
RAD ONC MSQ ACTUAL TOTAL DOSE: 4200 CGRAY
RAD ONC MSQ ELAPSED DAYS: 28
RAD ONC MSQ LAST DATE: NORMAL
RAD ONC MSQ PRESCRIBED FRACTIONAL DOSE: 200 CGRAY
RAD ONC MSQ PRESCRIBED NUMBER OF FRACTIONS: 30
RAD ONC MSQ PRESCRIBED TECHNIQUE: NORMAL
RAD ONC MSQ PRESCRIBED TOTAL DOSE: 6000 CGRAY
RAD ONC MSQ PRESCRIPTION PATTERN COMMENT: NORMAL
RAD ONC MSQ START DATE: NORMAL
RAD ONC MSQ TREATMENT COURSE NUMBER: 1
RAD ONC MSQ TREATMENT SITE: NORMAL

## 2025-03-11 PROCEDURE — 99215 OFFICE O/P EST HI 40 MIN: CPT | Performed by: INTERNAL MEDICINE

## 2025-03-11 PROCEDURE — 3074F SYST BP LT 130 MM HG: CPT | Performed by: INTERNAL MEDICINE

## 2025-03-11 PROCEDURE — 77336 RADIATION PHYSICS CONSULT: CPT | Performed by: STUDENT IN AN ORGANIZED HEALTH CARE EDUCATION/TRAINING PROGRAM

## 2025-03-11 PROCEDURE — 77386 HC INTENSITY-MODULATED RADIATION THERAPY (IMRT), COMPLEX: CPT | Performed by: STUDENT IN AN ORGANIZED HEALTH CARE EDUCATION/TRAINING PROGRAM

## 2025-03-11 PROCEDURE — 3078F DIAST BP <80 MM HG: CPT | Performed by: INTERNAL MEDICINE

## 2025-03-11 ASSESSMENT — PATIENT HEALTH QUESTIONNAIRE - PHQ9
1. LITTLE INTEREST OR PLEASURE IN DOING THINGS: NOT AT ALL
2. FEELING DOWN, DEPRESSED OR HOPELESS: NOT AT ALL
SUM OF ALL RESPONSES TO PHQ9 QUESTIONS 1 & 2: 0

## 2025-03-11 ASSESSMENT — ENCOUNTER SYMPTOMS
RESPIRATORY NEGATIVE: 1
GASTROINTESTINAL NEGATIVE: 1
CONSTITUTIONAL NEGATIVE: 1
CARDIOVASCULAR NEGATIVE: 1

## 2025-03-11 ASSESSMENT — COLUMBIA-SUICIDE SEVERITY RATING SCALE - C-SSRS
2. HAVE YOU ACTUALLY HAD ANY THOUGHTS OF KILLING YOURSELF?: NO
1. IN THE PAST MONTH, HAVE YOU WISHED YOU WERE DEAD OR WISHED YOU COULD GO TO SLEEP AND NOT WAKE UP?: NO
6. HAVE YOU EVER DONE ANYTHING, STARTED TO DO ANYTHING, OR PREPARED TO DO ANYTHING TO END YOUR LIFE?: NO

## 2025-03-11 NOTE — PROGRESS NOTES
Patient ID: Billie Hernadez is a 69 y.o. female.    Subjective:  Returns for follow up for lung cancer.   Feels OK. Cough has improved since starting radiation.     Heme/Onc History:  - CT c screening (Oct 2024): RUL nodule  - PET/CT: RUL lung nodule with uptake. Small R hilar, mediastinal, and a supraclavicular LN with moderate uptake. L adrenal nodule with low uptake (could not see that one myself)  - Bronch (11/22/24): NSCLC. C/w adenosquamous. PD-L1 10%. NGS pending. Level 7 LN aspiration positive for malignant cells. Level 4R, 11R, 11L negative.   - Started Carbo/taxol/nivo on 12/17/24 => Admitted to hospital with worsening dyspnea on 12/31 likely 2/2 COPD exacerbation => Steroid taper  - Supraclavicular LN bx (12/30/24): Positive for malignancy.   - CT c/a/p (Feb 2025, after C3): Stable RUL lung mass. Stable hilar, mediastinal LAPs. Cannot visualize supraclavicular LAP well. New small R pleural effusion, too small to tap. L adrenal nodule stable.  - ChemoRT (Feb-Mar 2025) with weekly carbo-taxol    Assessment/Plan:  ? NSCLC: At least stage III with biopsy proven mediastinal LAPs. Could be considered to have stage IV with biopsy proven R supraclavicular LAP. L adrenal nodule may be adenoma (very mild uptake on PET).     After a long conversation with the patient, we had decided to go ahead with the following plan:   A. Start chemoimmunotherapy with CARBO-PACLitaxel + nivolumab (completed)  B. Repeat PET/CT after C3. If disease is controlled, start chemoRT to include R supraclavicular lymph node. (Ongoing)  C. Meanwhile, if L adrenal gland is c/w metastasis, would get radiation to the adrenal as well.     The fact that supraclavicular LN is positive for malignancy is a poor sign. Patient understands that.   CT scans in Feb 2025 had not shown response but no progression either. Immunotherapy may have late efficacy, too. Continue chemoRT now. Last chemo dose will be next week. Plan to repeat CT chest without  contrast in April. If no progression, will continue with consolidation immunotherapy x 1 year. Will plan to get PET/CT done in June.     COPD: On symbicort.     Low Mg: On po magnesium    ADDENDUM: Mag is even lower. Will give 4 grams IV. Continue PO    I provide longitudinal care for Ms. Hernadez for her lung cancer    Review Of Systems:  Review of Systems   Constitutional: Negative.    HENT:  Negative.     Respiratory: Negative.     Cardiovascular: Negative.    Gastrointestinal: Negative.        Physical Exam:  /74 (BP Location: Left arm, Patient Position: Sitting)   Pulse (!) 117   Temp 36.2 °C (97.2 °F) (Skin)   Resp 19   Wt 48.4 kg (106 lb 12.8 oz)   SpO2 94%   BMI 18.92 kg/m²   BSA: 1.47 meters squared  Performance Status: Asymptomatic  Physical Exam  HENT:      Head: Normocephalic and atraumatic.   Eyes:      General: No scleral icterus.  Pulmonary:      Effort: Pulmonary effort is normal.   Musculoskeletal:         General: Normal range of motion.   Skin:     Coloration: Skin is not jaundiced.   Neurological:      General: No focal deficit present.      Mental Status: She is alert and oriented to person, place, and time.         Results:  Diagnostic Results   Lab Results   Component Value Date    WBC 3.8 (L) 03/05/2025    HGB 8.8 (L) 03/05/2025    HCT 27.1 (L) 03/05/2025    MCV 99 03/05/2025     03/05/2025     Lab Results   Component Value Date    CALCIUM 8.8 03/05/2025     (L) 03/05/2025    K 4.2 03/05/2025    CO2 33 (H) 03/05/2025    CL 95 (L) 03/05/2025    BUN 11 03/05/2025    CREATININE 0.47 (L) 03/05/2025    ALT 12 03/05/2025    AST 17 03/05/2025       Current Outpatient Medications:     albuterol 2.5 mg /3 mL (0.083 %) nebulizer solution, Take 3 mL (2.5 mg) by nebulization every 6 hours if needed for wheezing., Disp: 75 mL, Rfl: 3    albuterol 90 mcg/actuation inhaler, INHALE ONE TO TWO INHALTIONS BY MOUTH EVERY 4 TO 6 HOURS AS NEEDED, Disp: 51 g, Rfl: 2    alendronate (Fosamax)  70 mg tablet, TAKE ONE TABLET ONE TIME PER WEEK. TAKE IN THE MORNING WITH A FULL GLASS OF WATER, ON AN EMPTY STOMACH, AND DO NOT TAKE ANYTHING BY MOUTH OR LIE DOWN FOR 30 MINUTES, Disp: 12 tablet, Rfl: 3    calcium citrate-vitamin D2 250 mg-2.5 mcg (100 unit) tablet, Take 1 tablet by mouth once daily., Disp: , Rfl:     diphenhydramine/Maalox/lidocaine (Magic Mouthwash) - Compounded - Outpatient, 15 - 30 mL by mouth/swallow every 2 hours as needed for throat pain with swallowing food/drink, Disp: 360 mL, Rfl: 3    docusate sodium (Colace) 100 mg capsule, Take 1 capsule (100 mg) by mouth 2 times a day., Disp: 60 capsule, Rfl: 0    guaiFENesin (Mucinex) 600 mg 12 hr tablet, Take 1 tablet (600 mg) by mouth 2 times a day. Do not crush, chew, or split., Disp: 14 tablet, Rfl: 0    losartan (Cozaar) 25 mg tablet, TAKE ONE TABLET BY MOUTH ONCE DAILY, Disp: 100 tablet, Rfl: 3    magnesium oxide (Mag-Ox) 400 mg (241.3 mg magnesium) tablet, Take 1 tablet (400 mg) by mouth 2 times a day., Disp: 60 tablet, Rfl: 1    multivitamin with minerals tablet, Take 1 tablet by mouth once daily., Disp: , Rfl:     ondansetron (Zofran) 8 mg tablet, Take 1 tablet (8 mg) by mouth every 8 hours if needed for nausea or vomiting., Disp: 30 tablet, Rfl: 5    pantoprazole (Protonix) 20 mg EC tablet, Take 1 tablet (20 mg) by mouth once daily. Do not crush, chew, or split., Disp: 30 tablet, Rfl: 2    prochlorperazine (Compazine) 10 mg tablet, Take 1 tablet (10 mg) by mouth every 6 hours if needed for nausea or vomiting., Disp: 30 tablet, Rfl: 5    Spiriva with HandiHaler 18 mcg inhalation capsule, place ONE CAPSULE into inhaler AND inhale ONCE DAILY, Disp: 90 capsule, Rfl: 3    sucralfate (Carafate) 100 mg/mL suspension, Take 10 mL (1 g) by mouth 4 times a day with meals., Disp: 473 mL, Rfl: 3    benzonatate (Tessalon) 100 mg capsule, Take 1 capsule (100 mg) by mouth 3 times a day as needed for cough. Do not crush or chew. (Patient not taking:  Reported on 3/11/2025), Disp: 40 capsule, Rfl: 0    budesonide-formoteroL (Symbicort) 80-4.5 mcg/actuation inhaler, Inhale 2 puffs 2 times a day. Rinse mouth with water after use to reduce aftertaste and incidence of candidiasis. Do not swallow. (Patient not taking: Reported on 3/11/2025), Disp: 10.2 g, Rfl: 11    dexAMETHasone (Decadron) 4 mg tablet, TAKE TWO TABLETS BY MOUTH ONCE DAILY. start THE DAY AFTER treatment FOR THREE DAYS (Patient not taking: Reported on 3/11/2025), Disp: 6 tablet, Rfl: 2    OLANZapine (ZyPREXA) 5 mg tablet, TAKE ONE TABLET BY MOUTH AT BEDTIME FOR FOUR DAYS STARTING THE evening of treatment. (Patient not taking: Reported on 3/11/2025), Disp: 4 tablet, Rfl: 2     Past Surgical History:   Procedure Laterality Date    EXCISION / BIOPSY BREAST / NIPPLE / DUCT Left 2014    LUNG BIOPSY  2024    Bronchoscopy and needle biopsy    OTHER SURGICAL HISTORY Left 2015    Open Treatment Of Tibial Shaft Fracture With Implant    PORTACATH PLACEMENT N/A     TONSILLECTOMY       No family history on file.   reports that she quit smoking about 13 months ago. Her smoking use included cigarettes. She started smoking about 52 years ago. She has a 25.5 pack-year smoking history. She has been exposed to tobacco smoke. She has never used smokeless tobacco.  Social History     Socioeconomic History    Marital status:      Spouse name: Not on file    Number of children: Not on file    Years of education: Not on file    Highest education level: Not on file   Occupational History    Not on file   Tobacco Use    Smoking status: Former     Current packs/day: 0.00     Average packs/day: 0.5 packs/day for 51.1 years (25.5 ttl pk-yrs)     Types: Cigarettes     Start date:      Quit date: 2024     Years since quittin.1     Passive exposure: Past    Smokeless tobacco: Never   Vaping Use    Vaping status: Never Used   Substance and Sexual Activity    Alcohol use: Yes     Alcohol/week: 2.0  standard drinks of alcohol     Types: 2 Cans of beer per week     Comment: 1 every other month    Drug use: Never    Sexual activity: Defer   Other Topics Concern    Not on file   Social History Narrative    Not on file     Social Drivers of Health     Financial Resource Strain: Low Risk  (1/2/2025)    Overall Financial Resource Strain (CARDIA)     Difficulty of Paying Living Expenses: Not very hard   Food Insecurity: No Food Insecurity (1/1/2025)    Hunger Vital Sign     Worried About Running Out of Food in the Last Year: Never true     Ran Out of Food in the Last Year: Never true   Recent Concern: Food Insecurity - High Risk (12/18/2024)    Received from [facility] SDOH Screening     Does the member feel like he/she gets enough food most days?: Yes     Summarize member's perception of social determinants of health including living situation, food insecurity, financial needs, transportation, learning, and technology.  identify barriers to include ...: Member resides in ltc and has all care needs met at facility. no food, financial, or transportati...   Transportation Needs: No Transportation Needs (1/2/2025)    PRAPARE - Transportation     Lack of Transportation (Medical): No     Lack of Transportation (Non-Medical): No   Recent Concern: Transportation Needs - High Risk (12/18/2024)    Received from [facility] SDOH Screening     The following questions pertain to transportation, utilities, employment, and financial or legal concerns: Select next question     Does the member need help with any of the following activities?: Getting transportation   Physical Activity: Insufficiently Active (1/1/2025)    Exercise Vital Sign     Days of Exercise per Week: 3 days     Minutes of Exercise per Session: 40 min   Stress: Stress Concern Present (3/11/2025)    [place] citizen [place] [employer] - Occupational Stress Questionnaire     Feeling of Stress : To some extent   Social Connections: Not on  file   Intimate Partner Violence: Not At Risk (1/1/2025)    Humiliation, Afraid, Rape, and Kick questionnaire     Fear of Current or Ex-Partner: No     Emotionally Abused: No     Physically Abused: No     Sexually Abused: No   Housing Stability: Low Risk  (1/2/2025)    Housing Stability Vital Sign     Unable to Pay for Housing in the Last Year: No     Number of Times Moved in the Last Year: 0     Homeless in the Last Year: No       Diagnoses and all orders for this visit:  Malignant neoplasm of upper lobe of right lung (Multi)  -     Clinic Appointment Request  -     CT chest wo IV contrast; Future  -     Clinic Appointment Request Follow up; Future       Judith Santillan MD

## 2025-03-12 ENCOUNTER — HOSPITAL ENCOUNTER (OUTPATIENT)
Dept: RADIATION ONCOLOGY | Facility: CLINIC | Age: 70
Setting detail: RADIATION/ONCOLOGY SERIES
Discharge: HOME | End: 2025-03-12
Payer: MEDICARE

## 2025-03-12 ENCOUNTER — INFUSION (OUTPATIENT)
Dept: HEMATOLOGY/ONCOLOGY | Facility: CLINIC | Age: 70
End: 2025-03-12
Payer: MEDICARE

## 2025-03-12 VITALS
WEIGHT: 106.37 LBS | OXYGEN SATURATION: 99 % | TEMPERATURE: 97.2 F | DIASTOLIC BLOOD PRESSURE: 55 MMHG | HEART RATE: 83 BPM | SYSTOLIC BLOOD PRESSURE: 86 MMHG | RESPIRATION RATE: 18 BRPM | BODY MASS INDEX: 18.84 KG/M2

## 2025-03-12 DIAGNOSIS — E83.42 HYPOMAGNESEMIA: Primary | ICD-10-CM

## 2025-03-12 DIAGNOSIS — C34.11 MALIGNANT NEOPLASM OF UPPER LOBE OF RIGHT LUNG (MULTI): ICD-10-CM

## 2025-03-12 DIAGNOSIS — Z51.0 ENCOUNTER FOR ANTINEOPLASTIC RADIATION THERAPY: ICD-10-CM

## 2025-03-12 DIAGNOSIS — J96.01 ACUTE RESPIRATORY FAILURE WITH HYPOXIA (MULTI): ICD-10-CM

## 2025-03-12 DIAGNOSIS — C34.11 MALIGNANT NEOPLASM OF UPPER LOBE, RIGHT BRONCHUS OR LUNG: ICD-10-CM

## 2025-03-12 DIAGNOSIS — E83.42 HYPOMAGNESEMIA: ICD-10-CM

## 2025-03-12 LAB
ALBUMIN SERPL BCP-MCNC: 3.5 G/DL (ref 3.4–5)
ALP SERPL-CCNC: 65 U/L (ref 33–136)
ALT SERPL W P-5'-P-CCNC: 12 U/L (ref 7–45)
ANION GAP SERPL CALC-SCNC: 10 MMOL/L (ref 10–20)
AST SERPL W P-5'-P-CCNC: 20 U/L (ref 9–39)
BASOPHILS # BLD MANUAL: 0 X10*3/UL (ref 0–0.1)
BASOPHILS NFR BLD MANUAL: 0 %
BILIRUB SERPL-MCNC: 0.4 MG/DL (ref 0–1.2)
BUN SERPL-MCNC: 14 MG/DL (ref 6–23)
CALCIUM SERPL-MCNC: 8.8 MG/DL (ref 8.6–10.3)
CHLORIDE SERPL-SCNC: 97 MMOL/L (ref 98–107)
CO2 SERPL-SCNC: 35 MMOL/L (ref 21–32)
CREAT SERPL-MCNC: 0.48 MG/DL (ref 0.5–1.05)
DACRYOCYTES BLD QL SMEAR: ABNORMAL
EGFRCR SERPLBLD CKD-EPI 2021: >90 ML/MIN/1.73M*2
EOSINOPHIL # BLD MANUAL: 0.04 X10*3/UL (ref 0–0.4)
EOSINOPHIL NFR BLD MANUAL: 1 %
ERYTHROCYTE [DISTWIDTH] IN BLOOD BY AUTOMATED COUNT: 18.3 % (ref 11.5–14.5)
GIANT PLATELETS BLD QL SMEAR: ABNORMAL
GLUCOSE SERPL-MCNC: 120 MG/DL (ref 74–99)
HCT VFR BLD AUTO: 27.5 % (ref 36–46)
HGB BLD-MCNC: 8.6 G/DL (ref 12–16)
HYPOCHROMIA BLD QL SMEAR: ABNORMAL
IMM GRANULOCYTES # BLD AUTO: 0.03 X10*3/UL (ref 0–0.5)
IMM GRANULOCYTES NFR BLD AUTO: 0.8 % (ref 0–0.9)
LYMPHOCYTES # BLD MANUAL: 0.27 X10*3/UL (ref 0.8–3)
LYMPHOCYTES NFR BLD MANUAL: 7 %
MAGNESIUM SERPL-MCNC: 1.05 MG/DL (ref 1.6–2.4)
MCH RBC QN AUTO: 32.3 PG (ref 26–34)
MCHC RBC AUTO-ENTMCNC: 31.3 G/DL (ref 32–36)
MCV RBC AUTO: 103 FL (ref 80–100)
METAMYELOCYTES # BLD MANUAL: 0.04 X10*3/UL
METAMYELOCYTES NFR BLD MANUAL: 1 %
MONOCYTES # BLD MANUAL: 0.23 X10*3/UL (ref 0.05–0.8)
MONOCYTES NFR BLD MANUAL: 6 %
NEUTROPHILS # BLD MANUAL: 3.27 X10*3/UL (ref 1.6–5.5)
NEUTS BAND # BLD MANUAL: 0.23 X10*3/UL (ref 0–0.5)
NEUTS BAND NFR BLD MANUAL: 6 %
NEUTS SEG # BLD MANUAL: 3.04 X10*3/UL (ref 1.6–5)
NEUTS SEG NFR BLD MANUAL: 78 %
NRBC BLD-RTO: 0 /100 WBCS (ref 0–0)
OVALOCYTES BLD QL SMEAR: ABNORMAL
PLATELET # BLD AUTO: 200 X10*3/UL (ref 150–450)
POLYCHROMASIA BLD QL SMEAR: ABNORMAL
POTASSIUM SERPL-SCNC: 4.1 MMOL/L (ref 3.5–5.3)
PROT SERPL-MCNC: 6.4 G/DL (ref 6.4–8.2)
RAD ONC MSQ ACTUAL FRACTIONS DELIVERED: 22
RAD ONC MSQ ACTUAL SESSION DELIVERED DOSE: 200 CGRAY
RAD ONC MSQ ACTUAL TOTAL DOSE: 4400 CGRAY
RAD ONC MSQ ELAPSED DAYS: 29
RAD ONC MSQ LAST DATE: NORMAL
RAD ONC MSQ PRESCRIBED FRACTIONAL DOSE: 200 CGRAY
RAD ONC MSQ PRESCRIBED NUMBER OF FRACTIONS: 30
RAD ONC MSQ PRESCRIBED TECHNIQUE: NORMAL
RAD ONC MSQ PRESCRIBED TOTAL DOSE: 6000 CGRAY
RAD ONC MSQ PRESCRIPTION PATTERN COMMENT: NORMAL
RAD ONC MSQ START DATE: NORMAL
RAD ONC MSQ TREATMENT COURSE NUMBER: 1
RAD ONC MSQ TREATMENT SITE: NORMAL
RBC # BLD AUTO: 2.66 X10*6/UL (ref 4–5.2)
RBC MORPH BLD: ABNORMAL
SODIUM SERPL-SCNC: 138 MMOL/L (ref 136–145)
STOMATOCYTES BLD QL SMEAR: ABNORMAL
TOTAL CELLS COUNTED BLD: 100
VARIANT LYMPHS # BLD MANUAL: 0.04 X10*3/UL (ref 0–0.3)
VARIANT LYMPHS NFR BLD: 1 %
WBC # BLD AUTO: 3.9 X10*3/UL (ref 4.4–11.3)

## 2025-03-12 PROCEDURE — 83735 ASSAY OF MAGNESIUM: CPT

## 2025-03-12 PROCEDURE — 85007 BL SMEAR W/DIFF WBC COUNT: CPT

## 2025-03-12 PROCEDURE — 77386 HC INTENSITY-MODULATED RADIATION THERAPY (IMRT), COMPLEX: CPT | Performed by: STUDENT IN AN ORGANIZED HEALTH CARE EDUCATION/TRAINING PROGRAM

## 2025-03-12 PROCEDURE — 36591 DRAW BLOOD OFF VENOUS DEVICE: CPT

## 2025-03-12 PROCEDURE — 84075 ASSAY ALKALINE PHOSPHATASE: CPT

## 2025-03-12 PROCEDURE — 85027 COMPLETE CBC AUTOMATED: CPT

## 2025-03-12 PROCEDURE — 2500000004 HC RX 250 GENERAL PHARMACY W/ HCPCS (ALT 636 FOR OP/ED): Performed by: INTERNAL MEDICINE

## 2025-03-12 RX ORDER — HEPARIN 100 UNIT/ML
500 SYRINGE INTRAVENOUS AS NEEDED
Status: DISCONTINUED | OUTPATIENT
Start: 2025-03-12 | End: 2025-03-12 | Stop reason: HOSPADM

## 2025-03-12 RX ORDER — HEPARIN SODIUM,PORCINE/PF 10 UNIT/ML
50 SYRINGE (ML) INTRAVENOUS AS NEEDED
Status: DISCONTINUED | OUTPATIENT
Start: 2025-03-12 | End: 2025-03-12 | Stop reason: HOSPADM

## 2025-03-12 RX ORDER — HEPARIN SODIUM,PORCINE/PF 10 UNIT/ML
50 SYRINGE (ML) INTRAVENOUS AS NEEDED
Status: CANCELLED | OUTPATIENT
Start: 2025-03-12

## 2025-03-12 RX ORDER — MAGNESIUM SULFATE HEPTAHYDRATE 40 MG/ML
4 INJECTION, SOLUTION INTRAVENOUS ONCE
Status: CANCELLED | OUTPATIENT
Start: 2025-03-12

## 2025-03-12 RX ORDER — MAGNESIUM SULFATE HEPTAHYDRATE 40 MG/ML
4 INJECTION, SOLUTION INTRAVENOUS ONCE
OUTPATIENT
Start: 2025-03-19

## 2025-03-12 RX ORDER — HEPARIN 100 UNIT/ML
500 SYRINGE INTRAVENOUS AS NEEDED
Status: CANCELLED | OUTPATIENT
Start: 2025-03-12

## 2025-03-12 RX ADMIN — HEPARIN 500 UNITS: 100 SYRINGE at 08:28

## 2025-03-12 ASSESSMENT — PAIN SCALES - GENERAL: PAINLEVEL_OUTOF10: 0-NO PAIN

## 2025-03-12 ASSESSMENT — COLUMBIA-SUICIDE SEVERITY RATING SCALE - C-SSRS
1. IN THE PAST MONTH, HAVE YOU WISHED YOU WERE DEAD OR WISHED YOU COULD GO TO SLEEP AND NOT WAKE UP?: NO
6. HAVE YOU EVER DONE ANYTHING, STARTED TO DO ANYTHING, OR PREPARED TO DO ANYTHING TO END YOUR LIFE?: NO
2. HAVE YOU ACTUALLY HAD ANY THOUGHTS OF KILLING YOURSELF?: NO

## 2025-03-12 ASSESSMENT — PATIENT HEALTH QUESTIONNAIRE - PHQ9
2. FEELING DOWN, DEPRESSED OR HOPELESS: NOT AT ALL
SUM OF ALL RESPONSES TO PHQ9 QUESTIONS 1 AND 2: 0
1. LITTLE INTEREST OR PLEASURE IN DOING THINGS: NOT AT ALL

## 2025-03-12 ASSESSMENT — ENCOUNTER SYMPTOMS
DEPRESSION: 0
LOSS OF SENSATION IN FEET: 0
OCCASIONAL FEELINGS OF UNSTEADINESS: 0

## 2025-03-12 NOTE — PROGRESS NOTES
Radiation Oncology On Treatment Visit    Patient Name:  Billie Hernadez  MRN:  [id number]  :  1955    Referring Provider: Abimael Bridges MD  Primary Care Provider: MOR Holliday  Care Team: Patient Care Team:  MOR Holliday as PCP - General  Fredi Nolen MD as PCP - [employer] PCP  Judith Santillan MD as Medical Oncologist (Hematology and Oncology)  Bess Goins LPN as Care Manager (Case Management)  Jose Miguel Santillan MD as Medical Oncologist (Hematology and Oncology)    Date of Service: 3/12/2025     Diagnosis:   Specialty Problems          Radiation Oncology Problems    Malignant neoplasm of upper lobe of right lung (Multi)         Treatment Summary:  IMRT: Right Thorax, Midline Mediastinum, Right Supraclavicular fossa    Treatment Period Technique Fraction Dose Fractions Total Dose   Course 1 2025-3/12/2025  (days elapsed: 29)         RUL_Hil_Med 2025-3/12/2025 VMAT 200 / 200 cGy  4400 / 6,000 cGy     SUBJECTIVE: Carafate improving esophagitis.  Hypomagnesemia persistent despite mag oxide twice daily, recommended three times a day and reassess levels.     OBJECTIVE:   Vital Signs:  BP 86/55   Pulse 83   Temp 36.2 °C (97.2 °F) (Temporal)   Resp 18   Wt 48.2 kg (106 lb 6 oz)   SpO2 99%   BMI 18.84 kg/m²     Other Pertinent Findings:     Toxicity Assessment          2025    10:41 2025    09:19 2025    09:24 3/5/2025    09:31 3/12/2025    09:19   Toxicity Assessment   Treatment Site Thoracic Thoracic Thoracic Thoracic Thoracic   Anorexia Grade 0 Grade 0 Grade 0 Grade 0 Grade 0   Anxiety Grade 0 Grade 0 Grade 0 Grade 0 Grade 0   Dehydration Grade 0 Grade 0 Grade 0 Grade 0 Grade 0   Depression Grade 0 Grade 0 Grade 0 Grade 0 Grade 0   Dermatitis Radiation Grade 0 Grade 0 Grade 0 Grade 0 Grade 0   Diarrhea Grade 0 Grade 0 Grade 0 Grade 0 Grade 0   Fatigue Grade 1 Grade 0 Grade 1 Grade 1 Grade 1   Fibrosis Deep Connective Tissue  Grade 0 Grade 0 Grade 0 Grade 0 Grade 0   Fracture Grade 0 Grade 0 Grade 0 Grade 0 Grade 0   Nausea Grade 0 Grade 0 Grade 0 Grade 0 Grade 0   Pain Grade 0 Grade 0 Grade 0 Grade 0 Grade 0   Treatment Related Secondary Malignancy Grade 0 Grade 0 Grade 0 Grade 0 Grade 0   Tumor Pain Grade 0 Grade 0 Grade 0 Grade 0 Grade 0   Vomiting Grade 0 Grade 0 Grade 0 Grade 0 Grade 0   Constipation Grade 0 Grade 0 Grade 0 Grade 0 Grade 0   Dyspepsia Grade 0 Grade 0 Grade 0 Grade 0 Grade 0   Dysphagia Grade 0 Grade 0 Grade 1       sore throat Grade 0 Grade 1   Esophagitis Grade 0 Grade 0 Grade 0 Grade 0 Grade 0   Mucositis Oral Grade 0 Grade 0 Grade 0 Grade 0 Grade 0   Upper Gastrointestinal Hemorrhage Grade 0 Grade 0 Grade 0 Grade 0 Grade 0   Peripheral Sensory Neuropathy Grade 0   Grade 0 Grade 0   Brachial Plexopathy Grade 0       Pneumonitis Grade 0 Grade 0 Grade 0 Grade 0 Grade 0   Aspiration Grade 0 Grade 0 Grade 0 Grade 0 Grade 0   Hoarseness Grade 1 Grade 0 Grade 1 Grade 1 Grade 0   Laryngeal Edema Grade 0 Grade 0 Grade 0 Grade 0 Grade 0   Myocardial Infarction Grade 0 Grade 0 Grade 0 Grade 0 Grade 0   Pericardial Effusion Grade 0 Grade 0 Grade 0 Grade 0 Grade 0   Pericarditis Grade 0 Grade 0 Grade 0 Grade 0 Grade 0   Esophageal Fistula Grade 0 Grade 0 Grade 0 Grade 0 Grade 0   Esophageal Obstruction Grade 0 Grade 0 Grade 0 Grade 0 Grade 0   Esophageal Pain Grade 0 Grade 0 Grade 0 Grade 0 Grade 0   Esophageal Stenosis Grade 0 Grade 0 Grade 0 Grade 0 Grade 0   Esophageal Ulcer Grade 0 Grade 0 Grade 0 Grade 0 Grade 0   Bronchial Obstruction Grade 0 Grade 0 Grade 0 Grade 0 Grade 0   Cough Grade 1 Grade 1 Grade 1       dry Grade 0 Grade 1   Dyspnea Grade 2 Grade 1 Grade 1       with activity Grade 1 Grade 1   Epistaxis Grade 0 Grade 0 Grade 0 Grade 0 Grade 0   Hiccups Grade 0 Grade 0 Grade 0 Grade 0 Grade 0   Hypoxia Grade 0 Grade 0 Grade 0 Grade 0 Grade 0   Pulmonary Fibrosis Grade 0 Grade 0 Grade 0 Grade 0 Grade 0    Lymphedema Grade 0 Grade 0 Grade 0 Grade 0 Grade 0   Thromboembolic Event Grade 0 Grade 0 Grade 0 Grade 0 Grade 0   Hot Flashes Grade 0              Concurrent systemic therapy: fosaprepitant (Emend) 150 mg in sodium chloride 0.9% 150 mL IV, 150 mg, intravenous, Once, 3 of 3 cycles    Administration: 150 mg (12/17/2024), 150 mg (1/7/2025), 150 mg (1/28/2025)        CARBOplatin (Paraplatin) 440 mg in sodium chloride 0.9% 154 mL IV, 440 mg, intravenous, Once, 3 of 3 cycles    Administration: 440 mg (12/17/2024), 440 mg (1/7/2025), 440 mg (1/28/2025)        PACLitaxeL (Taxol) 288 mg in dextrose 5% 318 mL IV, 200 mg/m2 = 288 mg, intravenous, Once, 3 of 3 cycles    Administration: 288 mg (12/17/2024), 288 mg (1/7/2025), 288 mg (1/28/2025)        methylPREDNISolone sod succinate (SOLU-Medrol) 40 mg/mL injection 40 mg, 40 mg, intravenous, As needed, 3 of 3 cycles        palonosetron (Aloxi) injection 250 mcg, 250 mcg, intravenous, Once, 3 of 3 cycles    Administration: 250 mcg (12/17/2024), 250 mcg (1/7/2025), 250 mcg (1/28/2025)        nivolumab (Opdivo) 360 mg in sodium chloride 0.9% 146 mL IV, 360 mg, intravenous, Once, 3 of 3 cycles    Administration: 360 mg (12/17/2024), 360 mg (1/7/2025), 360 mg (1/28/2025)    CARBOplatin (Paraplatin) 160 mg in sodium chloride 0.9% 126 mL IV, 160 mg, intravenous, Once, 1 of 1 cycle    Administration: 160 mg (2/20/2025), 160 mg (2/27/2025), 160 mg (3/6/2025)        PACLitaxeL (Taxol) 65.4 mg in dextrose 5% 120.9 mL IV, 45 mg/m2 = 65.4 mg, intravenous, Once, 1 of 1 cycle    Administration: 65.4 mg (2/20/2025), 65.4 mg (2/27/2025), 65.4 mg (3/6/2025)        methylPREDNISolone sod succinate (SOLU-Medrol) 40 mg/mL injection 40 mg, 40 mg, intravenous, As needed, 1 of 1 cycle        palonosetron (Aloxi) injection 250 mcg, 250 mcg, intravenous, Once, 1 of 1 cycle    Administration: 250 mcg (2/20/2025), 250 mcg (2/27/2025), 0.25 mg (3/6/2025)      Labs:   WBC   Date Value Ref Range Status    03/12/2025 3.9 (L) 4.4 - 11.3 x10*3/uL Final   03/05/2025 3.8 (L) 4.4 - 11.3 x10*3/uL Final     Hemoglobin   Date Value Ref Range Status   03/12/2025 8.6 (L) 12.0 - 16.0 g/dL Final   03/05/2025 8.8 (L) 12.0 - 16.0 g/dL Final     Hematocrit   Date Value Ref Range Status   03/12/2025 27.5 (L) 36.0 - 46.0 % Final   03/05/2025 27.1 (L) 36.0 - 46.0 % Final     Neutrophils Absolute   Date Value Ref Range Status   02/18/2025 5.56 (H) 1.60 - 5.50 x10*3/uL Final     Comment:     Percent differential counts (%) should be interpreted in the context of the absolute cell counts (cells/uL).   02/11/2025 2.81 1.60 - 5.50 x10*3/uL Final     Comment:     Percent differential counts (%) should be interpreted in the context of the absolute cell counts (cells/uL).     Platelets   Date Value Ref Range Status   03/12/2025 200 150 - 450 x10*3/uL Final   03/05/2025 192 150 - 450 x10*3/uL Final     Alkaline Phosphatase   Date Value Ref Range Status   03/12/2025 65 33 - 136 U/L Final   03/05/2025 65 33 - 136 U/L Final     AST   Date Value Ref Range Status   03/12/2025 20 9 - 39 U/L Final   03/05/2025 17 9 - 39 U/L Final     ALT   Date Value Ref Range Status   03/12/2025 12 7 - 45 U/L Final     Comment:     Patients treated with Sulfasalazine may generate falsely decreased results for ALT.   03/05/2025 12 7 - 45 U/L Final     Comment:     Patients treated with Sulfasalazine may generate falsely decreased results for ALT.     Bilirubin, Total   Date Value Ref Range Status   03/12/2025 0.4 0.0 - 1.2 mg/dL Final   03/05/2025 0.3 0.0 - 1.2 mg/dL Final     Glucose   Date Value Ref Range Status   03/12/2025 120 (H) 74 - 99 mg/dL Final   03/05/2025 122 (H) 74 - 99 mg/dL Final     Calcium   Date Value Ref Range Status   03/12/2025 8.8 8.6 - 10.3 mg/dL Final   03/05/2025 8.8 8.6 - 10.3 mg/dL Final     Sodium   Date Value Ref Range Status   03/12/2025 138 136 - 145 mmol/L Final   03/05/2025 135 (L) 136 - 145 mmol/L Final     Potassium   Date Value  Ref Range Status   03/12/2025 4.1 3.5 - 5.3 mmol/L Final   03/05/2025 4.2 3.5 - 5.3 mmol/L Final     Bicarbonate   Date Value Ref Range Status   03/12/2025 35 (H) 21 - 32 mmol/L Final   03/05/2025 33 (H) 21 - 32 mmol/L Final     Chloride   Date Value Ref Range Status   03/12/2025 97 (L) 98 - 107 mmol/L Final   03/05/2025 95 (L) 98 - 107 mmol/L Final     Urea Nitrogen   Date Value Ref Range Status   03/12/2025 14 6 - 23 mg/dL Final   03/05/2025 11 6 - 23 mg/dL Final     Creatinine   Date Value Ref Range Status   03/12/2025 0.48 (L) 0.50 - 1.05 mg/dL Final   03/05/2025 0.47 (L) 0.50 - 1.05 mg/dL Final         Exam: Resting comfortable in chair on room air.       Assessment / Plan:  The patient is tolerating radiation therapy as anticipated.  Continue per current treatment plan.       Therapies: Increased mag oxide 400 mg to TID. Repeat Mag level, if still low may require referral for renal evaluation.      Side effects reviewed with patient. Images, chart and labs reviewed.    Abimael Bridges MD

## 2025-03-13 ENCOUNTER — INFUSION (OUTPATIENT)
Dept: HEMATOLOGY/ONCOLOGY | Facility: CLINIC | Age: 70
End: 2025-03-13
Payer: MEDICARE

## 2025-03-13 ENCOUNTER — HOSPITAL ENCOUNTER (OUTPATIENT)
Dept: RADIATION ONCOLOGY | Facility: CLINIC | Age: 70
Setting detail: RADIATION/ONCOLOGY SERIES
Discharge: HOME | End: 2025-03-13
Payer: MEDICARE

## 2025-03-13 VITALS
TEMPERATURE: 98.2 F | WEIGHT: 106.59 LBS | BODY MASS INDEX: 19.62 KG/M2 | HEIGHT: 62 IN | OXYGEN SATURATION: 97 % | HEART RATE: 107 BPM | RESPIRATION RATE: 18 BRPM | DIASTOLIC BLOOD PRESSURE: 66 MMHG | SYSTOLIC BLOOD PRESSURE: 112 MMHG

## 2025-03-13 DIAGNOSIS — Z51.0 ENCOUNTER FOR ANTINEOPLASTIC RADIATION THERAPY: ICD-10-CM

## 2025-03-13 DIAGNOSIS — C34.11 MALIGNANT NEOPLASM OF UPPER LOBE, RIGHT BRONCHUS OR LUNG: ICD-10-CM

## 2025-03-13 DIAGNOSIS — C34.11 MALIGNANT NEOPLASM OF UPPER LOBE OF RIGHT LUNG (MULTI): ICD-10-CM

## 2025-03-13 LAB
RAD ONC MSQ ACTUAL FRACTIONS DELIVERED: 23
RAD ONC MSQ ACTUAL SESSION DELIVERED DOSE: 200 CGRAY
RAD ONC MSQ ACTUAL TOTAL DOSE: 4600 CGRAY
RAD ONC MSQ ELAPSED DAYS: 30
RAD ONC MSQ LAST DATE: NORMAL
RAD ONC MSQ PRESCRIBED FRACTIONAL DOSE: 200 CGRAY
RAD ONC MSQ PRESCRIBED NUMBER OF FRACTIONS: 30
RAD ONC MSQ PRESCRIBED TECHNIQUE: NORMAL
RAD ONC MSQ PRESCRIBED TOTAL DOSE: 6000 CGRAY
RAD ONC MSQ PRESCRIPTION PATTERN COMMENT: NORMAL
RAD ONC MSQ START DATE: NORMAL
RAD ONC MSQ TREATMENT COURSE NUMBER: 1
RAD ONC MSQ TREATMENT SITE: NORMAL

## 2025-03-13 PROCEDURE — 96413 CHEMO IV INFUSION 1 HR: CPT

## 2025-03-13 PROCEDURE — 96366 THER/PROPH/DIAG IV INF ADDON: CPT | Mod: INF

## 2025-03-13 PROCEDURE — 96375 TX/PRO/DX INJ NEW DRUG ADDON: CPT | Mod: INF

## 2025-03-13 PROCEDURE — 77386 HC INTENSITY-MODULATED RADIATION THERAPY (IMRT), COMPLEX: CPT | Performed by: STUDENT IN AN ORGANIZED HEALTH CARE EDUCATION/TRAINING PROGRAM

## 2025-03-13 PROCEDURE — 96417 CHEMO IV INFUS EACH ADDL SEQ: CPT

## 2025-03-13 PROCEDURE — 96368 THER/DIAG CONCURRENT INF: CPT

## 2025-03-13 PROCEDURE — 2500000001 HC RX 250 WO HCPCS SELF ADMINISTERED DRUGS (ALT 637 FOR MEDICARE OP): Performed by: INTERNAL MEDICINE

## 2025-03-13 PROCEDURE — 2500000004 HC RX 250 GENERAL PHARMACY W/ HCPCS (ALT 636 FOR OP/ED): Performed by: INTERNAL MEDICINE

## 2025-03-13 RX ORDER — MAGNESIUM SULFATE HEPTAHYDRATE 40 MG/ML
4 INJECTION, SOLUTION INTRAVENOUS ONCE
Status: COMPLETED | OUTPATIENT
Start: 2025-03-13 | End: 2025-03-13

## 2025-03-13 RX ORDER — HEPARIN SODIUM,PORCINE/PF 10 UNIT/ML
50 SYRINGE (ML) INTRAVENOUS AS NEEDED
Status: DISCONTINUED | OUTPATIENT
Start: 2025-03-13 | End: 2025-03-13 | Stop reason: HOSPADM

## 2025-03-13 RX ORDER — FAMOTIDINE 10 MG/ML
20 INJECTION, SOLUTION INTRAVENOUS ONCE
Status: COMPLETED | OUTPATIENT
Start: 2025-03-13 | End: 2025-03-13

## 2025-03-13 RX ORDER — DIPHENHYDRAMINE HCL 25 MG
50 CAPSULE ORAL ONCE
Status: COMPLETED | OUTPATIENT
Start: 2025-03-13 | End: 2025-03-13

## 2025-03-13 RX ORDER — PROCHLORPERAZINE MALEATE 10 MG
10 TABLET ORAL EVERY 6 HOURS PRN
Status: DISCONTINUED | OUTPATIENT
Start: 2025-03-13 | End: 2025-03-13 | Stop reason: HOSPADM

## 2025-03-13 RX ORDER — DEXAMETHASONE 6 MG/1
12 TABLET ORAL ONCE
Status: COMPLETED | OUTPATIENT
Start: 2025-03-13 | End: 2025-03-13

## 2025-03-13 RX ORDER — DIPHENHYDRAMINE HYDROCHLORIDE 50 MG/ML
50 INJECTION INTRAMUSCULAR; INTRAVENOUS AS NEEDED
Status: DISCONTINUED | OUTPATIENT
Start: 2025-03-13 | End: 2025-03-13 | Stop reason: HOSPADM

## 2025-03-13 RX ORDER — HEPARIN 100 UNIT/ML
500 SYRINGE INTRAVENOUS AS NEEDED
Status: DISCONTINUED | OUTPATIENT
Start: 2025-03-13 | End: 2025-03-13 | Stop reason: HOSPADM

## 2025-03-13 RX ORDER — HEPARIN 100 UNIT/ML
500 SYRINGE INTRAVENOUS AS NEEDED
OUTPATIENT
Start: 2025-03-13

## 2025-03-13 RX ORDER — HEPARIN SODIUM,PORCINE/PF 10 UNIT/ML
50 SYRINGE (ML) INTRAVENOUS AS NEEDED
OUTPATIENT
Start: 2025-03-13

## 2025-03-13 RX ORDER — PALONOSETRON 0.05 MG/ML
0.25 INJECTION, SOLUTION INTRAVENOUS ONCE
Status: COMPLETED | OUTPATIENT
Start: 2025-03-13 | End: 2025-03-13

## 2025-03-13 RX ORDER — EPINEPHRINE 0.3 MG/.3ML
0.3 INJECTION SUBCUTANEOUS EVERY 5 MIN PRN
Status: DISCONTINUED | OUTPATIENT
Start: 2025-03-13 | End: 2025-03-13 | Stop reason: HOSPADM

## 2025-03-13 RX ORDER — FAMOTIDINE 10 MG/ML
20 INJECTION, SOLUTION INTRAVENOUS ONCE AS NEEDED
Status: DISCONTINUED | OUTPATIENT
Start: 2025-03-13 | End: 2025-03-13 | Stop reason: HOSPADM

## 2025-03-13 RX ORDER — PROCHLORPERAZINE EDISYLATE 5 MG/ML
10 INJECTION INTRAMUSCULAR; INTRAVENOUS EVERY 6 HOURS PRN
Status: DISCONTINUED | OUTPATIENT
Start: 2025-03-13 | End: 2025-03-13 | Stop reason: HOSPADM

## 2025-03-13 RX ORDER — ALBUTEROL SULFATE 0.83 MG/ML
3 SOLUTION RESPIRATORY (INHALATION) AS NEEDED
Status: DISCONTINUED | OUTPATIENT
Start: 2025-03-13 | End: 2025-03-13 | Stop reason: HOSPADM

## 2025-03-13 RX ADMIN — DEXAMETHASONE 12 MG: 6 TABLET ORAL at 08:28

## 2025-03-13 RX ADMIN — FAMOTIDINE 20 MG: 10 INJECTION INTRAVENOUS at 08:29

## 2025-03-13 RX ADMIN — DIPHENHYDRAMINE HYDROCHLORIDE 50 MG: 25 CAPSULE ORAL at 08:29

## 2025-03-13 RX ADMIN — HEPARIN 500 UNITS: 100 SYRINGE at 10:45

## 2025-03-13 RX ADMIN — PALONOSETRON HYDROCHLORIDE 0.25 MG: 0.25 INJECTION INTRAVENOUS at 08:35

## 2025-03-13 RX ADMIN — PACLITAXEL 65.4 MG: 6 INJECTION, SOLUTION INTRAVENOUS at 09:03

## 2025-03-13 RX ADMIN — CARBOPLATIN 160 MG: 10 INJECTION, SOLUTION INTRAVENOUS at 10:13

## 2025-03-13 RX ADMIN — MAGNESIUM SULFATE IN WATER 4 G: 4 INJECTION, SOLUTION INTRAVENOUS at 08:41

## 2025-03-13 ASSESSMENT — PAIN SCALES - GENERAL: PAINLEVEL_OUTOF10: 0-NO PAIN

## 2025-03-14 ENCOUNTER — HOSPITAL ENCOUNTER (OUTPATIENT)
Dept: RADIATION ONCOLOGY | Facility: CLINIC | Age: 70
Setting detail: RADIATION/ONCOLOGY SERIES
Discharge: HOME | End: 2025-03-14
Payer: MEDICARE

## 2025-03-14 DIAGNOSIS — Z51.0 ENCOUNTER FOR ANTINEOPLASTIC RADIATION THERAPY: ICD-10-CM

## 2025-03-14 DIAGNOSIS — C34.11 MALIGNANT NEOPLASM OF UPPER LOBE, RIGHT BRONCHUS OR LUNG: ICD-10-CM

## 2025-03-14 LAB
RAD ONC MSQ ACTUAL FRACTIONS DELIVERED: 24
RAD ONC MSQ ACTUAL SESSION DELIVERED DOSE: 200 CGRAY
RAD ONC MSQ ACTUAL TOTAL DOSE: 4800 CGRAY
RAD ONC MSQ ELAPSED DAYS: 31
RAD ONC MSQ LAST DATE: NORMAL
RAD ONC MSQ PRESCRIBED FRACTIONAL DOSE: 200 CGRAY
RAD ONC MSQ PRESCRIBED NUMBER OF FRACTIONS: 30
RAD ONC MSQ PRESCRIBED TECHNIQUE: NORMAL
RAD ONC MSQ PRESCRIBED TOTAL DOSE: 6000 CGRAY
RAD ONC MSQ PRESCRIPTION PATTERN COMMENT: NORMAL
RAD ONC MSQ START DATE: NORMAL
RAD ONC MSQ TREATMENT COURSE NUMBER: 1
RAD ONC MSQ TREATMENT SITE: NORMAL

## 2025-03-14 PROCEDURE — 77386 HC INTENSITY-MODULATED RADIATION THERAPY (IMRT), COMPLEX: CPT | Performed by: STUDENT IN AN ORGANIZED HEALTH CARE EDUCATION/TRAINING PROGRAM

## 2025-03-17 ENCOUNTER — HOSPITAL ENCOUNTER (OUTPATIENT)
Dept: RADIATION ONCOLOGY | Facility: CLINIC | Age: 70
Setting detail: RADIATION/ONCOLOGY SERIES
Discharge: HOME | End: 2025-03-17
Payer: MEDICARE

## 2025-03-17 DIAGNOSIS — C34.11 MALIGNANT NEOPLASM OF UPPER LOBE, RIGHT BRONCHUS OR LUNG: ICD-10-CM

## 2025-03-17 DIAGNOSIS — Z51.0 ENCOUNTER FOR ANTINEOPLASTIC RADIATION THERAPY: ICD-10-CM

## 2025-03-17 LAB
RAD ONC MSQ ACTUAL FRACTIONS DELIVERED: 25
RAD ONC MSQ ACTUAL SESSION DELIVERED DOSE: 200 CGRAY
RAD ONC MSQ ACTUAL TOTAL DOSE: 5000 CGRAY
RAD ONC MSQ ELAPSED DAYS: 34
RAD ONC MSQ LAST DATE: NORMAL
RAD ONC MSQ PRESCRIBED FRACTIONAL DOSE: 200 CGRAY
RAD ONC MSQ PRESCRIBED NUMBER OF FRACTIONS: 30
RAD ONC MSQ PRESCRIBED TECHNIQUE: NORMAL
RAD ONC MSQ PRESCRIBED TOTAL DOSE: 6000 CGRAY
RAD ONC MSQ PRESCRIPTION PATTERN COMMENT: NORMAL
RAD ONC MSQ START DATE: NORMAL
RAD ONC MSQ TREATMENT COURSE NUMBER: 1
RAD ONC MSQ TREATMENT SITE: NORMAL

## 2025-03-17 PROCEDURE — 77386 HC INTENSITY-MODULATED RADIATION THERAPY (IMRT), COMPLEX: CPT | Performed by: STUDENT IN AN ORGANIZED HEALTH CARE EDUCATION/TRAINING PROGRAM

## 2025-03-18 ENCOUNTER — HOSPITAL ENCOUNTER (OUTPATIENT)
Dept: RADIATION ONCOLOGY | Facility: CLINIC | Age: 70
Setting detail: RADIATION/ONCOLOGY SERIES
Discharge: HOME | End: 2025-03-18
Payer: MEDICARE

## 2025-03-18 DIAGNOSIS — Z51.0 ENCOUNTER FOR ANTINEOPLASTIC RADIATION THERAPY: ICD-10-CM

## 2025-03-18 DIAGNOSIS — C34.11 MALIGNANT NEOPLASM OF UPPER LOBE, RIGHT BRONCHUS OR LUNG: ICD-10-CM

## 2025-03-18 LAB
RAD ONC MSQ ACTUAL FRACTIONS DELIVERED: 26
RAD ONC MSQ ACTUAL SESSION DELIVERED DOSE: 200 CGRAY
RAD ONC MSQ ACTUAL TOTAL DOSE: 5200 CGRAY
RAD ONC MSQ ELAPSED DAYS: 35
RAD ONC MSQ LAST DATE: NORMAL
RAD ONC MSQ PRESCRIBED FRACTIONAL DOSE: 200 CGRAY
RAD ONC MSQ PRESCRIBED NUMBER OF FRACTIONS: 30
RAD ONC MSQ PRESCRIBED TECHNIQUE: NORMAL
RAD ONC MSQ PRESCRIBED TOTAL DOSE: 6000 CGRAY
RAD ONC MSQ PRESCRIPTION PATTERN COMMENT: NORMAL
RAD ONC MSQ START DATE: NORMAL
RAD ONC MSQ TREATMENT COURSE NUMBER: 1
RAD ONC MSQ TREATMENT SITE: NORMAL

## 2025-03-18 PROCEDURE — 77336 RADIATION PHYSICS CONSULT: CPT | Performed by: STUDENT IN AN ORGANIZED HEALTH CARE EDUCATION/TRAINING PROGRAM

## 2025-03-18 PROCEDURE — 77386 HC INTENSITY-MODULATED RADIATION THERAPY (IMRT), COMPLEX: CPT | Performed by: STUDENT IN AN ORGANIZED HEALTH CARE EDUCATION/TRAINING PROGRAM

## 2025-03-19 ENCOUNTER — HOSPITAL ENCOUNTER (OUTPATIENT)
Dept: RADIATION ONCOLOGY | Facility: CLINIC | Age: 70
Setting detail: RADIATION/ONCOLOGY SERIES
Discharge: HOME | End: 2025-03-19
Payer: MEDICARE

## 2025-03-19 ENCOUNTER — INFUSION (OUTPATIENT)
Dept: HEMATOLOGY/ONCOLOGY | Facility: CLINIC | Age: 70
End: 2025-03-19
Payer: MEDICARE

## 2025-03-19 VITALS
BODY MASS INDEX: 19.03 KG/M2 | HEART RATE: 139 BPM | WEIGHT: 104.72 LBS | SYSTOLIC BLOOD PRESSURE: 95 MMHG | DIASTOLIC BLOOD PRESSURE: 65 MMHG | OXYGEN SATURATION: 97 % | RESPIRATION RATE: 18 BRPM | TEMPERATURE: 97.3 F

## 2025-03-19 DIAGNOSIS — B37.0 THRUSH: ICD-10-CM

## 2025-03-19 DIAGNOSIS — E83.42 HYPOMAGNESEMIA: ICD-10-CM

## 2025-03-19 DIAGNOSIS — C15.5 CANCER OF LOWER THIRD OF ESOPHAGUS (MULTI): Primary | ICD-10-CM

## 2025-03-19 DIAGNOSIS — C34.11 MALIGNANT NEOPLASM OF UPPER LOBE OF RIGHT LUNG (MULTI): ICD-10-CM

## 2025-03-19 DIAGNOSIS — C34.11 MALIGNANT NEOPLASM OF UPPER LOBE OF RIGHT LUNG (MULTI): Primary | ICD-10-CM

## 2025-03-19 DIAGNOSIS — C34.11 MALIGNANT NEOPLASM OF UPPER LOBE, RIGHT BRONCHUS OR LUNG: ICD-10-CM

## 2025-03-19 DIAGNOSIS — Z51.0 ENCOUNTER FOR ANTINEOPLASTIC RADIATION THERAPY: ICD-10-CM

## 2025-03-19 LAB
ALBUMIN SERPL BCP-MCNC: 3.6 G/DL (ref 3.4–5)
ALP SERPL-CCNC: 62 U/L (ref 33–136)
ALT SERPL W P-5'-P-CCNC: 12 U/L (ref 7–45)
ANION GAP SERPL CALC-SCNC: 12 MMOL/L (ref 10–20)
AST SERPL W P-5'-P-CCNC: 19 U/L (ref 9–39)
BASOPHILS # BLD AUTO: 0.03 X10*3/UL (ref 0–0.1)
BASOPHILS NFR BLD AUTO: 0.8 %
BILIRUB SERPL-MCNC: 0.5 MG/DL (ref 0–1.2)
BUN SERPL-MCNC: 11 MG/DL (ref 6–23)
CALCIUM SERPL-MCNC: 8.5 MG/DL (ref 8.6–10.3)
CHLORIDE SERPL-SCNC: 96 MMOL/L (ref 98–107)
CO2 SERPL-SCNC: 33 MMOL/L (ref 21–32)
CREAT SERPL-MCNC: 0.58 MG/DL (ref 0.5–1.05)
EGFRCR SERPLBLD CKD-EPI 2021: >90 ML/MIN/1.73M*2
EOSINOPHIL # BLD AUTO: 0.03 X10*3/UL (ref 0–0.4)
EOSINOPHIL NFR BLD AUTO: 0.8 %
ERYTHROCYTE [DISTWIDTH] IN BLOOD BY AUTOMATED COUNT: 19 % (ref 11.5–14.5)
GLUCOSE SERPL-MCNC: 116 MG/DL (ref 74–99)
HCT VFR BLD AUTO: 28.1 % (ref 36–46)
HGB BLD-MCNC: 9 G/DL (ref 12–16)
IMM GRANULOCYTES # BLD AUTO: 0.04 X10*3/UL (ref 0–0.5)
IMM GRANULOCYTES NFR BLD AUTO: 1.1 % (ref 0–0.9)
LYMPHOCYTES # BLD AUTO: 0.29 X10*3/UL (ref 0.8–3)
LYMPHOCYTES NFR BLD AUTO: 7.7 %
MAGNESIUM SERPL-MCNC: 1.32 MG/DL (ref 1.6–2.4)
MCH RBC QN AUTO: 32.6 PG (ref 26–34)
MCHC RBC AUTO-ENTMCNC: 32 G/DL (ref 32–36)
MCV RBC AUTO: 102 FL (ref 80–100)
MONOCYTES # BLD AUTO: 0.33 X10*3/UL (ref 0.05–0.8)
MONOCYTES NFR BLD AUTO: 8.7 %
NEUTROPHILS # BLD AUTO: 3.07 X10*3/UL (ref 1.6–5.5)
NEUTROPHILS NFR BLD AUTO: 80.9 %
NRBC BLD-RTO: 0 /100 WBCS (ref 0–0)
PLATELET # BLD AUTO: 151 X10*3/UL (ref 150–450)
POTASSIUM SERPL-SCNC: 4 MMOL/L (ref 3.5–5.3)
PROT SERPL-MCNC: 6.6 G/DL (ref 6.4–8.2)
RAD ONC MSQ ACTUAL FRACTIONS DELIVERED: 27
RAD ONC MSQ ACTUAL SESSION DELIVERED DOSE: 200 CGRAY
RAD ONC MSQ ACTUAL TOTAL DOSE: 5400 CGRAY
RAD ONC MSQ ELAPSED DAYS: 36
RAD ONC MSQ LAST DATE: NORMAL
RAD ONC MSQ PRESCRIBED FRACTIONAL DOSE: 200 CGRAY
RAD ONC MSQ PRESCRIBED NUMBER OF FRACTIONS: 30
RAD ONC MSQ PRESCRIBED TECHNIQUE: NORMAL
RAD ONC MSQ PRESCRIBED TOTAL DOSE: 6000 CGRAY
RAD ONC MSQ PRESCRIPTION PATTERN COMMENT: NORMAL
RAD ONC MSQ START DATE: NORMAL
RAD ONC MSQ TREATMENT COURSE NUMBER: 1
RAD ONC MSQ TREATMENT SITE: NORMAL
RBC # BLD AUTO: 2.76 X10*6/UL (ref 4–5.2)
SODIUM SERPL-SCNC: 137 MMOL/L (ref 136–145)
WBC # BLD AUTO: 3.8 X10*3/UL (ref 4.4–11.3)

## 2025-03-19 PROCEDURE — 84075 ASSAY ALKALINE PHOSPHATASE: CPT

## 2025-03-19 PROCEDURE — 36591 DRAW BLOOD OFF VENOUS DEVICE: CPT

## 2025-03-19 PROCEDURE — 77386 HC INTENSITY-MODULATED RADIATION THERAPY (IMRT), COMPLEX: CPT | Performed by: STUDENT IN AN ORGANIZED HEALTH CARE EDUCATION/TRAINING PROGRAM

## 2025-03-19 PROCEDURE — 85025 COMPLETE CBC W/AUTO DIFF WBC: CPT

## 2025-03-19 PROCEDURE — 83735 ASSAY OF MAGNESIUM: CPT

## 2025-03-19 PROCEDURE — 2500000004 HC RX 250 GENERAL PHARMACY W/ HCPCS (ALT 636 FOR OP/ED): Performed by: INTERNAL MEDICINE

## 2025-03-19 RX ORDER — OXYCODONE HYDROCHLORIDE 5 MG/1
5 TABLET ORAL EVERY 6 HOURS PRN
Qty: 30 TABLET | Refills: 0 | Status: SHIPPED | OUTPATIENT
Start: 2025-03-19 | End: 2025-03-27

## 2025-03-19 RX ORDER — SODIUM CHLORIDE 9 MG/ML
1000 INJECTION, SOLUTION INTRAVENOUS ONCE
Status: CANCELLED | OUTPATIENT
Start: 2025-03-24

## 2025-03-19 RX ORDER — NYSTATIN 100000 [USP'U]/ML
5 SUSPENSION ORAL 4 TIMES DAILY
Qty: 200 ML | Refills: 0 | Status: SHIPPED | OUTPATIENT
Start: 2025-03-19 | End: 2025-03-29

## 2025-03-19 RX ORDER — HEPARIN 100 UNIT/ML
500 SYRINGE INTRAVENOUS AS NEEDED
Status: DISCONTINUED | OUTPATIENT
Start: 2025-03-19 | End: 2025-03-19 | Stop reason: HOSPADM

## 2025-03-19 RX ORDER — FAMOTIDINE 10 MG/ML
20 INJECTION, SOLUTION INTRAVENOUS ONCE AS NEEDED
Status: CANCELLED | OUTPATIENT
Start: 2025-03-24

## 2025-03-19 RX ORDER — HEPARIN SODIUM,PORCINE/PF 10 UNIT/ML
50 SYRINGE (ML) INTRAVENOUS AS NEEDED
Status: DISCONTINUED | OUTPATIENT
Start: 2025-03-19 | End: 2025-03-19 | Stop reason: HOSPADM

## 2025-03-19 RX ORDER — HEPARIN 100 UNIT/ML
500 SYRINGE INTRAVENOUS AS NEEDED
Status: CANCELLED | OUTPATIENT
Start: 2025-03-19

## 2025-03-19 RX ORDER — ALBUTEROL SULFATE 0.83 MG/ML
3 SOLUTION RESPIRATORY (INHALATION) AS NEEDED
Status: CANCELLED | OUTPATIENT
Start: 2025-03-24

## 2025-03-19 RX ORDER — DIPHENHYDRAMINE HYDROCHLORIDE 50 MG/ML
50 INJECTION, SOLUTION INTRAMUSCULAR; INTRAVENOUS AS NEEDED
Status: CANCELLED | OUTPATIENT
Start: 2025-03-24

## 2025-03-19 RX ORDER — EPINEPHRINE 0.3 MG/.3ML
0.3 INJECTION SUBCUTANEOUS EVERY 5 MIN PRN
Status: CANCELLED | OUTPATIENT
Start: 2025-03-24

## 2025-03-19 RX ORDER — HEPARIN SODIUM,PORCINE/PF 10 UNIT/ML
50 SYRINGE (ML) INTRAVENOUS AS NEEDED
Status: CANCELLED | OUTPATIENT
Start: 2025-03-19

## 2025-03-19 RX ADMIN — HEPARIN 500 UNITS: 100 SYRINGE at 08:29

## 2025-03-19 ASSESSMENT — ENCOUNTER SYMPTOMS
OCCASIONAL FEELINGS OF UNSTEADINESS: 0
LOSS OF SENSATION IN FEET: 0
DEPRESSION: 0

## 2025-03-19 ASSESSMENT — PAIN SCALES - GENERAL: PAINLEVEL_OUTOF10: 0-NO PAIN

## 2025-03-19 NOTE — PROGRESS NOTES
Radiation Oncology On Treatment Visit    Patient Name:  Billie Hernadez  MRN:  [id number]  :  1955    Referring Provider: Abimael Bridges MD  Primary Care Provider: MOR Holliday  Care Team: Patient Care Team:  MOR Holliday as PCP - General  Fredi Nolen MD as PCP - [employer] PCP  Judith Santillan MD as Medical Oncologist (Hematology and Oncology)  Bess Gonis LPN as Care Manager (Case Management)  Jose Miguel Santillan MD as Medical Oncologist (Hematology and Oncology)    Date of Service: 3/19/2025     Diagnosis:   Specialty Problems          Radiation Oncology Problems    Malignant neoplasm of upper lobe of right lung (Multi)         Treatment Summary:  IMRT: Right Thorax, Midline Mediastinum, Right Supraclavicular fossa    Treatment Period Technique Fraction Dose Fractions Total Dose   Course 1 2025-3/19/2025  (days elapsed: 36)         RUL_Hil_Med 2025-3/19/2025 VMAT 200 / 200 cGy 27 / 30 5400 / 6,000 cGy     SUBJECTIVE: Poor fluid intake due to esophagitis.  Scheduled for infusion tomorrow.      OBJECTIVE:   Vital Signs:  BP 95/65   Pulse (!) 139   Temp 36.3 °C (97.3 °F) (Temporal)   Resp 18   Wt 47.5 kg (104 lb 11.5 oz)   SpO2 97%   BMI 19.03 kg/m²     Other Pertinent Findings:     Toxicity Assessment          2025    10:41 2025    09:19 2025    09:24 3/5/2025    09:31 3/12/2025    09:19 3/19/2025    09:31   Toxicity Assessment   Treatment Site Thoracic Thoracic Thoracic Thoracic Thoracic Thoracic   Anorexia Grade 0 Grade 0 Grade 0 Grade 0 Grade 0 Grade 0   Anxiety Grade 0 Grade 0 Grade 0 Grade 0 Grade 0 Grade 0   Dehydration Grade 0 Grade 0 Grade 0 Grade 0 Grade 0 Grade 1       encouraged to drink more water   Depression Grade 0 Grade 0 Grade 0 Grade 0 Grade 0 Grade 0   Dermatitis Radiation Grade 0 Grade 0 Grade 0 Grade 0 Grade 0 Grade 0   Diarrhea Grade 0 Grade 0 Grade 0 Grade 0 Grade 0 Grade 1       one time in the AM'    Fatigue Grade 1 Grade 0 Grade 1 Grade 1 Grade 1 Grade 1   Fibrosis Deep Connective Tissue Grade 0 Grade 0 Grade 0 Grade 0 Grade 0 Grade 0   Fracture Grade 0 Grade 0 Grade 0 Grade 0 Grade 0 Grade 0   Nausea Grade 0 Grade 0 Grade 0 Grade 0 Grade 0 Grade 0   Pain Grade 0 Grade 0 Grade 0 Grade 0 Grade 0 Grade 0   Treatment Related Secondary Malignancy Grade 0 Grade 0 Grade 0 Grade 0 Grade 0 Grade 0   Tumor Pain Grade 0 Grade 0 Grade 0 Grade 0 Grade 0 Grade 0   Vomiting Grade 0 Grade 0 Grade 0 Grade 0 Grade 0    Constipation Grade 0 Grade 0 Grade 0 Grade 0 Grade 0 Grade 0   Dyspepsia Grade 0 Grade 0 Grade 0 Grade 0 Grade 0 Grade 0   Dysphagia Grade 0 Grade 0 Grade 1       sore throat Grade 0 Grade 1 Grade 1       sore throat   Esophagitis Grade 0 Grade 0 Grade 0 Grade 0 Grade 0 Grade 0   Mucositis Oral Grade 0 Grade 0 Grade 0 Grade 0 Grade 0 Grade 0   Upper Gastrointestinal Hemorrhage Grade 0 Grade 0 Grade 0 Grade 0 Grade 0 Grade 0   Peripheral Sensory Neuropathy Grade 0   Grade 0 Grade 0    Brachial Plexopathy Grade 0        Pneumonitis Grade 0 Grade 0 Grade 0 Grade 0 Grade 0 Grade 0   Aspiration Grade 0 Grade 0 Grade 0 Grade 0 Grade 0 Grade 0   Hoarseness Grade 1 Grade 0 Grade 1 Grade 1 Grade 0 Grade 1   Laryngeal Edema Grade 0 Grade 0 Grade 0 Grade 0 Grade 0 Grade 0   Myocardial Infarction Grade 0 Grade 0 Grade 0 Grade 0 Grade 0 Grade 0   Pericardial Effusion Grade 0 Grade 0 Grade 0 Grade 0 Grade 0 Grade 0   Pericarditis Grade 0 Grade 0 Grade 0 Grade 0 Grade 0 Grade 0   Esophageal Fistula Grade 0 Grade 0 Grade 0 Grade 0 Grade 0 Grade 0   Esophageal Obstruction Grade 0 Grade 0 Grade 0 Grade 0 Grade 0 Grade 0   Esophageal Pain Grade 0 Grade 0 Grade 0 Grade 0 Grade 0 Grade 0   Esophageal Stenosis Grade 0 Grade 0 Grade 0 Grade 0 Grade 0 Grade 0   Esophageal Ulcer Grade 0 Grade 0 Grade 0 Grade 0 Grade 0 Grade 0   Bronchial Obstruction Grade 0 Grade 0 Grade 0 Grade 0 Grade 0 Grade 0   Cough Grade 1 Grade 1 Grade 1        dry Grade 0 Grade 1 Grade 1   Dyspnea Grade 2 Grade 1 Grade 1       with activity Grade 1 Grade 1 Grade 1   Epistaxis Grade 0 Grade 0 Grade 0 Grade 0 Grade 0 Grade 0   Hiccups Grade 0 Grade 0 Grade 0 Grade 0 Grade 0 Grade 0   Hypoxia Grade 0 Grade 0 Grade 0 Grade 0 Grade 0 Grade 0   Pulmonary Fibrosis Grade 0 Grade 0 Grade 0 Grade 0 Grade 0 Grade 0   Lymphedema Grade 0 Grade 0 Grade 0 Grade 0 Grade 0 Grade 0   Thromboembolic Event Grade 0 Grade 0 Grade 0 Grade 0 Grade 0 Grade 0   Hot Flashes Grade 0               Concurrent systemic therapy: fosaprepitant (Emend) 150 mg in sodium chloride 0.9% 150 mL IV, 150 mg, intravenous, Once, 3 of 3 cycles    Administration: 150 mg (12/17/2024), 150 mg (1/7/2025), 150 mg (1/28/2025)        CARBOplatin (Paraplatin) 440 mg in sodium chloride 0.9% 154 mL IV, 440 mg, intravenous, Once, 3 of 3 cycles    Administration: 440 mg (12/17/2024), 440 mg (1/7/2025), 440 mg (1/28/2025)        PACLitaxeL (Taxol) 288 mg in dextrose 5% 318 mL IV, 200 mg/m2 = 288 mg, intravenous, Once, 3 of 3 cycles    Administration: 288 mg (12/17/2024), 288 mg (1/7/2025), 288 mg (1/28/2025)        methylPREDNISolone sod succinate (SOLU-Medrol) 40 mg/mL injection 40 mg, 40 mg, intravenous, As needed, 3 of 3 cycles        palonosetron (Aloxi) injection 250 mcg, 250 mcg, intravenous, Once, 3 of 3 cycles    Administration: 250 mcg (12/17/2024), 250 mcg (1/7/2025), 250 mcg (1/28/2025)        nivolumab (Opdivo) 360 mg in sodium chloride 0.9% 146 mL IV, 360 mg, intravenous, Once, 3 of 3 cycles    Administration: 360 mg (12/17/2024), 360 mg (1/7/2025), 360 mg (1/28/2025)    CARBOplatin (Paraplatin) 160 mg in sodium chloride 0.9% 126 mL IV, 160 mg, intravenous, Once, 1 of 1 cycle    Administration: 160 mg (2/20/2025), 160 mg (2/27/2025), 160 mg (3/6/2025), 160 mg (3/13/2025)        PACLitaxeL (Taxol) 65.4 mg in dextrose 5% 120.9 mL IV, 45 mg/m2 = 65.4 mg, intravenous, Once, 1 of 1 cycle    Administration: 65.4 mg  (2/20/2025), 65.4 mg (2/27/2025), 65.4 mg (3/6/2025), 65.4 mg (3/13/2025)        methylPREDNISolone sod succinate (SOLU-Medrol) 40 mg/mL injection 40 mg, 40 mg, intravenous, As needed, 1 of 1 cycle        palonosetron (Aloxi) injection 250 mcg, 250 mcg, intravenous, Once, 1 of 1 cycle    Administration: 250 mcg (2/20/2025), 250 mcg (2/27/2025), 0.25 mg (3/6/2025), 0.25 mg (3/13/2025)      Labs:   WBC   Date Value Ref Range Status   03/19/2025 3.8 (L) 4.4 - 11.3 x10*3/uL Final   03/12/2025 3.9 (L) 4.4 - 11.3 x10*3/uL Final     Hemoglobin   Date Value Ref Range Status   03/19/2025 9.0 (L) 12.0 - 16.0 g/dL Final   03/12/2025 8.6 (L) 12.0 - 16.0 g/dL Final     Hematocrit   Date Value Ref Range Status   03/19/2025 28.1 (L) 36.0 - 46.0 % Final   03/12/2025 27.5 (L) 36.0 - 46.0 % Final     Neutrophils Absolute   Date Value Ref Range Status   03/19/2025 3.07 1.60 - 5.50 x10*3/uL Final     Comment:     Percent differential counts (%) should be interpreted in the context of the absolute cell counts (cells/uL).   02/18/2025 5.56 (H) 1.60 - 5.50 x10*3/uL Final     Comment:     Percent differential counts (%) should be interpreted in the context of the absolute cell counts (cells/uL).     Platelets   Date Value Ref Range Status   03/19/2025 151 150 - 450 x10*3/uL Final   03/12/2025 200 150 - 450 x10*3/uL Final     Alkaline Phosphatase   Date Value Ref Range Status   03/19/2025 62 33 - 136 U/L Final   03/12/2025 65 33 - 136 U/L Final     AST   Date Value Ref Range Status   03/19/2025 19 9 - 39 U/L Final   03/12/2025 20 9 - 39 U/L Final     ALT   Date Value Ref Range Status   03/19/2025 12 7 - 45 U/L Final     Comment:     Patients treated with Sulfasalazine may generate falsely decreased results for ALT.   03/12/2025 12 7 - 45 U/L Final     Comment:     Patients treated with Sulfasalazine may generate falsely decreased results for ALT.     Bilirubin, Total   Date Value Ref Range Status   03/19/2025 0.5 0.0 - 1.2 mg/dL Final    03/12/2025 0.4 0.0 - 1.2 mg/dL Final     Glucose   Date Value Ref Range Status   03/19/2025 116 (H) 74 - 99 mg/dL Final   03/12/2025 120 (H) 74 - 99 mg/dL Final     Calcium   Date Value Ref Range Status   03/19/2025 8.5 (L) 8.6 - 10.3 mg/dL Final   03/12/2025 8.8 8.6 - 10.3 mg/dL Final     Sodium   Date Value Ref Range Status   03/19/2025 137 136 - 145 mmol/L Final   03/12/2025 138 136 - 145 mmol/L Final     Potassium   Date Value Ref Range Status   03/19/2025 4.0 3.5 - 5.3 mmol/L Final   03/12/2025 4.1 3.5 - 5.3 mmol/L Final     Bicarbonate   Date Value Ref Range Status   03/19/2025 33 (H) 21 - 32 mmol/L Final   03/12/2025 35 (H) 21 - 32 mmol/L Final     Chloride   Date Value Ref Range Status   03/19/2025 96 (L) 98 - 107 mmol/L Final   03/12/2025 97 (L) 98 - 107 mmol/L Final     Urea Nitrogen   Date Value Ref Range Status   03/19/2025 11 6 - 23 mg/dL Final   03/12/2025 14 6 - 23 mg/dL Final     Creatinine   Date Value Ref Range Status   03/19/2025 0.58 0.50 - 1.05 mg/dL Final   03/12/2025 0.48 (L) 0.50 - 1.05 mg/dL Final         Exam: In no acute distress, sinus tachycardia noted.     Assessment / Plan:  The patient is tolerating radiation therapy as anticipated.  Continue per current treatment plan.       Therapies: Discussed concern and recommended ED referral, patient declined.  Encouraged increased oral intake, prescribing fluconazole for prophylaxis of esophageal thrush and initiation of oxycodone as needed for pain.  OARRS reviewed.  Discussed with the patient expected side effects.  Discussed with the patient avoidance of driving or operating heavy machinery while taking.    Side effects reviewed with patient. Images, chart and labs reviewed.    Abimael Bridges MD

## 2025-03-20 ENCOUNTER — APPOINTMENT (OUTPATIENT)
Dept: HEMATOLOGY/ONCOLOGY | Facility: CLINIC | Age: 70
End: 2025-03-20
Payer: MEDICARE

## 2025-03-20 ENCOUNTER — INFUSION (OUTPATIENT)
Dept: HEMATOLOGY/ONCOLOGY | Facility: CLINIC | Age: 70
End: 2025-03-20
Payer: MEDICARE

## 2025-03-20 ENCOUNTER — HOSPITAL ENCOUNTER (OUTPATIENT)
Dept: RADIATION ONCOLOGY | Facility: CLINIC | Age: 70
Setting detail: RADIATION/ONCOLOGY SERIES
Discharge: HOME | End: 2025-03-20
Payer: MEDICARE

## 2025-03-20 VITALS
DIASTOLIC BLOOD PRESSURE: 77 MMHG | OXYGEN SATURATION: 97 % | HEART RATE: 97 BPM | RESPIRATION RATE: 18 BRPM | BODY MASS INDEX: 19.47 KG/M2 | WEIGHT: 107.14 LBS | TEMPERATURE: 97.5 F | SYSTOLIC BLOOD PRESSURE: 126 MMHG

## 2025-03-20 DIAGNOSIS — Z51.0 ENCOUNTER FOR ANTINEOPLASTIC RADIATION THERAPY: ICD-10-CM

## 2025-03-20 DIAGNOSIS — C34.11 MALIGNANT NEOPLASM OF UPPER LOBE OF RIGHT LUNG (MULTI): ICD-10-CM

## 2025-03-20 DIAGNOSIS — C34.11 MALIGNANT NEOPLASM OF UPPER LOBE, RIGHT BRONCHUS OR LUNG: ICD-10-CM

## 2025-03-20 LAB
RAD ONC MSQ ACTUAL FRACTIONS DELIVERED: 28
RAD ONC MSQ ACTUAL SESSION DELIVERED DOSE: 200 CGRAY
RAD ONC MSQ ACTUAL TOTAL DOSE: 5600 CGRAY
RAD ONC MSQ ELAPSED DAYS: 37
RAD ONC MSQ LAST DATE: NORMAL
RAD ONC MSQ PRESCRIBED FRACTIONAL DOSE: 200 CGRAY
RAD ONC MSQ PRESCRIBED NUMBER OF FRACTIONS: 30
RAD ONC MSQ PRESCRIBED TECHNIQUE: NORMAL
RAD ONC MSQ PRESCRIBED TOTAL DOSE: 6000 CGRAY
RAD ONC MSQ PRESCRIPTION PATTERN COMMENT: NORMAL
RAD ONC MSQ START DATE: NORMAL
RAD ONC MSQ TREATMENT COURSE NUMBER: 1
RAD ONC MSQ TREATMENT SITE: NORMAL

## 2025-03-20 PROCEDURE — 96375 TX/PRO/DX INJ NEW DRUG ADDON: CPT | Mod: INF

## 2025-03-20 PROCEDURE — 96417 CHEMO IV INFUS EACH ADDL SEQ: CPT

## 2025-03-20 PROCEDURE — 77386 HC INTENSITY-MODULATED RADIATION THERAPY (IMRT), COMPLEX: CPT | Performed by: STUDENT IN AN ORGANIZED HEALTH CARE EDUCATION/TRAINING PROGRAM

## 2025-03-20 PROCEDURE — 96366 THER/PROPH/DIAG IV INF ADDON: CPT | Mod: INF

## 2025-03-20 PROCEDURE — 96368 THER/DIAG CONCURRENT INF: CPT

## 2025-03-20 PROCEDURE — 2500000004 HC RX 250 GENERAL PHARMACY W/ HCPCS (ALT 636 FOR OP/ED): Performed by: STUDENT IN AN ORGANIZED HEALTH CARE EDUCATION/TRAINING PROGRAM

## 2025-03-20 PROCEDURE — 2500000004 HC RX 250 GENERAL PHARMACY W/ HCPCS (ALT 636 FOR OP/ED): Performed by: INTERNAL MEDICINE

## 2025-03-20 PROCEDURE — 2500000001 HC RX 250 WO HCPCS SELF ADMINISTERED DRUGS (ALT 637 FOR MEDICARE OP): Performed by: INTERNAL MEDICINE

## 2025-03-20 PROCEDURE — 96413 CHEMO IV INFUSION 1 HR: CPT

## 2025-03-20 RX ORDER — HEPARIN SODIUM,PORCINE/PF 10 UNIT/ML
50 SYRINGE (ML) INTRAVENOUS AS NEEDED
Status: DISCONTINUED | OUTPATIENT
Start: 2025-03-20 | End: 2025-03-20 | Stop reason: HOSPADM

## 2025-03-20 RX ORDER — EPINEPHRINE 0.3 MG/.3ML
0.3 INJECTION SUBCUTANEOUS EVERY 5 MIN PRN
Status: CANCELLED | OUTPATIENT
Start: 2025-03-20

## 2025-03-20 RX ORDER — PROCHLORPERAZINE EDISYLATE 5 MG/ML
10 INJECTION INTRAMUSCULAR; INTRAVENOUS EVERY 6 HOURS PRN
Status: DISCONTINUED | OUTPATIENT
Start: 2025-03-20 | End: 2025-03-20 | Stop reason: HOSPADM

## 2025-03-20 RX ORDER — DIPHENHYDRAMINE HCL 25 MG
50 CAPSULE ORAL ONCE
Status: COMPLETED | OUTPATIENT
Start: 2025-03-20 | End: 2025-03-20

## 2025-03-20 RX ORDER — FAMOTIDINE 10 MG/ML
20 INJECTION, SOLUTION INTRAVENOUS ONCE AS NEEDED
Status: DISCONTINUED | OUTPATIENT
Start: 2025-03-20 | End: 2025-03-20 | Stop reason: HOSPADM

## 2025-03-20 RX ORDER — PROCHLORPERAZINE MALEATE 10 MG
10 TABLET ORAL EVERY 6 HOURS PRN
Status: DISCONTINUED | OUTPATIENT
Start: 2025-03-20 | End: 2025-03-20 | Stop reason: HOSPADM

## 2025-03-20 RX ORDER — SODIUM CHLORIDE 9 MG/ML
1000 INJECTION, SOLUTION INTRAVENOUS ONCE
Status: COMPLETED | OUTPATIENT
Start: 2025-03-20 | End: 2025-03-20

## 2025-03-20 RX ORDER — HEPARIN 100 UNIT/ML
500 SYRINGE INTRAVENOUS AS NEEDED
OUTPATIENT
Start: 2025-03-20

## 2025-03-20 RX ORDER — ALBUTEROL SULFATE 0.83 MG/ML
3 SOLUTION RESPIRATORY (INHALATION) AS NEEDED
Status: CANCELLED | OUTPATIENT
Start: 2025-03-20

## 2025-03-20 RX ORDER — SODIUM CHLORIDE 9 MG/ML
1000 INJECTION, SOLUTION INTRAVENOUS ONCE
Status: CANCELLED | OUTPATIENT
Start: 2025-03-20

## 2025-03-20 RX ORDER — ALBUTEROL SULFATE 0.83 MG/ML
3 SOLUTION RESPIRATORY (INHALATION) AS NEEDED
Status: DISCONTINUED | OUTPATIENT
Start: 2025-03-20 | End: 2025-03-20 | Stop reason: HOSPADM

## 2025-03-20 RX ORDER — DEXAMETHASONE 6 MG/1
12 TABLET ORAL ONCE
Status: COMPLETED | OUTPATIENT
Start: 2025-03-20 | End: 2025-03-20

## 2025-03-20 RX ORDER — DIPHENHYDRAMINE HYDROCHLORIDE 50 MG/ML
50 INJECTION, SOLUTION INTRAMUSCULAR; INTRAVENOUS AS NEEDED
Status: DISCONTINUED | OUTPATIENT
Start: 2025-03-20 | End: 2025-03-20 | Stop reason: HOSPADM

## 2025-03-20 RX ORDER — FAMOTIDINE 10 MG/ML
20 INJECTION, SOLUTION INTRAVENOUS ONCE AS NEEDED
Status: CANCELLED | OUTPATIENT
Start: 2025-03-20

## 2025-03-20 RX ORDER — HEPARIN 100 UNIT/ML
500 SYRINGE INTRAVENOUS AS NEEDED
Status: DISCONTINUED | OUTPATIENT
Start: 2025-03-20 | End: 2025-03-20 | Stop reason: HOSPADM

## 2025-03-20 RX ORDER — HEPARIN SODIUM,PORCINE/PF 10 UNIT/ML
50 SYRINGE (ML) INTRAVENOUS AS NEEDED
OUTPATIENT
Start: 2025-03-20

## 2025-03-20 RX ORDER — DIPHENHYDRAMINE HYDROCHLORIDE 50 MG/ML
50 INJECTION, SOLUTION INTRAMUSCULAR; INTRAVENOUS AS NEEDED
Status: CANCELLED | OUTPATIENT
Start: 2025-03-20

## 2025-03-20 RX ORDER — PALONOSETRON 0.05 MG/ML
0.25 INJECTION, SOLUTION INTRAVENOUS ONCE
Status: COMPLETED | OUTPATIENT
Start: 2025-03-20 | End: 2025-03-20

## 2025-03-20 RX ORDER — MAGNESIUM SULFATE HEPTAHYDRATE 40 MG/ML
4 INJECTION, SOLUTION INTRAVENOUS ONCE
Status: COMPLETED | OUTPATIENT
Start: 2025-03-20 | End: 2025-03-20

## 2025-03-20 RX ORDER — EPINEPHRINE 0.3 MG/.3ML
0.3 INJECTION SUBCUTANEOUS EVERY 5 MIN PRN
Status: DISCONTINUED | OUTPATIENT
Start: 2025-03-20 | End: 2025-03-20 | Stop reason: HOSPADM

## 2025-03-20 RX ORDER — FAMOTIDINE 10 MG/ML
20 INJECTION, SOLUTION INTRAVENOUS ONCE
Status: COMPLETED | OUTPATIENT
Start: 2025-03-20 | End: 2025-03-20

## 2025-03-20 RX ADMIN — SODIUM CHLORIDE 1000 ML/HR: 9 INJECTION, SOLUTION INTRAVENOUS at 08:19

## 2025-03-20 RX ADMIN — PACLITAXEL 65.4 MG: 6 INJECTION, SOLUTION INTRAVENOUS at 08:43

## 2025-03-20 RX ADMIN — HEPARIN 500 UNITS: 100 SYRINGE at 10:37

## 2025-03-20 RX ADMIN — PALONOSETRON HYDROCHLORIDE 0.25 MG: 0.25 INJECTION INTRAVENOUS at 08:18

## 2025-03-20 RX ADMIN — DIPHENHYDRAMINE HYDROCHLORIDE 50 MG: 25 CAPSULE ORAL at 08:18

## 2025-03-20 RX ADMIN — MAGNESIUM SULFATE IN WATER 4 G: 4 INJECTION, SOLUTION INTRAVENOUS at 08:19

## 2025-03-20 RX ADMIN — FAMOTIDINE 20 MG: 10 INJECTION INTRAVENOUS at 08:18

## 2025-03-20 RX ADMIN — DEXAMETHASONE 12 MG: 6 TABLET ORAL at 08:18

## 2025-03-20 RX ADMIN — CARBOPLATIN 160 MG: 10 INJECTION, SOLUTION INTRAVENOUS at 09:59

## 2025-03-20 ASSESSMENT — PAIN SCALES - GENERAL: PAINLEVEL_OUTOF10: 0-NO PAIN

## 2025-03-21 ENCOUNTER — HOSPITAL ENCOUNTER (OUTPATIENT)
Dept: RADIATION ONCOLOGY | Facility: CLINIC | Age: 70
Setting detail: RADIATION/ONCOLOGY SERIES
Discharge: HOME | End: 2025-03-21
Payer: MEDICARE

## 2025-03-21 DIAGNOSIS — Z51.0 ENCOUNTER FOR ANTINEOPLASTIC RADIATION THERAPY: ICD-10-CM

## 2025-03-21 DIAGNOSIS — C34.11 MALIGNANT NEOPLASM OF UPPER LOBE, RIGHT BRONCHUS OR LUNG: ICD-10-CM

## 2025-03-21 LAB
RAD ONC MSQ ACTUAL FRACTIONS DELIVERED: 29
RAD ONC MSQ ACTUAL SESSION DELIVERED DOSE: 200 CGRAY
RAD ONC MSQ ACTUAL TOTAL DOSE: 5800 CGRAY
RAD ONC MSQ ELAPSED DAYS: 38
RAD ONC MSQ LAST DATE: NORMAL
RAD ONC MSQ PRESCRIBED FRACTIONAL DOSE: 200 CGRAY
RAD ONC MSQ PRESCRIBED NUMBER OF FRACTIONS: 30
RAD ONC MSQ PRESCRIBED TECHNIQUE: NORMAL
RAD ONC MSQ PRESCRIBED TOTAL DOSE: 6000 CGRAY
RAD ONC MSQ PRESCRIPTION PATTERN COMMENT: NORMAL
RAD ONC MSQ START DATE: NORMAL
RAD ONC MSQ TREATMENT COURSE NUMBER: 1
RAD ONC MSQ TREATMENT SITE: NORMAL

## 2025-03-21 PROCEDURE — 77386 HC INTENSITY-MODULATED RADIATION THERAPY (IMRT), COMPLEX: CPT | Performed by: STUDENT IN AN ORGANIZED HEALTH CARE EDUCATION/TRAINING PROGRAM

## 2025-03-24 ENCOUNTER — HOSPITAL ENCOUNTER (OUTPATIENT)
Dept: RADIATION ONCOLOGY | Facility: CLINIC | Age: 70
Setting detail: RADIATION/ONCOLOGY SERIES
Discharge: HOME | End: 2025-03-24
Payer: MEDICARE

## 2025-03-24 ENCOUNTER — DOCUMENTATION (OUTPATIENT)
Dept: RADIATION ONCOLOGY | Facility: CLINIC | Age: 70
End: 2025-03-24

## 2025-03-24 DIAGNOSIS — Z51.0 ENCOUNTER FOR ANTINEOPLASTIC RADIATION THERAPY: ICD-10-CM

## 2025-03-24 DIAGNOSIS — C34.11 MALIGNANT NEOPLASM OF UPPER LOBE OF RIGHT LUNG (MULTI): ICD-10-CM

## 2025-03-24 DIAGNOSIS — C34.11 MALIGNANT NEOPLASM OF UPPER LOBE, RIGHT BRONCHUS OR LUNG: ICD-10-CM

## 2025-03-24 LAB
RAD ONC MSQ ACTUAL FRACTIONS DELIVERED: 30
RAD ONC MSQ ACTUAL SESSION DELIVERED DOSE: 200 CGRAY
RAD ONC MSQ ACTUAL TOTAL DOSE: 6000 CGRAY
RAD ONC MSQ ELAPSED DAYS: 41
RAD ONC MSQ LAST DATE: NORMAL
RAD ONC MSQ PRESCRIBED FRACTIONAL DOSE: 200 CGRAY
RAD ONC MSQ PRESCRIBED NUMBER OF FRACTIONS: 30
RAD ONC MSQ PRESCRIBED TECHNIQUE: NORMAL
RAD ONC MSQ PRESCRIBED TOTAL DOSE: 6000 CGRAY
RAD ONC MSQ PRESCRIPTION PATTERN COMMENT: NORMAL
RAD ONC MSQ START DATE: NORMAL
RAD ONC MSQ TREATMENT COURSE NUMBER: 1
RAD ONC MSQ TREATMENT SITE: NORMAL

## 2025-03-24 PROCEDURE — 77386 HC INTENSITY-MODULATED RADIATION THERAPY (IMRT), COMPLEX: CPT | Performed by: STUDENT IN AN ORGANIZED HEALTH CARE EDUCATION/TRAINING PROGRAM

## 2025-03-25 ENCOUNTER — APPOINTMENT (OUTPATIENT)
Dept: PRIMARY CARE | Facility: CLINIC | Age: 70
End: 2025-03-25
Payer: MEDICARE

## 2025-03-25 NOTE — PROGRESS NOTES
Radiation Oncology Treatment Summary    Patient Name:  Billie Hernadez  MRN:  [id number]  :  1955    Referring Provider: Judith Santillan MD   Primary Care Provider: KAREN Holliday-CNP    Brief History: Please see the radiation oncology consultation note.  Briefly, Billie Hernadez is a 69 y.o. female with Malignant neoplasm of upper lobe of right lung (Multi), Clinical: Stage IIIB (cT1b, cN3, cM0).  The patient completed radiotherapy as outlined below.    The radiation planning and treatment procedures were explained to the patient in advance, and all questions were answered. The benefits and goals of treatment, options and alternatives, limitations, side effects and risks of radiation were also explained. The patient provided informed consent.    Technical Summary:  Region(s) Treated: Right thorax, hilum, mediastinum and supraclavicular fossa  Radiation Dose Prescribed: 6000 cGy in 30 fractions  Radiation Technique/Machine/Energy Used: VMAT / Truebeam / 6 MV photons   Additional radiation technical details available in our radiation oncology EMR [facility] and our treatment planning system.    Radiation Treatment Summary:    IMRT: Right Thorax, Midline Mediastinum, Right Supraclavicular fossa    Treatment Period Technique Fraction Dose Fractions Total Dose   Course 1 2025-3/24/2025  (days elapsed: 41)         RUL_Hil_Med 2025-3/24/2025 VMAT 200 / 200 cGy 30 / 30 6000 / 6,000 cGy       Please see the patient's [facility] chart for further details regarding the radiation plan, including beam energy.    Concurrent Chemotherapy:  Treatment Plans       Name Type Plan Dates Plan Provider         Active    PACLitaxel (Weekly) / CARBOplatin (Weekly) with Concurrent Radiation, 49 Day Cycle Oncology Treatment 2025 - Present Judith Santillan MD                      Clinical Summary:  The patient tolerated this course of radiation therapy relatively well, with no unusual events or unanticipated toxicities.  Symptoms during treatment included decreased oral intake requiring IV fluids, diarrhea, esophagitis requiring BMX, carafate and oxycodone.  The patient had baseline shortness of breath and cough.    CTCAE Toxicity Overview:   Toxicity Assessment          2/12/2025    10:41 2/19/2025    09:19 2/26/2025    09:24 3/5/2025    09:31 3/12/2025    09:19 3/19/2025    09:31   Toxicity Assessment   Treatment Site Thoracic Thoracic Thoracic Thoracic Thoracic Thoracic   Anorexia Grade 0 Grade 0 Grade 0 Grade 0 Grade 0 Grade 0   Anxiety Grade 0 Grade 0 Grade 0 Grade 0 Grade 0 Grade 0   Dehydration Grade 0 Grade 0 Grade 0 Grade 0 Grade 0 Grade 1       encouraged to drink more water   Depression Grade 0 Grade 0 Grade 0 Grade 0 Grade 0 Grade 0   Dermatitis Radiation Grade 0 Grade 0 Grade 0 Grade 0 Grade 0 Grade 0   Diarrhea Grade 0 Grade 0 Grade 0 Grade 0 Grade 0 Grade 1       one time in the AM'   Fatigue Grade 1 Grade 0 Grade 1 Grade 1 Grade 1 Grade 1   Fibrosis Deep Connective Tissue Grade 0 Grade 0 Grade 0 Grade 0 Grade 0 Grade 0   Fracture Grade 0 Grade 0 Grade 0 Grade 0 Grade 0 Grade 0   Nausea Grade 0 Grade 0 Grade 0 Grade 0 Grade 0 Grade 0   Pain Grade 0 Grade 0 Grade 0 Grade 0 Grade 0 Grade 0   Treatment Related Secondary Malignancy Grade 0 Grade 0 Grade 0 Grade 0 Grade 0 Grade 0   Tumor Pain Grade 0 Grade 0 Grade 0 Grade 0 Grade 0 Grade 0   Vomiting Grade 0 Grade 0 Grade 0 Grade 0 Grade 0    Constipation Grade 0 Grade 0 Grade 0 Grade 0 Grade 0 Grade 0   Dyspepsia Grade 0 Grade 0 Grade 0 Grade 0 Grade 0 Grade 0   Dysphagia Grade 0 Grade 0 Grade 1       sore throat Grade 0 Grade 1 Grade 1       sore throat   Esophagitis Grade 0 Grade 0 Grade 0 Grade 0 Grade 0 Grade 0   Mucositis Oral Grade 0 Grade 0 Grade 0 Grade 0 Grade 0 Grade 0   Upper Gastrointestinal Hemorrhage Grade 0 Grade 0 Grade 0 Grade 0 Grade 0 Grade 0   Peripheral Sensory Neuropathy Grade 0   Grade 0 Grade 0    Brachial Plexopathy Grade 0        Pneumonitis  Grade 0 Grade 0 Grade 0 Grade 0 Grade 0 Grade 0   Aspiration Grade 0 Grade 0 Grade 0 Grade 0 Grade 0 Grade 0   Hoarseness Grade 1 Grade 0 Grade 1 Grade 1 Grade 0 Grade 1   Laryngeal Edema Grade 0 Grade 0 Grade 0 Grade 0 Grade 0 Grade 0   Myocardial Infarction Grade 0 Grade 0 Grade 0 Grade 0 Grade 0 Grade 0   Pericardial Effusion Grade 0 Grade 0 Grade 0 Grade 0 Grade 0 Grade 0   Pericarditis Grade 0 Grade 0 Grade 0 Grade 0 Grade 0 Grade 0   Esophageal Fistula Grade 0 Grade 0 Grade 0 Grade 0 Grade 0 Grade 0   Esophageal Obstruction Grade 0 Grade 0 Grade 0 Grade 0 Grade 0 Grade 0   Esophageal Pain Grade 0 Grade 0 Grade 0 Grade 0 Grade 0 Grade 0   Esophageal Stenosis Grade 0 Grade 0 Grade 0 Grade 0 Grade 0 Grade 0   Esophageal Ulcer Grade 0 Grade 0 Grade 0 Grade 0 Grade 0 Grade 0   Bronchial Obstruction Grade 0 Grade 0 Grade 0 Grade 0 Grade 0 Grade 0   Cough Grade 1 Grade 1 Grade 1       dry Grade 0 Grade 1 Grade 1   Dyspnea Grade 2 Grade 1 Grade 1       with activity Grade 1 Grade 1 Grade 1   Epistaxis Grade 0 Grade 0 Grade 0 Grade 0 Grade 0 Grade 0   Hiccups Grade 0 Grade 0 Grade 0 Grade 0 Grade 0 Grade 0   Hypoxia Grade 0 Grade 0 Grade 0 Grade 0 Grade 0 Grade 0   Pulmonary Fibrosis Grade 0 Grade 0 Grade 0 Grade 0 Grade 0 Grade 0   Lymphedema Grade 0 Grade 0 Grade 0 Grade 0 Grade 0 Grade 0   Thromboembolic Event Grade 0 Grade 0 Grade 0 Grade 0 Grade 0 Grade 0   Hot Flashes Grade 0          Patient Disposition:  The patient will be scheduled for follow-up at our clinic on 4/24/25 @ 0900. The patient was encouraged to contact us for any questions or concerns in the interim.    Abimael Bridges MD  [facility] [place]  [facility] clinical  - Department of Radiation Oncology  Phone: [phone]  Fax: [phone]  Epic secure chat preferred

## 2025-03-26 ENCOUNTER — APPOINTMENT (OUTPATIENT)
Dept: HEMATOLOGY/ONCOLOGY | Facility: CLINIC | Age: 70
End: 2025-03-26
Payer: MEDICARE

## 2025-03-26 NOTE — PROGRESS NOTES
"Subjective   Patient ID: Billie Hernadez is a 69 y.o. female who presents for No chief complaint on file..    HPI    Postmenopausal osteoporosis- she is taking alendronate 70 mg oral tablet every week. Taking vitamin D and calcium supplements. Very active in the summer. She stays active at home in winter.      COPD: Breathing has been better. Patient is also active smoker and get short of breath with exertion. She have a history of COPD. She smokes about a pack for a few days. She is trying to wean herself down. She has been smoking since she was about 18. She has issues with blowing out candles or blowing up balloons. Goal is try to get it down to 5.   The last few days it has been bad. She is now having some nasal congestion. Taking zyrtec daily, unsure if this is helping.      She is going for the CT scan on 10/8 for cancer screening.      Mammogram: history of abnormal results. Last was Aug 2020, supposed to follow up every 6 months to monitor \"suspicious for Microcalcification.\" 4/2022- normal mammogram, due in 1 year      Hyperlipidemia- She is not on any medication.      Elevated BP reading: She is still running high. Agreeable to start low dose bp medication.      Vitamin D deficiency- Last vitamin D level increased to normal.     [facility] is due      She is Florin's grandma    Review of systems completed and unremarkable other than what is documented in HPI.    Objective   There were no vitals taken for this visit.    No data recorded    Physical Exam    Gen: No acute distress, alert and oriented x3, pleasant   HEENT: moist mucous membranes, b/l external auditory canals are clear of debris, TMs within normal limits, no oropharyngeal lesions, eomi, perrla   Neck: thyroid within normal limits, no lymphadenopathy   CV: RRR, normal S1/S2, no murmur   Resp: Clear to auscultation bilaterally, no wheezes or rhonchi appreciated  Abd: soft, nontender, non-distended, no guarding/rigidity, " bowel sounds present  Extr: no edema, no calf tenderness  Derm: Skin is warm and dry, no rashes appreciated  Psych: mood is good, affect is congruent, good hygiene, normal speech and eye contact  Neuro: cranial nerves grossly intact, normal gait      Assessment/Plan   {Assess/PlanSmartLinks:40029}       #HTN   Losartan 25mg   BP controlled   Continue medication   BP better at home      Postmenopausal Osteoporosis  - Continue Alendronate.  - Dexa scan 2016, needed every 2 years  - ordered today      Hyperlipidemia  - Lipid panel ordered.  - Lifestyle modification to continue.     COPD  - No wheezing on auscultation  - Patient encouraged to stop smoking.  - discussed possible PFT to assess  - refilled Spiriva and albuterol inhaler for PRN use   Has Low dose CT lung cancer screening scheduled for 10/8/2024     Breast cancer screening  - ordered mammogram today, needed every year      Vitamin D level ordered.     #HCM  Mammogram 5/2024  Cologuard done 2023   C-scope done 11/2023, repeat in 2028  Dexa 2023- osteoporosis   CT low cancer screening- getting next month   UTD on vaccines   Gets flu vaccine

## 2025-03-27 ENCOUNTER — APPOINTMENT (OUTPATIENT)
Dept: HEMATOLOGY/ONCOLOGY | Facility: CLINIC | Age: 70
End: 2025-03-27
Payer: MEDICARE

## 2025-03-27 ENCOUNTER — INFUSION (OUTPATIENT)
Dept: HEMATOLOGY/ONCOLOGY | Facility: HOSPITAL | Age: 70
End: 2025-03-27
Payer: MEDICARE

## 2025-03-27 VITALS
HEART RATE: 115 BPM | OXYGEN SATURATION: 91 % | SYSTOLIC BLOOD PRESSURE: 95 MMHG | DIASTOLIC BLOOD PRESSURE: 60 MMHG | TEMPERATURE: 98.6 F | RESPIRATION RATE: 18 BRPM

## 2025-03-27 DIAGNOSIS — C34.11 MALIGNANT NEOPLASM OF UPPER LOBE OF RIGHT LUNG (MULTI): Primary | ICD-10-CM

## 2025-03-27 LAB
ALBUMIN SERPL BCP-MCNC: 3.7 G/DL (ref 3.4–5)
ALP SERPL-CCNC: 58 U/L (ref 33–136)
ALT SERPL W P-5'-P-CCNC: 15 U/L (ref 7–45)
ANION GAP SERPL CALC-SCNC: 12 MMOL/L (ref 10–20)
AST SERPL W P-5'-P-CCNC: 21 U/L (ref 9–39)
BASOPHILS # BLD AUTO: 0.01 X10*3/UL (ref 0–0.1)
BASOPHILS NFR BLD AUTO: 0.4 %
BILIRUB SERPL-MCNC: 0.6 MG/DL (ref 0–1.2)
BUN SERPL-MCNC: 8 MG/DL (ref 6–23)
CALCIUM SERPL-MCNC: 8.5 MG/DL (ref 8.6–10.3)
CHLORIDE SERPL-SCNC: 94 MMOL/L (ref 98–107)
CO2 SERPL-SCNC: 29 MMOL/L (ref 21–32)
CREAT SERPL-MCNC: 0.57 MG/DL (ref 0.5–1.05)
EGFRCR SERPLBLD CKD-EPI 2021: >90 ML/MIN/1.73M*2
EOSINOPHIL # BLD AUTO: 0.03 X10*3/UL (ref 0–0.4)
EOSINOPHIL NFR BLD AUTO: 1.3 %
ERYTHROCYTE [DISTWIDTH] IN BLOOD BY AUTOMATED COUNT: 19.4 % (ref 11.5–14.5)
GLUCOSE SERPL-MCNC: 111 MG/DL (ref 74–99)
HCT VFR BLD AUTO: 27.4 % (ref 36–46)
HGB BLD-MCNC: 8.5 G/DL (ref 12–16)
IMM GRANULOCYTES # BLD AUTO: 0.02 X10*3/UL (ref 0–0.5)
IMM GRANULOCYTES NFR BLD AUTO: 0.8 % (ref 0–0.9)
LYMPHOCYTES # BLD AUTO: 0.23 X10*3/UL (ref 0.8–3)
LYMPHOCYTES NFR BLD AUTO: 9.6 %
MAGNESIUM SERPL-MCNC: 1.64 MG/DL (ref 1.6–2.4)
MCH RBC QN AUTO: 32.3 PG (ref 26–34)
MCHC RBC AUTO-ENTMCNC: 31 G/DL (ref 32–36)
MCV RBC AUTO: 104 FL (ref 80–100)
MONOCYTES # BLD AUTO: 0.25 X10*3/UL (ref 0.05–0.8)
MONOCYTES NFR BLD AUTO: 10.5 %
NEUTROPHILS # BLD AUTO: 1.85 X10*3/UL (ref 1.6–5.5)
NEUTROPHILS NFR BLD AUTO: 77.4 %
NRBC BLD-RTO: 0.8 /100 WBCS (ref 0–0)
PLATELET # BLD AUTO: 89 X10*3/UL (ref 150–450)
POTASSIUM SERPL-SCNC: 4.4 MMOL/L (ref 3.5–5.3)
PROT SERPL-MCNC: 7 G/DL (ref 6.4–8.2)
RBC # BLD AUTO: 2.63 X10*6/UL (ref 4–5.2)
RBC MORPH BLD: NORMAL
SODIUM SERPL-SCNC: 131 MMOL/L (ref 136–145)
WBC # BLD AUTO: 2.4 X10*3/UL (ref 4.4–11.3)

## 2025-03-27 PROCEDURE — 2500000004 HC RX 250 GENERAL PHARMACY W/ HCPCS (ALT 636 FOR OP/ED)

## 2025-03-27 PROCEDURE — 83735 ASSAY OF MAGNESIUM: CPT

## 2025-03-27 PROCEDURE — 96361 HYDRATE IV INFUSION ADD-ON: CPT | Mod: INF

## 2025-03-27 PROCEDURE — 96360 HYDRATION IV INFUSION INIT: CPT | Mod: INF

## 2025-03-27 PROCEDURE — 80053 COMPREHEN METABOLIC PANEL: CPT

## 2025-03-27 PROCEDURE — 85025 COMPLETE CBC W/AUTO DIFF WBC: CPT

## 2025-03-27 PROCEDURE — 2500000004 HC RX 250 GENERAL PHARMACY W/ HCPCS (ALT 636 FOR OP/ED): Performed by: INTERNAL MEDICINE

## 2025-03-27 RX ORDER — ALBUTEROL SULFATE 0.83 MG/ML
3 SOLUTION RESPIRATORY (INHALATION) AS NEEDED
OUTPATIENT
Start: 2025-04-03

## 2025-03-27 RX ORDER — FAMOTIDINE 10 MG/ML
20 INJECTION, SOLUTION INTRAVENOUS ONCE AS NEEDED
OUTPATIENT
Start: 2025-04-03

## 2025-03-27 RX ORDER — EPINEPHRINE 0.3 MG/.3ML
0.3 INJECTION SUBCUTANEOUS EVERY 5 MIN PRN
OUTPATIENT
Start: 2025-04-03

## 2025-03-27 RX ORDER — DIPHENHYDRAMINE HYDROCHLORIDE 50 MG/ML
50 INJECTION, SOLUTION INTRAMUSCULAR; INTRAVENOUS AS NEEDED
OUTPATIENT
Start: 2025-04-03

## 2025-03-27 RX ORDER — SODIUM CHLORIDE 9 MG/ML
500 INJECTION, SOLUTION INTRAVENOUS CONTINUOUS
Status: ACTIVE | OUTPATIENT
Start: 2025-03-27 | End: 2025-03-27

## 2025-03-27 RX ORDER — HEPARIN 100 UNIT/ML
500 SYRINGE INTRAVENOUS AS NEEDED
Status: DISCONTINUED | OUTPATIENT
Start: 2025-03-27 | End: 2025-03-27 | Stop reason: HOSPADM

## 2025-03-27 RX ORDER — HEPARIN SODIUM,PORCINE/PF 10 UNIT/ML
50 SYRINGE (ML) INTRAVENOUS AS NEEDED
OUTPATIENT
Start: 2025-03-27

## 2025-03-27 RX ORDER — SODIUM CHLORIDE 9 MG/ML
INJECTION, SOLUTION INTRAVENOUS
Status: COMPLETED
Start: 2025-03-27 | End: 2025-03-27

## 2025-03-27 RX ORDER — SODIUM CHLORIDE 9 MG/ML
500 INJECTION, SOLUTION INTRAVENOUS CONTINUOUS
OUTPATIENT
Start: 2025-04-03

## 2025-03-27 RX ORDER — HEPARIN 100 UNIT/ML
500 SYRINGE INTRAVENOUS AS NEEDED
OUTPATIENT
Start: 2025-03-27

## 2025-03-27 RX ADMIN — HEPARIN 500 UNITS: 100 SYRINGE at 12:54

## 2025-03-27 RX ADMIN — SODIUM CHLORIDE 500 ML/HR: 9 INJECTION, SOLUTION INTRAVENOUS at 10:38

## 2025-03-27 ASSESSMENT — PAIN SCALES - GENERAL: PAINLEVEL_OUTOF10: 0-NO PAIN

## 2025-04-01 ENCOUNTER — APPOINTMENT (OUTPATIENT)
Dept: PRIMARY CARE | Facility: CLINIC | Age: 70
End: 2025-04-01
Payer: MEDICARE

## 2025-04-01 ENCOUNTER — PATIENT OUTREACH (OUTPATIENT)
Dept: PRIMARY CARE | Facility: CLINIC | Age: 70
End: 2025-04-01

## 2025-04-01 ENCOUNTER — PATIENT MESSAGE (OUTPATIENT)
Dept: PRIMARY CARE | Facility: CLINIC | Age: 70
End: 2025-04-01

## 2025-04-01 VITALS — DIASTOLIC BLOOD PRESSURE: 65 MMHG | BODY MASS INDEX: 19.55 KG/M2 | WEIGHT: 107.6 LBS | SYSTOLIC BLOOD PRESSURE: 106 MMHG

## 2025-04-01 DIAGNOSIS — Z12.31 ENCOUNTER FOR SCREENING MAMMOGRAM FOR MALIGNANT NEOPLASM OF BREAST: ICD-10-CM

## 2025-04-01 DIAGNOSIS — I49.9 IRREGULAR HEART BEAT: Primary | ICD-10-CM

## 2025-04-01 PROCEDURE — 1036F TOBACCO NON-USER: CPT | Performed by: REGISTERED NURSE

## 2025-04-01 PROCEDURE — 3074F SYST BP LT 130 MM HG: CPT | Performed by: REGISTERED NURSE

## 2025-04-01 PROCEDURE — 1159F MED LIST DOCD IN RCRD: CPT | Performed by: REGISTERED NURSE

## 2025-04-01 PROCEDURE — 99214 OFFICE O/P EST MOD 30 MIN: CPT | Performed by: REGISTERED NURSE

## 2025-04-01 PROCEDURE — 1160F RVW MEDS BY RX/DR IN RCRD: CPT | Performed by: REGISTERED NURSE

## 2025-04-01 PROCEDURE — 3078F DIAST BP <80 MM HG: CPT | Performed by: REGISTERED NURSE

## 2025-04-03 ENCOUNTER — HOSPITAL ENCOUNTER (INPATIENT)
Facility: HOSPITAL | Age: 70
End: 2025-04-03
Attending: FAMILY MEDICINE | Admitting: INTERNAL MEDICINE
Payer: MEDICARE

## 2025-04-03 ENCOUNTER — INFUSION (OUTPATIENT)
Dept: HEMATOLOGY/ONCOLOGY | Facility: HOSPITAL | Age: 70
End: 2025-04-03
Payer: MEDICARE

## 2025-04-03 ENCOUNTER — APPOINTMENT (OUTPATIENT)
Dept: RADIOLOGY | Facility: HOSPITAL | Age: 70
DRG: 193 | End: 2025-04-03
Payer: MEDICARE

## 2025-04-03 ENCOUNTER — APPOINTMENT (OUTPATIENT)
Dept: HEMATOLOGY/ONCOLOGY | Facility: CLINIC | Age: 70
End: 2025-04-03
Payer: MEDICARE

## 2025-04-03 ENCOUNTER — HOSPITAL ENCOUNTER (OUTPATIENT)
Dept: CARDIOLOGY | Facility: HOSPITAL | Age: 70
Discharge: HOME | End: 2025-04-03
Payer: MEDICARE

## 2025-04-03 VITALS
HEART RATE: 111 BPM | RESPIRATION RATE: 19 BRPM | DIASTOLIC BLOOD PRESSURE: 65 MMHG | OXYGEN SATURATION: 97 % | TEMPERATURE: 100.8 F | SYSTOLIC BLOOD PRESSURE: 114 MMHG

## 2025-04-03 DIAGNOSIS — E83.42 HYPOMAGNESEMIA: ICD-10-CM

## 2025-04-03 DIAGNOSIS — J15.9 COMMUNITY ACQUIRED BACTERIAL PNEUMONIA: Primary | ICD-10-CM

## 2025-04-03 DIAGNOSIS — D84.9 IMMUNOCOMPROMISED: ICD-10-CM

## 2025-04-03 DIAGNOSIS — J96.01 ACUTE RESPIRATORY FAILURE WITH HYPOXIA: ICD-10-CM

## 2025-04-03 DIAGNOSIS — J18.9 PNEUMONIA OF RIGHT LOWER LOBE DUE TO INFECTIOUS ORGANISM: ICD-10-CM

## 2025-04-03 DIAGNOSIS — C34.11 CANCER OF UPPER LOBE OF RIGHT LUNG (MULTI): ICD-10-CM

## 2025-04-03 DIAGNOSIS — C34.11 MALIGNANT NEOPLASM OF UPPER LOBE OF RIGHT LUNG (MULTI): ICD-10-CM

## 2025-04-03 DIAGNOSIS — I49.9 IRREGULAR HEART BEAT: ICD-10-CM

## 2025-04-03 DIAGNOSIS — I10 HYPERTENSION, UNSPECIFIED TYPE: ICD-10-CM

## 2025-04-03 DIAGNOSIS — D70.9 NEUTROPENIA, UNSPECIFIED TYPE: ICD-10-CM

## 2025-04-03 DIAGNOSIS — K59.00 CONSTIPATION, UNSPECIFIED CONSTIPATION TYPE: ICD-10-CM

## 2025-04-03 LAB
ALBUMIN SERPL BCP-MCNC: 3.6 G/DL (ref 3.4–5)
ALP SERPL-CCNC: 61 U/L (ref 33–136)
ALT SERPL W P-5'-P-CCNC: 11 U/L (ref 7–45)
ANION GAP SERPL CALC-SCNC: 13 MMOL/L (ref 10–20)
AST SERPL W P-5'-P-CCNC: 19 U/L (ref 9–39)
ATRIAL RATE: 115 BPM
BASOPHILS # BLD MANUAL: 0 X10*3/UL (ref 0–0.1)
BASOPHILS NFR BLD MANUAL: 0 %
BILIRUB SERPL-MCNC: 0.5 MG/DL (ref 0–1.2)
BNP SERPL-MCNC: 165 PG/ML (ref 0–99)
BUN SERPL-MCNC: 10 MG/DL (ref 6–23)
CALCIUM SERPL-MCNC: 9 MG/DL (ref 8.6–10.3)
CARDIAC TROPONIN I PNL SERPL HS: 6 NG/L (ref 0–13)
CARDIAC TROPONIN I PNL SERPL HS: 6 NG/L (ref 0–13)
CHLORIDE SERPL-SCNC: 95 MMOL/L (ref 98–107)
CO2 SERPL-SCNC: 30 MMOL/L (ref 21–32)
CREAT SERPL-MCNC: 0.6 MG/DL (ref 0.5–1.05)
EGFRCR SERPLBLD CKD-EPI 2021: >90 ML/MIN/1.73M*2
EOSINOPHIL # BLD MANUAL: 0.03 X10*3/UL (ref 0–0.4)
EOSINOPHIL NFR BLD MANUAL: 1 %
ERYTHROCYTE [DISTWIDTH] IN BLOOD BY AUTOMATED COUNT: 20.8 % (ref 11.5–14.5)
FLUAV RNA RESP QL NAA+PROBE: NOT DETECTED
FLUBV RNA RESP QL NAA+PROBE: NOT DETECTED
GLUCOSE SERPL-MCNC: 126 MG/DL (ref 74–99)
HCT VFR BLD AUTO: 26.2 % (ref 36–46)
HGB BLD-MCNC: 8.1 G/DL (ref 12–16)
IMM GRANULOCYTES # BLD AUTO: 0.01 X10*3/UL (ref 0–0.5)
IMM GRANULOCYTES NFR BLD AUTO: 0.3 % (ref 0–0.9)
LACTATE SERPL-SCNC: 1 MMOL/L (ref 0.4–2)
LYMPHOCYTES # BLD MANUAL: 0.33 X10*3/UL (ref 0.8–3)
LYMPHOCYTES NFR BLD MANUAL: 11 %
MAGNESIUM SERPL-MCNC: 1.41 MG/DL (ref 1.6–2.4)
MCH RBC QN AUTO: 33.2 PG (ref 26–34)
MCHC RBC AUTO-ENTMCNC: 30.9 G/DL (ref 32–36)
MCV RBC AUTO: 107 FL (ref 80–100)
MONOCYTES # BLD MANUAL: 0.39 X10*3/UL (ref 0.05–0.8)
MONOCYTES NFR BLD MANUAL: 13 %
NEUTS SEG # BLD MANUAL: 2.25 X10*3/UL (ref 1.6–5)
NEUTS SEG NFR BLD MANUAL: 75 %
NRBC BLD-RTO: 0 /100 WBCS (ref 0–0)
OVALOCYTES BLD QL SMEAR: ABNORMAL
PLATELET # BLD AUTO: 154 X10*3/UL (ref 150–450)
POTASSIUM SERPL-SCNC: 3.9 MMOL/L (ref 3.5–5.3)
PR INTERVAL: 184 MS
PROT SERPL-MCNC: 6.7 G/DL (ref 6.4–8.2)
Q ONSET: 228 MS
QRS COUNT: 19 BEATS
QRS DURATION: 68 MS
QT INTERVAL: 314 MS
QTC CALCULATION(BAZETT): 434 MS
QTC FREDERICIA: 389 MS
R AXIS: 86 DEGREES
RBC # BLD AUTO: 2.44 X10*6/UL (ref 4–5.2)
RBC MORPH BLD: ABNORMAL
RSV RNA RESP QL NAA+PROBE: NOT DETECTED
SARS-COV-2 RNA RESP QL NAA+PROBE: NOT DETECTED
SODIUM SERPL-SCNC: 134 MMOL/L (ref 136–145)
SPHEROCYTES BLD QL SMEAR: ABNORMAL
T AXIS: 60 DEGREES
T OFFSET: 385 MS
TOTAL CELLS COUNTED BLD: 100
VENTRICULAR RATE: 115 BPM
WBC # BLD AUTO: 3 X10*3/UL (ref 4.4–11.3)

## 2025-04-03 PROCEDURE — 2500000002 HC RX 250 W HCPCS SELF ADMINISTERED DRUGS (ALT 637 FOR MEDICARE OP, ALT 636 FOR OP/ED): Mod: IPSPLIT | Performed by: NURSE PRACTITIONER

## 2025-04-03 PROCEDURE — 2500000004 HC RX 250 GENERAL PHARMACY W/ HCPCS (ALT 636 FOR OP/ED): Performed by: INTERNAL MEDICINE

## 2025-04-03 PROCEDURE — 84484 ASSAY OF TROPONIN QUANT: CPT | Performed by: FAMILY MEDICINE

## 2025-04-03 PROCEDURE — 2500000001 HC RX 250 WO HCPCS SELF ADMINISTERED DRUGS (ALT 637 FOR MEDICARE OP): Mod: IPSPLIT | Performed by: NURSE PRACTITIONER

## 2025-04-03 PROCEDURE — 94760 N-INVAS EAR/PLS OXIMETRY 1: CPT | Mod: IPSPLIT

## 2025-04-03 PROCEDURE — 83880 ASSAY OF NATRIURETIC PEPTIDE: CPT | Performed by: FAMILY MEDICINE

## 2025-04-03 PROCEDURE — 9420000001 HC RT PATIENT EDUCATION 5 MIN: Mod: IPSPLIT

## 2025-04-03 PROCEDURE — 94640 AIRWAY INHALATION TREATMENT: CPT | Mod: IPSPLIT

## 2025-04-03 PROCEDURE — 87040 BLOOD CULTURE FOR BACTERIA: CPT | Mod: CONLAB | Performed by: FAMILY MEDICINE

## 2025-04-03 PROCEDURE — 96365 THER/PROPH/DIAG IV INF INIT: CPT

## 2025-04-03 PROCEDURE — 94664 DEMO&/EVAL PT USE INHALER: CPT | Mod: IPSPLIT

## 2025-04-03 PROCEDURE — 85027 COMPLETE CBC AUTOMATED: CPT | Performed by: FAMILY MEDICINE

## 2025-04-03 PROCEDURE — 80053 COMPREHEN METABOLIC PANEL: CPT | Performed by: FAMILY MEDICINE

## 2025-04-03 PROCEDURE — 87637 SARSCOV2&INF A&B&RSV AMP PRB: CPT | Performed by: FAMILY MEDICINE

## 2025-04-03 PROCEDURE — 83735 ASSAY OF MAGNESIUM: CPT | Performed by: FAMILY MEDICINE

## 2025-04-03 PROCEDURE — 96360 HYDRATION IV INFUSION INIT: CPT | Mod: INF

## 2025-04-03 PROCEDURE — 85007 BL SMEAR W/DIFF WBC COUNT: CPT | Performed by: FAMILY MEDICINE

## 2025-04-03 PROCEDURE — 99285 EMERGENCY DEPT VISIT HI MDM: CPT | Performed by: FAMILY MEDICINE

## 2025-04-03 PROCEDURE — 96367 TX/PROPH/DG ADDL SEQ IV INF: CPT

## 2025-04-03 PROCEDURE — 93005 ELECTROCARDIOGRAM TRACING: CPT

## 2025-04-03 PROCEDURE — 2500000004 HC RX 250 GENERAL PHARMACY W/ HCPCS (ALT 636 FOR OP/ED): Performed by: FAMILY MEDICINE

## 2025-04-03 PROCEDURE — 96361 HYDRATE IV INFUSION ADD-ON: CPT | Mod: INF

## 2025-04-03 PROCEDURE — 36415 COLL VENOUS BLD VENIPUNCTURE: CPT | Performed by: FAMILY MEDICINE

## 2025-04-03 PROCEDURE — 2500000004 HC RX 250 GENERAL PHARMACY W/ HCPCS (ALT 636 FOR OP/ED): Mod: IPSPLIT | Performed by: NURSE PRACTITIONER

## 2025-04-03 PROCEDURE — 2500000005 HC RX 250 GENERAL PHARMACY W/O HCPCS: Mod: IPSPLIT | Performed by: NURSE PRACTITIONER

## 2025-04-03 PROCEDURE — 83605 ASSAY OF LACTIC ACID: CPT | Performed by: FAMILY MEDICINE

## 2025-04-03 PROCEDURE — 2500000004 HC RX 250 GENERAL PHARMACY W/ HCPCS (ALT 636 FOR OP/ED): Mod: IPSPLIT | Performed by: INTERNAL MEDICINE

## 2025-04-03 PROCEDURE — 71045 X-RAY EXAM CHEST 1 VIEW: CPT | Mod: FOREIGN READ | Performed by: RADIOLOGY

## 2025-04-03 PROCEDURE — 1100000001 HC PRIVATE ROOM DAILY: Mod: IPSPLIT

## 2025-04-03 PROCEDURE — 71045 X-RAY EXAM CHEST 1 VIEW: CPT

## 2025-04-03 RX ORDER — HEPARIN 100 UNIT/ML
500 SYRINGE INTRAVENOUS AS NEEDED
Status: DISCONTINUED | OUTPATIENT
Start: 2025-04-03 | End: 2025-04-03 | Stop reason: HOSPADM

## 2025-04-03 RX ORDER — LOSARTAN POTASSIUM 25 MG/1
25 TABLET ORAL DAILY
Status: DISCONTINUED | OUTPATIENT
Start: 2025-04-04 | End: 2025-04-04

## 2025-04-03 RX ORDER — SODIUM CHLORIDE 9 MG/ML
500 INJECTION, SOLUTION INTRAVENOUS CONTINUOUS
OUTPATIENT
Start: 2025-04-10

## 2025-04-03 RX ORDER — POLYETHYLENE GLYCOL 3350 17 G/17G
17 POWDER, FOR SOLUTION ORAL DAILY PRN
Status: ACTIVE | OUTPATIENT
Start: 2025-04-03

## 2025-04-03 RX ORDER — GUAIFENESIN/DEXTROMETHORPHAN 100-10MG/5
5 SYRUP ORAL EVERY 4 HOURS PRN
Status: ACTIVE | OUTPATIENT
Start: 2025-04-03

## 2025-04-03 RX ORDER — EPINEPHRINE 0.3 MG/.3ML
0.3 INJECTION SUBCUTANEOUS EVERY 5 MIN PRN
OUTPATIENT
Start: 2025-04-10

## 2025-04-03 RX ORDER — ENOXAPARIN SODIUM 100 MG/ML
40 INJECTION SUBCUTANEOUS EVERY 24 HOURS
Status: DISCONTINUED | OUTPATIENT
Start: 2025-04-03 | End: 2025-04-04

## 2025-04-03 RX ORDER — ALBUTEROL SULFATE 90 UG/1
2 INHALANT RESPIRATORY (INHALATION) EVERY 2 HOUR PRN
Status: ACTIVE | OUTPATIENT
Start: 2025-04-03

## 2025-04-03 RX ORDER — ACETAMINOPHEN 325 MG/1
650 TABLET ORAL EVERY 4 HOURS PRN
Status: DISPENSED | OUTPATIENT
Start: 2025-04-03

## 2025-04-03 RX ORDER — CEFTRIAXONE 2 G/50ML
2 INJECTION, SOLUTION INTRAVENOUS EVERY 24 HOURS
Status: DISCONTINUED | OUTPATIENT
Start: 2025-04-04 | End: 2025-04-04

## 2025-04-03 RX ORDER — LANOLIN ALCOHOL/MO/W.PET/CERES
400 CREAM (GRAM) TOPICAL 3 TIMES DAILY
Status: DISPENSED | OUTPATIENT
Start: 2025-04-03

## 2025-04-03 RX ORDER — ONDANSETRON HYDROCHLORIDE 2 MG/ML
4 INJECTION, SOLUTION INTRAVENOUS EVERY 8 HOURS PRN
Status: ACTIVE | OUTPATIENT
Start: 2025-04-03

## 2025-04-03 RX ORDER — OLANZAPINE 2.5 MG/1
5 TABLET ORAL NIGHTLY
Status: DISPENSED | OUTPATIENT
Start: 2025-04-03

## 2025-04-03 RX ORDER — GUAIFENESIN 600 MG/1
600 TABLET, EXTENDED RELEASE ORAL EVERY 12 HOURS PRN
Status: DISCONTINUED | OUTPATIENT
Start: 2025-04-03 | End: 2025-04-04

## 2025-04-03 RX ORDER — ALBUTEROL SULFATE 0.83 MG/ML
2.5 SOLUTION RESPIRATORY (INHALATION) EVERY 6 HOURS PRN
Status: DISCONTINUED | OUTPATIENT
Start: 2025-04-03 | End: 2025-04-03

## 2025-04-03 RX ORDER — HEPARIN 100 UNIT/ML
500 SYRINGE INTRAVENOUS AS NEEDED
OUTPATIENT
Start: 2025-04-03

## 2025-04-03 RX ORDER — FAMOTIDINE 10 MG/ML
20 INJECTION, SOLUTION INTRAVENOUS ONCE AS NEEDED
OUTPATIENT
Start: 2025-04-10

## 2025-04-03 RX ORDER — PANTOPRAZOLE SODIUM 20 MG/1
20 TABLET, DELAYED RELEASE ORAL DAILY
Status: DISPENSED | OUTPATIENT
Start: 2025-04-04

## 2025-04-03 RX ORDER — IPRATROPIUM BROMIDE AND ALBUTEROL SULFATE 2.5; .5 MG/3ML; MG/3ML
3 SOLUTION RESPIRATORY (INHALATION) EVERY 2 HOUR PRN
Status: ACTIVE | OUTPATIENT
Start: 2025-04-03

## 2025-04-03 RX ORDER — PANTOPRAZOLE SODIUM 40 MG/1
40 TABLET, DELAYED RELEASE ORAL
Status: DISCONTINUED | OUTPATIENT
Start: 2025-04-04 | End: 2025-04-03

## 2025-04-03 RX ORDER — CEFTRIAXONE 1 G/50ML
1 INJECTION, SOLUTION INTRAVENOUS ONCE
Status: COMPLETED | OUTPATIENT
Start: 2025-04-03 | End: 2025-04-03

## 2025-04-03 RX ORDER — AZITHROMYCIN MONOHYDRATE 500 MG/5ML
INJECTION, POWDER, LYOPHILIZED, FOR SOLUTION INTRAVENOUS
Status: DISPENSED
Start: 2025-04-03 | End: 2025-04-04

## 2025-04-03 RX ORDER — SODIUM CHLORIDE 9 MG/ML
500 INJECTION, SOLUTION INTRAVENOUS CONTINUOUS
Status: DISPENSED | OUTPATIENT
Start: 2025-04-03 | End: 2025-04-03

## 2025-04-03 RX ORDER — IPRATROPIUM BROMIDE AND ALBUTEROL SULFATE 2.5; .5 MG/3ML; MG/3ML
3 SOLUTION RESPIRATORY (INHALATION)
Status: DISCONTINUED | OUTPATIENT
Start: 2025-04-03 | End: 2025-04-03

## 2025-04-03 RX ORDER — SUCRALFATE 1 G/10ML
1 SUSPENSION ORAL
Status: DISCONTINUED | OUTPATIENT
Start: 2025-04-03 | End: 2025-04-05

## 2025-04-03 RX ORDER — HEPARIN SODIUM,PORCINE/PF 10 UNIT/ML
50 SYRINGE (ML) INTRAVENOUS AS NEEDED
OUTPATIENT
Start: 2025-04-03

## 2025-04-03 RX ORDER — DIPHENHYDRAMINE HYDROCHLORIDE 50 MG/ML
50 INJECTION, SOLUTION INTRAMUSCULAR; INTRAVENOUS AS NEEDED
OUTPATIENT
Start: 2025-04-10

## 2025-04-03 RX ORDER — IPRATROPIUM BROMIDE AND ALBUTEROL SULFATE 2.5; .5 MG/3ML; MG/3ML
3 SOLUTION RESPIRATORY (INHALATION) 3 TIMES DAILY
Status: DISPENSED | OUTPATIENT
Start: 2025-04-03

## 2025-04-03 RX ORDER — ALBUTEROL SULFATE 90 UG/1
2 INHALANT RESPIRATORY (INHALATION) EVERY 4 HOURS PRN
Status: DISCONTINUED | OUTPATIENT
Start: 2025-04-03 | End: 2025-04-03

## 2025-04-03 RX ORDER — GUAIFENESIN 600 MG/1
600 TABLET, EXTENDED RELEASE ORAL 2 TIMES DAILY
Status: DISCONTINUED | OUTPATIENT
Start: 2025-04-03 | End: 2025-04-03

## 2025-04-03 RX ORDER — ALBUTEROL SULFATE 0.83 MG/ML
3 SOLUTION RESPIRATORY (INHALATION) AS NEEDED
OUTPATIENT
Start: 2025-04-10

## 2025-04-03 RX ADMIN — Medication 400 MG: at 21:31

## 2025-04-03 RX ADMIN — ENOXAPARIN SODIUM 40 MG: 40 INJECTION SUBCUTANEOUS at 21:31

## 2025-04-03 RX ADMIN — SODIUM CHLORIDE 500 ML/HR: 9 INJECTION, SOLUTION INTRAVENOUS at 11:45

## 2025-04-03 RX ADMIN — CEFTRIAXONE 1 G: 1 INJECTION, SOLUTION INTRAVENOUS at 17:02

## 2025-04-03 RX ADMIN — AZITHROMYCIN MONOHYDRATE 500 MG: 500 INJECTION, POWDER, LYOPHILIZED, FOR SOLUTION INTRAVENOUS at 17:36

## 2025-04-03 RX ADMIN — Medication 4 L/MIN: at 20:15

## 2025-04-03 RX ADMIN — ACETAMINOPHEN 650 MG: 325 TABLET ORAL at 21:31

## 2025-04-03 RX ADMIN — IPRATROPIUM BROMIDE AND ALBUTEROL SULFATE 3 ML: 2.5; .5 SOLUTION RESPIRATORY (INHALATION) at 20:14

## 2025-04-03 RX ADMIN — GUAIFENESIN 600 MG: 600 TABLET ORAL at 21:31

## 2025-04-03 SDOH — SOCIAL STABILITY: SOCIAL INSECURITY: WITHIN THE LAST YEAR, HAVE YOU BEEN AFRAID OF YOUR PARTNER OR EX-PARTNER?: NO

## 2025-04-03 SDOH — SOCIAL STABILITY: SOCIAL INSECURITY: ABUSE: ADULT

## 2025-04-03 SDOH — SOCIAL STABILITY: SOCIAL INSECURITY: WITHIN THE LAST YEAR, HAVE YOU BEEN HUMILIATED OR EMOTIONALLY ABUSED IN OTHER WAYS BY YOUR PARTNER OR EX-PARTNER?: NO

## 2025-04-03 SDOH — ECONOMIC STABILITY: TRANSPORTATION INSECURITY: IN THE PAST 12 MONTHS, HAS LACK OF TRANSPORTATION KEPT YOU FROM MEDICAL APPOINTMENTS OR FROM GETTING MEDICATIONS?: NO

## 2025-04-03 SDOH — ECONOMIC STABILITY: INCOME INSECURITY: IN THE PAST 12 MONTHS HAS THE ELECTRIC, GAS, OIL, OR WATER COMPANY THREATENED TO SHUT OFF SERVICES IN YOUR HOME?: NO

## 2025-04-03 SDOH — ECONOMIC STABILITY: FOOD INSECURITY: HOW HARD IS IT FOR YOU TO PAY FOR THE VERY BASICS LIKE FOOD, HOUSING, MEDICAL CARE, AND HEATING?: NOT HARD AT ALL

## 2025-04-03 SDOH — ECONOMIC STABILITY: HOUSING INSECURITY: AT ANY TIME IN THE PAST 12 MONTHS, WERE YOU HOMELESS OR LIVING IN A SHELTER (INCLUDING NOW)?: NO

## 2025-04-03 SDOH — ECONOMIC STABILITY: FOOD INSECURITY: WITHIN THE PAST 12 MONTHS, THE FOOD YOU BOUGHT JUST DIDN'T LAST AND YOU DIDN'T HAVE MONEY TO GET MORE.: NEVER TRUE

## 2025-04-03 SDOH — ECONOMIC STABILITY: FOOD INSECURITY: WITHIN THE PAST 12 MONTHS, YOU WORRIED THAT YOUR FOOD WOULD RUN OUT BEFORE YOU GOT THE MONEY TO BUY MORE.: NEVER TRUE

## 2025-04-03 SDOH — SOCIAL STABILITY: SOCIAL INSECURITY: HAVE YOU HAD THOUGHTS OF HARMING ANYONE ELSE?: NO

## 2025-04-03 SDOH — ECONOMIC STABILITY: HOUSING INSECURITY: IN THE LAST 12 MONTHS, WAS THERE A TIME WHEN YOU WERE NOT ABLE TO PAY THE MORTGAGE OR RENT ON TIME?: NO

## 2025-04-03 SDOH — ECONOMIC STABILITY: HOUSING INSECURITY: IN THE PAST 12 MONTHS, HOW MANY TIMES HAVE YOU MOVED WHERE YOU WERE LIVING?: 1

## 2025-04-03 SDOH — SOCIAL STABILITY: SOCIAL INSECURITY: DOES ANYONE TRY TO KEEP YOU FROM HAVING/CONTACTING OTHER FRIENDS OR DOING THINGS OUTSIDE YOUR HOME?: NO

## 2025-04-03 SDOH — SOCIAL STABILITY: SOCIAL INSECURITY: ARE YOU OR HAVE YOU BEEN THREATENED OR ABUSED PHYSICALLY, EMOTIONALLY, OR SEXUALLY BY ANYONE?: NO

## 2025-04-03 SDOH — SOCIAL STABILITY: SOCIAL INSECURITY: DO YOU FEEL UNSAFE GOING BACK TO THE PLACE WHERE YOU ARE LIVING?: NO

## 2025-04-03 SDOH — SOCIAL STABILITY: SOCIAL INSECURITY: HAS ANYONE EVER THREATENED TO HURT YOUR FAMILY OR YOUR PETS?: NO

## 2025-04-03 SDOH — SOCIAL STABILITY: SOCIAL INSECURITY: HAVE YOU HAD ANY THOUGHTS OF HARMING ANYONE ELSE?: NO

## 2025-04-03 SDOH — SOCIAL STABILITY: SOCIAL INSECURITY: DO YOU FEEL ANYONE HAS EXPLOITED OR TAKEN ADVANTAGE OF YOU FINANCIALLY OR OF YOUR PERSONAL PROPERTY?: NO

## 2025-04-03 SDOH — SOCIAL STABILITY: SOCIAL INSECURITY: WERE YOU ABLE TO COMPLETE ALL THE BEHAVIORAL HEALTH SCREENINGS?: YES

## 2025-04-03 SDOH — SOCIAL STABILITY: SOCIAL INSECURITY: ARE THERE ANY APPARENT SIGNS OF INJURIES/BEHAVIORS THAT COULD BE RELATED TO ABUSE/NEGLECT?: NO

## 2025-04-03 ASSESSMENT — HEART SCORE
HEART SCORE: 6
TROPONIN: LESS THAN OR EQUAL TO NORMAL LIMIT
RISK FACTORS: 1-2 RISK FACTORS
HISTORY: HIGHLY SUSPICIOUS
ECG: NON-SPECIFIC REPOLARIZATION DISTURBANCE
AGE: 65+

## 2025-04-03 ASSESSMENT — COGNITIVE AND FUNCTIONAL STATUS - GENERAL
DAILY ACTIVITIY SCORE: 24
MOBILITY SCORE: 24
PATIENT BASELINE BEDBOUND: NO

## 2025-04-03 ASSESSMENT — LIFESTYLE VARIABLES
HOW OFTEN DO YOU HAVE A DRINK CONTAINING ALCOHOL: NEVER
HOW MANY STANDARD DRINKS CONTAINING ALCOHOL DO YOU HAVE ON A TYPICAL DAY: PATIENT DOES NOT DRINK
AUDIT-C TOTAL SCORE: 0
HOW OFTEN DO YOU HAVE 6 OR MORE DRINKS ON ONE OCCASION: NEVER
AUDIT-C TOTAL SCORE: 0
SKIP TO QUESTIONS 9-10: 1

## 2025-04-03 ASSESSMENT — ACTIVITIES OF DAILY LIVING (ADL)
ADEQUATE_TO_COMPLETE_ADL: YES
PATIENT'S MEMORY ADEQUATE TO SAFELY COMPLETE DAILY ACTIVITIES?: YES
WALKS IN HOME: INDEPENDENT
TOILETING: INDEPENDENT
FEEDING YOURSELF: INDEPENDENT
BATHING: INDEPENDENT
DRESSING YOURSELF: INDEPENDENT
GROOMING: INDEPENDENT
JUDGMENT_ADEQUATE_SAFELY_COMPLETE_DAILY_ACTIVITIES: YES
LACK_OF_TRANSPORTATION: NO
HEARING - LEFT EAR: FUNCTIONAL
HEARING - RIGHT EAR: FUNCTIONAL

## 2025-04-03 ASSESSMENT — PAIN DESCRIPTION - LOCATION: LOCATION: BACK

## 2025-04-03 ASSESSMENT — PAIN SCALES - GENERAL
PAINLEVEL_OUTOF10: 0 - NO PAIN
PAINLEVEL_OUTOF10: 3
PAINLEVEL_OUTOF10: 0 - NO PAIN
PAINLEVEL_OUTOF10: 0-NO PAIN

## 2025-04-03 ASSESSMENT — PAIN - FUNCTIONAL ASSESSMENT
PAIN_FUNCTIONAL_ASSESSMENT: 0-10

## 2025-04-03 NOTE — PROGRESS NOTES
Pt ambulated from the parking lot to the main entrance and then up to the second floor to the infusion center appointment. On arrival to infusion, pt reports shortness of breath, -130 and pulse ox 86-88. Reports experiencing CADET frequently tolerating no more than 30-40 steps and unable to climb one flight of stairs without dyspnea. Pt wears oxygen (4 L N/C) at night and occasionally as needed during the day. O2 at 2 L n/c applied on arrival. Pt and pt's  reports an EKG was ordered by SHARON Rae CNP and they were going to stop to get it done after this visit. Cardiology called and EKG was performed during this visit. After 30 min rest, IVF, and oxygen, pt reports feeling better. See nursing assessment.   After hydration complete, pt developed a low grade temp and reported increasing SOB and chills on and off for 1 week. Dr. Santillan notified.   Report called to Kayden DODSON and pt transported to the ER via w/c accompanied by .

## 2025-04-03 NOTE — ED PROVIDER NOTES
HPI   Chief Complaint   Patient presents with    Fever       HPI  This 69-year-old female patient was being treated for lung cancer she also has underlying COPD, quit smoking December she finished her chemotherapy last month however she has been having low blood pressure and that she is being given IV fluid infusion periodically intermittently and she has stopped taking her blood pressure medicine her blood pressure has been low but sent to the ER because her blood pressure was low even with IV fluid and she had low-grade fever and did not feel well she has been having cough.  Denies any chest pain denies coughing up blood pain or swelling legs recent travel surgery or history of blood clot.    Due to fever low blood pressure not feeling well low pulse ox in the 80s as she uses oxygen only at night, she was sent to ER for evaluation.  EKG was done at the infusion center at 1207 today shows sinus tachycardia with occasional PVCs and fusion complexes.  Heart rate of 115.  When compared with prior EKG on January 5, 2025 she has new fusion complexes..  Patient admitted with complex cysts are present as described, no ST-T evaluation       Patient History   Past Medical History:   Diagnosis Date    Cancer (Multi)     Chronic obstructive pulmonary disease with (acute) exacerbation (Multi) 08/08/2017    COPD exacerbation    Hypertension     Lung cancer (Multi)     Other specified health status 03/12/2015    No known problems    Personal history of other venous thrombosis and embolism 03/12/2015    History of deep venous thrombosis    Pneumonia      Past Surgical History:   Procedure Laterality Date    EXCISION / BIOPSY BREAST / NIPPLE / DUCT Left 05/28/2014    LUNG BIOPSY  12/31/2024    Bronchoscopy and needle biopsy    OTHER SURGICAL HISTORY Left 03/12/2015    Open Treatment Of Tibial Shaft Fracture With Implant    PORTACATH PLACEMENT N/A     TONSILLECTOMY       No family history on file.  Social History     Tobacco Use     Smoking status: Former     Current packs/day: 0.00     Average packs/day: 0.5 packs/day for 51.1 years (25.5 ttl pk-yrs)     Types: Cigarettes     Start date:      Quit date: 2024     Years since quittin.1     Passive exposure: Past    Smokeless tobacco: Never   Vaping Use    Vaping status: Never Used   Substance Use Topics    Alcohol use: Yes     Alcohol/week: 2.0 standard drinks of alcohol     Types: 2 Cans of beer per week     Comment: 1 every other month    Drug use: Never   EKG was done at the infusion center at 1207 today shows sinus tachycardia with occasional PVCs and fusion complexes.  Heart rate of 115.  When compared with prior EKG on 2025 she has new fusion complexes..  Patient admitted with complex cysts are present as described, no ST-T evaluation I reviewed this EKGs and I read this  EKG in the ER as this does not not read by provider yet.    Physical Exam   ED Triage Vitals   Temperature Heart Rate Respirations BP   25 1514 25 1514 25 1514 25 1514   36.8 °C (98.2 °F) (!) 105 17 119/55      SpO2 Temp src Heart Rate Source Patient Position   25 1509 -- -- --   97 %         BP Location FiO2 (%)     -- --             Physical Exam  Constitutional:       General: She is not in acute distress.     Appearance: Normal appearance. She is normal weight. She is ill-appearing. She is not toxic-appearing.      Comments: Chronically sick looking female patient appeared pale frail but awake alert oriented x 3 using O2 through nasal cannula 4 L normally she does not use oxygen at the time using at night only.  She was talking without acute respiratory distress while she on oxygen.  No facial drooping or drooling no stridor able to move arms and legs neck is supple.  Alert oriented x 3.    On auscultation she had coarse rhonchi expiratory wheezing crackles bilaterally diminished breath sound lung bases more on the right.  No chest wall retraction when she was on  oxygen.  Maintaining pulse ox in the mid 90s on 4 L through nasal cannula.  No peripheral edema calf ulcer nontender Homans' sign negative.  Intact DP PT pulses good cap refill intact sensation.    Abdomen soft positive bowel sound nontender no guarding rebound's or rigidity.  Could not appreciate pulsatile mass.  She has Goodemote sounds like generalized fatigue and weakness noted but no isolated motor or sensory deficit in arms or legs no arms or leg drift no facial drooping or drooling no stridor spine nontender neck was supple.    Cranial nerve II to XII gross intact.  Cerebellar examination grossly within normal range.  Neck is supple.    No petechiae ecchymosis or red streak.   HENT:      Head: Normocephalic and atraumatic.      Right Ear: External ear normal.      Left Ear: External ear normal.      Nose: Nose normal.      Mouth/Throat:      Mouth: Mucous membranes are moist.   Eyes:      Extraocular Movements: Extraocular movements intact.      Conjunctiva/sclera: Conjunctivae normal.      Pupils: Pupils are equal, round, and reactive to light.   Neck:      Vascular: No carotid bruit.   Cardiovascular:      Rate and Rhythm: Normal rate and regular rhythm.      Pulses: Normal pulses.      Heart sounds: Normal heart sounds. No murmur heard.     No friction rub. No gallop.   Pulmonary:      Effort: Pulmonary effort is normal. No respiratory distress.      Breath sounds: No stridor. Wheezing, rhonchi and rales present.   Abdominal:      General: Abdomen is flat. Bowel sounds are normal.      Palpations: Abdomen is soft. There is no mass.      Tenderness: There is no abdominal tenderness. There is no right CVA tenderness, left CVA tenderness or rebound.   Genitourinary:     Comments: No  complaint, examination deferred  Musculoskeletal:         General: No swelling, tenderness, deformity or signs of injury. Normal range of motion.      Cervical back: Normal range of motion and neck supple. No rigidity or  tenderness.      Right lower leg: No edema.      Left lower leg: No edema.   Lymphadenopathy:      Cervical: No cervical adenopathy.   Skin:     Capillary Refill: Capillary refill takes less than 2 seconds.      Coloration: Skin is pale. Skin is not jaundiced.      Findings: No bruising, erythema, lesion or rash.   Neurological:      General: No focal deficit present.      Mental Status: She is alert and oriented to person, place, and time. Mental status is at baseline.      Cranial Nerves: No cranial nerve deficit.      Sensory: No sensory deficit.      Motor: No weakness.      Coordination: Coordination normal.      Gait: Gait normal.      Deep Tendon Reflexes: Reflexes normal.   Psychiatric:         Mood and Affect: Mood normal.         Behavior: Behavior normal.           ED Course & MDM   Diagnoses as of 04/03/25 1656   Community acquired bacterial pneumonia   Cancer of upper lobe of right lung (Multi)   Immunocompromised   Neutropenia, unspecified type   Pneumonia of right lower lobe due to infectious organism   Patient history of lung cancer right lung nodule and mediastinal adenopathy other abnormal finding PET scan CT done in the recent past has been diagnosed with lung cancer she has been undergoing chemotherapy last chemotherapy was in March.  Heart wise she has been in hypotension and has been given IV infusion intermittently was found to be hypoxemic low-grade fever lethargic and weak at infusion center they did EKG which shows some tachycardia with no tachypnea and do the blood as result patient was subsequent sent to the ER for evaluation of the blood samples no labs are done at the infusion center.  Upon examination of chronically sick hypoxemic she required 4 L of oxygen with some nasal cannula to maintain a pulse ox in the mid 90s.  She had crackles coarse rhonchi and rales and she underlying COPD.  Heart with mild tachycardia no friction rub no murmur.  Abdomen soft in both upper EXTR including  motion nontender neck is supple.  Generalized fatigue and weakness      Her chest x-ray showed new finding of consolidation right lower lobe.  See chest x-ray report for detail.  The EKG done at infusion center and I read it and find no evidence of any circuitous infarct no ST-T evaluation no STEMI.    Patient has CBC chemistry electrolytes and blood cultures, she was neutropenic obviously immunocompromise due to status post chemotherapy white count 3.0 hemoglobin 8 hematocrit 26 platelet count 154 she was anemic blood count was not low to require transfusion without any active GI bleed symptoms are most recent hemoglobin 8.4 in ten 7.4.    She had blood cultures drawn and due to x-ray finding I given her an IV antibiotic.  Patient glucose is 126 sodium 134 potassium normal 3.9 chloride 8589) she is hyponatremic and mild dehydration.  Her troponin x 2 was negative.  Magnesium level was 1.41 COVID-19, influenza A and influenza B as well as RSV is negative.  BNP was 165 slightly elevated lactate of 1.0.    Patient was started on IV antibiotic Rocephin and Zithromax Case discussed with admitting service and patient was admitted to the floor on telemetry bed.  Patient and family agree with hospitalization for IV antibiotic.          Medical Decision Making      Procedure  Procedures     Neel Gurrola MD  04/03/25 [id number]       Neel Gurrola MD  04/03/25 [id number]

## 2025-04-04 ENCOUNTER — APPOINTMENT (OUTPATIENT)
Dept: CARDIOLOGY | Facility: HOSPITAL | Age: 70
DRG: 193 | End: 2025-04-04
Payer: MEDICARE

## 2025-04-04 ENCOUNTER — APPOINTMENT (OUTPATIENT)
Dept: RADIOLOGY | Facility: HOSPITAL | Age: 70
DRG: 193 | End: 2025-04-04
Payer: MEDICARE

## 2025-04-04 PROBLEM — E83.42 HYPOMAGNESEMIA: Status: ACTIVE | Noted: 2025-04-04

## 2025-04-04 PROBLEM — D69.6 THROMBOCYTOPENIA (CMS-HCC): Status: ACTIVE | Noted: 2025-04-04

## 2025-04-04 PROBLEM — D70.1 CHEMOTHERAPY-INDUCED NEUTROPENIA: Status: ACTIVE | Noted: 2025-04-04

## 2025-04-04 PROBLEM — D63.8 ANEMIA OF CHRONIC DISEASE: Status: ACTIVE | Noted: 2025-04-04

## 2025-04-04 PROBLEM — D53.9 MACROCYTIC ANEMIA: Status: ACTIVE | Noted: 2025-04-04

## 2025-04-04 PROBLEM — J96.21 ACUTE ON CHRONIC RESPIRATORY FAILURE WITH HYPOXIA: Status: ACTIVE | Noted: 2024-12-31

## 2025-04-04 PROBLEM — Z85.118 HISTORY OF LUNG CANCER: Status: ACTIVE | Noted: 2025-04-04

## 2025-04-04 PROBLEM — D84.9 IMMUNOCOMPROMISED PATIENT: Status: ACTIVE | Noted: 2025-04-04

## 2025-04-04 PROBLEM — K21.9 CHRONIC GERD: Status: ACTIVE | Noted: 2025-04-04

## 2025-04-04 PROBLEM — T45.1X5A CHEMOTHERAPY-INDUCED NEUTROPENIA: Status: ACTIVE | Noted: 2025-04-04

## 2025-04-04 LAB
ABO GROUP (TYPE) IN BLOOD: NORMAL
ABO GROUP (TYPE) IN BLOOD: NORMAL
ANION GAP SERPL CALC-SCNC: <7 MMOL/L (ref 10–20)
ANTIBODY SCREEN: NORMAL
BLOOD EXPIRATION DATE: NORMAL
BUN SERPL-MCNC: 6 MG/DL (ref 6–23)
CALCIUM SERPL-MCNC: 8.1 MG/DL (ref 8.6–10.3)
CHLORIDE SERPL-SCNC: 100 MMOL/L (ref 98–107)
CO2 SERPL-SCNC: 33 MMOL/L (ref 21–32)
CREAT SERPL-MCNC: 0.36 MG/DL (ref 0.5–1.05)
DISPENSE STATUS: NORMAL
EGFRCR SERPLBLD CKD-EPI 2021: >90 ML/MIN/1.73M*2
ERYTHROCYTE [DISTWIDTH] IN BLOOD BY AUTOMATED COUNT: 19.9 % (ref 11.5–14.5)
FERRITIN SERPL-MCNC: 364 NG/ML (ref 8–150)
GLUCOSE SERPL-MCNC: 98 MG/DL (ref 74–99)
HCT VFR BLD AUTO: 22.4 % (ref 36–46)
HGB BLD-MCNC: 6.9 G/DL (ref 12–16)
HOLD SPECIMEN: NORMAL
HOLD SPECIMEN: NORMAL
IRON SATN MFR SERPL: 17 % (ref 25–45)
IRON SERPL-MCNC: 41 UG/DL (ref 35–150)
MAGNESIUM SERPL-MCNC: 1.4 MG/DL (ref 1.6–2.4)
MCH RBC QN AUTO: 32.9 PG (ref 26–34)
MCHC RBC AUTO-ENTMCNC: 30.8 G/DL (ref 32–36)
MCV RBC AUTO: 107 FL (ref 80–100)
MRSA DNA SPEC QL NAA+PROBE: NOT DETECTED
NRBC BLD-RTO: 0 /100 WBCS (ref 0–0)
PLATELET # BLD AUTO: 137 X10*3/UL (ref 150–450)
POTASSIUM SERPL-SCNC: 4 MMOL/L (ref 3.5–5.3)
PRODUCT BLOOD TYPE: 5100
PRODUCT CODE: NORMAL
RBC # BLD AUTO: 2.1 X10*6/UL (ref 4–5.2)
RH FACTOR (ANTIGEN D): NORMAL
RH FACTOR (ANTIGEN D): NORMAL
SODIUM SERPL-SCNC: 134 MMOL/L (ref 136–145)
TIBC SERPL-MCNC: 236 UG/DL (ref 240–445)
UIBC SERPL-MCNC: 195 UG/DL (ref 110–370)
UNIT ABO: NORMAL
UNIT NUMBER: NORMAL
UNIT RH: NORMAL
UNIT VOLUME: 350
WBC # BLD AUTO: 2.5 X10*3/UL (ref 4.4–11.3)
XM INTEP: NORMAL

## 2025-04-04 PROCEDURE — 83540 ASSAY OF IRON: CPT | Mod: IPSPLIT | Performed by: NURSE PRACTITIONER

## 2025-04-04 PROCEDURE — 93005 ELECTROCARDIOGRAM TRACING: CPT | Mod: IPSPLIT

## 2025-04-04 PROCEDURE — 83735 ASSAY OF MAGNESIUM: CPT | Mod: IPSPLIT | Performed by: NURSE PRACTITIONER

## 2025-04-04 PROCEDURE — 2500000002 HC RX 250 W HCPCS SELF ADMINISTERED DRUGS (ALT 637 FOR MEDICARE OP, ALT 636 FOR OP/ED): Mod: IPSPLIT | Performed by: INTERNAL MEDICINE

## 2025-04-04 PROCEDURE — 84145 PROCALCITONIN (PCT): CPT | Mod: CONLAB | Performed by: NURSE PRACTITIONER

## 2025-04-04 PROCEDURE — 2500000005 HC RX 250 GENERAL PHARMACY W/O HCPCS: Mod: IPSPLIT | Performed by: NURSE PRACTITIONER

## 2025-04-04 PROCEDURE — 71250 CT THORAX DX C-: CPT | Performed by: RADIOLOGY

## 2025-04-04 PROCEDURE — 85027 COMPLETE CBC AUTOMATED: CPT | Mod: IPSPLIT | Performed by: NURSE PRACTITIONER

## 2025-04-04 PROCEDURE — 82728 ASSAY OF FERRITIN: CPT | Mod: IPSPLIT | Performed by: NURSE PRACTITIONER

## 2025-04-04 PROCEDURE — 2500000004 HC RX 250 GENERAL PHARMACY W/ HCPCS (ALT 636 FOR OP/ED): Mod: IPSPLIT | Performed by: INTERNAL MEDICINE

## 2025-04-04 PROCEDURE — 87449 NOS EACH ORGANISM AG IA: CPT | Mod: CONLAB | Performed by: NURSE PRACTITIONER

## 2025-04-04 PROCEDURE — 80048 BASIC METABOLIC PNL TOTAL CA: CPT | Mod: IPSPLIT | Performed by: NURSE PRACTITIONER

## 2025-04-04 PROCEDURE — 1200000002 HC GENERAL ROOM WITH TELEMETRY DAILY: Mod: IPSPLIT

## 2025-04-04 PROCEDURE — 2500000001 HC RX 250 WO HCPCS SELF ADMINISTERED DRUGS (ALT 637 FOR MEDICARE OP): Mod: IPSPLIT | Performed by: NURSE PRACTITIONER

## 2025-04-04 PROCEDURE — 9420000001 HC RT PATIENT EDUCATION 5 MIN: Mod: IPSPLIT

## 2025-04-04 PROCEDURE — 94664 DEMO&/EVAL PT USE INHALER: CPT | Mod: IPSPLIT

## 2025-04-04 PROCEDURE — 71250 CT THORAX DX C-: CPT | Mod: IPSPLIT

## 2025-04-04 PROCEDURE — 36430 TRANSFUSION BLD/BLD COMPNT: CPT | Mod: IPSPLIT

## 2025-04-04 PROCEDURE — 87641 MR-STAPH DNA AMP PROBE: CPT | Mod: IPSPLIT | Performed by: NURSE PRACTITIONER

## 2025-04-04 PROCEDURE — 2500000004 HC RX 250 GENERAL PHARMACY W/ HCPCS (ALT 636 FOR OP/ED): Mod: IPSPLIT

## 2025-04-04 PROCEDURE — 2500000002 HC RX 250 W HCPCS SELF ADMINISTERED DRUGS (ALT 637 FOR MEDICARE OP, ALT 636 FOR OP/ED): Mod: IPSPLIT | Performed by: NURSE PRACTITIONER

## 2025-04-04 PROCEDURE — 83550 IRON BINDING TEST: CPT | Mod: IPSPLIT | Performed by: NURSE PRACTITIONER

## 2025-04-04 PROCEDURE — 94760 N-INVAS EAR/PLS OXIMETRY 1: CPT | Mod: IPSPLIT

## 2025-04-04 PROCEDURE — 2500000004 HC RX 250 GENERAL PHARMACY W/ HCPCS (ALT 636 FOR OP/ED): Mod: JZ,TB,IPSPLIT | Performed by: NURSE PRACTITIONER

## 2025-04-04 PROCEDURE — 99223 1ST HOSP IP/OBS HIGH 75: CPT | Performed by: NURSE PRACTITIONER

## 2025-04-04 PROCEDURE — 86900 BLOOD TYPING SEROLOGIC ABO: CPT | Mod: IPSPLIT | Performed by: NURSE PRACTITIONER

## 2025-04-04 PROCEDURE — 87899 AGENT NOS ASSAY W/OPTIC: CPT | Mod: CONLAB | Performed by: NURSE PRACTITIONER

## 2025-04-04 PROCEDURE — 86920 COMPATIBILITY TEST SPIN: CPT | Mod: IPSPLIT

## 2025-04-04 PROCEDURE — 94640 AIRWAY INHALATION TREATMENT: CPT | Mod: IPSPLIT

## 2025-04-04 PROCEDURE — 86901 BLOOD TYPING SEROLOGIC RH(D): CPT | Mod: IPSPLIT | Performed by: NURSE PRACTITIONER

## 2025-04-04 PROCEDURE — P9040 RBC LEUKOREDUCED IRRADIATED: HCPCS | Mod: IPSPLIT

## 2025-04-04 PROCEDURE — 2500000001 HC RX 250 WO HCPCS SELF ADMINISTERED DRUGS (ALT 637 FOR MEDICARE OP): Mod: IPSPLIT | Performed by: INTERNAL MEDICINE

## 2025-04-04 RX ORDER — FONDAPARINUX SODIUM 2.5 MG/.5ML
2.5 INJECTION SUBCUTANEOUS EVERY 24 HOURS
Status: DISPENSED | OUTPATIENT
Start: 2025-04-04

## 2025-04-04 RX ORDER — CEFEPIME HYDROCHLORIDE 2 G/50ML
2 INJECTION, SOLUTION INTRAVENOUS EVERY 8 HOURS
Status: DISPENSED | OUTPATIENT
Start: 2025-04-04

## 2025-04-04 RX ORDER — AZITHROMYCIN 250 MG/1
500 TABLET, FILM COATED ORAL
Status: DISCONTINUED | OUTPATIENT
Start: 2025-04-04 | End: 2025-04-04

## 2025-04-04 RX ORDER — VANCOMYCIN HYDROCHLORIDE 1 G/20ML
INJECTION, POWDER, LYOPHILIZED, FOR SOLUTION INTRAVENOUS DAILY PRN
Status: DISCONTINUED | OUTPATIENT
Start: 2025-04-04 | End: 2025-04-04

## 2025-04-04 RX ORDER — LORAZEPAM 0.5 MG/1
0.5 TABLET ORAL ONCE
Status: COMPLETED | OUTPATIENT
Start: 2025-04-04 | End: 2025-04-04

## 2025-04-04 RX ORDER — VANCOMYCIN 1 G/200ML
1 INJECTION, SOLUTION INTRAVENOUS EVERY 12 HOURS
Status: DISCONTINUED | OUTPATIENT
Start: 2025-04-04 | End: 2025-04-04

## 2025-04-04 RX ORDER — DOXYCYCLINE HYCLATE 100 MG
100 TABLET ORAL EVERY 12 HOURS SCHEDULED
Status: DISPENSED | OUTPATIENT
Start: 2025-04-04

## 2025-04-04 RX ORDER — METOPROLOL TARTRATE 1 MG/ML
5 INJECTION, SOLUTION INTRAVENOUS ONCE
Status: DISCONTINUED | OUTPATIENT
Start: 2025-04-04 | End: 2025-04-04

## 2025-04-04 RX ORDER — SODIUM CHLORIDE 9 MG/ML
INJECTION, SOLUTION INTRAVENOUS
Status: COMPLETED
Start: 2025-04-04 | End: 2025-04-04

## 2025-04-04 RX ORDER — METOPROLOL TARTRATE 1 MG/ML
5 INJECTION, SOLUTION INTRAVENOUS ONCE
Status: COMPLETED | OUTPATIENT
Start: 2025-04-04 | End: 2025-04-04

## 2025-04-04 RX ORDER — GUAIFENESIN 600 MG/1
600 TABLET, EXTENDED RELEASE ORAL 2 TIMES DAILY
Status: DISPENSED | OUTPATIENT
Start: 2025-04-04

## 2025-04-04 RX ORDER — MAGNESIUM SULFATE HEPTAHYDRATE 40 MG/ML
2 INJECTION, SOLUTION INTRAVENOUS ONCE
Status: COMPLETED | OUTPATIENT
Start: 2025-04-04 | End: 2025-04-04

## 2025-04-04 RX ADMIN — SUCRALFATE ORAL SUSPENSION 1 G: 1 SUSPENSION ORAL at 17:15

## 2025-04-04 RX ADMIN — SUCRALFATE ORAL SUSPENSION 1 G: 1 SUSPENSION ORAL at 20:07

## 2025-04-04 RX ADMIN — DOXYCYCLINE HYCLATE 100 MG: 100 TABLET, COATED ORAL at 20:07

## 2025-04-04 RX ADMIN — SODIUM CHLORIDE 500 ML: 0.9 INJECTION, SOLUTION INTRAVENOUS at 03:56

## 2025-04-04 RX ADMIN — GUAIFENESIN 600 MG: 600 TABLET ORAL at 20:07

## 2025-04-04 RX ADMIN — Medication 4 L/MIN: at 21:14

## 2025-04-04 RX ADMIN — CEFEPIME HYDROCHLORIDE 2 G: 2 INJECTION, SOLUTION INTRAVENOUS at 18:40

## 2025-04-04 RX ADMIN — AZITHROMYCIN DIHYDRATE 500 MG: 250 TABLET ORAL at 08:37

## 2025-04-04 RX ADMIN — PANTOPRAZOLE SODIUM 20 MG: 20 TABLET, DELAYED RELEASE ORAL at 08:37

## 2025-04-04 RX ADMIN — FONDAPARINUX SODIUM 2.5 MG: 2.5 INJECTION SUBCUTANEOUS at 13:25

## 2025-04-04 RX ADMIN — Medication 400 MG: at 14:16

## 2025-04-04 RX ADMIN — FILGRASTIM-SNDZ 300 MCG: 300 INJECTION, SOLUTION INTRAVENOUS; SUBCUTANEOUS at 14:16

## 2025-04-04 RX ADMIN — Medication 400 MG: at 08:37

## 2025-04-04 RX ADMIN — MAGNESIUM SULFATE HEPTAHYDRATE 2 G: 40 INJECTION, SOLUTION INTRAVENOUS at 11:30

## 2025-04-04 RX ADMIN — IPRATROPIUM BROMIDE AND ALBUTEROL SULFATE 3 ML: 2.5; .5 SOLUTION RESPIRATORY (INHALATION) at 21:09

## 2025-04-04 RX ADMIN — CEFEPIME HYDROCHLORIDE 2 G: 2 INJECTION, SOLUTION INTRAVENOUS at 10:50

## 2025-04-04 RX ADMIN — IPRATROPIUM BROMIDE AND ALBUTEROL SULFATE 3 ML: 2.5; .5 SOLUTION RESPIRATORY (INHALATION) at 10:33

## 2025-04-04 RX ADMIN — VANCOMYCIN 1 G: 1 INJECTION, SOLUTION INTRAVENOUS at 13:25

## 2025-04-04 RX ADMIN — METHYLPREDNISOLONE SODIUM SUCCINATE 40 MG: 40 INJECTION, POWDER, FOR SOLUTION INTRAMUSCULAR; INTRAVENOUS at 13:25

## 2025-04-04 RX ADMIN — LORAZEPAM 0.5 MG: 0.5 TABLET ORAL at 16:31

## 2025-04-04 RX ADMIN — OLANZAPINE 5 MG: 2.5 TABLET, FILM COATED ORAL at 20:07

## 2025-04-04 RX ADMIN — TIOTROPIUM BROMIDE INHALATION SPRAY 2 PUFF: 3.12 SPRAY, METERED RESPIRATORY (INHALATION) at 10:32

## 2025-04-04 RX ADMIN — SODIUM CHLORIDE: 0.9 INJECTION, SOLUTION INTRAVENOUS at 15:24

## 2025-04-04 RX ADMIN — METOPROLOL TARTRATE 5 MG: 5 INJECTION INTRAVENOUS at 16:31

## 2025-04-04 RX ADMIN — METHYLPREDNISOLONE SODIUM SUCCINATE 40 MG: 40 INJECTION, POWDER, FOR SOLUTION INTRAMUSCULAR; INTRAVENOUS at 20:08

## 2025-04-04 RX ADMIN — Medication 4 L/MIN: at 10:32

## 2025-04-04 RX ADMIN — Medication 400 MG: at 20:12

## 2025-04-04 RX ADMIN — ACETAMINOPHEN 650 MG: 325 TABLET ORAL at 20:07

## 2025-04-04 RX ADMIN — SUCRALFATE ORAL SUSPENSION 1 G: 1 SUSPENSION ORAL at 08:38

## 2025-04-04 RX ADMIN — GUAIFENESIN 600 MG: 600 TABLET ORAL at 08:41

## 2025-04-04 RX ADMIN — SUCRALFATE ORAL SUSPENSION 1 G: 1 SUSPENSION ORAL at 12:01

## 2025-04-04 RX ADMIN — IPRATROPIUM BROMIDE AND ALBUTEROL SULFATE 3 ML: 2.5; .5 SOLUTION RESPIRATORY (INHALATION) at 15:32

## 2025-04-04 ASSESSMENT — COGNITIVE AND FUNCTIONAL STATUS - GENERAL
DAILY ACTIVITIY SCORE: 24
MOBILITY SCORE: 24
DAILY ACTIVITIY SCORE: 24
MOBILITY SCORE: 24

## 2025-04-04 ASSESSMENT — ENCOUNTER SYMPTOMS
SHORTNESS OF BREATH: 1
ENDOCRINE NEGATIVE: 1
PSYCHIATRIC NEGATIVE: 1
FATIGUE: 1
CHILLS: 1
COUGH: 1
WEAKNESS: 1
MUSCULOSKELETAL NEGATIVE: 1
GASTROINTESTINAL NEGATIVE: 1
SORE THROAT: 0
SINUS PRESSURE: 0
ACTIVITY CHANGE: 1
NEUROLOGICAL NEGATIVE: 1
UNEXPECTED WEIGHT CHANGE: 0
SINUS PAIN: 0
DIAPHORESIS: 0
EYES NEGATIVE: 1
CARDIOVASCULAR NEGATIVE: 1
FEVER: 1

## 2025-04-04 ASSESSMENT — PAIN SCALES - GENERAL
PAINLEVEL_OUTOF10: 5 - MODERATE PAIN
PAINLEVEL_OUTOF10: 0 - NO PAIN

## 2025-04-04 ASSESSMENT — PAIN - FUNCTIONAL ASSESSMENT: PAIN_FUNCTIONAL_ASSESSMENT: 0-10

## 2025-04-04 NOTE — NURSING NOTE
Patient resting in room. Alert and oriented per baseline. Remains on supplemental oxygen. No complaints. Spo2 has been reading between 84-92% while on supplemental oxygen. Switched finger probes to different locations. Will continue to monitor.

## 2025-04-04 NOTE — CONSULTS
Inpatient consult to Infectious Diseases  Consult performed by: MOR Reilly  Consult ordered by: MOR Melendez  Reason for consult: pneumonia, immunocompomise          Primary MD: MOR Holliday      History Of Present Illness  Billie Hernadez is a 69 y.o. female with past medical history of lung cancer, COPD started chemotherapy in December 2024 and finished on March 20, 2025.  Visit was conducted via telephone conversation with patient  She reports presented to the emergency room with fever, increasing shortness of breath.  She reports she had been feeling ill for several weeks however thought it was aftereffects of chemotherapy.  She reports she presented to the emergency room after progressively worsening symptoms.  She was found to be hypoxic on room air saturating 88%.  She was placed on 4 L of oxygen.  She reports she normally uses 4 L of oxygen at night only.  She reports has had low blood pressure, been receiving IV fluid infusions.  She reports shortness of breath with mostly nonproductive cough.  She reports generalized ill feeling.  She denies nausea vomiting or diarrhea.  She denies sore throat however reports nasal congestion.  Labs with pancytopenia.  COVID flu and influenza PCR negative.  MRSA PCR negative.  CT of chest showed development of endobronchial obstruction in the right lower lobe, right middle lobe possibly due to aspiration, mucous plugging or endotracheal lesion.  Post obstructive atelectasis.  Mass of the right upper lobe.  She was admitted for further evaluation and management.  She was started on IV cefepime, vancomycin     Past Medical History  She has a past medical history of Cancer (Multi), Chronic obstructive pulmonary disease with (acute) exacerbation (Multi) (08/08/2017), Hypertension, Lung cancer (Multi), Other specified health status (03/12/2015), Personal history of other venous thrombosis and embolism (03/12/2015), and  Pneumonia.    Surgical History  She has a past surgical history that includes Other surgical history (Left, 2015); Tonsillectomy; Excision / biopsy breast / nipple / duct (Left, 2014); Lung biopsy (2024); and Portacath placement (N/A).     Social History     Occupational History    Not on file   Tobacco Use    Smoking status: Former     Current packs/day: 0.00     Average packs/day: 0.5 packs/day for 51.1 years (25.5 ttl pk-yrs)     Types: Cigarettes     Start date:      Quit date: 2024     Years since quittin.1     Passive exposure: Past    Smokeless tobacco: Never   Vaping Use    Vaping status: Never Used   Substance and Sexual Activity    Alcohol use: Yes     Alcohol/week: 2.0 standard drinks of alcohol     Types: 2 Cans of beer per week     Comment: 1 every other month    Drug use: Never    Sexual activity: Defer     Travel History   Travel since 23    No documented travel since 23              Family History  No family history on file.  Allergies  Patient has no known allergies.     Immunization History   Administered Date(s) Administered    Flu vaccine, trivalent, preservative free, HIGH-DOSE, age 65y+ (Fluzone) 2024    Influenza, seasonal, injectable 10/06/2022    Moderna COVID-19 vaccine, 12 years and older (50mcg/0.5mL)(Spikevax) 10/10/2023, 2024    Moderna SARS-CoV-2 Vaccination 2021, 2021, 10/27/2021, 10/06/2022    Pneumococcal conjugate vaccine, 13-valent (PREVNAR 13) 2020    Pneumococcal polysaccharide vaccine, 23-valent, age 2 years and older (PNEUMOVAX 23) 2011    Pneumococcal, Unspecified 09/15/2016, 2022    SARS-CoV-2, Unspecified 2021    Zoster, Unspecified 10/28/2020, 2020     Medications  Home medications:  Medications Prior to Admission   Medication Sig Dispense Refill Last Dose/Taking    calcium citrate-vitamin D2 250 mg-2.5 mcg (100 unit) tablet Take 1 tablet by mouth once daily.   4/3/2025     guaiFENesin (Mucinex) 600 mg 12 hr tablet Take 1 tablet (600 mg) by mouth 2 times a day. Do not crush, chew, or split. 14 tablet 0 4/3/2025    losartan (Cozaar) 25 mg tablet TAKE ONE TABLET BY MOUTH ONCE DAILY 100 tablet 3 Past Week    magnesium oxide (Mag-Ox) 400 mg (241.3 mg magnesium) tablet Take 1 tablet (400 mg) by mouth 2 times a day. (Patient taking differently: Take 1 tablet (400 mg) by mouth 3 times a day.) 60 tablet 1 4/3/2025 Morning    multivitamin with minerals tablet Take 1 tablet by mouth once daily.   4/3/2025    pantoprazole (Protonix) 20 mg EC tablet Take 1 tablet (20 mg) by mouth once daily. Do not crush, chew, or split. 30 tablet 2 4/3/2025    Spiriva with HandiHaler 18 mcg inhalation capsule place ONE CAPSULE into inhaler AND inhale ONCE DAILY 90 capsule 3 4/3/2025    sucralfate (Carafate) 100 mg/mL suspension Take 10 mL (1 g) by mouth 4 times a day with meals. 473 mL 3 Past Month    albuterol 2.5 mg /3 mL (0.083 %) nebulizer solution Take 3 mL (2.5 mg) by nebulization every 6 hours if needed for wheezing. 75 mL 3     albuterol 90 mcg/actuation inhaler INHALE ONE TO TWO INHALTIONS BY MOUTH EVERY 4 TO 6 HOURS AS NEEDED 51 g 2     [] nystatin (Mycostatin) 100,000 unit/mL suspension Take 5 mL (500,000 Units) by mouth 4 times a day for 10 days. Swish and swallow 200 mL 0     OLANZapine (ZyPREXA) 5 mg tablet TAKE ONE TABLET BY MOUTH AT BEDTIME FOR FOUR DAYS STARTING THE evening of treatment. 4 tablet 2 Unknown    [] oxyCODONE (Roxicodone) 5 mg immediate release tablet Take 1 tablet (5 mg) by mouth every 6 hours if needed for severe pain (7 - 10) for up to 8 days. 30 tablet 0      Current medications:  Scheduled medications  azithromycin, 500 mg, oral, q24h KOKI  [Held by provider] calcium citrate-vitamin D2, 1 tablet, oral, Daily  cefepime, 2 g, intravenous, q8h  fondaparinux, 2.5 mg, subcutaneous, q24h  guaiFENesin, 600 mg, oral, BID  ipratropium-albuteroL, 3 mL, nebulization,  "TID  magnesium oxide, 400 mg, oral, TID  methylPREDNISolone sodium succinate (PF), 40 mg, intravenous, q8h  OLANZapine, 5 mg, oral, Nightly  oxygen, , inhalation, Continuous - Inhalation  pantoprazole, 20 mg, oral, Daily  sucralfate, 1 g, oral, With meals & nightly  tiotropium, 2 puff, inhalation, Daily      Continuous medications     PRN medications  PRN medications: acetaminophen, albuterol, benzocaine-menthol, dextromethorphan-guaifenesin, ipratropium-albuteroL, ondansetron, polyethylene glycol    Review of Systems   Constitutional:  Positive for fatigue.   HENT:  Positive for congestion.    Eyes: Negative.    Respiratory:  Positive for cough and shortness of breath.    Cardiovascular: Negative.    Gastrointestinal: Negative.    Endocrine: Negative.    Genitourinary: Negative.    Musculoskeletal: Negative.    Skin: Negative.    Neurological:  Positive for weakness.   Psychiatric/Behavioral: Negative.          Objective  Range of Vitals (last 24 hours)  Heart Rate:  []   Temp:  [36.4 °C (97.6 °F)-37 °C (98.6 °F)]   Resp:  [16-22]   BP: ()/(45-80)   Height:  [160 cm (5' 3\")]   Weight:  [49 kg (108 lb)]   SpO2:  [84 %-100 %]   Daily Weight  04/03/25 : 49 kg (108 lb)    Body mass index is 19.13 kg/m².     Physical Exam  Cardiovascular:      Rate and Rhythm: Tachycardia present.   Neurological:      Mental Status: She is alert.          Relevant Results  Outside Hospital Results  No  Labs  Results from last 72 hours   Lab Units 04/04/25  0630 04/03/25  1512   WBC AUTO x10*3/uL 2.5* 3.0*   HEMOGLOBIN g/dL 6.9* 8.1*   HEMATOCRIT % 22.4* 26.2*   PLATELETS AUTO x10*3/uL 137* 154   LYMPHO PCT MAN %  --  11.0   MONO PCT MAN %  --  13.0   EOSINO PCT MAN %  --  1.0     Results from last 72 hours   Lab Units 04/04/25  0630 04/03/25  1512   SODIUM mmol/L 134* 134*   POTASSIUM mmol/L 4.0 3.9   CHLORIDE mmol/L 100 95*   CO2 mmol/L 33* 30   BUN mg/dL 6 10   CREATININE mg/dL 0.36* 0.60   GLUCOSE mg/dL 98 126*   CALCIUM " "mg/dL 8.1* 9.0   ANION GAP mmol/L <7* 13   EGFR mL/min/1.73m*2 >90 >90     Results from last 72 hours   Lab Units 04/03/25  1512   ALK PHOS U/L 61   BILIRUBIN TOTAL mg/dL 0.5   PROTEIN TOTAL g/dL 6.7   ALT U/L 11   AST U/L 19   ALBUMIN g/dL 3.6     Estimated Creatinine Clearance: 110.9 mL/min (A) (by C-G formula based on SCr of 0.36 mg/dL (L)).  No results found for: \"CRP\", \"SEDRATE\"  No results found for: \"HIV1X2\", \"HIVCONF\", \"CQQOMC7DC\"  No results found for: \"HEPCABINIT\", \"HEPCAB\", \"HCVPCRQUANT\"  Microbiology  MRSA PCR negative  Legionella urine antigen pending  Strep pneumoniae antigen pending  Blood cultures pending        Imaging  CT chest wo IV contrast    Result Date: 4/4/2025  Interpreted By:  Nolan De León, STUDY: CT CHEST WO IV CONTRAST;  4/4/2025 12:36 pm   INDICATION: Signs/Symptoms:cough, SOB, hypoxi.   COMPARISON: 02/13/2025   ACCESSION NUMBER(S): [id number]   ORDERING CLINICIAN: CECE WHALEN   TECHNIQUE: Helical data acquisition of the chest was obtained  without IV contrast material.  Images were reformatted in axial, coronal, and sagittal planes.   FINDINGS: Hypoaeration with centrilobular emphysematous changes. 13 mm pleural-based density right upper lobe posterior segment stable.   There is endobronchial density of the right lower lobe favoring mucous plugging. Endobronchial lesion can not be excluded. This is new since most recent examination 02/13/2025. Progression of segmental atelectasis of the right lung base with continued moderate size right pleural effusion appearing similar.   There is right middle lobe medial segmental atelectasis with endobronchial mucous plugging and or lesion, marginally increased since prior examination.   New lung consolidation right lung base with air bronchograms.   Spiculated mass of the right upper lobe measuring 16 mm, marginally smaller than prior examination previously measuring up to 20 mm.   New atelectasis inferior lingular segment left upper " lobe. New subsegmental atelectasis left lung base posterior basilar segment. Traction bronchiectasis left lung base posterior basilar segment.   Heart size is enlarged. Enlarged mediastinal lymph nodes are stable since most recent examination.   Global cardiomegaly similar.   Right-sided MediPort catheter tip projected in the distal SVC.   Upper abdomen demonstrates dense calcifications of the proximal abdominal aorta. Prominence of the left adrenal gland appearing similar prior examination 12/31/2024.   Cervical arthritis of the lower cervical spine. No acute osseous abnormality       1.  Radiographic worsening of the lung fields when compared to prior CT of the chest 02/03/2025 with development of endobronchial obstruction of the right lower lobe posterior segments and right middle lobe medial segment possibly related to aspiration, mucous plugging or endobronchial lesion. Concurrent postobstructive atelectasis has developed in these segments. 2. Spiculated mass of the right upper lobe marginally smaller than prior examination. 3. New subsegmental atelectasis left lung base posterior basilar segment.   Signed by: Nolan De León 4/4/2025 2:11 PM Dictation workstation:   [id number]    XR chest 1 view    Result Date: 4/3/2025  STUDY: Chest Radiograph;  4/3/2025 3:28 PM INDICATION: Evaluate for Covid 19. COMPARISON: 2/4/2025 XR chest. ACCESSION NUMBER(S): [id number] ORDERING CLINICIAN: FIDENCIO SCOTT TECHNIQUE:  Frontal chest was obtained at 15:27 hours. FINDINGS: CARDIOMEDIASTINAL SILHOUETTE: Heart is enlarged with slight increased pulmonary vascularity. Right-sided [id number] with tip in the superior vena cava.  LUNGS: Fluctuating opacities in the right lung base with resolution of some opacities seen on prior exam from 2/4/2025 and with question new area of consolidation medially in the right base ABDOMEN: No remarkable upper abdominal findings.  BONES: No acute osseous changes.    Fluctuating opacities in  the right lung base with resolution of some opacities seen on prior exam from 2/4/2025 and with question new area of consolidation medially in the right base. Signed by Bib Cabrera MD    ECG 12 lead (Ancillary Performed)    Result Date: 4/3/2025  Sinus tachycardia with occasional and consecutive Premature ventricular complexes and Fusion complexes Abnormal ECG When compared with ECG of 05-JAN-2025 09:52, Fusion complexes are now Present Premature ventricular complexes are now Present Premature atrial complexes are no longer Present     Assessment/Plan   Acute on chronic respiratory failure, on 4 L of oxygen  COPD-wears 4 L of oxygen at night baseline  Lung cancer status post chemotherapy last treatment 3/20/2025  Hypotension-responding to IV fluids  Pancytopenia    IV cefepime  IV vancomycin  Discontinue IV azithromycin-potential interaction with Zyprexa-QT prolongation  Start p.o. doxycycline  Monitor temperature and WBC  Sputum culture if able  Supplemental oxygen, wean as tolerated  Supportive care  Follow-up pending workup  Mycoplasma IgM  Follow-up Legionella urine antigen, strep pneumoniae antigen  Fungitell  Galactomannan Aspergillus  Histoplasma urine antigen   Further recommendations based on workup    Total time spent caring for the patient today was 35 minutes. This includes time spent before the visit reviewing the chart, time spent during the visit, and time spent after the visit on documentation.   Yecenia Weller, KAREN-CNP

## 2025-04-04 NOTE — NURSING NOTE
Patient resting in bed. Family at bedside  visiting. Vitals wnl. Spo2 hard to read, moving to different locations. No complaints of sob. Denies pain. Tolerating medications. Call light within reach. Will continue to monitor.

## 2025-04-04 NOTE — CONSULTS
Vancomycin Dosing by Pharmacy- INITIAL    Billie Hernadez is a 69 y.o. year old female who Pharmacy has been consulted for vancomycin dosing for pneumonia. Based on the patient's indication and renal status this patient will be dosed based on a goal AUC of 400-600.     Renal function is currently stable.    Visit Vitals  /54 (BP Location: Left arm, Patient Position: Lying)   Pulse 105   Temp 36.7 °C (98.1 °F) (Temporal)   Resp 16        Lab Results   Component Value Date    CREATININE 0.36 (L) 2025    CREATININE 0.60 2025    CREATININE 0.57 2025    CREATININE 0.58 2025        Patient weight is as follows:   Vitals:    25 1830   Weight: 49 kg (108 lb)       Cultures:  No results found for the encounter in last 14 days.        I/O last 3 completed shifts:  In: 50 (1 mL/kg) [IV Piggyback:50]  Out: - (0 mL/kg)   Weight: 49 kg   I/O during current shift:  I/O this shift:  In: 240 [P.O.:240]  Out: 300 [Urine:300]    Temp (24hrs), Av.2 °C (99 °F), Min:36.4 °C (97.6 °F), Max:38.3 °C (100.9 °F)         Assessment/Plan     Patient will not be given a loading dose.  Will initiate vancomycin maintenance,  1000 mg every 12 hours.    This dosing regimen is predicted by [employer] to result in the following pharmacokinetic parameters:  Regimen: 1000 mg IV every 12 hours.  Start time: 11:01 on 2025  Exposure target: AUC24 (range)400-600 mg/L.hr   VOI48-01: 413 mg/L.hr  AUC24,ss: 471 mg/L.hr  Probability of AUC24 > 400: 67 %  Ctrough,ss: 12.7 mg/L  Probability of Ctrough,ss > 20: 19 %    Follow-up level will be ordered on 25 at 0500 unless clinically indicated sooner.  Will continue to monitor renal function daily while on vancomycin and order serum creatinine at least every 48 hours if not already ordered.  Follow for continued vancomycin needs, clinical response, and signs/symptoms of toxicity.       Denilson Chambers, PharmD

## 2025-04-04 NOTE — PROGRESS NOTES
04/04/25 1049   Discharge Planning   Living Arrangements Spouse/significant other   Support Systems Spouse/significant other   Assistance Needed Met with pt and spouse and they report that they are happy with our services here. Pt and  report no needs at home right now.   Type of Residence Private residence   Expected Discharge Disposition Home   Patient Choice   Provider Choice list and CMS website (https://medicare.gov/care-compare#search) for post-acute Quality and Resource Measure Data were provided and reviewed with: Patient   Patient / Family choosing to utilize agency / facility established prior to hospitalization No   Stroke Family Assessment   Stroke Family Assessment Needed No   Intensity of Service   Intensity of Service 0-30 min     LYNDSEY COYNE

## 2025-04-04 NOTE — CARE PLAN
The patient's goals for the shift include      The clinical goals for the shift include Patient spo2 dexter remain above 90% this shift    Patient in bed. Call bell in reach. No complaints of n/v,chest pain. Shortness of breath with exertion.

## 2025-04-04 NOTE — CARE PLAN
The patient's goals for the shift include      The clinical goals for the shift include patient spo2 will remain above 90% this shift    Patient tolerating medication therapy. Received 1 unit of blood. Tolerated well. Vitals wnl. States feeling much better, still get sob with exertion. Requiring BSC. Will continue to monitor.

## 2025-04-04 NOTE — CONSULTS
"Nutrition Initial Assessment:   Nutrition Assessment    Reason for Assessment: Admission nursing screening (MST=3; poor appetite; unsure of wt loss)    Patient is a 69 y.o. female presenting with fever after chemotherapy on 03/20/25.    PMH: lung cancer, COPD, HTN    Nutrition History:  Energy Intake: Good > 75 %  Food and Nutrient History: Pt visited in room, resting in bed. Pt reports good intake and appetite, no difficulty chewing or swallowing. Pt reports a UBW of 105-110 lbs. States she has not had any weight loss or difficulty with intake despite chemotherapy. Pt lives at home with her , who is a great support. Pt offered Ensure plus, but states she does not like the taste. Pt educated on increased needs with metabolic disease and chemotherapy. Pt amendable to trying Gelatein plus 2x/day to increase protein intake during hospitalization.     Anthropometrics:  Height: 160 cm (5' 3\")   Weight: 49 kg (108 lb)   BMI (Calculated): 19.14  IBW/kg (Dietitian Calculated): 59 kg  Percent of IBW: 83 %     Weight History:   Wt Readings from Last 10 Encounters:   04/03/25 49 kg (108 lb)   04/01/25 48.8 kg (107 lb 9.6 oz)   03/20/25 48.6 kg (107 lb 2.3 oz)   03/19/25 47.5 kg (104 lb 11.5 oz)   03/13/25 48.4 kg (106 lb 9.5 oz)   03/12/25 48.2 kg (106 lb 6 oz)   03/11/25 48.4 kg (106 lb 12.8 oz)   03/06/25 48.3 kg (106 lb 7.7 oz)   03/05/25 48.4 kg (106 lb 11.2 oz)   02/27/25 48.1 kg (105 lb 14.9 oz)      Weight Change %:  Weight History / % Weight Change: 04/03/25 - 49 kg; 02/27/25 - 48.1 kg  Significant Weight Loss: No    Nutrition Focused Physical Exam Findings:    Subcutaneous Fat Loss:   Orbital Fat Pads: Severe (dark circles, hollowing and loose skin)  Buccal Fat Pads: Severe (hollow, sunken and narrow face)  Triceps: Severe (negligible fat tissue)  Ribs: Defer  Muscle Wasting:  Temporalis: Severe (hollowed scooping depression)  Pectoralis (Clavicular Region): Severe (protruding prominent " clavicle)  Deltoid/Trapezius: Severe (squared shoulders, acromion process prominent)  Interosseous: Defer  Trapezius/Infraspinatus/Supraspinatus (Scapular Region): Defer  Quadriceps: Defer    Nutrition Significant Labs:  BMP Trend:   Results from last 7 days   Lab Units 04/04/25  0630 04/03/25  1512   GLUCOSE mg/dL 98 126*   CALCIUM mg/dL 8.1* 9.0   SODIUM mmol/L 134* 134*   POTASSIUM mmol/L 4.0 3.9   CO2 mmol/L 33* 30   CHLORIDE mmol/L 100 95*   BUN mg/dL 6 10   CREATININE mg/dL 0.36* 0.60    , Renal Lab Trend:   Results from last 7 days   Lab Units 04/04/25  0630 04/03/25  1655 04/03/25  1512   POTASSIUM mmol/L 4.0  --  3.9   SODIUM mmol/L 134*  --  134*   MAGNESIUM mg/dL 1.40*   < >  --    EGFR mL/min/1.73m*2 >90  --  >90   BUN mg/dL 6  --  10   CREATININE mg/dL 0.36*  --  0.60    < > = values in this interval not displayed.      Nutrition Specific Medications:  azithromycin, 500 mg, oral, q24h KOKI  [Held by provider] calcium citrate-vitamin D2, 1 tablet, oral, Daily  cefepime, 2 g, intravenous, q8h  filgrastim or biosimilar, 300 mcg, subcutaneous, Once  fondaparinux, 2.5 mg, subcutaneous, q24h  guaiFENesin, 600 mg, oral, BID  ipratropium-albuteroL, 3 mL, nebulization, TID  magnesium oxide, 400 mg, oral, TID  magnesium sulfate, 2 g, intravenous, Once  methylPREDNISolone sodium succinate (PF), 40 mg, intravenous, q8h  OLANZapine, 5 mg, oral, Nightly  oxygen, , inhalation, Continuous - Inhalation  pantoprazole, 20 mg, oral, Daily  sucralfate, 1 g, oral, With meals & nightly  tiotropium, 2 puff, inhalation, Daily  vancomycin, 1 g, intravenous, q12h     I/O:    ; Stool Appearance: Formed (04/04/25 1200)    Dietary Orders (From admission, onward)       Start     Ordered    04/03/25 1858  May Participate in Room Service  ( ROOM SERVICE MAY PARTICIPATE)  Once        Question:  .  Answer:  Yes    04/03/25 1857    04/03/25 1825  Adult diet Regular  Diet effective now        Question:  Diet type  Answer:  Regular     04/03/25 1825                   Estimated Needs:   Total Energy Estimated Needs in 24 hours (kCal): 1600 kCal (4104-9248 Kcal/day)  Method for Estimating Needs: 30-35 Kcal/kg  Total Protein Estimated Needs in 24 Hours (g): 70 g (64-74 gm/day)  Method for Estimating 24 Hour Protein Needs: 1.3-1.5 gm/kg  Total Fluid Estimated Needs in 24 Hours (mL): 1600 mL  Method for Estimating 24 Hour Fluid Needs: 1 mL/Kcal      Nutrition Diagnosis   Malnutrition Diagnosis  Patient has Malnutrition Diagnosis: Yes  Diagnosis Status: New  Malnutrition Diagnosis: Moderate malnutrition related to chronic disease or condition  Related to: physicological causes increasing nutrient needs due to illness  As Evidenced by: lung cancer w/chemotherapy and fever; BMI 19 low for age; severe muscle wasting in multiple areas (temporal wasting, protruding clavicle, squared shoulders); Significant subcutaneous fat wasting (buccal, orbital fat pads, triceps)       Nutrition Interventions/Recommendations   Nutrition prescription for oral nutrition    Nutrition Recommendations:  Individualized Nutrition Prescription Provided for : diet, fluids, ONS    Nutrition Interventions/Goals:   Interventions: Meals and snacks, Medical food supplement  Meals and Snacks: General healthful diet  Goal: Agree with regular diet as ordered  Medical Food Supplement: Commercial food medical food supplement therapy  Goal: Gelatein Plus BID (160kcal and 20g protein per 4oz)  Coordination of Care with Providers: Nursing, Provider  Goal: IDT meeting; Pamela DODSON    Education Documentation  Nutrition Related Education, taught by Christiana Souza RD at 4/4/2025  1:44 PM.  Learner: Patient  Readiness: Acceptance  Method: Explanation  Response: Verbalizes Understanding  Comment: Benefits of increasing protein intake to meet increased demands with cancer; benefits of oral nutrition supplements to meet increased nutrition demand         Nutrition Monitoring and Evaluation    Food/Nutrient Related History Monitoring  Monitoring and Evaluation Plan: Estimated Energy Intake, Intake / amount of food  Estimated Energy Intake: Energy intake greater or equal to 75% of estimated energy needs  Intake / Amount of food: Consumes > or equal to 70% of supplement    Anthropometric Measurements  Monitoring and Evaluation Plan: Body weight  Body Weight: Body weight - Promote weight restoration          Time Spent (min): 65 minutes

## 2025-04-04 NOTE — NURSING NOTE
Patient alert and oriented per baseline. No complaints. Spo2 on masimo reading 85% on 4lpm. Patient denies worsening sob, all other vitals wnl per baseline. Respiratory notified. While in patients room assessing patient prior to starting blood transfusion, rechecked oxygen via ear and it read 92%. noticed that patient's oxygen was connected to oxygen connector and on 4lpm, however was not attached to humidifier. Patient's spo2 reading 93% at this time. Sepsis alert alarmed. Provider notified.

## 2025-04-04 NOTE — CONSULTS
Billie Hernadez     Room 309    1.Do you have any home inhalers?   Patient states yes, she has Sprivia she uses once a day and an Albuterol inhaler. Patient states she also has a nebulizer but has ran out of the medication for it, patient would like a script when discharged. Patient does not have any barriers, and knows to rinse mouth after Spiriva.     2.When were you diagnosed with COPD?  Patient states a couple years ago she was officially diagnosed, but it had been told to her prior.     3.Any previous PFTs?   Patient states yes, a couple of months ago. This RT explained the importance of a PFT.     4.Do you have a pulmonary doctor?   Patient sates no, but she is open to setting up an appointment with one. Patient states she tried before but they told her it was months before she could get in, so she gave up.     5.Do you currently smoke or vape or have you ever?   Patient states she quit in December of 2024, she used to smoke 1 ppd for about 50 years.     6.Do you have a primary doctor?   Patient states she sees Puja Rae, she saw her on 4/1/25.     7.Do you have any home O2 or CPAP/BiPAP?   Patient   Patient states yes she has O2 with [facility]. Patient states she normally just wears 4L at night but she will need to wear it continuously now.     This RT to see patient for COPD consult. The patient was given a COPD booklet with educational materials regarding pulmonary issues. Smoking cessation, better breathers support group, and pulmonary rehab options given to patient. I educated the patient on disease process and how it affects the lungs, making it difficult to breath. Patient was given  pulmonary office phone number to make an appointment. Patient states when she has some free time or feels batter she will call to set one up. Patient was very receptive to all the information given.         Bernardo Pacheco, RRT

## 2025-04-04 NOTE — H&P
History Of Present Illness  Billie Hernadez is a 69 y.o. female presenting with a complaint of fever. She has a hx of lung cancer and COPD. She started chemotherapy in December 2024 and finished chemotherapy on 3/20/25. She has been having low blood pressure; so she has been going to the infusion center intermittent infusion if IVF. She was at the infusion center yesterday; her BP was low even after getting the IV fluid. She had a low grade fever, cough, and not been feeling well. She was sent to the ED for evaluation.    In the ED:  VS: T 36.8; ; R 18; /55; SpO2 88% on RA  Labs: Glu 126; Na 134; K 3.9; BUN 10; Cr 0.60; Mg 1.41; ; WBC 3.0; Hb 8.1; Hcrt 26.2;   Radiology: Fluctuating opacities in the right lung base with resolution of some opacities seen on prior exam from 2/4/2025 and with question new area of consolidation medially in the right base.  Medication: azithromycin, ceftriaxone  Disposition: med/surg     Past Medical History  Past Medical History:   Diagnosis Date    Cancer (Multi)     Chronic obstructive pulmonary disease with (acute) exacerbation (Multi) 08/08/2017    COPD exacerbation    Hypertension     Lung cancer (Multi)     Other specified health status 03/12/2015    No known problems    Personal history of other venous thrombosis and embolism 03/12/2015    History of deep venous thrombosis    Pneumonia        Surgical History  Past Surgical History:   Procedure Laterality Date    EXCISION / BIOPSY BREAST / NIPPLE / DUCT Left 05/28/2014    LUNG BIOPSY  12/31/2024    Bronchoscopy and needle biopsy    OTHER SURGICAL HISTORY Left 03/12/2015    Open Treatment Of Tibial Shaft Fracture With Implant    PORTACATH PLACEMENT N/A     TONSILLECTOMY          Social History  She reports that she quit smoking about 14 months ago. Her smoking use included cigarettes. She started smoking about 52 years ago. She has a 25.5 pack-year smoking history. She has been exposed to tobacco smoke. She  "has never used smokeless tobacco. She reports current alcohol use of about 2.0 standard drinks of alcohol per week. She reports that she does not use drugs.    Family History  No family history on file.     Allergies  Patient has no known allergies.    Review of Systems   Constitutional:  Positive for activity change, chills, fatigue and fever. Negative for diaphoresis and unexpected weight change.   HENT:  Positive for congestion. Negative for sinus pressure, sinus pain and sore throat.    Eyes: Negative.    Respiratory:  Positive for cough and shortness of breath.    Cardiovascular: Negative.    Gastrointestinal: Negative.    Genitourinary: Negative.    Musculoskeletal: Negative.    Skin: Negative.    Neurological: Negative.    Psychiatric/Behavioral: Negative.          Physical Exam  Vitals reviewed.   Constitutional:       Appearance: Normal appearance. She is normal weight.   HENT:      Head: Normocephalic and atraumatic.      Right Ear: External ear normal.      Left Ear: External ear normal.      Nose: Nose normal.      Mouth/Throat:      Mouth: Mucous membranes are moist.      Pharynx: Oropharynx is clear.   Eyes:      Conjunctiva/sclera: Conjunctivae normal.      Pupils: Pupils are equal, round, and reactive to light.   Neurological:      Mental Status: She is alert.          Last Recorded Vitals  Blood pressure (!) 86/48, pulse 103, temperature 36.6 °C (97.9 °F), temperature source Temporal, resp. rate 18, height 1.6 m (5' 3\"), weight 49 kg (108 lb), SpO2 93%.    Relevant Results    Scheduled medications  azithromycin, 500 mg, intravenous, q24h  calcium citrate-vitamin D2, 1 tablet, oral, Daily  cefTRIAXone, 2 g, intravenous, q24h  enoxaparin, 40 mg, subcutaneous, q24h  ipratropium-albuteroL, 3 mL, nebulization, TID  magnesium oxide, 400 mg, oral, TID  OLANZapine, 5 mg, oral, Nightly  oxygen, , inhalation, Continuous - Inhalation  pantoprazole, 20 mg, oral, Daily  sucralfate, 1 g, oral, With meals & " nightly  tiotropium, 2 puff, inhalation, Daily      Continuous medications     PRN medications  PRN medications: acetaminophen, albuterol, benzocaine-menthol, dextromethorphan-guaifenesin, guaiFENesin, ipratropium-albuteroL, ondansetron, polyethylene glycol, sodium chloride    Results for orders placed or performed during the hospital encounter of 04/03/25 (from the past 24 hours)   CBC with Differential   Result Value Ref Range    WBC 3.0 (L) 4.4 - 11.3 x10*3/uL    nRBC 0.0 0.0 - 0.0 /100 WBCs    RBC 2.44 (L) 4.00 - 5.20 x10*6/uL    Hemoglobin 8.1 (L) 12.0 - 16.0 g/dL    Hematocrit 26.2 (L) 36.0 - 46.0 %     (H) 80 - 100 fL    MCH 33.2 26.0 - 34.0 pg    MCHC 30.9 (L) 32.0 - 36.0 g/dL    RDW 20.8 (H) 11.5 - 14.5 %    Platelets 154 150 - 450 x10*3/uL    Immature Granulocytes %, Automated 0.3 0.0 - 0.9 %    Immature Granulocytes Absolute, Automated 0.01 0.00 - 0.50 x10*3/uL   Comprehensive Metabolic Panel   Result Value Ref Range    Glucose 126 (H) 74 - 99 mg/dL    Sodium 134 (L) 136 - 145 mmol/L    Potassium 3.9 3.5 - 5.3 mmol/L    Chloride 95 (L) 98 - 107 mmol/L    Bicarbonate 30 21 - 32 mmol/L    Anion Gap 13 10 - 20 mmol/L    Urea Nitrogen 10 6 - 23 mg/dL    Creatinine 0.60 0.50 - 1.05 mg/dL    eGFR >90 >60 mL/min/1.73m*2    Calcium 9.0 8.6 - 10.3 mg/dL    Albumin 3.6 3.4 - 5.0 g/dL    Alkaline Phosphatase 61 33 - 136 U/L    Total Protein 6.7 6.4 - 8.2 g/dL    AST 19 9 - 39 U/L    Bilirubin, Total 0.5 0.0 - 1.2 mg/dL    ALT 11 7 - 45 U/L   Troponin I, High Sensitivity, Initial   Result Value Ref Range    Troponin I, High Sensitivity 6 0 - 13 ng/L   B-Type Natriuretic Peptide   Result Value Ref Range     (H) 0 - 99 pg/mL   Manual Differential   Result Value Ref Range    Neutrophils %, Manual 75.0 40.0 - 80.0 %    Lymphocytes %, Manual 11.0 13.0 - 44.0 %    Monocytes %, Manual 13.0 2.0 - 10.0 %    Eosinophils %, Manual 1.0 0.0 - 6.0 %    Basophils %, Manual 0.0 0.0 - 2.0 %    Seg Neutrophils Absolute,  Manual 2.25 1.60 - 5.00 x10*3/uL    Lymphocytes Absolute, Manual 0.33 (L) 0.80 - 3.00 x10*3/uL    Monocytes Absolute, Manual 0.39 0.05 - 0.80 x10*3/uL    Eosinophils Absolute, Manual 0.03 0.00 - 0.40 x10*3/uL    Basophils Absolute, Manual 0.00 0.00 - 0.10 x10*3/uL    Total Cells Counted 100     RBC Morphology See Below     Spherocytes Few     Ovalocytes Few    Lactate   Result Value Ref Range    Lactate 1.0 0.4 - 2.0 mmol/L   Sars-CoV-2, Influenza A/B and RSV PCR   Result Value Ref Range    Coronavirus 2019, PCR Not Detected Not Detected    Flu A Result Not Detected Not Detected    Flu B Result Not Detected Not Detected    RSV PCR Not Detected Not Detected   Magnesium   Result Value Ref Range    Magnesium 1.41 (L) 1.60 - 2.40 mg/dL   Troponin, High Sensitivity, 1 Hour   Result Value Ref Range    Troponin I, High Sensitivity 6 0 - 13 ng/L             Assessment/Plan   Assessment & Plan  Community acquired bacterial pneumonia    COPD exacerbation (Multi)    Acute on chronic respiratory failure with hypoxia    Hypomagnesemia    Chronic GERD    Immunocompromised patient    History of lung cancer    Macrocytic anemia    Anemia of chronic disease    Thrombocytopenia (CMS-HCC)    Chemotherapy-induced neutropenia      Acute on Chronic respiratory failure with hypoxia  Community acquired bacterial pneumonia  COPD exacerbation  Immunocompromised  Hx lung cancer  - last chemotherapy treatment 3/20/25  - SpO2 88% on RA  - supplemental oxygen to keep SpO2 > 90%  - currently on 4lpm via NC continuous  - home oxygen is 4lpm via NC at night only  - given azithromycin, ceftriaxone in the ED  - continue azithromycin 500 mg daily; day 2  - started cefepime 2 g q8h; day 1  - given vancomycin 1 g IV once  - started duoneb q8h  - started solumedrol 40 mg q8h  - continue tiotropium 2.5 mcg daily  - started acetaminophen 650 mg q4h, prn  - started guaifenesin 600 mg BID  - RT for bronchial hygiene  - MRSA pcr negative  - blood culture  pending  - legionella antigen: pending  - streptococcus pneumoniae antigen; pending  - ID consult    Macrocytic anemia  Anemia of chronic disease  Thrombocytopenia  Chemotherapy induced neutropenia  - WBC 3.0 > 2.5  - Hb 8.1 > 6.9  -    -  > 137  - will transfuse 1 unit of PRBC today  - discontinued enoxaparin  - started fondaparinux 2.5 daily  - Spoke with Dr. Judith Santillan, patient's oncologist; he was ok with giving the patient a dose of neupogen  - will give neupogen 300 mcg subcutaneous today  - monitor CBC    Hypomagnesia  - Mg 1.41  - replaced with magnesium sulfate 2 g today  - continue magnesium oxide 400 mg TID    Insomnia  - continue olanzapine 5 mg hs    Chronic GERD  - continue pantoprazole 20 mg daily, sucralfate 1 g QID    DVT ppx  - discontinued enoxaparin  - started fondaparinux 2.5 mg daily    Code status: Full    Disposition: patient requires more than 2 inpatient days.    Seen and discussed with MD Sandy Valdes, APRN-CNP  Attending Attestation:    Patient was seen and examined face to face, history and physical was taken personally at bedside the APRN-CNP, was present for the whole duration of the exam who participated in the documentation of this note. I performed the medical decision-making components (assessment and plan of care). I have reviewed the documentation and verified the findings in the note as written with additions or exceptions as stated in the body of this note.  Patient with lung cancer status postchemotherapy the second chemo was on March 2025, she was brought to the hospital, when she was found to be having some shortness of breath, decreased blood pressure and low-grade fever while receiving IV fluid infusion in infusion center, she was pulse oxing 88% on room air.  Workup in the emergency room including chest x-ray showing right lower lobe infiltrate, patient was leukopenic, WBC was 3, hemoglobin 8.1, and she was started on  azithromycin and Rocephin and admitted for community-acquired pneumonia.  Patient was seen this morning she is laying in bed in no significant distress on oxygen, has been coughing, with some productive phlegm, no pain in the chest, no sore throat no earache, no abdominal pain no nausea vomiting or diarrhea, she denies dysuria, she denies any skin rash or ulceration.  On exam, patient does not look septic, heart is regular, diffuse rhonchi and crackles in the lungs, bilaterally, portacatheter in the right side of the chest site is clean no sign of infection no tenderness, abdomen soft bowel sounds are present, extremities no edema pedal pulses are present.  No focal neurological deficit.  Patient with leukopenic fever, and pneumonia, we will change antibiotics to Anti-Pseudomonas antibiotics, continue with Zithromax given patient had external port, will place patient on vancomycin, culture was sent, ask visitors to wear mask when they enter in the room even patient is leukopenic but ANC still above 500, patient may need to receive Neupogen shot.    Dr. Zaira Armendariz MD  Internal Medicine

## 2025-04-04 NOTE — PROGRESS NOTES
Vancomycin Dosing by Pharmacy- Cessation of Therapy    Discontinue vanco per Anita Cordova NP due to MRSA PCR returning 'not detected'     Consult to pharmacy for vancomycin dosing has been discontinued by the prescriber, pharmacy will sign off at this time.    Please call pharmacy if there are further questions or re-enter a consult if vancomycin is resumed.     Denilson Chambers, PharmD

## 2025-04-05 LAB
ANION GAP SERPL CALC-SCNC: 7 MMOL/L (ref 10–20)
BUN SERPL-MCNC: 10 MG/DL (ref 6–23)
CALCIUM SERPL-MCNC: 8.3 MG/DL (ref 8.6–10.3)
CHLORIDE SERPL-SCNC: 100 MMOL/L (ref 98–107)
CO2 SERPL-SCNC: 31 MMOL/L (ref 21–32)
CREAT SERPL-MCNC: 0.37 MG/DL (ref 0.5–1.05)
EGFRCR SERPLBLD CKD-EPI 2021: >90 ML/MIN/1.73M*2
ERYTHROCYTE [DISTWIDTH] IN BLOOD BY AUTOMATED COUNT: 24 % (ref 11.5–14.5)
GLUCOSE SERPL-MCNC: 140 MG/DL (ref 74–99)
HCT VFR BLD AUTO: 26.8 % (ref 36–46)
HGB BLD-MCNC: 8.3 G/DL (ref 12–16)
LEGIONELLA AG UR QL: NEGATIVE
MAGNESIUM SERPL-MCNC: 1.75 MG/DL (ref 1.6–2.4)
MCH RBC QN AUTO: 31.2 PG (ref 26–34)
MCHC RBC AUTO-ENTMCNC: 31 G/DL (ref 32–36)
MCV RBC AUTO: 101 FL (ref 80–100)
NRBC BLD-RTO: 0 /100 WBCS (ref 0–0)
PLATELET # BLD AUTO: 157 X10*3/UL (ref 150–450)
POTASSIUM SERPL-SCNC: 4.3 MMOL/L (ref 3.5–5.3)
PROCALCITONIN SERPL-MCNC: 0.04 NG/ML
RBC # BLD AUTO: 2.66 X10*6/UL (ref 4–5.2)
S PNEUM AG UR QL: NEGATIVE
SODIUM SERPL-SCNC: 134 MMOL/L (ref 136–145)
WBC # BLD AUTO: 11.5 X10*3/UL (ref 4.4–11.3)

## 2025-04-05 PROCEDURE — 94640 AIRWAY INHALATION TREATMENT: CPT | Mod: IPSPLIT

## 2025-04-05 PROCEDURE — 87449 NOS EACH ORGANISM AG IA: CPT | Performed by: NURSE PRACTITIONER

## 2025-04-05 PROCEDURE — 94760 N-INVAS EAR/PLS OXIMETRY 1: CPT | Mod: IPSPLIT

## 2025-04-05 PROCEDURE — 85027 COMPLETE CBC AUTOMATED: CPT | Mod: IPSPLIT | Performed by: NURSE PRACTITIONER

## 2025-04-05 PROCEDURE — 2500000002 HC RX 250 W HCPCS SELF ADMINISTERED DRUGS (ALT 637 FOR MEDICARE OP, ALT 636 FOR OP/ED): Mod: IPSPLIT | Performed by: INTERNAL MEDICINE

## 2025-04-05 PROCEDURE — 2500000004 HC RX 250 GENERAL PHARMACY W/ HCPCS (ALT 636 FOR OP/ED): Mod: IPSPLIT | Performed by: INTERNAL MEDICINE

## 2025-04-05 PROCEDURE — 83735 ASSAY OF MAGNESIUM: CPT | Mod: IPSPLIT | Performed by: NURSE PRACTITIONER

## 2025-04-05 PROCEDURE — 2500000002 HC RX 250 W HCPCS SELF ADMINISTERED DRUGS (ALT 637 FOR MEDICARE OP, ALT 636 FOR OP/ED): Mod: IPSPLIT | Performed by: NURSE PRACTITIONER

## 2025-04-05 PROCEDURE — 1200000002 HC GENERAL ROOM WITH TELEMETRY DAILY: Mod: IPSPLIT

## 2025-04-05 PROCEDURE — 2500000004 HC RX 250 GENERAL PHARMACY W/ HCPCS (ALT 636 FOR OP/ED): Mod: IPSPLIT | Performed by: NURSE PRACTITIONER

## 2025-04-05 PROCEDURE — 80048 BASIC METABOLIC PNL TOTAL CA: CPT | Mod: IPSPLIT | Performed by: NURSE PRACTITIONER

## 2025-04-05 PROCEDURE — 86738 MYCOPLASMA ANTIBODY: CPT | Performed by: NURSE PRACTITIONER

## 2025-04-05 PROCEDURE — 94669 MECHANICAL CHEST WALL OSCILL: CPT | Mod: IPSPLIT

## 2025-04-05 PROCEDURE — 2500000005 HC RX 250 GENERAL PHARMACY W/O HCPCS: Mod: IPSPLIT | Performed by: NURSE PRACTITIONER

## 2025-04-05 PROCEDURE — 2500000001 HC RX 250 WO HCPCS SELF ADMINISTERED DRUGS (ALT 637 FOR MEDICARE OP): Mod: IPSPLIT | Performed by: NURSE PRACTITIONER

## 2025-04-05 PROCEDURE — 99232 SBSQ HOSP IP/OBS MODERATE 35: CPT | Performed by: NURSE PRACTITIONER

## 2025-04-05 PROCEDURE — 87305 ASPERGILLUS AG IA: CPT | Performed by: NURSE PRACTITIONER

## 2025-04-05 PROCEDURE — 2500000004 HC RX 250 GENERAL PHARMACY W/ HCPCS (ALT 636 FOR OP/ED): Mod: JZ,TB,IPSPLIT | Performed by: NURSE PRACTITIONER

## 2025-04-05 RX ORDER — SUCRALFATE 1 G/10ML
1 SUSPENSION ORAL
Status: DISPENSED | OUTPATIENT
Start: 2025-04-05

## 2025-04-05 RX ORDER — METOPROLOL TARTRATE 1 MG/ML
2.5 INJECTION, SOLUTION INTRAVENOUS ONCE
Status: COMPLETED | OUTPATIENT
Start: 2025-04-05 | End: 2025-04-05

## 2025-04-05 RX ORDER — PREDNISONE 20 MG/1
40 TABLET ORAL DAILY
Status: DISPENSED | OUTPATIENT
Start: 2025-04-06 | End: 2025-04-09

## 2025-04-05 RX ADMIN — SUCRALFATE ORAL SUSPENSION 1 G: 1 SUSPENSION ORAL at 20:36

## 2025-04-05 RX ADMIN — GUAIFENESIN 600 MG: 600 TABLET ORAL at 09:09

## 2025-04-05 RX ADMIN — Medication 400 MG: at 15:59

## 2025-04-05 RX ADMIN — PANTOPRAZOLE SODIUM 20 MG: 20 TABLET, DELAYED RELEASE ORAL at 09:09

## 2025-04-05 RX ADMIN — Medication 4 L/MIN: at 20:27

## 2025-04-05 RX ADMIN — METOPROLOL TARTRATE 2.5 MG: 5 INJECTION INTRAVENOUS at 20:38

## 2025-04-05 RX ADMIN — OLANZAPINE 5 MG: 2.5 TABLET, FILM COATED ORAL at 20:36

## 2025-04-05 RX ADMIN — METHYLPREDNISOLONE SODIUM SUCCINATE 40 MG: 40 INJECTION, POWDER, FOR SOLUTION INTRAMUSCULAR; INTRAVENOUS at 04:12

## 2025-04-05 RX ADMIN — SUCRALFATE ORAL SUSPENSION 1 G: 1 SUSPENSION ORAL at 15:59

## 2025-04-05 RX ADMIN — Medication 4 L/MIN: at 09:42

## 2025-04-05 RX ADMIN — CEFEPIME HYDROCHLORIDE 2 G: 2 INJECTION, SOLUTION INTRAVENOUS at 02:09

## 2025-04-05 RX ADMIN — TIOTROPIUM BROMIDE INHALATION SPRAY 2 PUFF: 3.12 SPRAY, METERED RESPIRATORY (INHALATION) at 09:43

## 2025-04-05 RX ADMIN — FONDAPARINUX SODIUM 2.5 MG: 2.5 INJECTION SUBCUTANEOUS at 13:35

## 2025-04-05 RX ADMIN — METHYLPREDNISOLONE SODIUM SUCCINATE 40 MG: 40 INJECTION, POWDER, FOR SOLUTION INTRAMUSCULAR; INTRAVENOUS at 13:35

## 2025-04-05 RX ADMIN — IPRATROPIUM BROMIDE AND ALBUTEROL SULFATE 3 ML: 2.5; .5 SOLUTION RESPIRATORY (INHALATION) at 20:27

## 2025-04-05 RX ADMIN — CEFEPIME HYDROCHLORIDE 2 G: 2 INJECTION, SOLUTION INTRAVENOUS at 18:09

## 2025-04-05 RX ADMIN — Medication 400 MG: at 20:36

## 2025-04-05 RX ADMIN — SUCRALFATE ORAL SUSPENSION 1 G: 1 SUSPENSION ORAL at 11:40

## 2025-04-05 RX ADMIN — DOXYCYCLINE HYCLATE 100 MG: 100 TABLET, COATED ORAL at 09:09

## 2025-04-05 RX ADMIN — Medication 400 MG: at 09:09

## 2025-04-05 RX ADMIN — DOXYCYCLINE HYCLATE 100 MG: 100 TABLET, COATED ORAL at 20:36

## 2025-04-05 RX ADMIN — CEFEPIME HYDROCHLORIDE 2 G: 2 INJECTION, SOLUTION INTRAVENOUS at 09:09

## 2025-04-05 RX ADMIN — GUAIFENESIN 600 MG: 600 TABLET ORAL at 20:36

## 2025-04-05 RX ADMIN — IPRATROPIUM BROMIDE AND ALBUTEROL SULFATE 3 ML: 2.5; .5 SOLUTION RESPIRATORY (INHALATION) at 15:22

## 2025-04-05 RX ADMIN — IPRATROPIUM BROMIDE AND ALBUTEROL SULFATE 3 ML: 2.5; .5 SOLUTION RESPIRATORY (INHALATION) at 09:42

## 2025-04-05 ASSESSMENT — COGNITIVE AND FUNCTIONAL STATUS - GENERAL
DAILY ACTIVITIY SCORE: 24
MOBILITY SCORE: 24
DAILY ACTIVITIY SCORE: 24

## 2025-04-05 ASSESSMENT — PAIN SCALES - GENERAL
PAINLEVEL_OUTOF10: 0 - NO PAIN
PAINLEVEL_OUTOF10: 2

## 2025-04-05 ASSESSMENT — PAIN - FUNCTIONAL ASSESSMENT: PAIN_FUNCTIONAL_ASSESSMENT: 0-10

## 2025-04-05 NOTE — CARE PLAN
The patient's goals for the shift include      The clinical goals for the shift include patient spo2 will remain above 92% throughout shift  Patient tolerated iv antibiotics well. Denies pain. Rested on and off throughout shift. Call light within reach.

## 2025-04-05 NOTE — PROGRESS NOTES
Billie Hernadez is a 69 y.o. female on day 2 of admission presenting with Community acquired bacterial pneumonia.      Subjective   Patient expressed that she slept well.   Discussed CT chest findings with patient.   ID consult appreciated.   Continue current plan of care with solumedrol, duoneb, doxycycline, cefepime.   Will plan transition of solumedrol to prednisone 4/6/25.  MRSA not detected~vancomycin Dc'd 4/4       Objective     Last Recorded Vitals  /69 (BP Location: Left arm, Patient Position: Lying)   Pulse 106   Temp 36.7 °C (98.1 °F) (Temporal)   Resp 20   Wt 49 kg (108 lb)   SpO2 90%   Intake/Output last 3 Shifts:    Intake/Output Summary (Last 24 hours) at 4/5/2025 0919  Last data filed at 4/5/2025 0909  Gross per 24 hour   Intake 1788.34 ml   Output 1 ml   Net 1787.34 ml       Admission Weight  Weight: 48.8 kg (107 lb 9.4 oz) (04/03/25 1514)    Daily Weight  04/03/25 : 49 kg (108 lb)    Image Results  CT chest wo IV contrast  Narrative: Interpreted By:  Nolan De León,   STUDY:  CT CHEST WO IV CONTRAST;  4/4/2025 12:36 pm      INDICATION:  Signs/Symptoms:cough, SOB, hypoxi.      COMPARISON:  02/13/2025      ACCESSION NUMBER(S):  [id number]      ORDERING CLINICIAN:  CECE WHALEN      TECHNIQUE:  Helical data acquisition of the chest was obtained  without IV  contrast material.  Images were reformatted in axial, coronal, and  sagittal planes.      FINDINGS:  Hypoaeration with centrilobular emphysematous changes. 13 mm  pleural-based density right upper lobe posterior segment stable.      There is endobronchial density of the right lower lobe favoring  mucous plugging. Endobronchial lesion can not be excluded. This is  new since most recent examination 02/13/2025. Progression of  segmental atelectasis of the right lung base with continued moderate  size right pleural effusion appearing similar.      There is right middle lobe medial segmental atelectasis with  endobronchial mucous plugging  and or lesion, marginally increased  since prior examination.      New lung consolidation right lung base with air bronchograms.      Spiculated mass of the right upper lobe measuring 16 mm, marginally  smaller than prior examination previously measuring up to 20 mm.      New atelectasis inferior lingular segment left upper lobe. New  subsegmental atelectasis left lung base posterior basilar segment.  Traction bronchiectasis left lung base posterior basilar segment.      Heart size is enlarged. Enlarged mediastinal lymph nodes are stable  since most recent examination.      Global cardiomegaly similar.      Right-sided MediPort catheter tip projected in the distal SVC.      Upper abdomen demonstrates dense calcifications of the proximal  abdominal aorta. Prominence of the left adrenal gland appearing  similar prior examination 12/31/2024.      Cervical arthritis of the lower cervical spine. No acute osseous  abnormality      Impression: 1.  Radiographic worsening of the lung fields when compared to prior  CT of the chest 02/03/2025 with development of endobronchial  obstruction of the right lower lobe posterior segments and right  middle lobe medial segment possibly related to aspiration, mucous  plugging or endobronchial lesion. Concurrent postobstructive  atelectasis has developed in these segments.  2. Spiculated mass of the right upper lobe marginally smaller than  prior examination.  3. New subsegmental atelectasis left lung base posterior basilar  segment.      Signed by: Nolan De León 4/4/2025 2:11 PM  Dictation workstation:   [id number]    Results for orders placed or performed during the hospital encounter of 04/03/25 (from the past 24 hours)   MRSA Surveillance for Vancomycin De-escalation, PCR    Specimen: Anterior Nares; Swab   Result Value Ref Range    MRSA PCR Not Detected Not Detected   Type and screen   Result Value Ref Range    ABO TYPE A     Rh TYPE POS     ANTIBODY SCREEN NEG    VERIFY ABO/Rh  Group Test   Result Value Ref Range    ABO TYPE A     Rh TYPE POS    Basic Metabolic Panel   Result Value Ref Range    Glucose 140 (H) 74 - 99 mg/dL    Sodium 134 (L) 136 - 145 mmol/L    Potassium 4.3 3.5 - 5.3 mmol/L    Chloride 100 98 - 107 mmol/L    Bicarbonate 31 21 - 32 mmol/L    Anion Gap 7 (L) 10 - 20 mmol/L    Urea Nitrogen 10 6 - 23 mg/dL    Creatinine 0.37 (L) 0.50 - 1.05 mg/dL    eGFR >90 >60 mL/min/1.73m*2    Calcium 8.3 (L) 8.6 - 10.3 mg/dL   CBC   Result Value Ref Range    WBC 11.5 (H) 4.4 - 11.3 x10*3/uL    nRBC 0.0 0.0 - 0.0 /100 WBCs    RBC 2.66 (L) 4.00 - 5.20 x10*6/uL    Hemoglobin 8.3 (L) 12.0 - 16.0 g/dL    Hematocrit 26.8 (L) 36.0 - 46.0 %     (H) 80 - 100 fL    MCH 31.2 26.0 - 34.0 pg    MCHC 31.0 (L) 32.0 - 36.0 g/dL    RDW 24.0 (H) 11.5 - 14.5 %    Platelets 157 150 - 450 x10*3/uL       Physical Exam  Constitutional:       Appearance: She is ill-appearing.   HENT:      Head: Atraumatic.      Nose: Nose normal.      Mouth/Throat:      Mouth: Mucous membranes are moist.   Eyes:      Pupils: Pupils are equal, round, and reactive to light.   Cardiovascular:      Rate and Rhythm: Normal rate and regular rhythm.      Pulses: Normal pulses.      Heart sounds: S1 normal and S2 normal.   Pulmonary:      Effort: Pulmonary effort is normal.      Breath sounds: Decreased breath sounds present.      Comments: Oxygen 4 liters  Abdominal:      General: Bowel sounds are normal.      Palpations: Abdomen is soft.      Tenderness: There is no abdominal tenderness.   Musculoskeletal:      Right lower leg: No edema.      Left lower leg: No edema.   Skin:     Capillary Refill: Capillary refill takes less than 2 seconds.   Neurological:      Mental Status: She is alert and oriented to person, place, and time.   Psychiatric:         Attention and Perception: Attention normal.         Mood and Affect: Mood normal.         Speech: Speech normal.         Behavior: Behavior normal.         Thought Content:  Thought content normal.         Cognition and Memory: Cognition normal.         Judgment: Judgment normal.     Scheduled medications  [Held by provider] calcium citrate-vitamin D2, 1 tablet, oral, Daily  cefepime, 2 g, intravenous, q8h  doxycylcine, 100 mg, oral, q12h KOKI  fondaparinux, 2.5 mg, subcutaneous, q24h  guaiFENesin, 600 mg, oral, BID  ipratropium-albuteroL, 3 mL, nebulization, TID  magnesium oxide, 400 mg, oral, TID  methylPREDNISolone sodium succinate (PF), 40 mg, intravenous, q8h  OLANZapine, 5 mg, oral, Nightly  oxygen, , inhalation, Continuous - Inhalation  pantoprazole, 20 mg, oral, Daily  [START ON 4/6/2025] predniSONE, 40 mg, oral, Daily  sucralfate, 1 g, oral, Before meals & nightly  tiotropium, 2 puff, inhalation, Daily      Continuous medications     PRN medications  PRN medications: acetaminophen, albuterol, benzocaine-menthol, dextromethorphan-guaifenesin, ipratropium-albuteroL, ondansetron, polyethylene glycol      Assessment/Plan        This patient has a central line   Reason for the central line remaining today? Parenteral medication        Malnutrition Diagnosis Status: New  Malnutrition Diagnosis: Moderate malnutrition related to chronic disease or condition  Related to: physicological causes increasing nutrient needs due to illness  As Evidenced by: lung cancer w/chemotherapy and fever; BMI 19 low for age; severe muscle wasting in multiple areas (temporal wasting, protruding clavicle, squared shoulders); Significant subcutaneous fat wasting (buccal, orbital fat pads, triceps)  I agree with the dietitian's malnutrition diagnosis.    Acute on Chronic respiratory failure with hypoxia  Community acquired bacterial pneumonia  COPD exacerbation  Immunocompromised  Hx lung cancer  - last chemotherapy treatment 3/20/25  - SpO2 88% on RA  - supplemental oxygen to keep SpO2 > 90%  - currently on 4lpm via NC continuous  - home oxygen is 4lpm via NC at night only  - given azithromycin, ceftriaxone  in the ED  - continue azithromycin 500 mg daily; day 2  - started cefepime 2 g q8h  - given vancomycin 1 g IV once  - started duoneb q8h  - solumedrol 40 mg q8h~last dose 4/5  - continue tiotropium 2.5 mcg daily  - acetaminophen 650 mg q4h, prn  - guaifenesin 600 mg BID  - RT for bronchial hygiene  - MRSA pcr negative  - blood culture pending  - legionella antigen: pending  - streptococcus pneumoniae antigen; pending  - ID consult  4/5  -transition to prednisone 4/6  -continue cefepime and doxycycline     Macrocytic anemia  Anemia of chronic disease  Thrombocytopenia  Chemotherapy induced neutropenia  - WBC 3.0 > 2.5  - Hb 8.1 > 6.9  -    -  > 137  - iron 41, ferritin 364, TIBC 236, %sat 17  - will transfuse 1 unit of PRBC today  - discontinued enoxaparin  - started fondaparinux 2.5 daily  - Spoke with Dr. Judith Santillan, patient's oncologist; he was ok with giving the patient a dose of neupogen  - will give neupogen 300 mcg subcutaneous today  - monitor CBC     Hypomagnesia  - Mg 1.41  - replaced with magnesium sulfate 2 GM 4/4  - continue magnesium oxide 400 mg TID     Insomnia  - continue olanzapine 5 mg hs     Chronic GERD  - continue pantoprazole 20 mg daily, sucralfate 1 g QID     DVT ppx  - discontinued enoxaparin  - started fondaparinux 2.5 mg daily     Code status: Full     Disposition: patient requires more than 2 inpatient days.            KAREN Alanis-CNP

## 2025-04-06 VITALS
DIASTOLIC BLOOD PRESSURE: 64 MMHG | OXYGEN SATURATION: 95 % | HEART RATE: 91 BPM | WEIGHT: 108 LBS | RESPIRATION RATE: 17 BRPM | BODY MASS INDEX: 19.14 KG/M2 | HEIGHT: 63 IN | TEMPERATURE: 98.4 F | SYSTOLIC BLOOD PRESSURE: 102 MMHG

## 2025-04-06 LAB
ANION GAP SERPL CALC-SCNC: <7 MMOL/L (ref 10–20)
BACTERIA BLD CULT: NORMAL
BACTERIA BLD CULT: NORMAL
BUN SERPL-MCNC: 13 MG/DL (ref 6–23)
CALCIUM SERPL-MCNC: 8.4 MG/DL (ref 8.6–10.3)
CHLORIDE SERPL-SCNC: 102 MMOL/L (ref 98–107)
CO2 SERPL-SCNC: 34 MMOL/L (ref 21–32)
CREAT SERPL-MCNC: 0.39 MG/DL (ref 0.5–1.05)
EGFRCR SERPLBLD CKD-EPI 2021: >90 ML/MIN/1.73M*2
ERYTHROCYTE [DISTWIDTH] IN BLOOD BY AUTOMATED COUNT: 24.3 % (ref 11.5–14.5)
GLUCOSE SERPL-MCNC: 112 MG/DL (ref 74–99)
HCT VFR BLD AUTO: 25.4 % (ref 36–46)
HGB BLD-MCNC: 8 G/DL (ref 12–16)
MCH RBC QN AUTO: 32.3 PG (ref 26–34)
MCHC RBC AUTO-ENTMCNC: 31.5 G/DL (ref 32–36)
MCV RBC AUTO: 102 FL (ref 80–100)
NRBC BLD-RTO: 0.2 /100 WBCS (ref 0–0)
PLATELET # BLD AUTO: 170 X10*3/UL (ref 150–450)
POTASSIUM SERPL-SCNC: 4.2 MMOL/L (ref 3.5–5.3)
RBC # BLD AUTO: 2.48 X10*6/UL (ref 4–5.2)
SODIUM SERPL-SCNC: 138 MMOL/L (ref 136–145)
WBC # BLD AUTO: 11.9 X10*3/UL (ref 4.4–11.3)

## 2025-04-06 PROCEDURE — 2500000001 HC RX 250 WO HCPCS SELF ADMINISTERED DRUGS (ALT 637 FOR MEDICARE OP): Mod: IPSPLIT | Performed by: NURSE PRACTITIONER

## 2025-04-06 PROCEDURE — 94669 MECHANICAL CHEST WALL OSCILL: CPT | Mod: IPSPLIT

## 2025-04-06 PROCEDURE — 2500000004 HC RX 250 GENERAL PHARMACY W/ HCPCS (ALT 636 FOR OP/ED): Mod: IPSPLIT | Performed by: NURSE PRACTITIONER

## 2025-04-06 PROCEDURE — 99232 SBSQ HOSP IP/OBS MODERATE 35: CPT | Performed by: NURSE PRACTITIONER

## 2025-04-06 PROCEDURE — 2500000002 HC RX 250 W HCPCS SELF ADMINISTERED DRUGS (ALT 637 FOR MEDICARE OP, ALT 636 FOR OP/ED): Mod: IPSPLIT | Performed by: NURSE PRACTITIONER

## 2025-04-06 PROCEDURE — 1200000002 HC GENERAL ROOM WITH TELEMETRY DAILY: Mod: IPSPLIT

## 2025-04-06 PROCEDURE — 94760 N-INVAS EAR/PLS OXIMETRY 1: CPT | Mod: IPSPLIT

## 2025-04-06 PROCEDURE — 2500000002 HC RX 250 W HCPCS SELF ADMINISTERED DRUGS (ALT 637 FOR MEDICARE OP, ALT 636 FOR OP/ED): Mod: IPSPLIT | Performed by: INTERNAL MEDICINE

## 2025-04-06 PROCEDURE — 85027 COMPLETE CBC AUTOMATED: CPT | Mod: IPSPLIT | Performed by: NURSE PRACTITIONER

## 2025-04-06 PROCEDURE — 94640 AIRWAY INHALATION TREATMENT: CPT | Mod: IPSPLIT

## 2025-04-06 PROCEDURE — 2500000005 HC RX 250 GENERAL PHARMACY W/O HCPCS: Mod: IPSPLIT | Performed by: NURSE PRACTITIONER

## 2025-04-06 PROCEDURE — 2500000004 HC RX 250 GENERAL PHARMACY W/ HCPCS (ALT 636 FOR OP/ED): Mod: IPSPLIT | Performed by: INTERNAL MEDICINE

## 2025-04-06 PROCEDURE — 80048 BASIC METABOLIC PNL TOTAL CA: CPT | Mod: IPSPLIT | Performed by: NURSE PRACTITIONER

## 2025-04-06 RX ORDER — METOPROLOL TARTRATE 25 MG/1
12.5 TABLET, FILM COATED ORAL 2 TIMES DAILY
Status: DISPENSED | OUTPATIENT
Start: 2025-04-06

## 2025-04-06 RX ORDER — ACETAMINOPHEN 500 MG
5 TABLET ORAL NIGHTLY PRN
Status: DISPENSED | OUTPATIENT
Start: 2025-04-06

## 2025-04-06 RX ORDER — METOPROLOL TARTRATE 1 MG/ML
5 INJECTION, SOLUTION INTRAVENOUS EVERY 6 HOURS PRN
Status: DISPENSED | OUTPATIENT
Start: 2025-04-06

## 2025-04-06 RX ADMIN — IPRATROPIUM BROMIDE AND ALBUTEROL SULFATE 3 ML: 2.5; .5 SOLUTION RESPIRATORY (INHALATION) at 20:52

## 2025-04-06 RX ADMIN — FONDAPARINUX SODIUM 2.5 MG: 2.5 INJECTION SUBCUTANEOUS at 12:11

## 2025-04-06 RX ADMIN — SALINE NASAL SPRAY 1 SPRAY: 1.5 SOLUTION NASAL at 12:11

## 2025-04-06 RX ADMIN — PREDNISONE 40 MG: 20 TABLET ORAL at 08:53

## 2025-04-06 RX ADMIN — SUCRALFATE ORAL SUSPENSION 1 G: 1 SUSPENSION ORAL at 12:11

## 2025-04-06 RX ADMIN — Medication 400 MG: at 08:53

## 2025-04-06 RX ADMIN — ACETAMINOPHEN 650 MG: 325 TABLET ORAL at 20:25

## 2025-04-06 RX ADMIN — METOPROLOL TARTRATE 5 MG: 5 INJECTION INTRAVENOUS at 18:10

## 2025-04-06 RX ADMIN — OLANZAPINE 5 MG: 2.5 TABLET, FILM COATED ORAL at 20:21

## 2025-04-06 RX ADMIN — IPRATROPIUM BROMIDE AND ALBUTEROL SULFATE 3 ML: 2.5; .5 SOLUTION RESPIRATORY (INHALATION) at 09:33

## 2025-04-06 RX ADMIN — DOXYCYCLINE HYCLATE 100 MG: 100 TABLET, COATED ORAL at 20:21

## 2025-04-06 RX ADMIN — GUAIFENESIN 600 MG: 600 TABLET ORAL at 20:21

## 2025-04-06 RX ADMIN — GUAIFENESIN 600 MG: 600 TABLET ORAL at 08:54

## 2025-04-06 RX ADMIN — SUCRALFATE ORAL SUSPENSION 1 G: 1 SUSPENSION ORAL at 05:17

## 2025-04-06 RX ADMIN — DOXYCYCLINE HYCLATE 100 MG: 100 TABLET, COATED ORAL at 08:53

## 2025-04-06 RX ADMIN — PANTOPRAZOLE SODIUM 20 MG: 20 TABLET, DELAYED RELEASE ORAL at 08:53

## 2025-04-06 RX ADMIN — Medication 400 MG: at 16:24

## 2025-04-06 RX ADMIN — Medication 4 L/MIN: at 09:33

## 2025-04-06 RX ADMIN — Medication 400 MG: at 20:21

## 2025-04-06 RX ADMIN — METOPROLOL TARTRATE 12.5 MG: 25 TABLET, FILM COATED ORAL at 08:53

## 2025-04-06 RX ADMIN — Medication 4 L/MIN: at 20:52

## 2025-04-06 RX ADMIN — SUCRALFATE ORAL SUSPENSION 1 G: 1 SUSPENSION ORAL at 20:21

## 2025-04-06 RX ADMIN — CEFEPIME HYDROCHLORIDE 2 G: 2 INJECTION, SOLUTION INTRAVENOUS at 09:00

## 2025-04-06 RX ADMIN — IPRATROPIUM BROMIDE AND ALBUTEROL SULFATE 3 ML: 2.5; .5 SOLUTION RESPIRATORY (INHALATION) at 15:40

## 2025-04-06 RX ADMIN — TIOTROPIUM BROMIDE INHALATION SPRAY 2 PUFF: 3.12 SPRAY, METERED RESPIRATORY (INHALATION) at 09:33

## 2025-04-06 RX ADMIN — CEFEPIME HYDROCHLORIDE 2 G: 2 INJECTION, SOLUTION INTRAVENOUS at 01:56

## 2025-04-06 RX ADMIN — ACETAMINOPHEN 650 MG: 325 TABLET ORAL at 09:00

## 2025-04-06 RX ADMIN — CEFEPIME HYDROCHLORIDE 2 G: 2 INJECTION, SOLUTION INTRAVENOUS at 18:10

## 2025-04-06 RX ADMIN — SUCRALFATE ORAL SUSPENSION 1 G: 1 SUSPENSION ORAL at 16:24

## 2025-04-06 RX ADMIN — METOPROLOL TARTRATE 12.5 MG: 25 TABLET, FILM COATED ORAL at 20:21

## 2025-04-06 RX ADMIN — Medication 5 MG: at 20:21

## 2025-04-06 ASSESSMENT — PAIN SCALES - GENERAL
PAINLEVEL_OUTOF10: 4
PAINLEVEL_OUTOF10: 1
PAINLEVEL_OUTOF10: 0 - NO PAIN
PAINLEVEL_OUTOF10: 3
PAINLEVEL_OUTOF10: 1

## 2025-04-06 ASSESSMENT — COGNITIVE AND FUNCTIONAL STATUS - GENERAL
MOBILITY SCORE: 24
DAILY ACTIVITIY SCORE: 24

## 2025-04-06 ASSESSMENT — PAIN DESCRIPTION - ORIENTATION: ORIENTATION: MID;LOWER

## 2025-04-06 ASSESSMENT — PAIN - FUNCTIONAL ASSESSMENT
PAIN_FUNCTIONAL_ASSESSMENT: 0-10

## 2025-04-06 ASSESSMENT — PAIN DESCRIPTION - LOCATION
LOCATION: BACK
LOCATION: BACK

## 2025-04-06 NOTE — CARE PLAN
The patient's goals for the shift include      The clinical goals for the shift include Patient will remain > 90% on supplemental O2 this shift    Patient continued to tolerate IV ATB this shift. O2 remained stable on supplemental oxygen. Her HR started to elevate this afternoon, notified provider who placed one time order for metoprolol. Patient responded well to prn, has po metoprolol ordered twice a day.

## 2025-04-06 NOTE — CARE PLAN
The patient's goals for the shift include  NA    The clinical goals for the shift include Pt SPO2 will remain > 92% on supplemental oxygen this shift    Pt stable on 4L O2 this shift (baseline RA/O2 PRN at home). -120's this shift. No complaints of pain. Tolerating IV ATB. Port has good blood return, labs drawn this morning.  Pt says she is feeling better.

## 2025-04-06 NOTE — PROGRESS NOTES
Billie Hernadez is a 69 y.o. female on day 3 of admission presenting with Community acquired bacterial pneumonia.      Subjective   Patient is seen in her room in bed.    Expressed that she did not sleep well but her breathing feels a bit better this morning.   Lung sounds mildly improved.   Re-evaluate in am.        Objective     Last Recorded Vitals  /78 (BP Location: Right arm, Patient Position: Lying)   Pulse 92   Temp 36.7 °C (98.1 °F) (Temporal)   Resp 19   Wt 49 kg (108 lb)   SpO2 96%   Intake/Output last 3 Shifts:    Intake/Output Summary (Last 24 hours) at 4/6/2025 0758  Last data filed at 4/6/2025 0600  Gross per 24 hour   Intake 940 ml   Output --   Net 940 ml       Admission Weight  Weight: 48.8 kg (107 lb 9.4 oz) (04/03/25 1514)    Daily Weight  04/03/25 : 49 kg (108 lb)    Image Results  CT chest wo IV contrast  Narrative: Interpreted By:  Nolan De León,   STUDY:  CT CHEST WO IV CONTRAST;  4/4/2025 12:36 pm      INDICATION:  Signs/Symptoms:cough, SOB, hypoxi.      COMPARISON:  02/13/2025      ACCESSION NUMBER(S):  [id number]      ORDERING CLINICIAN:  CECE WHALEN      TECHNIQUE:  Helical data acquisition of the chest was obtained  without IV  contrast material.  Images were reformatted in axial, coronal, and  sagittal planes.      FINDINGS:  Hypoaeration with centrilobular emphysematous changes. 13 mm  pleural-based density right upper lobe posterior segment stable.      There is endobronchial density of the right lower lobe favoring  mucous plugging. Endobronchial lesion can not be excluded. This is  new since most recent examination 02/13/2025. Progression of  segmental atelectasis of the right lung base with continued moderate  size right pleural effusion appearing similar.      There is right middle lobe medial segmental atelectasis with  endobronchial mucous plugging and or lesion, marginally increased  since prior examination.      New lung consolidation right lung base with air  bronchograms.      Spiculated mass of the right upper lobe measuring 16 mm, marginally  smaller than prior examination previously measuring up to 20 mm.      New atelectasis inferior lingular segment left upper lobe. New  subsegmental atelectasis left lung base posterior basilar segment.  Traction bronchiectasis left lung base posterior basilar segment.      Heart size is enlarged. Enlarged mediastinal lymph nodes are stable  since most recent examination.      Global cardiomegaly similar.      Right-sided MediPort catheter tip projected in the distal SVC.      Upper abdomen demonstrates dense calcifications of the proximal  abdominal aorta. Prominence of the left adrenal gland appearing  similar prior examination 12/31/2024.      Cervical arthritis of the lower cervical spine. No acute osseous  abnormality      Impression: 1.  Radiographic worsening of the lung fields when compared to prior  CT of the chest 02/03/2025 with development of endobronchial  obstruction of the right lower lobe posterior segments and right  middle lobe medial segment possibly related to aspiration, mucous  plugging or endobronchial lesion. Concurrent postobstructive  atelectasis has developed in these segments.  2. Spiculated mass of the right upper lobe marginally smaller than  prior examination.  3. New subsegmental atelectasis left lung base posterior basilar  segment.      Signed by: Nolan De León 4/4/2025 2:11 PM  Dictation workstation:   [id number]    Results for orders placed or performed during the hospital encounter of 04/03/25 (from the past 24 hours)   CBC   Result Value Ref Range    WBC 11.9 (H) 4.4 - 11.3 x10*3/uL    nRBC 0.2 (H) 0.0 - 0.0 /100 WBCs    RBC 2.48 (L) 4.00 - 5.20 x10*6/uL    Hemoglobin 8.0 (L) 12.0 - 16.0 g/dL    Hematocrit 25.4 (L) 36.0 - 46.0 %     (H) 80 - 100 fL    MCH 32.3 26.0 - 34.0 pg    MCHC 31.5 (L) 32.0 - 36.0 g/dL    RDW 24.3 (H) 11.5 - 14.5 %    Platelets 170 150 - 450 x10*3/uL   Basic  Metabolic Panel   Result Value Ref Range    Glucose 112 (H) 74 - 99 mg/dL    Sodium 138 136 - 145 mmol/L    Potassium 4.2 3.5 - 5.3 mmol/L    Chloride 102 98 - 107 mmol/L    Bicarbonate 34 (H) 21 - 32 mmol/L    Anion Gap <7 (L) 10 - 20 mmol/L    Urea Nitrogen 13 6 - 23 mg/dL    Creatinine 0.39 (L) 0.50 - 1.05 mg/dL    eGFR >90 >60 mL/min/1.73m*2    Calcium 8.4 (L) 8.6 - 10.3 mg/dL     Physical Exam  Constitutional:       Appearance: She is ill-appearing.   HENT:      Head: Atraumatic.      Nose: Nose normal.      Mouth/Throat:      Mouth: Mucous membranes are moist.   Eyes:      Pupils: Pupils are equal, round, and reactive to light.   Cardiovascular:      Rate and Rhythm: Normal rate and regular rhythm.      Pulses: Normal pulses.      Heart sounds: S1 normal and S2 normal.   Pulmonary:      Effort: Pulmonary effort is normal.      Breath sounds: Decreased breath sounds present.    Rhonchi right base. Congested cough.      Comments: Oxygen 4 liters  Abdominal:      General: Bowel sounds are normal.      Palpations: Abdomen is soft.      Tenderness: There is no abdominal tenderness.   Musculoskeletal:      Right lower leg: No edema.      Left lower leg: No edema.   Skin:     Capillary Refill: Capillary refill takes less than 2 seconds.   Neurological:      Mental Status: She is alert and oriented to person, place, and time.   Psychiatric:         Attention and Perception: Attention normal.         Mood and Affect: Mood normal.         Speech: Speech normal.         Behavior: Behavior normal.         Thought Content: Thought content normal.         Cognition and Memory: Cognition normal.         Judgment: Judgment normal.     Scheduled medications  [Held by provider] calcium citrate-vitamin D2, 1 tablet, oral, Daily  cefepime, 2 g, intravenous, q8h  doxycylcine, 100 mg, oral, q12h KOKI  fondaparinux, 2.5 mg, subcutaneous, q24h  guaiFENesin, 600 mg, oral, BID  ipratropium-albuteroL, 3 mL, nebulization, TID  magnesium  oxide, 400 mg, oral, TID  metoprolol tartrate, 12.5 mg, oral, BID  OLANZapine, 5 mg, oral, Nightly  oxygen, , inhalation, Continuous - Inhalation  pantoprazole, 20 mg, oral, Daily  predniSONE, 40 mg, oral, Daily  sucralfate, 1 g, oral, Before meals & nightly  tiotropium, 2 puff, inhalation, Daily      Continuous medications     PRN medications  PRN medications: acetaminophen, albuterol, benzocaine-menthol, dextromethorphan-guaifenesin, ipratropium-albuteroL, ondansetron, polyethylene glycol      Assessment/Plan      Tachycardia/bursts a-fibe  ~metoprolol 12.5 mg BID started    Acute on Chronic respiratory failure with hypoxia  Community acquired bacterial pneumonia  COPD exacerbation  Immunocompromised  Hx lung cancer  - last chemotherapy treatment 3/20/25  - SpO2 88% on RA  - supplemental oxygen to keep SpO2 > 90%  - currently on 4lpm via NC continuous  - home oxygen is 4lpm via NC at night only  - given azithromycin, ceftriaxone in the ED  - continue azithromycin 500 mg daily; day 2  - started cefepime 2 g q8h  - given vancomycin 1 g IV once  - started duoneb q8h  - solumedrol 40 mg q8h~last dose 4/5  - continue tiotropium 2.5 mcg daily  - acetaminophen 650 mg q4h, prn  - guaifenesin 600 mg BID  - RT for bronchial hygiene  - MRSA pcr negative  - blood culture pending  - legionella antigen: pending  - streptococcus pneumoniae antigen; pending  - ID consult  4/5  -transition to prednisone 4/6  -continue cefepime and doxycycline     Macrocytic anemia  Anemia of chronic disease  Thrombocytopenia  Chemotherapy induced neutropenia  - WBC 3.0 > 2.5  - Hb 8.1 > 6.9  -    -  > 137  - iron 41, ferritin 364, TIBC 236, %sat 17  - will transfuse 1 unit of PRBC today  - discontinued enoxaparin  - started fondaparinux 2.5 daily  - Spoke with Dr. Judith Santillan, patient's oncologist; he was ok with giving the patient a dose of neupogen  - will give neupogen 300 mcg subcutaneous today  - monitor CBC      Hypomagnesia  - Mg 1.41  - replaced with magnesium sulfate 2 GM 4/4  - continue magnesium oxide 400 mg TID     Insomnia  - continue olanzapine 5 mg hs     Chronic GERD  - continue pantoprazole 20 mg daily, sucralfate 1 g QID     DVT ppx  - discontinued enoxaparin  - started fondaparinux 2.5 mg daily     Code status: Full     Disposition: patient requires more than 2 inpatient days.      Blessing Jade, APRN-CNP

## 2025-04-07 LAB
ANION GAP SERPL CALC-SCNC: 7 MMOL/L (ref 10–20)
ATRIAL RATE: 123 BPM
BUN SERPL-MCNC: 12 MG/DL (ref 6–23)
CALCIUM SERPL-MCNC: 8.6 MG/DL (ref 8.6–10.3)
CHLORIDE SERPL-SCNC: 98 MMOL/L (ref 98–107)
CO2 SERPL-SCNC: 36 MMOL/L (ref 21–32)
CREAT SERPL-MCNC: 0.48 MG/DL (ref 0.5–1.05)
EGFRCR SERPLBLD CKD-EPI 2021: >90 ML/MIN/1.73M*2
ERYTHROCYTE [DISTWIDTH] IN BLOOD BY AUTOMATED COUNT: 23.2 % (ref 11.5–14.5)
GLUCOSE SERPL-MCNC: 103 MG/DL (ref 74–99)
HCT VFR BLD AUTO: 29.7 % (ref 36–46)
HGB BLD-MCNC: 9.1 G/DL (ref 12–16)
MCH RBC QN AUTO: 32 PG (ref 26–34)
MCHC RBC AUTO-ENTMCNC: 30.6 G/DL (ref 32–36)
MCV RBC AUTO: 105 FL (ref 80–100)
NRBC BLD-RTO: 0.4 /100 WBCS (ref 0–0)
PLATELET # BLD AUTO: 206 X10*3/UL (ref 150–450)
POTASSIUM SERPL-SCNC: 3.5 MMOL/L (ref 3.5–5.3)
PR INTERVAL: 176 MS
Q ONSET: 228 MS
QRS COUNT: 20 BEATS
QRS DURATION: 68 MS
QT INTERVAL: 306 MS
QTC CALCULATION(BAZETT): 438 MS
QTC FREDERICIA: 388 MS
R AXIS: 64 DEGREES
RBC # BLD AUTO: 2.84 X10*6/UL (ref 4–5.2)
SODIUM SERPL-SCNC: 137 MMOL/L (ref 136–145)
T AXIS: 52 DEGREES
T OFFSET: 381 MS
VENTRICULAR RATE: 123 BPM
WBC # BLD AUTO: 8 X10*3/UL (ref 4.4–11.3)

## 2025-04-07 PROCEDURE — 2500000004 HC RX 250 GENERAL PHARMACY W/ HCPCS (ALT 636 FOR OP/ED): Mod: IPSPLIT | Performed by: NURSE PRACTITIONER

## 2025-04-07 PROCEDURE — 80048 BASIC METABOLIC PNL TOTAL CA: CPT | Mod: IPSPLIT | Performed by: NURSE PRACTITIONER

## 2025-04-07 PROCEDURE — 2500000004 HC RX 250 GENERAL PHARMACY W/ HCPCS (ALT 636 FOR OP/ED): Mod: IPSPLIT | Performed by: INTERNAL MEDICINE

## 2025-04-07 PROCEDURE — 2500000001 HC RX 250 WO HCPCS SELF ADMINISTERED DRUGS (ALT 637 FOR MEDICARE OP): Mod: IPSPLIT | Performed by: NURSE PRACTITIONER

## 2025-04-07 PROCEDURE — 2500000005 HC RX 250 GENERAL PHARMACY W/O HCPCS: Mod: IPSPLIT | Performed by: NURSE PRACTITIONER

## 2025-04-07 PROCEDURE — 99232 SBSQ HOSP IP/OBS MODERATE 35: CPT | Performed by: NURSE PRACTITIONER

## 2025-04-07 PROCEDURE — 94760 N-INVAS EAR/PLS OXIMETRY 1: CPT | Mod: IPSPLIT

## 2025-04-07 PROCEDURE — 1200000002 HC GENERAL ROOM WITH TELEMETRY DAILY: Mod: IPSPLIT

## 2025-04-07 PROCEDURE — 94640 AIRWAY INHALATION TREATMENT: CPT | Mod: IPSPLIT

## 2025-04-07 PROCEDURE — 2500000002 HC RX 250 W HCPCS SELF ADMINISTERED DRUGS (ALT 637 FOR MEDICARE OP, ALT 636 FOR OP/ED): Mod: IPSPLIT | Performed by: INTERNAL MEDICINE

## 2025-04-07 PROCEDURE — 85027 COMPLETE CBC AUTOMATED: CPT | Mod: IPSPLIT | Performed by: NURSE PRACTITIONER

## 2025-04-07 PROCEDURE — 2500000002 HC RX 250 W HCPCS SELF ADMINISTERED DRUGS (ALT 637 FOR MEDICARE OP, ALT 636 FOR OP/ED): Mod: IPSPLIT | Performed by: NURSE PRACTITIONER

## 2025-04-07 RX ORDER — AMOXICILLIN 250 MG
1 CAPSULE ORAL
Status: DISCONTINUED | OUTPATIENT
Start: 2025-04-07 | End: 2025-04-08 | Stop reason: HOSPADM

## 2025-04-07 RX ADMIN — CEFEPIME HYDROCHLORIDE 2 G: 2 INJECTION, SOLUTION INTRAVENOUS at 17:40

## 2025-04-07 RX ADMIN — CEFEPIME HYDROCHLORIDE 2 G: 2 INJECTION, SOLUTION INTRAVENOUS at 10:21

## 2025-04-07 RX ADMIN — Medication 400 MG: at 09:28

## 2025-04-07 RX ADMIN — SUCRALFATE ORAL SUSPENSION 1 G: 1 SUSPENSION ORAL at 05:54

## 2025-04-07 RX ADMIN — CEFEPIME HYDROCHLORIDE 2 G: 2 INJECTION, SOLUTION INTRAVENOUS at 02:05

## 2025-04-07 RX ADMIN — Medication 400 MG: at 15:30

## 2025-04-07 RX ADMIN — IPRATROPIUM BROMIDE AND ALBUTEROL SULFATE 3 ML: 2.5; .5 SOLUTION RESPIRATORY (INHALATION) at 08:54

## 2025-04-07 RX ADMIN — DOXYCYCLINE HYCLATE 100 MG: 100 TABLET, COATED ORAL at 20:21

## 2025-04-07 RX ADMIN — GUAIFENESIN 600 MG: 600 TABLET ORAL at 09:28

## 2025-04-07 RX ADMIN — METOPROLOL TARTRATE 12.5 MG: 25 TABLET, FILM COATED ORAL at 20:22

## 2025-04-07 RX ADMIN — SUCRALFATE ORAL SUSPENSION 1 G: 1 SUSPENSION ORAL at 20:21

## 2025-04-07 RX ADMIN — ACETAMINOPHEN 650 MG: 325 TABLET ORAL at 20:24

## 2025-04-07 RX ADMIN — METOPROLOL TARTRATE 12.5 MG: 25 TABLET, FILM COATED ORAL at 09:28

## 2025-04-07 RX ADMIN — PREDNISONE 40 MG: 20 TABLET ORAL at 09:28

## 2025-04-07 RX ADMIN — SUCRALFATE ORAL SUSPENSION 1 G: 1 SUSPENSION ORAL at 15:30

## 2025-04-07 RX ADMIN — Medication 4 L/MIN: at 08:51

## 2025-04-07 RX ADMIN — TIOTROPIUM BROMIDE INHALATION SPRAY 2 PUFF: 3.12 SPRAY, METERED RESPIRATORY (INHALATION) at 08:51

## 2025-04-07 RX ADMIN — PANTOPRAZOLE SODIUM 20 MG: 20 TABLET, DELAYED RELEASE ORAL at 09:28

## 2025-04-07 RX ADMIN — Medication 4 L/MIN: at 20:40

## 2025-04-07 RX ADMIN — SENNOSIDES, DOCUSATE SODIUM 1 TABLET: 50; 8.6 TABLET, FILM COATED ORAL at 12:28

## 2025-04-07 RX ADMIN — Medication 400 MG: at 20:22

## 2025-04-07 RX ADMIN — OLANZAPINE 5 MG: 2.5 TABLET, FILM COATED ORAL at 20:21

## 2025-04-07 RX ADMIN — FONDAPARINUX SODIUM 2.5 MG: 2.5 INJECTION SUBCUTANEOUS at 12:28

## 2025-04-07 RX ADMIN — SUCRALFATE ORAL SUSPENSION 1 G: 1 SUSPENSION ORAL at 10:21

## 2025-04-07 RX ADMIN — GUAIFENESIN 600 MG: 600 TABLET ORAL at 20:22

## 2025-04-07 RX ADMIN — IPRATROPIUM BROMIDE AND ALBUTEROL SULFATE 3 ML: 2.5; .5 SOLUTION RESPIRATORY (INHALATION) at 20:38

## 2025-04-07 RX ADMIN — DOXYCYCLINE HYCLATE 100 MG: 100 TABLET, COATED ORAL at 09:27

## 2025-04-07 RX ADMIN — IPRATROPIUM BROMIDE AND ALBUTEROL SULFATE 3 ML: 2.5; .5 SOLUTION RESPIRATORY (INHALATION) at 14:50

## 2025-04-07 ASSESSMENT — COGNITIVE AND FUNCTIONAL STATUS - GENERAL
DAILY ACTIVITIY SCORE: 24
MOBILITY SCORE: 24

## 2025-04-07 ASSESSMENT — PAIN SCALES - GENERAL
PAINLEVEL_OUTOF10: 0 - NO PAIN
PAINLEVEL_OUTOF10: 3

## 2025-04-07 ASSESSMENT — PAIN - FUNCTIONAL ASSESSMENT: PAIN_FUNCTIONAL_ASSESSMENT: 0-10

## 2025-04-07 NOTE — PROGRESS NOTES
04/07/25 1233   Discharge Planning   Living Arrangements Spouse/significant other   Support Systems Spouse/significant other   Assistance Needed Pt reports no needs at home currently. Pt  reports no needs previously as well.   Type of Residence Private residence   Expected Discharge Disposition Home   Patient Choice   Provider Choice list and CMS website (https://medicare.gov/care-compare#search) for post-acute Quality and Resource Measure Data were provided and reviewed with: Patient   Patient / Family choosing to utilize agency / facility established prior to hospitalization No   Stroke Family Assessment   Stroke Family Assessment Needed No   Intensity of Service   Intensity of Service 0-30 min     Pt signed her Medicare rights form and gave a copy. LYNDSEY COYNE

## 2025-04-07 NOTE — PROGRESS NOTES
"Billie Hernadez is a 69 y.o. female on day 4 of admission presenting with Community acquired bacterial pneumonia.    Subjective   Interval History:   Interval phone conversation with patient  Interval phone conversation with nursing  Patient reports she is feeling much better  She reports her breathing is improving  She reports less coughing.  She denies any new complaints.  She is afebrile, denies chills  She remains on 4 L of oxygen  No nausea vomiting or diarrhea            Objective   Range of Vitals (last 24 hours)  Heart Rate:  []   Temp:  [36.2 °C (97.1 °F)-37.1 °C (98.8 °F)]   Resp:  [16-18]   BP: ()/(58-83)   SpO2:  [93 %-97 %]   Daily Weight  04/03/25 : 49 kg (108 lb)    Body mass index is 19.13 kg/m².    Physical Exam  Awake, alert  Regular heart rhythm  Remains on 4 L of oxygen  Afebrile    Antibiotics  cefepime - 2 gram/50 mL  doxycycline - 100 mg    Relevant Results  Labs  Results from last 72 hours   Lab Units 04/06/25  0522 04/05/25  0743   WBC AUTO x10*3/uL 11.9* 11.5*   HEMOGLOBIN g/dL 8.0* 8.3*   HEMATOCRIT % 25.4* 26.8*   PLATELETS AUTO x10*3/uL 170 157     Results from last 72 hours   Lab Units 04/06/25  0522 04/05/25  0743   SODIUM mmol/L 138 134*   POTASSIUM mmol/L 4.2 4.3   CHLORIDE mmol/L 102 100   CO2 mmol/L 34* 31   BUN mg/dL 13 10   CREATININE mg/dL 0.39* 0.37*   GLUCOSE mg/dL 112* 140*   CALCIUM mg/dL 8.4* 8.3*   ANION GAP mmol/L <7* 7*   EGFR mL/min/1.73m*2 >90 >90         Estimated Creatinine Clearance: 102.3 mL/min (A) (by C-G formula based on SCr of 0.39 mg/dL (L)).  No results found for: \"CRP\"  Microbiology  MRSA PCR negative  Legionella urine antigen negative  Strep pneumoniae antigen negative  Blood cultures pending  Mycoplasma IgM pending      Imaging  CT chest wo IV contrast    Result Date: 4/4/2025  Interpreted By:  Nolan De León, STUDY: CT CHEST WO IV CONTRAST;  4/4/2025 12:36 pm   INDICATION: Signs/Symptoms:cough, SOB, hypoxi.   COMPARISON: 02/13/2025   ACCESSION " NUMBER(S): [id number]   ORDERING CLINICIAN: CECE WHALEN   TECHNIQUE: Helical data acquisition of the chest was obtained  without IV contrast material.  Images were reformatted in axial, coronal, and sagittal planes.   FINDINGS: Hypoaeration with centrilobular emphysematous changes. 13 mm pleural-based density right upper lobe posterior segment stable.   There is endobronchial density of the right lower lobe favoring mucous plugging. Endobronchial lesion can not be excluded. This is new since most recent examination 02/13/2025. Progression of segmental atelectasis of the right lung base with continued moderate size right pleural effusion appearing similar.   There is right middle lobe medial segmental atelectasis with endobronchial mucous plugging and or lesion, marginally increased since prior examination.   New lung consolidation right lung base with air bronchograms.   Spiculated mass of the right upper lobe measuring 16 mm, marginally smaller than prior examination previously measuring up to 20 mm.   New atelectasis inferior lingular segment left upper lobe. New subsegmental atelectasis left lung base posterior basilar segment. Traction bronchiectasis left lung base posterior basilar segment.   Heart size is enlarged. Enlarged mediastinal lymph nodes are stable since most recent examination.   Global cardiomegaly similar.   Right-sided MediPort catheter tip projected in the distal SVC.   Upper abdomen demonstrates dense calcifications of the proximal abdominal aorta. Prominence of the left adrenal gland appearing similar prior examination 12/31/2024.   Cervical arthritis of the lower cervical spine. No acute osseous abnormality       1.  Radiographic worsening of the lung fields when compared to prior CT of the chest 02/03/2025 with development of endobronchial obstruction of the right lower lobe posterior segments and right middle lobe medial segment possibly related to aspiration, mucous plugging or  endobronchial lesion. Concurrent postobstructive atelectasis has developed in these segments. 2. Spiculated mass of the right upper lobe marginally smaller than prior examination. 3. New subsegmental atelectasis left lung base posterior basilar segment.   Signed by: Nolan De León 4/4/2025 2:11 PM Dictation workstation:   [id number]    XR chest 1 view    Result Date: 4/3/2025  STUDY: Chest Radiograph;  4/3/2025 3:28 PM INDICATION: Evaluate for Covid 19. COMPARISON: 2/4/2025 XR chest. ACCESSION NUMBER(S): [id number] ORDERING CLINICIAN: FIDENCIO SCOTT TECHNIQUE:  Frontal chest was obtained at 15:27 hours. FINDINGS: CARDIOMEDIASTINAL SILHOUETTE: Heart is enlarged with slight increased pulmonary vascularity. Right-sided Gtejvt-p-Licy with tip in the superior vena cava.  LUNGS: Fluctuating opacities in the right lung base with resolution of some opacities seen on prior exam from 2/4/2025 and with question new area of consolidation medially in the right base ABDOMEN: No remarkable upper abdominal findings.  BONES: No acute osseous changes.    Fluctuating opacities in the right lung base with resolution of some opacities seen on prior exam from 2/4/2025 and with question new area of consolidation medially in the right base. Signed by Bib Cabrera MD    ECG 12 lead (Ancillary Performed)    Result Date: 4/3/2025  Sinus tachycardia with occasional and consecutive Premature ventricular complexes and Fusion complexes Abnormal ECG When compared with ECG of 05-JAN-2025 09:52, Fusion complexes are now Present Premature ventricular complexes are now Present Premature atrial complexes are no longer Present           Assessment/Plan   Acute on chronic respiratory failure, on 4 L of oxygen  COPD-wears 4 L of oxygen at night baseline  Lung cancer status post chemotherapy last treatment 3/20/2025  Hypotension-responding to IV fluids, resolved   Pancytopenia     IV cefepime  p.o. doxycycline  Monitor temperature and WBC  Sputum  culture if able  Supplemental oxygen, wean as tolerated  Supportive care  Follow up Mycoplasma IgM  Follow up Fungitell/Galactomannan Aspergillus/Histoplasma urine antigen        Total time spent caring for the patient today was 25 minutes. This includes time spent before the visit reviewing the chart, time spent during the visit, and time spent after the visit on documentation.     Yecenia Weller, APRN-CNP

## 2025-04-07 NOTE — NURSING NOTE
A nurse from the infusion center came up to change mediport and reaccess for blood return. This nurse yao labs after, positive for blood return, flushed without difficulty and IV cefepime hooked up. Patient denied any discomfort and tolerated procedure well.

## 2025-04-07 NOTE — CARE PLAN
The patient's goals for the shift include      The clinical goals for the shift include Patient's SpO2 will be > 90% with supplemental O2 through 1900    Over the shift, the patient did meet goal. Patient tolerated walking in room and to her bathroom maintaining SpO2 > 90% with supplemental O2. Patient denies any discomfort at this time. Will continue with POC, call light in reach.

## 2025-04-07 NOTE — CARE PLAN
The patient's goals for the shift include      The clinical goals for the shift include Pt SPO2 will remain > 92% on supplemental oxygen this shift    Over the shift, the patient had an uneventful night  VSS  SPO2 remains > 93% on supplemental oxygen this shift  Tolerating IV antibiotics as ordered  Using BSC independently  No c/o pain  Call light in reach

## 2025-04-07 NOTE — PROGRESS NOTES
Billie Hernadez is a 69 y.o. female on day 4 of admission presenting with Community acquired bacterial pneumonia.      Subjective   Billie is seen in her room in her bed.   HR 90 this am and she did not receive IV lopressor overnight.   She is baseline oxygen needs.   Would anticipate discharge in am on oral antibiotics if HR remains stable.        Objective     Last Recorded Vitals  BP 92/65 (BP Location: Left arm, Patient Position: Lying)   Pulse 102   Temp 37.2 °C (99 °F) (Temporal)   Resp 18   Wt 49 kg (108 lb)   SpO2 90%   Intake/Output last 3 Shifts:    Intake/Output Summary (Last 24 hours) at 4/7/2025 1445  Last data filed at 4/7/2025 1236  Gross per 24 hour   Intake 1060 ml   Output 500 ml   Net 560 ml       Admission Weight  Weight: 48.8 kg (107 lb 9.4 oz) (04/03/25 1514)    Daily Weight  04/03/25 : 49 kg (108 lb)    Image Results  CT chest wo IV contrast  Narrative: Interpreted By:  Nolan De León,   STUDY:  CT CHEST WO IV CONTRAST;  4/4/2025 12:36 pm      INDICATION:  Signs/Symptoms:cough, SOB, hypoxi.      COMPARISON:  02/13/2025      ACCESSION NUMBER(S):  [id number]      ORDERING CLINICIAN:  CECE WHALEN      TECHNIQUE:  Helical data acquisition of the chest was obtained  without IV  contrast material.  Images were reformatted in axial, coronal, and  sagittal planes.      FINDINGS:  Hypoaeration with centrilobular emphysematous changes. 13 mm  pleural-based density right upper lobe posterior segment stable.      There is endobronchial density of the right lower lobe favoring  mucous plugging. Endobronchial lesion can not be excluded. This is  new since most recent examination 02/13/2025. Progression of  segmental atelectasis of the right lung base with continued moderate  size right pleural effusion appearing similar.      There is right middle lobe medial segmental atelectasis with  endobronchial mucous plugging and or lesion, marginally increased  since prior examination.      New lung  consolidation right lung base with air bronchograms.      Spiculated mass of the right upper lobe measuring 16 mm, marginally  smaller than prior examination previously measuring up to 20 mm.      New atelectasis inferior lingular segment left upper lobe. New  subsegmental atelectasis left lung base posterior basilar segment.  Traction bronchiectasis left lung base posterior basilar segment.      Heart size is enlarged. Enlarged mediastinal lymph nodes are stable  since most recent examination.      Global cardiomegaly similar.      Right-sided MediPort catheter tip projected in the distal SVC.      Upper abdomen demonstrates dense calcifications of the proximal  abdominal aorta. Prominence of the left adrenal gland appearing  similar prior examination 12/31/2024.      Cervical arthritis of the lower cervical spine. No acute osseous  abnormality      Impression: 1.  Radiographic worsening of the lung fields when compared to prior  CT of the chest 02/03/2025 with development of endobronchial  obstruction of the right lower lobe posterior segments and right  middle lobe medial segment possibly related to aspiration, mucous  plugging or endobronchial lesion. Concurrent postobstructive  atelectasis has developed in these segments.  2. Spiculated mass of the right upper lobe marginally smaller than  prior examination.  3. New subsegmental atelectasis left lung base posterior basilar  segment.      Signed by: Nolan De León 4/4/2025 2:11 PM  Dictation workstation:   [id number]    Results for orders placed or performed during the hospital encounter of 04/03/25 (from the past 24 hours)   CBC   Result Value Ref Range    WBC 8.0 4.4 - 11.3 x10*3/uL    nRBC 0.4 (H) 0.0 - 0.0 /100 WBCs    RBC 2.84 (L) 4.00 - 5.20 x10*6/uL    Hemoglobin 9.1 (L) 12.0 - 16.0 g/dL    Hematocrit 29.7 (L) 36.0 - 46.0 %     (H) 80 - 100 fL    MCH 32.0 26.0 - 34.0 pg    MCHC 30.6 (L) 32.0 - 36.0 g/dL    RDW 23.2 (H) 11.5 - 14.5 %    Platelets  206 150 - 450 x10*3/uL   Basic Metabolic Panel   Result Value Ref Range    Glucose 103 (H) 74 - 99 mg/dL    Sodium 137 136 - 145 mmol/L    Potassium 3.5 3.5 - 5.3 mmol/L    Chloride 98 98 - 107 mmol/L    Bicarbonate 36 (H) 21 - 32 mmol/L    Anion Gap 7 (L) 10 - 20 mmol/L    Urea Nitrogen 12 6 - 23 mg/dL    Creatinine 0.48 (L) 0.50 - 1.05 mg/dL    eGFR >90 >60 mL/min/1.73m*2    Calcium 8.6 8.6 - 10.3 mg/dL     Physical Exam  Constitutional:       Appearance: She is ill-appearing.   HENT:      Head: Atraumatic.      Nose: Nose normal.      Mouth/Throat:      Mouth: Mucous membranes are moist.   Eyes:      Pupils: Pupils are equal, round, and reactive to light.   Cardiovascular:      Rate and Rhythm: Normal rate and regular rhythm.      Pulses: Normal pulses.      Heart sounds: S1 normal and S2 normal.   Pulmonary:      Effort: Pulmonary effort is normal.      Breath sounds: Decreased breath sounds present.               Rhonchi right base. Congested cough.      Comments: Oxygen 4 liters  Abdominal:      General: Bowel sounds are normal.      Palpations: Abdomen is soft.      Tenderness: There is no abdominal tenderness.   Musculoskeletal:      Right lower leg: No edema.      Left lower leg: No edema.   Skin:     Capillary Refill: Capillary refill takes less than 2 seconds.   Neurological:      Mental Status: She is alert and oriented to person, place, and time.   Psychiatric:         Attention and Perception: Attention normal.         Mood and Affect: Mood normal.         Speech: Speech normal.         Behavior: Behavior normal.         Thought Content: Thought content normal.         Cognition and Memory: Cognition normal.         Judgment: Judgment normal.     Scheduled medications  cefepime, 2 g, intravenous, q8h  doxycylcine, 100 mg, oral, q12h KOKI  fondaparinux, 2.5 mg, subcutaneous, q24h  guaiFENesin, 600 mg, oral, BID  ipratropium-albuteroL, 3 mL, nebulization, TID  magnesium oxide, 400 mg, oral,  TID  metoprolol tartrate, 12.5 mg, oral, BID  OLANZapine, 5 mg, oral, Nightly  oxygen, , inhalation, Continuous - Inhalation  pantoprazole, 20 mg, oral, Daily  predniSONE, 40 mg, oral, Daily  sennosides-docusate sodium, 1 tablet, oral, Daily with lunch  sucralfate, 1 g, oral, Before meals & nightly  tiotropium, 2 puff, inhalation, Daily      Continuous medications     PRN medications  PRN medications: acetaminophen, albuterol, alteplase, benzocaine-menthol, dextromethorphan-guaifenesin, ipratropium-albuteroL, melatonin, metoprolol, ondansetron, polyethylene glycol, sodium chloride        Assessment/Plan      Tachycardia/bursts a-fibe  ~metoprolol 12.5 mg BID started  ~did not require IV lopressor overnight     Acute on Chronic respiratory failure with hypoxia  Community acquired bacterial pneumonia  COPD exacerbation  Immunocompromised  Hx lung cancer  - last chemotherapy treatment 3/20/25  - SpO2 88% on RA  - supplemental oxygen to keep SpO2 > 90%  - currently on 4lpm via NC continuous  - home oxygen is 4lpm via NC at night only  - given azithromycin, ceftriaxone in the ED  - continue azithromycin 500 mg daily; day 2  - started cefepime 2 g q8h  - given vancomycin 1 g IV once  - started duoneb q8h  - solumedrol 40 mg q8h~last dose 4/5  - continue tiotropium 2.5 mcg daily  - acetaminophen 650 mg q4h, prn  - guaifenesin 600 mg BID  - RT for bronchial hygiene  - MRSA pcr negative  - blood culture pending  - legionella antigen: pending  - streptococcus pneumoniae antigen; pending  - ID consult  4/5  -transition to prednisone 4/6  -continue cefepime and doxycycline     Macrocytic anemia  Anemia of chronic disease  Thrombocytopenia  Chemotherapy induced neutropenia  - WBC 3.0 > 2.5  - Hb 8.1 > 6.9  -    -  > 137  - iron 41, ferritin 364, TIBC 236, %sat 17  - will transfuse 1 unit of PRBC today  - discontinued enoxaparin  - started fondaparinux 2.5 daily  - Spoke with Dr. Judith Santillan, patient's  oncologist; he was ok with giving the patient a dose of neupogen  - will give neupogen 300 mcg subcutaneous today  - monitor CBC     Hypomagnesia  - Mg 1.41  - replaced with magnesium sulfate 2 GM 4/4  - continue magnesium oxide 400 mg TID     Insomnia  - continue olanzapine 5 mg hs     Chronic GERD  - continue pantoprazole 20 mg daily, sucralfate 1 g QID     DVT ppx  - discontinued enoxaparin  - started fondaparinux 2.5 mg daily     Code status: Full     Disposition: patient requires more than 2 inpatient days.  This patient has a central line   Reason for the central line remaining today? Parenteral medication        Blessing Jade, APRN-CNP

## 2025-04-07 NOTE — NURSING NOTE
"Patient is refusing bed alarm. Patient states,\" I don't want to even drink my water because if I have to get up to go to the bathroom that alarm will go off and I don't want to inconvenient anyone. I don't have alarms at home, why do I need them here.\" Patient denies any falls within the last month and patient is a moderate fall risk. Patient was educated on fall risk and bed alarm is off. Patient was encouraged to use call bell for assistance.   "

## 2025-04-08 VITALS
WEIGHT: 108 LBS | HEART RATE: 88 BPM | BODY MASS INDEX: 19.14 KG/M2 | TEMPERATURE: 97.7 F | HEIGHT: 63 IN | DIASTOLIC BLOOD PRESSURE: 78 MMHG | RESPIRATION RATE: 16 BRPM | OXYGEN SATURATION: 97 % | SYSTOLIC BLOOD PRESSURE: 130 MMHG

## 2025-04-08 DIAGNOSIS — E83.42 HYPOMAGNESEMIA: ICD-10-CM

## 2025-04-08 PROBLEM — J44.1 COPD EXACERBATION (MULTI): Status: RESOLVED | Noted: 2024-12-31 | Resolved: 2025-04-08

## 2025-04-08 LAB
ANION GAP SERPL CALC-SCNC: <7 MMOL/L (ref 10–20)
ASPERGILLUS GALACTOMANNAN EIA,SERUM: 0.03
BACTERIA BLD CULT: NORMAL
BACTERIA BLD CULT: NORMAL
BUN SERPL-MCNC: 10 MG/DL (ref 6–23)
CALCIUM SERPL-MCNC: 7.8 MG/DL (ref 8.6–10.3)
CHLORIDE SERPL-SCNC: 101 MMOL/L (ref 98–107)
CO2 SERPL-SCNC: 37 MMOL/L (ref 21–32)
CREAT SERPL-MCNC: 0.36 MG/DL (ref 0.5–1.05)
EGFRCR SERPLBLD CKD-EPI 2021: >90 ML/MIN/1.73M*2
ERYTHROCYTE [DISTWIDTH] IN BLOOD BY AUTOMATED COUNT: 22 % (ref 11.5–14.5)
FUNGITELL BETA-D GLUCAN,SERUM: 49 PG/ML
GLUCOSE SERPL-MCNC: 85 MG/DL (ref 74–99)
HCT VFR BLD AUTO: 27.3 % (ref 36–46)
HGB BLD-MCNC: 8.4 G/DL (ref 12–16)
M PNEUMO IGM SER IA-ACNC: 0.18 U/L
MCH RBC QN AUTO: 32.3 PG (ref 26–34)
MCHC RBC AUTO-ENTMCNC: 30.8 G/DL (ref 32–36)
MCV RBC AUTO: 105 FL (ref 80–100)
NRBC BLD-RTO: 0.5 /100 WBCS (ref 0–0)
PLATELET # BLD AUTO: 191 X10*3/UL (ref 150–450)
POTASSIUM SERPL-SCNC: 3.8 MMOL/L (ref 3.5–5.3)
RBC # BLD AUTO: 2.6 X10*6/UL (ref 4–5.2)
SODIUM SERPL-SCNC: 138 MMOL/L (ref 136–145)
WBC # BLD AUTO: 4.3 X10*3/UL (ref 4.4–11.3)

## 2025-04-08 PROCEDURE — 2500000004 HC RX 250 GENERAL PHARMACY W/ HCPCS (ALT 636 FOR OP/ED): Mod: IPSPLIT | Performed by: INTERNAL MEDICINE

## 2025-04-08 PROCEDURE — 2500000001 HC RX 250 WO HCPCS SELF ADMINISTERED DRUGS (ALT 637 FOR MEDICARE OP): Mod: IPSPLIT | Performed by: NURSE PRACTITIONER

## 2025-04-08 PROCEDURE — 2500000004 HC RX 250 GENERAL PHARMACY W/ HCPCS (ALT 636 FOR OP/ED): Mod: IPSPLIT | Performed by: NURSE PRACTITIONER

## 2025-04-08 PROCEDURE — 94640 AIRWAY INHALATION TREATMENT: CPT | Mod: IPSPLIT

## 2025-04-08 PROCEDURE — 2500000002 HC RX 250 W HCPCS SELF ADMINISTERED DRUGS (ALT 637 FOR MEDICARE OP, ALT 636 FOR OP/ED): Mod: IPSPLIT | Performed by: INTERNAL MEDICINE

## 2025-04-08 PROCEDURE — 99238 HOSP IP/OBS DSCHRG MGMT 30/<: CPT | Performed by: NURSE PRACTITIONER

## 2025-04-08 PROCEDURE — 85027 COMPLETE CBC AUTOMATED: CPT | Mod: IPSPLIT | Performed by: NURSE PRACTITIONER

## 2025-04-08 PROCEDURE — 2500000002 HC RX 250 W HCPCS SELF ADMINISTERED DRUGS (ALT 637 FOR MEDICARE OP, ALT 636 FOR OP/ED): Mod: IPSPLIT | Performed by: NURSE PRACTITIONER

## 2025-04-08 PROCEDURE — 2500000005 HC RX 250 GENERAL PHARMACY W/O HCPCS: Mod: IPSPLIT | Performed by: NURSE PRACTITIONER

## 2025-04-08 PROCEDURE — 80048 BASIC METABOLIC PNL TOTAL CA: CPT | Mod: IPSPLIT | Performed by: NURSE PRACTITIONER

## 2025-04-08 RX ORDER — AMOXICILLIN 250 MG
1 CAPSULE ORAL
Qty: 30 TABLET | Refills: 3 | Status: SHIPPED | OUTPATIENT
Start: 2025-04-08

## 2025-04-08 RX ORDER — LANOLIN ALCOHOL/MO/W.PET/CERES
400 CREAM (GRAM) TOPICAL 3 TIMES DAILY
Qty: 90 TABLET | Refills: 3 | Status: SHIPPED | OUTPATIENT
Start: 2025-04-08 | End: 2025-04-16

## 2025-04-08 RX ORDER — HEPARIN 100 UNIT/ML
5 SYRINGE INTRAVENOUS AS NEEDED
Status: DISCONTINUED | OUTPATIENT
Start: 2025-04-08 | End: 2025-04-08 | Stop reason: HOSPADM

## 2025-04-08 RX ORDER — ALBUTEROL SULFATE 0.83 MG/ML
2.5 SOLUTION RESPIRATORY (INHALATION) EVERY 6 HOURS PRN
Qty: 75 ML | Refills: 3 | Status: SHIPPED | OUTPATIENT
Start: 2025-04-08

## 2025-04-08 RX ORDER — CEFUROXIME AXETIL 500 MG/1
500 TABLET ORAL 2 TIMES DAILY
Qty: 10 TABLET | Refills: 0 | Status: SHIPPED | OUTPATIENT
Start: 2025-04-08 | End: 2025-04-13

## 2025-04-08 RX ORDER — DOXYCYCLINE 100 MG/1
100 CAPSULE ORAL EVERY 12 HOURS SCHEDULED
Qty: 10 CAPSULE | Refills: 0 | Status: SHIPPED | OUTPATIENT
Start: 2025-04-08 | End: 2025-04-13

## 2025-04-08 RX ORDER — METOPROLOL TARTRATE 25 MG/1
12.5 TABLET, FILM COATED ORAL 2 TIMES DAILY
Qty: 30 TABLET | Refills: 3 | Status: SHIPPED | OUTPATIENT
Start: 2025-04-08

## 2025-04-08 RX ADMIN — TIOTROPIUM BROMIDE INHALATION SPRAY 2 PUFF: 3.12 SPRAY, METERED RESPIRATORY (INHALATION) at 10:05

## 2025-04-08 RX ADMIN — PREDNISONE 40 MG: 20 TABLET ORAL at 08:33

## 2025-04-08 RX ADMIN — Medication 400 MG: at 08:33

## 2025-04-08 RX ADMIN — IPRATROPIUM BROMIDE AND ALBUTEROL SULFATE 3 ML: 2.5; .5 SOLUTION RESPIRATORY (INHALATION) at 10:08

## 2025-04-08 RX ADMIN — CEFEPIME HYDROCHLORIDE 2 G: 2 INJECTION, SOLUTION INTRAVENOUS at 01:49

## 2025-04-08 RX ADMIN — SUCRALFATE ORAL SUSPENSION 1 G: 1 SUSPENSION ORAL at 06:14

## 2025-04-08 RX ADMIN — PANTOPRAZOLE SODIUM 20 MG: 20 TABLET, DELAYED RELEASE ORAL at 08:33

## 2025-04-08 RX ADMIN — METOPROLOL TARTRATE 12.5 MG: 25 TABLET, FILM COATED ORAL at 08:33

## 2025-04-08 RX ADMIN — DOXYCYCLINE HYCLATE 100 MG: 100 TABLET, COATED ORAL at 08:33

## 2025-04-08 RX ADMIN — Medication 4 L/MIN: at 10:05

## 2025-04-08 RX ADMIN — GUAIFENESIN 600 MG: 600 TABLET ORAL at 08:33

## 2025-04-08 RX ADMIN — HEPARIN 500 UNITS: 100 SYRINGE at 10:50

## 2025-04-08 ASSESSMENT — PAIN SCALES - GENERAL: PAINLEVEL_OUTOF10: 0 - NO PAIN

## 2025-04-08 NOTE — DISCHARGE SUMMARY
Discharge Diagnosis  Community acquired bacterial pneumonia    Issues Requiring Follow-Up  Maintain follow up appointments.     Discharge Meds     Medication List      START taking these medications     cefuroxime 500 mg tablet; Commonly known as: Ceftin; Take 1 tablet (500   mg) by mouth 2 times a day for 5 days.   doxycycline 100 mg capsule; Commonly known as: Vibramycin; Take 1   capsule (100 mg) by mouth every 12 hours for 5 days. Take with a full   glass of water and do not lie down for at least 30 minutes after.   metoprolol tartrate 25 mg tablet; Commonly known as: Lopressor; Take 0.5   tablets (12.5 mg) by mouth 2 times a day.   oxygen gas therapy; Commonly known as: O2; Inhale 1 each every 12 hours.   sennosides-docusate sodium 8.6-50 mg tablet; Commonly known as:   Heidi-Colace; Take 1 tablet by mouth once daily at noon. Take with meals.   sodium chloride 0.65 % nasal spray; Commonly known as: Ocean; Administer   1 spray into each nostril 4 times a day as needed for congestion.     CONTINUE taking these medications     * albuterol 2.5 mg /3 mL (0.083 %) nebulizer solution; Take 3 mL (2.5   mg) by nebulization every 6 hours if needed for wheezing.   * albuterol 90 mcg/actuation inhaler; INHALE ONE TO TWO INHALTIONS BY   MOUTH EVERY 4 TO 6 HOURS AS NEEDED   calcium citrate-vitamin D2 250 mg-2.5 mcg (100 unit) tablet   guaiFENesin 600 mg 12 hr tablet; Commonly known as: Mucinex; Take 1   tablet (600 mg) by mouth 2 times a day. Do not crush, chew, or split.   magnesium oxide 400 mg (241.3 mg magnesium) tablet; Commonly known as:   Mag-Ox; Take 1 tablet (400 mg) by mouth 3 times a day.   multivitamin with minerals tablet   OLANZapine 5 mg tablet; Commonly known as: ZyPREXA; TAKE ONE TABLET BY   MOUTH AT BEDTIME FOR FOUR DAYS STARTING THE evening of treatment.   pantoprazole 20 mg EC tablet; Commonly known as: Protonix; Take 1 tablet   (20 mg) by mouth once daily. Do not crush, chew, or split.   Spiriva with  HandiHaler 18 mcg inhalation capsule; Generic drug:   tiotropium; place ONE CAPSULE into inhaler AND inhale ONCE DAILY   sucralfate 100 mg/mL suspension; Commonly known as: Carafate; Take 10 mL   (1 g) by mouth 4 times a day with meals.  * This list has 2 medication(s) that are the same as other medications   prescribed for you. Read the directions carefully, and ask your doctor or   other care provider to review them with you.     STOP taking these medications     losartan 25 mg tablet; Commonly known as: Cozaar   nystatin 100,000 unit/mL suspension; Commonly known as: Mycostatin   oxyCODONE 5 mg immediate release tablet; Commonly known as: Roxicodone       Test Results Pending At Discharge  Pending Labs       Order Current Status    Aspergillus galactomannan antigen In process    Fungitell Beta-D Glucan Serum In process    Mycoplasma pneumoniae antibody, IgM In process            Hospital Course   Billie Hernadez is a 69 y.o. female presenting with a complaint of fever. She has a hx of lung cancer and COPD. She started chemotherapy in December 2024 and finished chemotherapy on 3/20/25. She has been having low blood pressure; so she has been going to the infusion center intermittent infusion if IVF. She was at the infusion center yesterday; her BP was low even after getting the IV fluid. She had a low grade fever, cough, and not been feeling well.    Had tachycardia and bursts of atrial fibrillation~treated and initiated on lopressor 12.5 mg BID and stabilization of the heart rate.   A/C resp failure, COPD, Lung CA~ wearing oxygen 4 liters that is her baseline at HS at home. She was treated with cefuroxime, doxycycline, mucinex, duoneb, and pulmonary toileting. She is stable and discharged on ceftin for 5 days and doxycycline total 5 days.   Anemia, thrombocytopenia, neutropenia~chemo induced/influenced~transfused 1 uprbc and neupogen. Patient to follow up with oncology as outpatient.   Hypomagnesisa~ongoing with  replacements ordered    Pertinent Physical Exam At Time of Discharge     Appearance: Improved and baseline.  HENT:      Head: Atraumatic.      Nose: Nose normal.      Mouth/Throat:      Mouth: Mucous membranes are moist.   Eyes:      Pupils: Pupils are equal, round, and reactive to light.   Cardiovascular:      Rate and Rhythm: Normal rate and regular rhythm.      Pulses: Normal pulses.      Heart sounds: S1 normal and S2 normal.   Pulmonary:      Effort: Pulmonary effort is normal.      Breath sounds: Decreased breath sounds present.               Rhonchi right base. Congested cough.      Comments: Oxygen 4 liters  Abdominal:      General: Bowel sounds are normal.      Palpations: Abdomen is soft.      Tenderness: There is no abdominal tenderness.   Musculoskeletal:      Right lower leg: No edema.      Left lower leg: No edema.   Skin:     Capillary Refill: Capillary refill takes less than 2 seconds.   Neurological:      Mental Status: She is alert and oriented to person, place, and time.   Psychiatric:         Attention and Perception: Attention normal.         Mood and Affect: Mood normal.         Speech: Speech normal.         Behavior: Behavior normal.         Thought Content: Thought content normal.         Cognition and Memory: Cognition normal.         Judgment: Judgment normal.     Outpatient Follow-Up  Future Appointments   Date Time Provider Department Center   4/10/2025 10:00 AM INF 04 CONNEAUT CONINF None   4/15/2025 10:30 AM INF 04 [facility] CON[facility] None   4/16/2025  9:00 AM CON CT 1 CONCT CON Rad Cent   4/22/2025 10:00 AM INF 03 [facility] CON[facility] None   4/23/2025 10:00 AM Judith Santillan MD [id number] [facility]   4/24/2025  9:00 AM Abimael Bridges MD [id number] [facility]   6/2/2025  9:45 AM Puja Rae, APRN-CNP [id number] [facility]         Blessing Jade, APRN-CNP

## 2025-04-08 NOTE — CARE PLAN
The patient's goals for the shift include      The clinical goals for the shift include Patient will report no SOB or pain through 1900    Over the shift, the patient did meet goal. Patient is discharging this shift, discharge instructions given to spouse and patient and both verbalized understanding.

## 2025-04-09 ENCOUNTER — PATIENT OUTREACH (OUTPATIENT)
Dept: PRIMARY CARE | Facility: CLINIC | Age: 70
End: 2025-04-09
Payer: MEDICARE

## 2025-04-09 NOTE — PROGRESS NOTES
Discharge Facility: [facility]   Discharge Diagnosis: Community acquired bacterial pneumonia   Admission Date: 4/3/25  Discharge Date: 4/8/25    PCP Appointment Date: Message routed to office for scheduling     Specialist Appointment Date: 4/15/250 Infusion 4/23/25- Hem/Onc  Hospital Encounter and Summary Linked: ED to Hosp-Admission (Discharged) with Zaira Armendariz MD; Neel Gurrola MD (04/03/2025)   Discharge Summary by Blessing Jade APRN-CNP (04/08/2025 09:53)   See discharge assessment below for further details    Two attempts were made to reach patient within two business days after discharge. Voicemail left with contact information for patient to call back with any non-emergent questions or concerns.

## 2025-04-10 ENCOUNTER — INFUSION (OUTPATIENT)
Facility: HOSPITAL | Age: 70
End: 2025-04-10
Payer: MEDICARE

## 2025-04-10 VITALS
OXYGEN SATURATION: 96 % | SYSTOLIC BLOOD PRESSURE: 145 MMHG | RESPIRATION RATE: 20 BRPM | DIASTOLIC BLOOD PRESSURE: 77 MMHG | HEART RATE: 90 BPM | TEMPERATURE: 99.1 F

## 2025-04-10 DIAGNOSIS — C34.11 MALIGNANT NEOPLASM OF UPPER LOBE OF RIGHT LUNG (MULTI): ICD-10-CM

## 2025-04-10 PROCEDURE — 96360 HYDRATION IV INFUSION INIT: CPT

## 2025-04-10 PROCEDURE — 96361 HYDRATE IV INFUSION ADD-ON: CPT

## 2025-04-10 PROCEDURE — 2500000004 HC RX 250 GENERAL PHARMACY W/ HCPCS (ALT 636 FOR OP/ED): Performed by: INTERNAL MEDICINE

## 2025-04-10 RX ORDER — FAMOTIDINE 10 MG/ML
20 INJECTION, SOLUTION INTRAVENOUS ONCE AS NEEDED
OUTPATIENT
Start: 2025-04-17

## 2025-04-10 RX ORDER — HEPARIN 100 UNIT/ML
500 SYRINGE INTRAVENOUS AS NEEDED
Status: DISCONTINUED | OUTPATIENT
Start: 2025-04-10 | End: 2025-04-10 | Stop reason: HOSPADM

## 2025-04-10 RX ORDER — HEPARIN 100 UNIT/ML
500 SYRINGE INTRAVENOUS AS NEEDED
OUTPATIENT
Start: 2025-04-10

## 2025-04-10 RX ORDER — HEPARIN SODIUM,PORCINE/PF 10 UNIT/ML
50 SYRINGE (ML) INTRAVENOUS AS NEEDED
OUTPATIENT
Start: 2025-04-10

## 2025-04-10 RX ORDER — ALBUTEROL SULFATE 0.83 MG/ML
3 SOLUTION RESPIRATORY (INHALATION) AS NEEDED
OUTPATIENT
Start: 2025-04-17

## 2025-04-10 RX ORDER — SODIUM CHLORIDE 9 MG/ML
500 INJECTION, SOLUTION INTRAVENOUS CONTINUOUS
Status: DISPENSED | OUTPATIENT
Start: 2025-04-10 | End: 2025-04-10

## 2025-04-10 RX ORDER — EPINEPHRINE 0.3 MG/.3ML
0.3 INJECTION SUBCUTANEOUS EVERY 5 MIN PRN
OUTPATIENT
Start: 2025-04-17

## 2025-04-10 RX ORDER — DIPHENHYDRAMINE HYDROCHLORIDE 50 MG/ML
50 INJECTION, SOLUTION INTRAMUSCULAR; INTRAVENOUS AS NEEDED
OUTPATIENT
Start: 2025-04-17

## 2025-04-10 RX ORDER — SODIUM CHLORIDE 9 MG/ML
500 INJECTION, SOLUTION INTRAVENOUS CONTINUOUS
OUTPATIENT
Start: 2025-04-17

## 2025-04-10 RX ADMIN — SODIUM CHLORIDE 500 ML/HR: 9 INJECTION, SOLUTION INTRAVENOUS at 10:30

## 2025-04-10 RX ADMIN — HEPARIN 500 UNITS: 100 SYRINGE at 12:40

## 2025-04-10 ASSESSMENT — PAIN SCALES - GENERAL
PAINLEVEL_OUTOF10: 0-NO PAIN
PAINLEVEL_OUTOF10: 0-NO PAIN

## 2025-04-10 ASSESSMENT — ENCOUNTER SYMPTOMS
OCCASIONAL FEELINGS OF UNSTEADINESS: 0
DEPRESSION: 0
LOSS OF SENSATION IN FEET: 0

## 2025-04-10 NOTE — PROGRESS NOTES
Discharged patient home via own transport. Patient to provide own transport. AVS, medications, and education reviewed wth patient, verbalized understanding. Ziopatch placed by cardiology, to be returned via mail in 1 week. Belongings packed. IV removed. Telemetry box returned to telemetry station.    [facility]   Infusion Clinic Note   Date: April 10, 2025   Name: Billie Hernadez  : 1955   MRN: [id number]         Reason for Visit: Hydration      Accompanied by:   Visit Type:: Infusion   Diagnosis: Malignant neoplasm of upper lobe of right lung (Multi)    Allergies:   Allergies as of 04/10/2025    (No Known Allergies)      Current Meds has a current medication list which includes the following prescription(s): albuterol, albuterol, calcium citrate-vitamin d2, cefuroxime, doxycycline, guaifenesin, magnesium oxide, metoprolol tartrate, multivitamin with minerals, olanzapine, oxygen, pantoprazole, sennosides-docusate sodium, sodium chloride, spiriva with handihaler, and sucralfate, and the following Facility-Administered Medications: heparin flush and sodium chloride 0.9%.        Vitals:  Vitals:    04/10/25 1008 04/10/25 1015   BP: 110/73    BP Location: Right arm    Patient Position: Sitting    BP Cuff Size: Adult    Pulse: 110    Resp: 20    Temp: 36.9 °C (98.4 °F)    TempSrc: Temporal    SpO2: (!) 89% 98%      Infusion Pre-procedure Checklist   Allergies reviewed: yes   Medications reviewed: yes   Contraindications to treatment:No   Previous reaction to current treatment:No   Current Health Issues: None and Recent Illness   Pain: 0-no pain [0]'    Is the pain different from normal: No   Is the pain tolerable: n/a   Is your Doctor aware: n/a   Contraindications based on patient history: No   Provider notified: Not applicable   Labs: None   Fall Risk Screening: Germain Fall Risk  History of Falling, Immediate or Within 3 Months: No  Secondary Diagnosis: Yes  Ambulatory Aid: Walks without aid/bedrest/nurse assist  Intravenous Therapy/Heparin Lock: Yes  Gait/Transferring: Normal/bedrest/immobile  Mental Status: Oriented to own ability  Germain Fall Risk Score: 35    Review of Systems - Oncology   Negative for complaint: [] all other systems have been reviewed and are negative for  complaint: See oncology assessment. Pt here for hydration. 1 L N/S IV given over 2 hours. Pt tolerated without complication.    Infusion Readiness:   Assessment Concerns Related to Infusion: No  Provider notified: n/a  Assess patient for the concerns below. Document provider notification as appropriate:  - Does not meet criteria to treat No  - Has an active or recent infection with/without current antibiotic use Yes  - Has recent/planned dental work No  - Has recent/planned surgeries No  - Has recently received or plans to receive vaccinations No  - Has treatment related toxicities No  - Is pregnant (unless noted otherwise) No    Initiated By: Tianna Lopez RN   Time: 10:53 AM     We administered sodium chloride 0.9%.

## 2025-04-15 ENCOUNTER — INFUSION (OUTPATIENT)
Facility: HOSPITAL | Age: 70
End: 2025-04-15
Payer: MEDICARE

## 2025-04-15 VITALS
OXYGEN SATURATION: 100 % | HEART RATE: 96 BPM | SYSTOLIC BLOOD PRESSURE: 107 MMHG | RESPIRATION RATE: 20 BRPM | DIASTOLIC BLOOD PRESSURE: 70 MMHG | TEMPERATURE: 98.6 F

## 2025-04-15 DIAGNOSIS — C34.11 MALIGNANT NEOPLASM OF UPPER LOBE OF RIGHT LUNG (MULTI): ICD-10-CM

## 2025-04-15 PROCEDURE — 96360 HYDRATION IV INFUSION INIT: CPT

## 2025-04-15 PROCEDURE — 2500000004 HC RX 250 GENERAL PHARMACY W/ HCPCS (ALT 636 FOR OP/ED): Performed by: INTERNAL MEDICINE

## 2025-04-15 PROCEDURE — 96361 HYDRATE IV INFUSION ADD-ON: CPT

## 2025-04-15 PROCEDURE — 2500000004 HC RX 250 GENERAL PHARMACY W/ HCPCS (ALT 636 FOR OP/ED)

## 2025-04-15 RX ORDER — SODIUM CHLORIDE 9 MG/ML
500 INJECTION, SOLUTION INTRAVENOUS CONTINUOUS
OUTPATIENT
Start: 2025-04-17

## 2025-04-15 RX ORDER — DIPHENHYDRAMINE HYDROCHLORIDE 50 MG/ML
50 INJECTION, SOLUTION INTRAMUSCULAR; INTRAVENOUS AS NEEDED
OUTPATIENT
Start: 2025-04-17

## 2025-04-15 RX ORDER — HEPARIN SODIUM,PORCINE/PF 10 UNIT/ML
50 SYRINGE (ML) INTRAVENOUS AS NEEDED
OUTPATIENT
Start: 2025-04-15

## 2025-04-15 RX ORDER — HEPARIN 100 UNIT/ML
500 SYRINGE INTRAVENOUS AS NEEDED
Status: DISCONTINUED | OUTPATIENT
Start: 2025-04-15 | End: 2025-04-15 | Stop reason: HOSPADM

## 2025-04-15 RX ORDER — ALBUTEROL SULFATE 0.83 MG/ML
3 SOLUTION RESPIRATORY (INHALATION) AS NEEDED
OUTPATIENT
Start: 2025-04-17

## 2025-04-15 RX ORDER — SODIUM CHLORIDE 9 MG/ML
500 INJECTION, SOLUTION INTRAVENOUS CONTINUOUS
Status: ACTIVE | OUTPATIENT
Start: 2025-04-15 | End: 2025-04-15

## 2025-04-15 RX ORDER — HEPARIN 100 UNIT/ML
500 SYRINGE INTRAVENOUS AS NEEDED
OUTPATIENT
Start: 2025-04-15

## 2025-04-15 RX ORDER — EPINEPHRINE 0.3 MG/.3ML
0.3 INJECTION SUBCUTANEOUS EVERY 5 MIN PRN
OUTPATIENT
Start: 2025-04-17

## 2025-04-15 RX ORDER — SODIUM CHLORIDE 9 MG/ML
INJECTION, SOLUTION INTRAVENOUS
Status: COMPLETED
Start: 2025-04-15 | End: 2025-04-15

## 2025-04-15 RX ORDER — FAMOTIDINE 10 MG/ML
20 INJECTION, SOLUTION INTRAVENOUS ONCE AS NEEDED
OUTPATIENT
Start: 2025-04-17

## 2025-04-15 RX ADMIN — HEPARIN 500 UNITS: 100 SYRINGE at 12:35

## 2025-04-15 RX ADMIN — SODIUM CHLORIDE 500 ML/HR: 9 INJECTION, SOLUTION INTRAVENOUS at 10:33

## 2025-04-15 RX ADMIN — SODIUM CHLORIDE 500 ML/HR: 0.9 INJECTION, SOLUTION INTRAVENOUS at 10:33

## 2025-04-15 ASSESSMENT — PAIN SCALES - GENERAL
PAINLEVEL_OUTOF10: 0-NO PAIN
PAINLEVEL_OUTOF10: 0-NO PAIN

## 2025-04-16 ENCOUNTER — HOSPITAL ENCOUNTER (OUTPATIENT)
Dept: RADIOLOGY | Facility: HOSPITAL | Age: 70
Discharge: HOME | End: 2025-04-16
Payer: MEDICARE

## 2025-04-16 DIAGNOSIS — C34.11 MALIGNANT NEOPLASM OF UPPER LOBE OF RIGHT LUNG (MULTI): ICD-10-CM

## 2025-04-16 PROCEDURE — 71250 CT THORAX DX C-: CPT

## 2025-04-16 RX ORDER — LANOLIN ALCOHOL/MO/W.PET/CERES
1 CREAM (GRAM) TOPICAL 2 TIMES DAILY
Qty: 60 TABLET | Refills: 1 | Status: SHIPPED | OUTPATIENT
Start: 2025-04-16

## 2025-04-22 ENCOUNTER — INFUSION (OUTPATIENT)
Facility: HOSPITAL | Age: 70
End: 2025-04-22
Payer: MEDICARE

## 2025-04-22 VITALS
HEART RATE: 105 BPM | TEMPERATURE: 97.5 F | DIASTOLIC BLOOD PRESSURE: 75 MMHG | SYSTOLIC BLOOD PRESSURE: 124 MMHG | RESPIRATION RATE: 20 BRPM | OXYGEN SATURATION: 92 %

## 2025-04-22 DIAGNOSIS — C34.11 MALIGNANT NEOPLASM OF UPPER LOBE OF RIGHT LUNG (MULTI): ICD-10-CM

## 2025-04-22 PROCEDURE — 96361 HYDRATE IV INFUSION ADD-ON: CPT

## 2025-04-22 PROCEDURE — 2500000004 HC RX 250 GENERAL PHARMACY W/ HCPCS (ALT 636 FOR OP/ED): Mod: JZ | Performed by: INTERNAL MEDICINE

## 2025-04-22 PROCEDURE — 2500000004 HC RX 250 GENERAL PHARMACY W/ HCPCS (ALT 636 FOR OP/ED)

## 2025-04-22 PROCEDURE — 96360 HYDRATION IV INFUSION INIT: CPT

## 2025-04-22 RX ORDER — FAMOTIDINE 10 MG/ML
20 INJECTION, SOLUTION INTRAVENOUS ONCE AS NEEDED
OUTPATIENT
Start: 2025-04-24

## 2025-04-22 RX ORDER — HEPARIN SODIUM,PORCINE/PF 10 UNIT/ML
50 SYRINGE (ML) INTRAVENOUS AS NEEDED
OUTPATIENT
Start: 2025-04-22

## 2025-04-22 RX ORDER — EPINEPHRINE 0.3 MG/.3ML
0.3 INJECTION SUBCUTANEOUS EVERY 5 MIN PRN
OUTPATIENT
Start: 2025-04-24

## 2025-04-22 RX ORDER — HEPARIN 100 UNIT/ML
500 SYRINGE INTRAVENOUS AS NEEDED
Status: DISCONTINUED | OUTPATIENT
Start: 2025-04-22 | End: 2025-04-22 | Stop reason: HOSPADM

## 2025-04-22 RX ORDER — ALBUTEROL SULFATE 0.83 MG/ML
3 SOLUTION RESPIRATORY (INHALATION) AS NEEDED
OUTPATIENT
Start: 2025-04-24

## 2025-04-22 RX ORDER — SODIUM CHLORIDE 9 MG/ML
500 INJECTION, SOLUTION INTRAVENOUS CONTINUOUS
Status: ACTIVE | OUTPATIENT
Start: 2025-04-22 | End: 2025-04-22

## 2025-04-22 RX ORDER — DIPHENHYDRAMINE HYDROCHLORIDE 50 MG/ML
50 INJECTION, SOLUTION INTRAMUSCULAR; INTRAVENOUS AS NEEDED
OUTPATIENT
Start: 2025-04-24

## 2025-04-22 RX ORDER — SODIUM CHLORIDE 9 MG/ML
INJECTION, SOLUTION INTRAVENOUS
Status: COMPLETED
Start: 2025-04-22 | End: 2025-04-22

## 2025-04-22 RX ORDER — HEPARIN 100 UNIT/ML
500 SYRINGE INTRAVENOUS AS NEEDED
OUTPATIENT
Start: 2025-04-22

## 2025-04-22 RX ORDER — SODIUM CHLORIDE 9 MG/ML
500 INJECTION, SOLUTION INTRAVENOUS CONTINUOUS
OUTPATIENT
Start: 2025-04-24

## 2025-04-22 RX ADMIN — SODIUM CHLORIDE 500 ML/HR: 9 INJECTION, SOLUTION INTRAVENOUS at 10:05

## 2025-04-22 RX ADMIN — HEPARIN 500 UNITS: 100 SYRINGE at 12:15

## 2025-04-22 ASSESSMENT — ENCOUNTER SYMPTOMS
GASTROINTESTINAL NEGATIVE: 1
HEMATOLOGIC/LYMPHATIC NEGATIVE: 1
NERVOUS/ANXIOUS: 1
CONSTITUTIONAL NEGATIVE: 1
COUGH: 1
MUSCULOSKELETAL NEGATIVE: 1
CARDIOVASCULAR NEGATIVE: 1
EYES NEGATIVE: 1
SHORTNESS OF BREATH: 1
NEUROLOGICAL NEGATIVE: 1

## 2025-04-22 ASSESSMENT — PAIN SCALES - GENERAL: PAINLEVEL_OUTOF10: 0-NO PAIN

## 2025-04-22 NOTE — PROGRESS NOTES
[facility]   Infusion Clinic Note   Date: 2025   Name: Billie Hernadez  : 1955   MRN: [id number]         Reason for Visit: OP Infusion         Today: Billie Hernadez had no medications administered during this visit.       Ordered By: No ref. provider found       For a Diagnosis of: Malignant neoplasm of upper lobe of right lung (Multi)       At today's visit patient accompanied by:       Today's Vitals:   Vitals:    25 0958   BP: 124/75   Pulse: 105   Resp: 20   Temp: 36.4 °C (97.5 °F)   TempSrc: Temporal   SpO2: 92%  Comment: Room air   PainSc: 0-No pain             Pre - Treatment Checklist:      - Previous reaction to current treatment: no      (Assess patient for the concerns below. Document provider notification as appropriate).  - Active or recent infection with/without current antibiotic use: no  - Recent or planned invasive dental work: no  - Recent or planned surgeries: no  - Recently received or plans to receive vaccinations: no  - Has treatment related toxicities: no  - Any chance may be pregnant:  n/a      Pain: 0   - Is the pain different from normal: no   - Is prescribing Doctor aware:  n/a      Labs: N/A      Fall Risk Screening: Germain Fall Risk  History of Falling, Immediate or Within 3 Months: No  Secondary Diagnosis: Yes  Ambulatory Aid: Walks without aid/bedrest/nurse assist  Intravenous Therapy/Heparin Lock: No  Gait/Transferring: Normal/bedrest/immobile  Mental Status: Oriented to own ability  Germain Fall Risk Score: 15       Review Of Systems:  Review of Systems   Constitutional: Negative.    HENT:  Negative.     Eyes: Negative.    Respiratory:  Positive for cough and shortness of breath.         Wears oxygen 4 L N/C frequently at home; reports frequent air hunger and wheezing; productive cough with white sputum occasionally.  Lungs diminished especially in the right base, crackles left base   Cardiovascular: Negative.    Gastrointestinal:  Negative.    Genitourinary: Negative.     Musculoskeletal: Negative.    Skin: Negative.    Neurological: Negative.    Hematological: Negative.    Psychiatric/Behavioral:  The patient is nervous/anxious.         Occasionally and worse in the evening         Infusion Readiness:  - Assessment Concerns Related to Infusion: No  - Provider notified: n/a      New Patient Education:    N/A (returning patient for continuation of therapy. Ongoing education provided as needed.)        Treatment Conditions & Drug Specific Questions:    NOT APPLICABLE        Weight Based Drug Calculations:    WEIGHT BASED DRUGS: NOT APPLICABLE / FLAT DOSE       Post Treatment: {[facility]:[id number]}      Note Authored / Patient Cared for By: Tianna Lopez RN

## 2025-04-23 ENCOUNTER — PATIENT OUTREACH (OUTPATIENT)
Dept: PRIMARY CARE | Facility: CLINIC | Age: 70
End: 2025-04-23

## 2025-04-23 ENCOUNTER — OFFICE VISIT (OUTPATIENT)
Dept: HEMATOLOGY/ONCOLOGY | Facility: HOSPITAL | Age: 70
End: 2025-04-23
Payer: MEDICARE

## 2025-04-23 VITALS
HEART RATE: 93 BPM | WEIGHT: 108.14 LBS | SYSTOLIC BLOOD PRESSURE: 108 MMHG | HEIGHT: 63 IN | DIASTOLIC BLOOD PRESSURE: 67 MMHG | TEMPERATURE: 96.8 F | OXYGEN SATURATION: 91 % | RESPIRATION RATE: 16 BRPM | BODY MASS INDEX: 19.16 KG/M2

## 2025-04-23 DIAGNOSIS — C34.11 MALIGNANT NEOPLASM OF UPPER LOBE OF RIGHT LUNG (MULTI): Primary | ICD-10-CM

## 2025-04-23 PROCEDURE — 3008F BODY MASS INDEX DOCD: CPT | Performed by: INTERNAL MEDICINE

## 2025-04-23 PROCEDURE — 99215 OFFICE O/P EST HI 40 MIN: CPT | Performed by: INTERNAL MEDICINE

## 2025-04-23 PROCEDURE — 1159F MED LIST DOCD IN RCRD: CPT | Performed by: INTERNAL MEDICINE

## 2025-04-23 PROCEDURE — 1111F DSCHRG MED/CURRENT MED MERGE: CPT | Performed by: INTERNAL MEDICINE

## 2025-04-23 PROCEDURE — G2211 COMPLEX E/M VISIT ADD ON: HCPCS | Performed by: INTERNAL MEDICINE

## 2025-04-23 PROCEDURE — 1126F AMNT PAIN NOTED NONE PRSNT: CPT | Performed by: INTERNAL MEDICINE

## 2025-04-23 PROCEDURE — 3078F DIAST BP <80 MM HG: CPT | Performed by: INTERNAL MEDICINE

## 2025-04-23 PROCEDURE — 3074F SYST BP LT 130 MM HG: CPT | Performed by: INTERNAL MEDICINE

## 2025-04-23 RX ORDER — FAMOTIDINE 10 MG/ML
20 INJECTION, SOLUTION INTRAVENOUS ONCE AS NEEDED
OUTPATIENT
Start: 2025-05-07

## 2025-04-23 RX ORDER — PROCHLORPERAZINE EDISYLATE 5 MG/ML
10 INJECTION INTRAMUSCULAR; INTRAVENOUS EVERY 6 HOURS PRN
OUTPATIENT
Start: 2025-05-07

## 2025-04-23 RX ORDER — ALBUTEROL SULFATE 0.83 MG/ML
3 SOLUTION RESPIRATORY (INHALATION) AS NEEDED
OUTPATIENT
Start: 2025-05-07

## 2025-04-23 RX ORDER — PROCHLORPERAZINE MALEATE 5 MG
10 TABLET ORAL EVERY 6 HOURS PRN
OUTPATIENT
Start: 2025-05-07

## 2025-04-23 RX ORDER — EPINEPHRINE 0.3 MG/.3ML
0.3 INJECTION SUBCUTANEOUS EVERY 5 MIN PRN
OUTPATIENT
Start: 2025-05-07

## 2025-04-23 RX ORDER — PROCHLORPERAZINE MALEATE 10 MG
10 TABLET ORAL EVERY 6 HOURS PRN
Qty: 30 TABLET | Refills: 3 | Status: SHIPPED | OUTPATIENT
Start: 2025-04-23 | End: 2025-05-23

## 2025-04-23 RX ORDER — DIPHENHYDRAMINE HYDROCHLORIDE 50 MG/ML
50 INJECTION, SOLUTION INTRAMUSCULAR; INTRAVENOUS AS NEEDED
OUTPATIENT
Start: 2025-05-07

## 2025-04-23 RX ORDER — BUDESONIDE AND FORMOTEROL FUMARATE DIHYDRATE 160; 4.5 UG/1; UG/1
2 AEROSOL RESPIRATORY (INHALATION)
Qty: 10.2 G | Refills: 11 | Status: SHIPPED | OUTPATIENT
Start: 2025-04-23 | End: 2026-04-23

## 2025-04-23 ASSESSMENT — ENCOUNTER SYMPTOMS
CONSTITUTIONAL NEGATIVE: 1
RESPIRATORY NEGATIVE: 1
GASTROINTESTINAL NEGATIVE: 1
CARDIOVASCULAR NEGATIVE: 1

## 2025-04-23 ASSESSMENT — PAIN SCALES - GENERAL: PAINLEVEL_OUTOF10: 0-NO PAIN

## 2025-04-23 NOTE — PROGRESS NOTES
Patient ID: Billie Hernadez is a 69 y.o. female.    Subjective:  Returns for follow up for lung cancer.   Feels OK but still short of breath and dyspneic. Cough ++    Heme/Onc History:  - CT c screening (Oct 2024): RUL nodule  - PET/CT: RUL lung nodule with uptake. Small R hilar, mediastinal, and a supraclavicular LN with moderate uptake. L adrenal nodule with low uptake (could not see that one myself)  - Bronch (11/22/24): NSCLC. C/w adenosquamous. PD-L1 10%. NGS pending. Level 7 LN aspiration positive for malignant cells. Level 4R, 11R, 11L negative.   - Started Carbo/taxol/nivo on 12/17/24 => Admitted to hospital with worsening dyspnea on 12/31 likely 2/2 COPD exacerbation => Steroid taper  - Supraclavicular LN bx (12/30/24): Positive for malignancy.   - CT c/a/p (Feb 2025, after C3): Stable RUL lung mass. Stable hilar, mediastinal LAPs. Cannot visualize supraclavicular LAP well. New small R pleural effusion, too small to tap. L adrenal nodule stable.  - ChemoRT (Feb-Mar 2025) with weekly carbo-taxol  - CT Chest (Apr 2025): RUL nodule smaller. Significant atelectasis in RUL/RML    Assessment/Plan:  ? NSCLC: At least stage III with biopsy proven mediastinal LAPs. Could be considered to have stage IV with biopsy proven R supraclavicular LAP. L adrenal nodule may be adenoma (very mild uptake on PET).     After a long conversation with the patient, we had decided to go ahead with the following plan:   A. Start chemoimmunotherapy with CARBO-PACLitaxel + nivolumab (completed)  B. Repeat PET/CT after C3. If disease is controlled, start chemoRT to include R supraclavicular lymph node. (Ongoing)  C. Meanwhile, if L adrenal gland is c/w metastasis, would get radiation to the adrenal as well.     The fact that supraclavicular LN is positive for malignancy is a poor sign. Patient understands that.   CT scans in Feb 2025 had not shown response but no progression either. Immunotherapy may have late efficacy, too.     She  "completed chemoRT. CT Chest now shows no sign of progression. Will start her on consolidation durvalumab now. Total one year. We talked about possible side effects including but not limited to diarrhea, rash, thyrpoid dysfunction, pneumonitis, and liver toxicity. She agreed to the treatment.     Plan to repeat PET/CT in 5-8 weeks to assess FDG uptake central to the atelectatic area    COPD: On symbicort. Not well controlled => Will refer to Pulmonology    I provide longitudinal care for Ms. Hernadez for her lung cancer    Review Of Systems:  Review of Systems   Constitutional: Negative.    HENT:  Negative.     Respiratory: Negative.     Cardiovascular: Negative.    Gastrointestinal: Negative.        Physical Exam:  /67 (BP Location: Right arm, Patient Position: Sitting, BP Cuff Size: Small adult)   Pulse 93   Temp 36 °C (96.8 °F) (Temporal)   Resp 16   Ht 1.6 m (5' 3\")   Wt 49 kg (108 lb 2.2 oz)   SpO2 91%   BMI 19.16 kg/m²   BSA: 1.48 meters squared  Performance Status: Asymptomatic  Physical Exam  HENT:      Head: Normocephalic and atraumatic.   Eyes:      General: No scleral icterus.  Pulmonary:      Effort: Pulmonary effort is normal.   Musculoskeletal:         General: Normal range of motion.   Skin:     Coloration: Skin is not jaundiced.   Neurological:      General: No focal deficit present.      Mental Status: She is alert and oriented to person, place, and time.         Results:  Diagnostic Results   Lab Results   Component Value Date    WBC 4.3 (L) 04/08/2025    HGB 8.4 (L) 04/08/2025    HCT 27.3 (L) 04/08/2025     (H) 04/08/2025     04/08/2025     Lab Results   Component Value Date    CALCIUM 7.8 (L) 04/08/2025     04/08/2025    K 3.8 04/08/2025    CO2 37 (H) 04/08/2025     04/08/2025    BUN 10 04/08/2025    CREATININE 0.36 (L) 04/08/2025    ALT 11 04/03/2025    AST 19 04/03/2025       Current Outpatient Medications:     albuterol 2.5 mg /3 mL (0.083 %) nebulizer " solution, Take 3 mL (2.5 mg) by nebulization every 6 hours if needed for wheezing., Disp: 75 mL, Rfl: 3    albuterol 90 mcg/actuation inhaler, INHALE ONE TO TWO INHALTIONS BY MOUTH EVERY 4 TO 6 HOURS AS NEEDED, Disp: 51 g, Rfl: 2    calcium citrate-vitamin D2 250 mg-2.5 mcg (100 unit) tablet, Take 1 tablet by mouth once daily., Disp: , Rfl:     guaiFENesin (Mucinex) 600 mg 12 hr tablet, Take 1 tablet (600 mg) by mouth 2 times a day. Do not crush, chew, or split., Disp: 14 tablet, Rfl: 0    magnesium oxide (Mag-Ox) 400 mg (241.3 mg elemental) tablet, TAKE ONE TABLET BY MOUTH TWICE DAILY, Disp: 60 tablet, Rfl: 1    metoprolol tartrate (Lopressor) 25 mg tablet, Take 0.5 tablets (12.5 mg) by mouth 2 times a day., Disp: 30 tablet, Rfl: 3    multivitamin with minerals tablet, Take 1 tablet by mouth once daily., Disp: , Rfl:     oxygen (O2) gas therapy, Inhale 1 each every 12 hours., Disp: , Rfl:     pantoprazole (Protonix) 20 mg EC tablet, Take 1 tablet (20 mg) by mouth once daily. Do not crush, chew, or split., Disp: 30 tablet, Rfl: 2    sodium chloride (Ocean) 0.65 % nasal spray, Administer 1 spray into each nostril 4 times a day as needed for congestion., Disp: 30 mL, Rfl: 12    Spiriva with HandiHaler 18 mcg inhalation capsule, place ONE CAPSULE into inhaler AND inhale ONCE DAILY, Disp: 90 capsule, Rfl: 3    budesonide-formoterol (Symbicort) 160-4.5 mcg/actuation inhaler, Inhale 2 puffs 2 times a day. Rinse mouth with water after use to reduce aftertaste and incidence of candidiasis. Do not swallow., Disp: 10.2 g, Rfl: 11    OLANZapine (ZyPREXA) 5 mg tablet, TAKE ONE TABLET BY MOUTH AT BEDTIME FOR FOUR DAYS STARTING THE evening of treatment. (Patient not taking: Reported on 4/23/2025), Disp: 4 tablet, Rfl: 2    sennosides-docusate sodium (Heidi-Colace) 8.6-50 mg tablet, Take 1 tablet by mouth once daily at noon. Take with meals. (Patient not taking: Reported on 4/23/2025), Disp: 30 tablet, Rfl: 3     Past Surgical  History:   Procedure Laterality Date    EXCISION / BIOPSY BREAST / NIPPLE / DUCT Left 2014    LUNG BIOPSY  2024    Bronchoscopy and needle biopsy    OTHER SURGICAL HISTORY Left 2015    Open Treatment Of Tibial Shaft Fracture With Implant    PORTACATH PLACEMENT N/A     TONSILLECTOMY       No family history on file.   reports that she quit smoking about 14 months ago. Her smoking use included cigarettes. She started smoking about 52 years ago. She has a 25.5 pack-year smoking history. She has been exposed to tobacco smoke. She has never used smokeless tobacco.  Social History     Socioeconomic History    Marital status:      Spouse name: Not on file    Number of children: Not on file    Years of education: Not on file    Highest education level: Not on file   Occupational History    Not on file   Tobacco Use    Smoking status: Former     Current packs/day: 0.00     Average packs/day: 0.5 packs/day for 51.1 years (25.5 ttl pk-yrs)     Types: Cigarettes     Start date:      Quit date: 2024     Years since quittin.2     Passive exposure: Past    Smokeless tobacco: Never   Vaping Use    Vaping status: Never Used   Substance and Sexual Activity    Alcohol use: Yes     Alcohol/week: 2.0 standard drinks of alcohol     Types: 2 Cans of beer per week     Comment: 1 every other month    Drug use: Never    Sexual activity: Defer   Other Topics Concern    Not on file   Social History Narrative    Not on file     Social Drivers of Health     Financial Resource Strain: Low Risk  (4/3/2025)    Overall Financial Resource Strain (CARDIA)     Difficulty of Paying Living Expenses: Not hard at all   Food Insecurity: No Food Insecurity (4/3/2025)    Hunger Vital Sign     Worried About Running Out of Food in the Last Year: Never true     Ran Out of Food in the Last Year: Never true   Transportation Needs: No Transportation Needs (4/3/2025)    PRAPARE - Transportation     Lack of Transportation  (Medical): No     Lack of Transportation (Non-Medical): No   Physical Activity: Insufficiently Active (1/1/2025)    Exercise Vital Sign     Days of Exercise per Week: 3 days     Minutes of Exercise per Session: 40 min   Stress: No Stress Concern Present (4/15/2025)    [employer] [place] [employer] - Occupational Stress Questionnaire     Feeling of Stress : Only a little   Recent Concern: Stress - Stress Concern Present (4/3/2025)    [employer] [place] [employer] - Occupational Stress Questionnaire     Feeling of Stress : To some extent   Social Connections: Not on file   Intimate Partner Violence: Not At Risk (4/3/2025)    Humiliation, Afraid, Rape, and Kick questionnaire     Fear of Current or Ex-Partner: No     Emotionally Abused: No     Physically Abused: No     Sexually Abused: No   Housing Stability: Low Risk  (4/3/2025)    Housing Stability Vital Sign     Unable to Pay for Housing in the Last Year: No     Number of Times Moved in the Last Year: 1     Homeless in the Last Year: No       Diagnoses and all orders for this visit:  Malignant neoplasm of upper lobe of right lung (Multi)  -     Clinic Appointment Request Follow up  -     Clinic Appointment Request; Future  -     Infusion Appointment Request; Future  -     CBC and Auto Differential; Future  -     Comprehensive metabolic panel; Future  -     Acth; Future  -     Cortisol Am; Future  -     Tsh With Reflex To Free T4 If Abnormal; Future  -     Clinic Appointment Request Follow up; Future  -     NM PET CT bone skull base to mid thigh; Future  -     budesonide-formoterol (Symbicort) 160-4.5 mcg/actuation inhaler; Inhale 2 puffs 2 times a day. Rinse mouth with water after use to reduce aftertaste and incidence of candidiasis. Do not swallow.  -     Referral to Pulmonology; Future  Other orders  -     prochlorperazine (Compazine) tablet 10 mg  -     prochlorperazine (Compazine) injection 10 mg  -     durvalumab (Imfinzi) 1,500 mg in  sodium chloride 0.9% 130 mL IV  -     sodium chloride 0.9 % bolus 500 mL  -     dextrose 5 % in water (D5W) bolus 500 mL  -     diphenhydrAMINE (BENADryl) injection 50 mg  -     methylPREDNISolone sod succinate (SOLU-Medrol) 40 mg/mL injection 40 mg  -     famotidine PF (Pepcid) injection 20 mg  -     EPINEPHrine (Epipen) injection syringe 0.3 mg  -     albuterol 2.5 mg /3 mL (0.083 %) nebulizer solution 3 mL       Judith Santillan MD

## 2025-04-23 NOTE — PROGRESS NOTES
Unable to reach patient for follow up call after recent hospitalization.   Left voicemail with call back number for patient to call if needed   If no voicemail available call attempts x 2 were made to contact the patient to assist with any questions or concerns patient may have.       Yes

## 2025-04-24 ENCOUNTER — HOSPITAL ENCOUNTER (OUTPATIENT)
Dept: RADIATION ONCOLOGY | Facility: CLINIC | Age: 70
Setting detail: RADIATION/ONCOLOGY SERIES
Discharge: HOME | End: 2025-04-24
Payer: MEDICARE

## 2025-04-24 VITALS
BODY MASS INDEX: 19.02 KG/M2 | WEIGHT: 107.36 LBS | DIASTOLIC BLOOD PRESSURE: 66 MMHG | TEMPERATURE: 96.1 F | OXYGEN SATURATION: 100 % | HEART RATE: 89 BPM | SYSTOLIC BLOOD PRESSURE: 114 MMHG | RESPIRATION RATE: 16 BRPM

## 2025-04-24 DIAGNOSIS — C34.11 MALIGNANT NEOPLASM OF UPPER LOBE OF RIGHT LUNG (MULTI): Primary | ICD-10-CM

## 2025-04-24 PROCEDURE — 99214 OFFICE O/P EST MOD 30 MIN: CPT | Performed by: STUDENT IN AN ORGANIZED HEALTH CARE EDUCATION/TRAINING PROGRAM

## 2025-04-24 PROCEDURE — G2211 COMPLEX E/M VISIT ADD ON: HCPCS | Performed by: STUDENT IN AN ORGANIZED HEALTH CARE EDUCATION/TRAINING PROGRAM

## 2025-04-24 ASSESSMENT — ENCOUNTER SYMPTOMS
HEADACHES: 0
LOSS OF SENSATION IN FEET: 0
FEVER: 0
SHORTNESS OF BREATH: 1
CHILLS: 0
OCCASIONAL FEELINGS OF UNSTEADINESS: 0
DEPRESSION: 0
FATIGUE: 0

## 2025-04-24 ASSESSMENT — PAIN SCALES - GENERAL: PAINLEVEL_OUTOF10: 0-NO PAIN

## 2025-04-24 NOTE — PROGRESS NOTES
Radiation Oncology Nursing Note    Pain: The patient's current pain level was assessed.  They report currently having a pain of 0 out of 10.  They feel their pain is under control without the use of pain medications.    Review of Systems:  Review of Systems - Oncology    Patient is in today for follow up visit with Dr Bridges. Last radiation treatment was 3/24/25. Today the patient denies pain, chest tightness, and N/V/F. She reports SOB with activity and an occasional cough with clear/white sputum. She states she had pneumonia and with in hospital for IV antibiotics and discharged home with PO. No longer on antibiotics. Patient will have PET 6/2, see Jacque 6/4, and Ector 6/4. She will follow up with this office in 3 months.     Education Documentation  When and How to Contact Clinic, taught by Sandra Eduardo RN at 4/24/2025  9:42 AM.  Learner: Patient  Readiness: Acceptance  Method: Explanation  Response: Verbalizes Understanding    Education Comments  No comments found.

## 2025-04-24 NOTE — PROGRESS NOTES
Radiation Oncology Follow-Up    Patient Name:  Billie Hernadez  MRN:  [id number]  :  1955    Referring Provider: Abimael Bridges MD  Primary Care Provider:  Fredi Nolen MD  Care Team: Patient Care Team:  KAREN Holliday-CNP as PCP - General  Fredi Nolen MD as PCP - [employer] PCP  Judith Santillan MD as Medical Oncologist (Hematology and Oncology)  Jose Miguel Santillan MD as Medical Oncologist (Hematology and Oncology)  Bess Goins LPN as Care Manager (Case Management)    Date of Service: 2025    SUBJECTIVE  History of Present Illness:  Billie Hernadez is a 69 y.o. female who was previously seen at the [facility] Department of Radiation Oncology for non-small cell lung cancer.    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     Ms. Hernadez is a female that was found to have on screening CT lung imaging on 10/8/2024 a irregular 1.4 cm nodule in the right upper lobe with subcentimeter nodules of the right lower lobe and increase in size and number of mediastinal lymph nodes.  The patient was seen by cardiothoracic surgery and underwent staging PET/CT on 2024 which showed right hypermetabolic supraclavicular lymph node, irregular right upper lobe for metabolic nodule, hypermetabolic activity in mediastinal and hilar lymph nodes including right lower paratracheal, subcarinal, right hilar and aortopulmonary lymph node and mild uptake and thickening in the left adrenal gland with no evidence of distant metastatic osseous disease.  The patient underwent bronchoscopy/EBUS on 2024 which revealed no evidence of tracheal, right or left mainstem bronchus lesions, sampling occurred from station 11 RS, 4R, 7 and 11L.  Rapid onsite evaluation was a suggestive of lymphoid tissue in the lymph nodes and in the right upper lobe atypical cells noted.  Final pathology  revealed malignant cells of adenosquamous possibility in the right upper lobe, atypical cells on bronchial right upper lobe brushings, malignant cells from non-small cell lung cancer in station 7 negative in station 4R, 11 RS and 11L..  PD-L1 TPS 10% (low).      The patient elected for intracranial staging with CT head with contrast, deferring MRI brain with and without contrast which showed no evidence of metastatic disease or acute hemorrhage on 12/2/2024.  The patient underwent CT abdomen with IV contrast to further evaluate the hypermetabolic left adrenal lesion.    The patient completed chemoradiation therapy 6000 cGy in 30 fractions.    4/24/2025: The patient was recently found to have pneumonia and completed course of antibiotics. Has SOB. Esophagitis improved. Low energy.    Labs:  12/3/2024-PFTs: FEV1 0.7 L (32%), DLCO 53%     Pathology:  11/21/2024- A. LUNG, RIGHT UPPER LOBE; BIOPSY:-- Non-small cell carcinoma. Comment: The tumor is composed of two morphologically and immunophenotypically distinct populations; one of which is positive for p40 and negative for TTF-1 and the other is positive for TTF-1 and negative for p40. These findings raise the possibility of adenosquamous carcinoma, but this classification requires a resection specimen. Clinical and radiologic correlation is recommended.     11/21/2024- A. LUNG FINE NEEDLE ASPIRATION RIGHT UPPER LOBE NODULE, CYTOLOGY AND CELL BLOCK: --MALIGNANT CELLS DERIVED FROM NON-SMALL CELL CARCINOMA.  --SEE ALSO SURGICAL PATHOLOGY REPORT, [id number]. Note:  Two malignant components are identified. One component shows keratinizing squamous morphology and immunohistochemically is positive for p40, while it is negative for TTF-1. The other component shows adenocarcinoma morphology and immunohistochemically is focally positive for TTF-1 and negative for p40, raising concern for adenosquamous carcinoma.  However a final classification cannot be done on this cell block  material. Molecular testing (NGS) has been ordered and results will be issued in a separate report. The cell block contains low tumor cellularity representing roughly 20% of all nucleated cells.     B. BRONCHIAL BRUSH RIGHT UPPER LOBE, CYTOLOGY AND CELL BLOCK:  --PAUCICELLULAR SPECIMEN.  A FEW ATYPICAL CELLS ARE PRESENT, SUSPICIOUS FOR MALIGNANCY.       C. LYMPH NODE 11 L PULMONARY FINE NEEDLE ASPIRATION, CYTOLOGY AND CELL BLOCK:  --LIMITED LYMPHOID SPECIMEN. --NO MALIGNANT CELLS IDENTIFIED.           D. LYMPH NODE 7 PULMONARY FINE NEEDLE ASPIRATION, CYTOLOGY AND CELL BLOCK:  --MALIGNANT CELLS  DERIVED FROM NON-SMALL CELL CARCINOMA. Note:  Tumor cell morphology is similar to that of the adenocarcinoma component noted in part A (right upper lobe nodule).   Tumor cells show positive immunostaining for CK7 and keratins AE1/AE3 and CAM5.2.  Tumor cells show no immunostaining for TTF-1 or p40.      E. LYMPH NODE 4 R PULMONARY FINE NEEDLE ASPIRATION, CYTOLOGY AND CELL BLOCK: --LIMITED LYMPHOID SPECIMEN. --NO MALIGNANT CELLS IDENTIFIED.           F. LYMPH NODE 11 Rs PULMONARY FINE NEEDLE ASPIRATION, CYTOLOGY AND CELL BLOCK: --LIMITED LYMPHOID SPECIMEN. --NO MALIGNANT CELLS IDENTIFIED.       Disease Associated Genomics:   PD-L1 TPS 10% (low)    NGS:  MICROSATELLITE STATUS: Microsatellite Instability-High (MSI-H) is NOT DETECTED.        DISEASE ASSOCIATED GENOMIC FINDINGS:   MAP2K1 p.K57N ([id number] c.171G>C)     DISEASE RELEVANT ALTERATIONS NOT DETECTED:   Negative for ALK fusion.  Negative for BRAF V600E.  Negative for EGFR sensitizing mutation.  Negative for ERBB2 activating mutation  Negative for KRAS G12C.  Negative for MET exon 14 skipping mutation.  Negative for NTRK fusion.  Negative for RET fusion.  Negative for ROS1 fusion.        VARIANTS OF UNCERTAIN SIGNIFICANCE:   ERBB3 p.N101S ([id number] c.302A>G)       Disease Tumor Markers:  None      Imagin2025-CT chest without contrast: Stable right middle lobe  spiculated nodule, interval worsening interlobar septal thickening, groundglass opacities and right pleural effusion which may be related to postobstructive pneumonitis however lymphangitic carcinomatosis cannot be excluded.  Worsening of right upper lobe subpleural opacities compatible with subsegmental atelectasis.    12/5/2024-CT abdomen with contrast: Nonspecific nodule in the right adrenal gland.     11/21/2024-CT chest without contrast: Mild centrilobular emphysematous changes with enlargement of the right upper lobe pulmonary nodule measuring 1.7 x 1.6 cm, right middle lobe consolidation and collapse and pulmonary nodule within the anterior right lower lobe measuring 6 mm, interval increase of 4 mm subpleural nodule within the left lower lobe and multiple punctate subcentimeter pulmonary nodules throughout the right lung.  Multiple prominent right-sided hilar and mediastinal lymph nodes with right paratracheal measuring 1.5 cm and additional right paratracheal lymph node measuring 1.2 cm.  Interlobar septal thickening in the right upper lobe surrounding the nodule, lymphangitic spread cannot be excluded.     11/6/2024-PET/CT: No evidence of hypermetabolic cervical lymphadenopathy.  There is a right hypermetabolic supraclavicular lymph node with max SUV 6.4.  Within the chest there is a irregular right upper lobe nodule with max SUV 9.1.  No hypermetabolic activity at the right lower lobe or subpleural left lower lobe nodules.  Mediastinal and hilar lymph nodes with hypermetabolic activity including right upper paratracheal lymph node with max SUV 3.3, right lower paratracheal lymph node with max SUV 5.9, subcarinal lymph node with max SUV 6.9 and right hilar lymph node with max SUV 7.0.  There is uptake and aortopulmonary lymph nodes with max SUV 4.3.  There is mild uptake within the left adrenal gland with max SUV 2.9.  No evidence of osseous metastatic disease.     10/8/2024-CT lung dose screening:  Extensive tracheobronchial calcifications with narrowing of the right middle lobe bronchus with mucoid impaction.  Right middle lobe consolidation/collapse.  In the right upper lobe a irregular 1.1 x 1.4 cm nodule, within the anterior segment of the right lower lobe a irregular 5 mm lung nodule and 2 mm subpleural lung nodule with associated 3 mm focus in the right lower lobe of mucoid impaction.  Increased size and number of mediastinal lymph nodes.     Prior Systemic Therapies:  Durvalumab planned  2/11/2025-3/24/2025: Concurrent CarboTaxol with radiation therapy  12/2024-current: CarboTaxol plus nivolumab     Prior Surgeries:  11/21/2024-bronchoscopy/EBUS: No evidence of lesions involving the trachea, right or left bronchial tree.  Navigation bronchoscopy was conducted with sampling of the right upper lobe.  EBUS was conducted and sampling occurred from station 11 RS, 4R, 7 and 11L.  Rapid onsite evaluation was suggestive of lymphoid tissue in station 4R, 7 and 11L.  Rapid onsite evaluation of the right upper lobe lesion was suggestive of atypical cells.     Prior Radiation Therapy:  2/11/2025-3/24/2025: 6000 cGy in 30 fractions to the right lung, hilum and mediastinum    Treatment Rendered:   IMRT: Right Thorax, Midline Mediastinum, Right Supraclavicular fossa    Treatment Period Technique Fraction Dose Fractions Total Dose   Course 1 2/11/2025-3/24/2025  (days elapsed: 41)         RUL_Hil_Med 2/11/2025-3/24/2025 VMAT 200 / 200 cGy 30 / 30 6,000 / 6,000 cGy       Review of Systems:   Review of Systems   Constitutional:  Negative for chills, fatigue and fever.   Respiratory:  Positive for shortness of breath.    Cardiovascular:  Negative for chest pain.   Neurological:  Negative for headaches.       Performance Status:   The Karnofsky performance scale today is 80, Normal activity with effort; some signs or symptoms of disease (ECOG equivalent 1).       OBJECTIVE  Vital Signs:  There were no vitals taken for this  visit.  Physical Exam  Vitals and nursing note reviewed.   HENT:      Head: Normocephalic.   Eyes:      Extraocular Movements: Extraocular movements intact.   Cardiovascular:      Rate and Rhythm: Normal rate.   Pulmonary:      Effort: Pulmonary effort is normal.      Breath sounds: Normal breath sounds.   Musculoskeletal:         General: Normal range of motion.   Neurological:      General: No focal deficit present.      Mental Status: She is alert.            ASSESSMENT:   Billie Hernadez is a 69 y.o. female with Malignant neoplasm of upper lobe of right lung (Multi), Clinical: Stage IIIB (cT1b, cN3, cM0).      Ms. Hernadez is a female with locally advanced non-small cell lung cancer, cT1b cN3 (Right SCV) cM0 adenosquamous of the right upper lobe.  The patient is status post neoadjuvant chemo-immunotherapy followed by chemoradiation therapy.    The patient is following with medical oncology, plan for consolidative durvalumab.  The patient has CT imaging which shows right pleural effusion and worsening interlobar septal thickening which may be related to pneumonitis; however interval follow-up is required to ensure not related to lymphangitic carcinomatosis.  The patient has interval improvement of the dominant nodule in the right spiculated nodule.    The patient is scheduled for reimaging with PET/CT in 6/2025.  The patient will be seen for follow-up in 7/2025.    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  -Follow up in 7/2025  - Following with medical oncology plan for durvalumab, PET CT in 6/2025  -Status post chemoradiation therapy  - Status post neoadjuvant chemo-immunotherapy    #) Acute toxicities  -Fatigue: improved with rest, grade 1  -SOB: Grade 1, Monitor    #) Long term side effects    A total of 15 minutes were spent face-to-face with the patient, with an additional 15 minutes spent reviewing records  including imaging, pathology and physician notes.    Abimael Bridges MD  [facility] [place]  BOB clinical  - Department of Radiation Oncology  Phone: [phone]  Fax: [phone]  [facility] secure chat preferred / Pager [phone]    Note: This was transcribed using Dragon voice recognition software. Attempts were made to correct any errors; however, errors or omissions may be present.     NCCN Guidelines were applicable to guide this patients treatment plan.

## 2025-05-05 LAB
ATRIAL RATE: 115 BPM
ATRIAL RATE: 123 BPM
PR INTERVAL: 176 MS
PR INTERVAL: 184 MS
Q ONSET: 228 MS
Q ONSET: 228 MS
QRS COUNT: 19 BEATS
QRS COUNT: 20 BEATS
QRS DURATION: 68 MS
QRS DURATION: 68 MS
QT INTERVAL: 306 MS
QT INTERVAL: 314 MS
QTC CALCULATION(BAZETT): 434 MS
QTC CALCULATION(BAZETT): 438 MS
QTC FREDERICIA: 388 MS
QTC FREDERICIA: 389 MS
R AXIS: 64 DEGREES
R AXIS: 86 DEGREES
T AXIS: 52 DEGREES
T AXIS: 60 DEGREES
T OFFSET: 381 MS
T OFFSET: 385 MS
VENTRICULAR RATE: 115 BPM
VENTRICULAR RATE: 123 BPM

## 2025-05-06 ENCOUNTER — INFUSION (OUTPATIENT)
Facility: HOSPITAL | Age: 70
End: 2025-05-06
Payer: MEDICARE

## 2025-05-06 VITALS
SYSTOLIC BLOOD PRESSURE: 109 MMHG | OXYGEN SATURATION: 93 % | HEART RATE: 86 BPM | TEMPERATURE: 97.7 F | RESPIRATION RATE: 18 BRPM | DIASTOLIC BLOOD PRESSURE: 70 MMHG

## 2025-05-06 DIAGNOSIS — C34.11 MALIGNANT NEOPLASM OF UPPER LOBE OF RIGHT LUNG (MULTI): ICD-10-CM

## 2025-05-06 LAB
ALBUMIN SERPL BCP-MCNC: 3.9 G/DL (ref 3.4–5)
ALP SERPL-CCNC: 56 U/L (ref 33–136)
ALT SERPL W P-5'-P-CCNC: 13 U/L (ref 7–45)
ANION GAP SERPL CALC-SCNC: <7 MMOL/L (ref 10–20)
AST SERPL W P-5'-P-CCNC: 28 U/L (ref 9–39)
BASOPHILS # BLD AUTO: 0.04 X10*3/UL (ref 0–0.1)
BASOPHILS NFR BLD AUTO: 0.4 %
BILIRUB SERPL-MCNC: 0.4 MG/DL (ref 0–1.2)
BUN SERPL-MCNC: 10 MG/DL (ref 6–23)
CALCIUM SERPL-MCNC: 8.9 MG/DL (ref 8.6–10.3)
CHLORIDE SERPL-SCNC: 96 MMOL/L (ref 98–107)
CO2 SERPL-SCNC: 33 MMOL/L (ref 21–32)
CORTIS AM PEAK SERPL-MSCNC: 15.2 UG/DL (ref 5–20)
CREAT SERPL-MCNC: 0.45 MG/DL (ref 0.5–1.05)
EGFRCR SERPLBLD CKD-EPI 2021: >90 ML/MIN/1.73M*2
EOSINOPHIL # BLD AUTO: 0.08 X10*3/UL (ref 0–0.4)
EOSINOPHIL NFR BLD AUTO: 0.8 %
ERYTHROCYTE [DISTWIDTH] IN BLOOD BY AUTOMATED COUNT: 17.4 % (ref 11.5–14.5)
GLUCOSE SERPL-MCNC: 118 MG/DL (ref 74–99)
HCT VFR BLD AUTO: 31.9 % (ref 36–46)
HGB BLD-MCNC: 9.9 G/DL (ref 12–16)
IMM GRANULOCYTES # BLD AUTO: 0.05 X10*3/UL (ref 0–0.5)
IMM GRANULOCYTES NFR BLD AUTO: 0.5 % (ref 0–0.9)
LYMPHOCYTES # BLD AUTO: 0.72 X10*3/UL (ref 0.8–3)
LYMPHOCYTES NFR BLD AUTO: 7.3 %
MCH RBC QN AUTO: 32.5 PG (ref 26–34)
MCHC RBC AUTO-ENTMCNC: 31 G/DL (ref 32–36)
MCV RBC AUTO: 105 FL (ref 80–100)
MONOCYTES # BLD AUTO: 0.82 X10*3/UL (ref 0.05–0.8)
MONOCYTES NFR BLD AUTO: 8.3 %
NEUTROPHILS # BLD AUTO: 8.15 X10*3/UL (ref 1.6–5.5)
NEUTROPHILS NFR BLD AUTO: 82.7 %
NRBC BLD-RTO: 0 /100 WBCS (ref 0–0)
PLATELET # BLD AUTO: 289 X10*3/UL (ref 150–450)
POTASSIUM SERPL-SCNC: 4 MMOL/L (ref 3.5–5.3)
PROT SERPL-MCNC: 6.9 G/DL (ref 6.4–8.2)
RBC # BLD AUTO: 3.05 X10*6/UL (ref 4–5.2)
SODIUM SERPL-SCNC: 130 MMOL/L (ref 136–145)
TSH SERPL-ACNC: 0.49 MIU/L (ref 0.44–3.98)
WBC # BLD AUTO: 9.9 X10*3/UL (ref 4.4–11.3)

## 2025-05-06 PROCEDURE — 82533 TOTAL CORTISOL: CPT | Mod: CONLAB

## 2025-05-06 PROCEDURE — 84443 ASSAY THYROID STIM HORMONE: CPT

## 2025-05-06 PROCEDURE — 2500000004 HC RX 250 GENERAL PHARMACY W/ HCPCS (ALT 636 FOR OP/ED): Mod: JZ | Performed by: INTERNAL MEDICINE

## 2025-05-06 PROCEDURE — 85025 COMPLETE CBC W/AUTO DIFF WBC: CPT

## 2025-05-06 PROCEDURE — 82024 ASSAY OF ACTH: CPT

## 2025-05-06 PROCEDURE — 36591 DRAW BLOOD OFF VENOUS DEVICE: CPT

## 2025-05-06 PROCEDURE — 80053 COMPREHEN METABOLIC PANEL: CPT

## 2025-05-06 RX ORDER — HEPARIN SODIUM,PORCINE/PF 10 UNIT/ML
50 SYRINGE (ML) INTRAVENOUS AS NEEDED
Status: DISCONTINUED | OUTPATIENT
Start: 2025-05-06 | End: 2025-05-06 | Stop reason: HOSPADM

## 2025-05-06 RX ORDER — HEPARIN SODIUM,PORCINE/PF 10 UNIT/ML
50 SYRINGE (ML) INTRAVENOUS AS NEEDED
Status: CANCELLED | OUTPATIENT
Start: 2025-05-06

## 2025-05-06 RX ORDER — HEPARIN 100 UNIT/ML
500 SYRINGE INTRAVENOUS AS NEEDED
Status: DISCONTINUED | OUTPATIENT
Start: 2025-05-06 | End: 2025-05-06 | Stop reason: HOSPADM

## 2025-05-06 RX ORDER — HEPARIN 100 UNIT/ML
500 SYRINGE INTRAVENOUS AS NEEDED
Status: CANCELLED | OUTPATIENT
Start: 2025-05-06

## 2025-05-06 RX ADMIN — HEPARIN 500 UNITS: 100 SYRINGE at 09:48

## 2025-05-06 ASSESSMENT — PAIN SCALES - GENERAL: PAINLEVEL_OUTOF10: 0-NO PAIN

## 2025-05-06 ASSESSMENT — ENCOUNTER SYMPTOMS
ENDOCRINE NEGATIVE: 1
HEMATOLOGIC/LYMPHATIC NEGATIVE: 1
EYES NEGATIVE: 1
CARDIOVASCULAR NEGATIVE: 1
MUSCULOSKELETAL NEGATIVE: 1
NEUROLOGICAL NEGATIVE: 1
CONSTITUTIONAL NEGATIVE: 1
GASTROINTESTINAL NEGATIVE: 1
PSYCHIATRIC NEGATIVE: 1
COUGH: 1

## 2025-05-06 NOTE — PROGRESS NOTES
[facility]   Infusion Clinic Note   Date: May 6, 2025   Name: Billie Hernadez  : 1955   MRN: [id number]         Reason for Visit: Labs Only         Today: We administered heparin flush.       Ordered By: No ref. provider found       For a Diagnosis of: Malignant neoplasm of upper lobe of right lung (Multi)       At today's visit patient accompanied by:       Today's Vitals:   Vitals:    25 0945   BP: 109/70   Pulse: 86   Resp: 18   Temp: 36.5 °C (97.7 °F)   TempSrc: Temporal   SpO2: 93%   PainSc: 0-No pain             Pre - Treatment Checklist:      - Previous reaction to current treatment: n/a      (Assess patient for the concerns below. Document provider notification as appropriate).  - Active or recent infection with/without current antibiotic use: n/a  - Recent or planned invasive dental work: n/a  - Recent or planned surgeries: n/a  - Recently received or plans to receive vaccinations: n/a  - Has treatment related toxicities: n/a  - Any chance may be pregnant:  n/a      Pain: 0   - Is the pain different from normal: n/a   - Is prescribing Doctor aware:  n/a      Labs: Labs drawn and sent per order      Fall Risk Screening: Germain Fall Risk  History of Falling, Immediate or Within 3 Months: No  Secondary Diagnosis: No  Ambulatory Aid: Walks without aid/bedrest/nurse assist  Intravenous Therapy/Heparin Lock: No  Gait/Transferring: Normal/bedrest/immobile  Mental Status: Oriented to own ability  Germain Fall Risk Score: 0       Review Of Systems:  Review of Systems   Constitutional: Negative.    HENT:  Negative.     Eyes: Negative.    Respiratory:  Positive for cough.    Cardiovascular: Negative.    Gastrointestinal: Negative.    Endocrine: Negative.    Genitourinary: Negative.     Musculoskeletal: Negative.    Skin: Negative.    Neurological: Negative.    Hematological: Negative.    Psychiatric/Behavioral: Negative.           Infusion Readiness:  - Assessment Concerns  Related to Infusion: No  - Provider notified: n/a      New Patient Education:    N/A (returning patient for continuation of therapy. Ongoing education provided as needed.)        Treatment Conditions & Drug Specific Questions:    NOT APPLICABLE        Weight Based Drug Calculations:    WEIGHT BASED DRUGS: NOT APPLICABLE / FLAT DOSE       Post Treatment: Patient tolerated treatment without issue and was discharged in no apparent distress.      Note Authored / Patient Cared for By: Mirta Pagan RN

## 2025-05-07 ENCOUNTER — INFUSION (OUTPATIENT)
Dept: HEMATOLOGY/ONCOLOGY | Facility: HOSPITAL | Age: 70
End: 2025-05-07
Payer: MEDICARE

## 2025-05-07 VITALS
HEART RATE: 96 BPM | OXYGEN SATURATION: 93 % | HEIGHT: 62 IN | RESPIRATION RATE: 17 BRPM | SYSTOLIC BLOOD PRESSURE: 113 MMHG | DIASTOLIC BLOOD PRESSURE: 67 MMHG | WEIGHT: 105.6 LBS | BODY MASS INDEX: 19.43 KG/M2 | TEMPERATURE: 97.2 F

## 2025-05-07 DIAGNOSIS — C34.11 MALIGNANT NEOPLASM OF UPPER LOBE OF RIGHT LUNG (MULTI): ICD-10-CM

## 2025-05-07 PROCEDURE — 2500000004 HC RX 250 GENERAL PHARMACY W/ HCPCS (ALT 636 FOR OP/ED): Mod: JZ,TB | Performed by: INTERNAL MEDICINE

## 2025-05-07 PROCEDURE — 96413 CHEMO IV INFUSION 1 HR: CPT

## 2025-05-07 RX ORDER — FAMOTIDINE 10 MG/ML
20 INJECTION, SOLUTION INTRAVENOUS ONCE AS NEEDED
Status: DISCONTINUED | OUTPATIENT
Start: 2025-05-07 | End: 2025-05-07 | Stop reason: HOSPADM

## 2025-05-07 RX ORDER — PROCHLORPERAZINE MALEATE 10 MG
10 TABLET ORAL EVERY 6 HOURS PRN
Status: DISCONTINUED | OUTPATIENT
Start: 2025-05-07 | End: 2025-05-07 | Stop reason: HOSPADM

## 2025-05-07 RX ORDER — HEPARIN SODIUM,PORCINE/PF 10 UNIT/ML
50 SYRINGE (ML) INTRAVENOUS AS NEEDED
OUTPATIENT
Start: 2025-05-07

## 2025-05-07 RX ORDER — HEPARIN 100 UNIT/ML
500 SYRINGE INTRAVENOUS AS NEEDED
OUTPATIENT
Start: 2025-05-07

## 2025-05-07 RX ORDER — ALBUTEROL SULFATE 0.83 MG/ML
3 SOLUTION RESPIRATORY (INHALATION) AS NEEDED
Status: DISCONTINUED | OUTPATIENT
Start: 2025-05-07 | End: 2025-05-07 | Stop reason: HOSPADM

## 2025-05-07 RX ORDER — EPINEPHRINE 0.3 MG/.3ML
0.3 INJECTION SUBCUTANEOUS EVERY 5 MIN PRN
Status: DISCONTINUED | OUTPATIENT
Start: 2025-05-07 | End: 2025-05-07 | Stop reason: HOSPADM

## 2025-05-07 RX ORDER — PROCHLORPERAZINE EDISYLATE 5 MG/ML
10 INJECTION INTRAMUSCULAR; INTRAVENOUS EVERY 6 HOURS PRN
Status: DISCONTINUED | OUTPATIENT
Start: 2025-05-07 | End: 2025-05-07 | Stop reason: HOSPADM

## 2025-05-07 RX ORDER — HEPARIN 100 UNIT/ML
500 SYRINGE INTRAVENOUS AS NEEDED
Status: DISCONTINUED | OUTPATIENT
Start: 2025-05-07 | End: 2025-05-07 | Stop reason: HOSPADM

## 2025-05-07 RX ORDER — DIPHENHYDRAMINE HYDROCHLORIDE 50 MG/ML
50 INJECTION, SOLUTION INTRAMUSCULAR; INTRAVENOUS AS NEEDED
Status: DISCONTINUED | OUTPATIENT
Start: 2025-05-07 | End: 2025-05-07 | Stop reason: HOSPADM

## 2025-05-07 RX ADMIN — SODIUM CHLORIDE 1500 MG: 9 INJECTION, SOLUTION INTRAVENOUS at 10:10

## 2025-05-07 ASSESSMENT — PAIN SCALES - GENERAL: PAINLEVEL_OUTOF10: 0-NO PAIN

## 2025-05-07 ASSESSMENT — ENCOUNTER SYMPTOMS
DEPRESSION: 0
OCCASIONAL FEELINGS OF UNSTEADINESS: 0
LOSS OF SENSATION IN FEET: 0

## 2025-05-07 NOTE — PROGRESS NOTES
Pt educated on her lower sodium level.  Encouraged salty snacks and told signs of low sodium, what to be aware of.  States understanding.

## 2025-05-07 NOTE — SIGNIFICANT EVENT

## 2025-05-08 LAB — ACTH PLAS-MCNC: 26 PG/ML (ref 7.2–63.3)

## 2025-05-19 DIAGNOSIS — E78.49 OTHER HYPERLIPIDEMIA: ICD-10-CM

## 2025-05-19 RX ORDER — ALENDRONATE SODIUM 70 MG/1
TABLET ORAL
Qty: 12 TABLET | Refills: 3 | Status: SHIPPED | OUTPATIENT
Start: 2025-05-19

## 2025-05-23 NOTE — PROGRESS NOTES
"Subjective   Patient ID: Billie Hernadez is a 69 y.o. female who presents for Follow-up (2M;).    HPI    Lung cancer: She just finished chemo and radiation last week, dx in November. Having a hard time staying hydrated. Checking bp at home   Finished her IV fluids and weekly blood work. Still has oxygen at home, doesn't always wear it, wearing while sleeping. Taking deep breaths as needed.      HTN: Taking losartan 25mg, holding for now d/t dehydration. BP has been running lower, 110-120 at home.       Postmenopausal osteoporosis- she is taking alendronate 70 mg oral tablet every week. Taking vitamin D and calcium supplements. Very active in the summer. She stays active at home in winter.      COPD: Breathing has been better. Patient is also active smoker and get short of breath with exertion. She have a history of COPD. She smokes about a pack for a few days. She is trying to wean herself down. She has been smoking since she was about 18. She has issues with blowing out candles or blowing up balloons. Goal is try to get it down to 5.   Checking her oxygen at home and it is good, but having some tachycardia on her monitor.          Mammogram: history of abnormal results. Last was Aug 2020, supposed to follow up every 6 months to monitor \"suspicious for Microcalcification.\" 4/2022- normal mammogram, due in 1 year   She has not done one since with her dx of lung cancer, she plans to after she finishes treatments.      Hyperlipidemia- She is not on any medication.      Vitamin D deficiency- Last vitamin D level increased to normal.     [facility] is due      She is Florin's grandma       Review of systems completed and unremarkable other than what is documented in HPI.    Objective   BP 97/63 (BP Location: Right arm, Patient Position: Sitting, BP Cuff Size: Adult)   Pulse 105   Ht 1.582 m (5' 2.28\")   Wt 45.3 kg (99 lb 12.8 oz)   BMI 18.09 kg/m²     Patient Health Questionnaire-2 Score: 0 " (6/3/2025 11:10 AM)    Physical Exam    Gen: No acute distress, alert and oriented x3, pleasant   HEENT: moist mucous membranes, b/l external auditory canals are clear of debris, TMs within normal limits, no oropharyngeal lesions, eomi, perrla   Neck: thyroid within normal limits, no lymphadenopathy   CV: RRR, normal S1/S2, no murmur   Resp: Clear to auscultation bilaterally, no wheezes or rhonchi appreciated  Abd: soft, nontender, non-distended, no guarding/rigidity, bowel sounds present  Extr: no edema, no calf tenderness  Derm: Skin is warm and dry, no rashes appreciated  Psych: mood is good, affect is congruent, good hygiene, normal speech and eye contact  Neuro: cranial nerves grossly intact, normal gait      Assessment/Plan       #Irregular rhythm   #Tachycardia   Checking EKG   Likely from dehydration   Taking metoprolol daily   Taking half tab metoprolol twice per day   Denies lightheadedness   Usually in 110-120 systolic range      #Lung cancer   Following with hem/onc   Completed chemo and radiation   Finished with IV fluids   Had PET scan recently       #HTN   Losartan 25mg   BP low with dehydration   Hold losartan for now   Monitor BP at home, if elevated let office know   May need to restart       Postmenopausal Osteoporosis  - Continue Alendronate.  - Dexa scan 2023  - ordered today      Hyperlipidemia  - Lipid panel ordered.  - Lifestyle modification to continue.     COPD  - No wheezing on auscultation  - Patient encouraged to stop smoking.  - discussed possible PFT to assess  - refilled Spiriva and albuterol inhaler for PRN use      Breast cancer screening  - ordered mammogram today, needed every year   - Waiting until finishing with lung cancer treatment      Vitamin D level ordered.     #HCM  Mammogram 5/2024, ordered  Cologuard done 2023   C-scope done 11/2023, repeat in 2028  Dexa 2023- osteoporosis Due in October   CT low cancer screening- getting next month   UTD on vaccines   Gets flu vaccine

## 2025-05-30 DIAGNOSIS — E83.42 HYPOMAGNESEMIA: ICD-10-CM

## 2025-05-30 RX ORDER — LANOLIN ALCOHOL/MO/W.PET/CERES
1 CREAM (GRAM) TOPICAL 2 TIMES DAILY
Qty: 60 TABLET | Refills: 1 | Status: SHIPPED | OUTPATIENT
Start: 2025-05-30

## 2025-06-02 ENCOUNTER — APPOINTMENT (OUTPATIENT)
Dept: PRIMARY CARE | Facility: CLINIC | Age: 70
End: 2025-06-02
Payer: MEDICARE

## 2025-06-02 ENCOUNTER — INFUSION (OUTPATIENT)
Dept: HEMATOLOGY/ONCOLOGY | Facility: HOSPITAL | Age: 70
End: 2025-06-02
Payer: MEDICARE

## 2025-06-02 ENCOUNTER — HOSPITAL ENCOUNTER (OUTPATIENT)
Dept: RADIOLOGY | Facility: HOSPITAL | Age: 70
Discharge: HOME | End: 2025-06-02
Payer: MEDICARE

## 2025-06-02 VITALS
OXYGEN SATURATION: 95 % | RESPIRATION RATE: 16 BRPM | SYSTOLIC BLOOD PRESSURE: 109 MMHG | TEMPERATURE: 95.9 F | HEART RATE: 75 BPM | DIASTOLIC BLOOD PRESSURE: 67 MMHG

## 2025-06-02 DIAGNOSIS — C34.11 MALIGNANT NEOPLASM OF UPPER LOBE OF RIGHT LUNG (MULTI): ICD-10-CM

## 2025-06-02 LAB
ALBUMIN SERPL BCP-MCNC: 3.9 G/DL (ref 3.4–5)
ALP SERPL-CCNC: 60 U/L (ref 33–136)
ALT SERPL W P-5'-P-CCNC: 13 U/L (ref 7–45)
ANION GAP SERPL CALC-SCNC: 11 MMOL/L (ref 10–20)
AST SERPL W P-5'-P-CCNC: 26 U/L (ref 9–39)
BASOPHILS # BLD AUTO: 0.02 X10*3/UL (ref 0–0.1)
BASOPHILS NFR BLD AUTO: 0.3 %
BILIRUB SERPL-MCNC: 0.4 MG/DL (ref 0–1.2)
BUN SERPL-MCNC: 10 MG/DL (ref 6–23)
CALCIUM SERPL-MCNC: 9.5 MG/DL (ref 8.6–10.3)
CHLORIDE SERPL-SCNC: 93 MMOL/L (ref 98–107)
CO2 SERPL-SCNC: 33 MMOL/L (ref 21–32)
CREAT SERPL-MCNC: 0.56 MG/DL (ref 0.5–1.05)
EGFRCR SERPLBLD CKD-EPI 2021: >90 ML/MIN/1.73M*2
EOSINOPHIL # BLD AUTO: 0.09 X10*3/UL (ref 0–0.4)
EOSINOPHIL NFR BLD AUTO: 1.4 %
ERYTHROCYTE [DISTWIDTH] IN BLOOD BY AUTOMATED COUNT: 15.5 % (ref 11.5–14.5)
GLUCOSE SERPL-MCNC: 114 MG/DL (ref 74–99)
HCT VFR BLD AUTO: 36.5 % (ref 36–46)
HGB BLD-MCNC: 11.7 G/DL (ref 12–16)
IMM GRANULOCYTES # BLD AUTO: 0 X10*3/UL (ref 0–0.5)
IMM GRANULOCYTES NFR BLD AUTO: 0 % (ref 0–0.9)
LYMPHOCYTES # BLD AUTO: 0.76 X10*3/UL (ref 0.8–3)
LYMPHOCYTES NFR BLD AUTO: 11.7 %
MCH RBC QN AUTO: 33.2 PG (ref 26–34)
MCHC RBC AUTO-ENTMCNC: 32.1 G/DL (ref 32–36)
MCV RBC AUTO: 104 FL (ref 80–100)
MONOCYTES # BLD AUTO: 0.6 X10*3/UL (ref 0.05–0.8)
MONOCYTES NFR BLD AUTO: 9.2 %
NEUTROPHILS # BLD AUTO: 5.03 X10*3/UL (ref 1.6–5.5)
NEUTROPHILS NFR BLD AUTO: 77.4 %
NRBC BLD-RTO: ABNORMAL /100{WBCS}
PLATELET # BLD AUTO: 271 X10*3/UL (ref 150–450)
POTASSIUM SERPL-SCNC: 4.2 MMOL/L (ref 3.5–5.3)
PROT SERPL-MCNC: 7.1 G/DL (ref 6.4–8.2)
RBC # BLD AUTO: 3.52 X10*6/UL (ref 4–5.2)
SODIUM SERPL-SCNC: 133 MMOL/L (ref 136–145)
WBC # BLD AUTO: 6.5 X10*3/UL (ref 4.4–11.3)

## 2025-06-02 PROCEDURE — A9552 F18 FDG: HCPCS | Performed by: INTERNAL MEDICINE

## 2025-06-02 PROCEDURE — 36591 DRAW BLOOD OFF VENOUS DEVICE: CPT

## 2025-06-02 PROCEDURE — 78815 PET IMAGE W/CT SKULL-THIGH: CPT | Mod: PS

## 2025-06-02 PROCEDURE — 78815 PET IMAGE W/CT SKULL-THIGH: CPT | Mod: PET TUMOR SUBSQ TX STRATEGY | Performed by: RADIOLOGY

## 2025-06-02 PROCEDURE — 2500000004 HC RX 250 GENERAL PHARMACY W/ HCPCS (ALT 636 FOR OP/ED): Performed by: INTERNAL MEDICINE

## 2025-06-02 PROCEDURE — 84075 ASSAY ALKALINE PHOSPHATASE: CPT

## 2025-06-02 PROCEDURE — 3430000001 HC RX 343 DIAGNOSTIC RADIOPHARMACEUTICALS: Performed by: INTERNAL MEDICINE

## 2025-06-02 PROCEDURE — 85025 COMPLETE CBC W/AUTO DIFF WBC: CPT

## 2025-06-02 RX ORDER — HEPARIN 100 UNIT/ML
500 SYRINGE INTRAVENOUS AS NEEDED
Status: CANCELLED | OUTPATIENT
Start: 2025-06-02

## 2025-06-02 RX ORDER — HEPARIN 100 UNIT/ML
500 SYRINGE INTRAVENOUS AS NEEDED
Status: DISCONTINUED | OUTPATIENT
Start: 2025-06-02 | End: 2025-06-02 | Stop reason: HOSPADM

## 2025-06-02 RX ORDER — HEPARIN SODIUM,PORCINE/PF 10 UNIT/ML
50 SYRINGE (ML) INTRAVENOUS AS NEEDED
Status: CANCELLED | OUTPATIENT
Start: 2025-06-02

## 2025-06-02 RX ORDER — FLUDEOXYGLUCOSE F 18 200 MCI/ML
13.5 INJECTION, SOLUTION INTRAVENOUS
Status: COMPLETED | OUTPATIENT
Start: 2025-06-02 | End: 2025-06-02

## 2025-06-02 RX ORDER — HEPARIN SODIUM,PORCINE/PF 10 UNIT/ML
50 SYRINGE (ML) INTRAVENOUS AS NEEDED
Status: DISCONTINUED | OUTPATIENT
Start: 2025-06-02 | End: 2025-06-02 | Stop reason: HOSPADM

## 2025-06-02 RX ADMIN — FLUDEOXYGLUCOSE F 18 13.5 MILLICURIE: 200 INJECTION, SOLUTION INTRAVENOUS at 07:38

## 2025-06-02 RX ADMIN — Medication 500 UNITS: at 09:05

## 2025-06-02 ASSESSMENT — PATIENT HEALTH QUESTIONNAIRE - PHQ9
2. FEELING DOWN, DEPRESSED OR HOPELESS: NOT AT ALL
1. LITTLE INTEREST OR PLEASURE IN DOING THINGS: NOT AT ALL
SUM OF ALL RESPONSES TO PHQ9 QUESTIONS 1 & 2: 0

## 2025-06-02 ASSESSMENT — PAIN SCALES - GENERAL: PAINLEVEL_OUTOF10: 0-NO PAIN

## 2025-06-03 ENCOUNTER — APPOINTMENT (OUTPATIENT)
Facility: HOSPITAL | Age: 70
End: 2025-06-03
Payer: MEDICARE

## 2025-06-03 ENCOUNTER — APPOINTMENT (OUTPATIENT)
Dept: PRIMARY CARE | Facility: CLINIC | Age: 70
End: 2025-06-03
Payer: MEDICARE

## 2025-06-03 VITALS
DIASTOLIC BLOOD PRESSURE: 63 MMHG | BODY MASS INDEX: 18.37 KG/M2 | HEART RATE: 105 BPM | WEIGHT: 99.8 LBS | SYSTOLIC BLOOD PRESSURE: 97 MMHG | HEIGHT: 62 IN

## 2025-06-03 DIAGNOSIS — Z13.820 ENCOUNTER FOR SCREENING FOR OSTEOPOROSIS: ICD-10-CM

## 2025-06-03 PROCEDURE — 1160F RVW MEDS BY RX/DR IN RCRD: CPT | Performed by: REGISTERED NURSE

## 2025-06-03 PROCEDURE — 3074F SYST BP LT 130 MM HG: CPT | Performed by: REGISTERED NURSE

## 2025-06-03 PROCEDURE — 1036F TOBACCO NON-USER: CPT | Performed by: REGISTERED NURSE

## 2025-06-03 PROCEDURE — 1159F MED LIST DOCD IN RCRD: CPT | Performed by: REGISTERED NURSE

## 2025-06-03 PROCEDURE — 3078F DIAST BP <80 MM HG: CPT | Performed by: REGISTERED NURSE

## 2025-06-03 PROCEDURE — 3008F BODY MASS INDEX DOCD: CPT | Performed by: REGISTERED NURSE

## 2025-06-03 PROCEDURE — 99214 OFFICE O/P EST MOD 30 MIN: CPT | Performed by: REGISTERED NURSE

## 2025-06-03 RX ORDER — CETIRIZINE HYDROCHLORIDE 10 MG/1
10 TABLET ORAL DAILY
COMMUNITY

## 2025-06-04 ENCOUNTER — APPOINTMENT (OUTPATIENT)
Dept: PULMONOLOGY | Facility: CLINIC | Age: 70
End: 2025-06-04
Payer: MEDICARE

## 2025-06-04 ENCOUNTER — OFFICE VISIT (OUTPATIENT)
Dept: HEMATOLOGY/ONCOLOGY | Facility: HOSPITAL | Age: 70
End: 2025-06-04
Payer: MEDICARE

## 2025-06-04 ENCOUNTER — INFUSION (OUTPATIENT)
Dept: HEMATOLOGY/ONCOLOGY | Facility: HOSPITAL | Age: 70
End: 2025-06-04
Payer: MEDICARE

## 2025-06-04 VITALS
HEIGHT: 62 IN | HEART RATE: 73 BPM | OXYGEN SATURATION: 94 % | BODY MASS INDEX: 18.36 KG/M2 | WEIGHT: 99.76 LBS | TEMPERATURE: 96.8 F | SYSTOLIC BLOOD PRESSURE: 120 MMHG | RESPIRATION RATE: 16 BRPM | DIASTOLIC BLOOD PRESSURE: 77 MMHG

## 2025-06-04 VITALS
OXYGEN SATURATION: 97 % | SYSTOLIC BLOOD PRESSURE: 126 MMHG | HEART RATE: 94 BPM | BODY MASS INDEX: 18.23 KG/M2 | DIASTOLIC BLOOD PRESSURE: 79 MMHG | WEIGHT: 100.6 LBS

## 2025-06-04 VITALS
DIASTOLIC BLOOD PRESSURE: 75 MMHG | TEMPERATURE: 96.4 F | OXYGEN SATURATION: 95 % | HEART RATE: 70 BPM | RESPIRATION RATE: 16 BRPM | SYSTOLIC BLOOD PRESSURE: 125 MMHG

## 2025-06-04 DIAGNOSIS — C34.11 MALIGNANT NEOPLASM OF UPPER LOBE OF RIGHT LUNG (MULTI): ICD-10-CM

## 2025-06-04 DIAGNOSIS — C34.11 MALIGNANT NEOPLASM OF UPPER LOBE OF RIGHT LUNG (MULTI): Primary | ICD-10-CM

## 2025-06-04 DIAGNOSIS — J43.9 PULMONARY EMPHYSEMA, UNSPECIFIED EMPHYSEMA TYPE (MULTI): Primary | ICD-10-CM

## 2025-06-04 DIAGNOSIS — R09.02 HYPOXIA: ICD-10-CM

## 2025-06-04 PROCEDURE — 1036F TOBACCO NON-USER: CPT | Performed by: PEDIATRICS

## 2025-06-04 PROCEDURE — 2500000004 HC RX 250 GENERAL PHARMACY W/ HCPCS (ALT 636 FOR OP/ED): Mod: JZ,TB | Performed by: INTERNAL MEDICINE

## 2025-06-04 PROCEDURE — G2211 COMPLEX E/M VISIT ADD ON: HCPCS | Performed by: INTERNAL MEDICINE

## 2025-06-04 PROCEDURE — 3008F BODY MASS INDEX DOCD: CPT | Performed by: INTERNAL MEDICINE

## 2025-06-04 PROCEDURE — 1160F RVW MEDS BY RX/DR IN RCRD: CPT | Performed by: PEDIATRICS

## 2025-06-04 PROCEDURE — 1126F AMNT PAIN NOTED NONE PRSNT: CPT | Performed by: INTERNAL MEDICINE

## 2025-06-04 PROCEDURE — 1159F MED LIST DOCD IN RCRD: CPT | Performed by: PEDIATRICS

## 2025-06-04 PROCEDURE — 3078F DIAST BP <80 MM HG: CPT | Performed by: INTERNAL MEDICINE

## 2025-06-04 PROCEDURE — 3074F SYST BP LT 130 MM HG: CPT | Performed by: INTERNAL MEDICINE

## 2025-06-04 PROCEDURE — 96413 CHEMO IV INFUSION 1 HR: CPT

## 2025-06-04 PROCEDURE — 3074F SYST BP LT 130 MM HG: CPT | Performed by: PEDIATRICS

## 2025-06-04 PROCEDURE — 1159F MED LIST DOCD IN RCRD: CPT | Performed by: INTERNAL MEDICINE

## 2025-06-04 PROCEDURE — 1036F TOBACCO NON-USER: CPT | Performed by: INTERNAL MEDICINE

## 2025-06-04 PROCEDURE — 99215 OFFICE O/P EST HI 40 MIN: CPT | Performed by: PEDIATRICS

## 2025-06-04 PROCEDURE — 2500000004 HC RX 250 GENERAL PHARMACY W/ HCPCS (ALT 636 FOR OP/ED): Performed by: INTERNAL MEDICINE

## 2025-06-04 PROCEDURE — 99215 OFFICE O/P EST HI 40 MIN: CPT | Mod: 25 | Performed by: INTERNAL MEDICINE

## 2025-06-04 PROCEDURE — 99215 OFFICE O/P EST HI 40 MIN: CPT | Performed by: INTERNAL MEDICINE

## 2025-06-04 PROCEDURE — 3078F DIAST BP <80 MM HG: CPT | Performed by: PEDIATRICS

## 2025-06-04 RX ORDER — PROCHLORPERAZINE MALEATE 10 MG
10 TABLET ORAL EVERY 6 HOURS PRN
Status: DISCONTINUED | OUTPATIENT
Start: 2025-06-04 | End: 2025-06-04 | Stop reason: HOSPADM

## 2025-06-04 RX ORDER — EPINEPHRINE 0.3 MG/.3ML
0.3 INJECTION SUBCUTANEOUS EVERY 5 MIN PRN
OUTPATIENT
Start: 2025-07-09

## 2025-06-04 RX ORDER — ALBUTEROL SULFATE 0.83 MG/ML
3 SOLUTION RESPIRATORY (INHALATION) AS NEEDED
Status: DISCONTINUED | OUTPATIENT
Start: 2025-06-04 | End: 2025-06-04 | Stop reason: HOSPADM

## 2025-06-04 RX ORDER — HEPARIN 100 UNIT/ML
500 SYRINGE INTRAVENOUS AS NEEDED
OUTPATIENT
Start: 2025-06-04

## 2025-06-04 RX ORDER — HEPARIN SODIUM,PORCINE/PF 10 UNIT/ML
50 SYRINGE (ML) INTRAVENOUS AS NEEDED
OUTPATIENT
Start: 2025-06-04

## 2025-06-04 RX ORDER — ALBUTEROL SULFATE 0.83 MG/ML
3 SOLUTION RESPIRATORY (INHALATION) AS NEEDED
OUTPATIENT
Start: 2025-07-09

## 2025-06-04 RX ORDER — PROCHLORPERAZINE MALEATE 5 MG
10 TABLET ORAL EVERY 6 HOURS PRN
OUTPATIENT
Start: 2025-07-09

## 2025-06-04 RX ORDER — DIPHENHYDRAMINE HYDROCHLORIDE 50 MG/ML
50 INJECTION, SOLUTION INTRAMUSCULAR; INTRAVENOUS AS NEEDED
OUTPATIENT
Start: 2025-07-09

## 2025-06-04 RX ORDER — HEPARIN 100 UNIT/ML
500 SYRINGE INTRAVENOUS AS NEEDED
Status: DISCONTINUED | OUTPATIENT
Start: 2025-06-04 | End: 2025-06-04 | Stop reason: HOSPADM

## 2025-06-04 RX ORDER — FAMOTIDINE 10 MG/ML
20 INJECTION, SOLUTION INTRAVENOUS ONCE AS NEEDED
Status: DISCONTINUED | OUTPATIENT
Start: 2025-06-04 | End: 2025-06-04 | Stop reason: HOSPADM

## 2025-06-04 RX ORDER — DIPHENHYDRAMINE HYDROCHLORIDE 50 MG/ML
50 INJECTION, SOLUTION INTRAMUSCULAR; INTRAVENOUS AS NEEDED
Status: DISCONTINUED | OUTPATIENT
Start: 2025-06-04 | End: 2025-06-04 | Stop reason: HOSPADM

## 2025-06-04 RX ORDER — EPINEPHRINE 0.3 MG/.3ML
0.3 INJECTION SUBCUTANEOUS EVERY 5 MIN PRN
Status: DISCONTINUED | OUTPATIENT
Start: 2025-06-04 | End: 2025-06-04 | Stop reason: HOSPADM

## 2025-06-04 RX ORDER — HEPARIN SODIUM,PORCINE/PF 10 UNIT/ML
50 SYRINGE (ML) INTRAVENOUS AS NEEDED
Status: DISCONTINUED | OUTPATIENT
Start: 2025-06-04 | End: 2025-06-04 | Stop reason: HOSPADM

## 2025-06-04 RX ORDER — PROCHLORPERAZINE EDISYLATE 5 MG/ML
10 INJECTION INTRAMUSCULAR; INTRAVENOUS EVERY 6 HOURS PRN
Status: DISCONTINUED | OUTPATIENT
Start: 2025-06-04 | End: 2025-06-04 | Stop reason: HOSPADM

## 2025-06-04 RX ORDER — FAMOTIDINE 10 MG/ML
20 INJECTION, SOLUTION INTRAVENOUS ONCE AS NEEDED
OUTPATIENT
Start: 2025-07-09

## 2025-06-04 RX ORDER — PROCHLORPERAZINE EDISYLATE 5 MG/ML
10 INJECTION INTRAMUSCULAR; INTRAVENOUS EVERY 6 HOURS PRN
OUTPATIENT
Start: 2025-07-09

## 2025-06-04 RX ADMIN — HEPARIN 500 UNITS: 100 SYRINGE at 12:30

## 2025-06-04 RX ADMIN — SODIUM CHLORIDE 1500 MG: 9 INJECTION, SOLUTION INTRAVENOUS at 11:28

## 2025-06-04 SDOH — ECONOMIC STABILITY: GENERAL
WHICH OF THE FOLLOWING WOULD YOU LIKE TO GET CONNECTED TO IN ORDER TO RECEIVE A DISCOUNT OR FOR FREE? (CHOOSE ALL THAT APPLY): PATIENT UNABLE TO ANSWER

## 2025-06-04 SDOH — ECONOMIC STABILITY: GENERAL
WHICH OF THE FOLLOWING DO YOU KNOW HOW TO USE AND HAVE ACCESS TO EVERY DAY? (CHOOSE ALL THAT APPLY): SMARTPHONE WITH CELLULAR DATA PLAN;DESKTOP COMPUTER, LAPTOP COMPUTER, OR TABLET WITH BROADBAND INTERNET CONNECTION

## 2025-06-04 ASSESSMENT — SOCIAL DETERMINANTS OF HEALTH (SDOH)
HOW OFTEN DO YOU ATTEND CHURCH OR RELIGIOUS SERVICES?: NEVER
IN A TYPICAL WEEK, HOW MANY TIMES DO YOU TALK ON THE PHONE WITH FAMILY, FRIENDS, OR NEIGHBORS?: ONCE A WEEK
DO YOU BELONG TO ANY CLUBS OR ORGANIZATIONS SUCH AS CHURCH GROUPS UNIONS, FRATERNAL OR ATHLETIC GROUPS, OR SCHOOL GROUPS?: YES
HOW OFTEN DO YOU ATTENT MEETINGS OF THE CLUB OR ORGANIZATION YOU BELONG TO?: MORE THAN 4 TIMES PER YEAR
HOW OFTEN DO YOU GET TOGETHER WITH FRIENDS OR RELATIVES?: TWICE A WEEK

## 2025-06-04 ASSESSMENT — PATIENT HEALTH QUESTIONNAIRE - PHQ9
1. LITTLE INTEREST OR PLEASURE IN DOING THINGS: NOT AT ALL
1. LITTLE INTEREST OR PLEASURE IN DOING THINGS: NOT AT ALL
SUM OF ALL RESPONSES TO PHQ9 QUESTIONS 1 AND 2: 0
2. FEELING DOWN, DEPRESSED OR HOPELESS: NOT AT ALL
2. FEELING DOWN, DEPRESSED OR HOPELESS: NOT AT ALL
SUM OF ALL RESPONSES TO PHQ9 QUESTIONS 1 & 2: 0

## 2025-06-04 ASSESSMENT — ENCOUNTER SYMPTOMS
LOSS OF SENSATION IN FEET: 0
DEPRESSION: 0
RESPIRATORY NEGATIVE: 1
OCCASIONAL FEELINGS OF UNSTEADINESS: 0
GASTROINTESTINAL NEGATIVE: 1
CARDIOVASCULAR NEGATIVE: 1
CONSTITUTIONAL NEGATIVE: 1

## 2025-06-04 ASSESSMENT — PAIN SCALES - GENERAL
PAINLEVEL_OUTOF10: 0-NO PAIN
PAINLEVEL_OUTOF10: 0-NO PAIN

## 2025-06-04 NOTE — PROGRESS NOTES
Subjective   Patient ID: Billie Hernadez is a 69 y.o. female who presents for COPD, lung cancer    HPI    6/4/2025:  Mrs Hernadez is here to establish care.  She has recently completed chemo and XRT for nonsmall cell lung cancer, she is now starting on immune therapy (1st infusion was today).  She tells me she feels great.  Her breathing is better than it has been in a long time. She still gets short of breath with exertion but overall she is doing well.  She uses symbicort and spiriva and albuterol as needed.  She has O2 but does not use it during the day any more.  She checks her pulse-ox and it has been in the mid 90's.  She does wear the oxygen at night.    I reviewed PFT from 1/3/2024, very severe obstruction.          Excerpts from pulmonary consult 1/1/2025 (admission for COPD exacerbation):    Billie Hernadez is a 69 y.o. female with a past medical history of COPD, and right non-small cell lung cancer diagnosed status post bronchoscopy on 11/2024, prior PE not on anticoagulation who presents to the ED with complaints of shortness of breath.  Pulmonary was consulted for further management of acute hypoxic respiratory failure.     Per chart review patient was coming in for a lymph node biopsy and was found to be hypoxic and was directed to come to the ED.  She states that she has been having some increasing shortness of breath over the last few days.  Does endorse a cough that is productive of sputum.  Denies fevers and chills and chest pain.  Per patient she is on room air at baseline.  She required 3 L to obtain a SpO2 greater than 92%.     ED workup was performed laboratory studies and imaging studies.  Patient was found to be hyperglycemic to 120, mildly hyponatremic to 132, hypochloremic to 93, elevated bicarb of 33.  Hypocalcemia at 7.9.  No albumin collected for correction.  CBC shows no leukocytosis and no anemia.  Patient recently had influenza A, B, RSV, COVID-19 PCR's performed on 1231 which were negative.   Last ABG noted in the chart showed compensated respiratory acidosis with a pH of 7.34 and a pCO2 of 63 with a PaO2 of 74.  This was on 12/31 a CT PE study was performed yesterday in the ED and showed redemonstration of her right middle lobe.  Mucous plugging and collapse.  Malignancy was also redemonstrated once again.  Furthermore patient has emphysematous changes and multiple nodules.  Lower lobe concerning for possible aspiration versus atelectasis.     Pt had Vapotherm at bedside but appears to be only on NC. She denies fevers/chills. States she feels better than before.     Social History:   reports that she has been smoking cigarettes. She started smoking about 52 years ago. She has a 26 pack-year smoking history. She has never used smokeless tobacco. She reports current alcohol use of about 2.0 standard drinks of alcohol per week. She reports that she does not use drugs.    Assessment/Plan  Billie Hernadez is a 69 y.o. female with a past medical history of COPD, and right non-small cell lung cancer diagnosed status post bronchoscopy on 11/2024, prior PE not on anticoagulation who presents to the ED with complaints of shortness of breath.  Pulmonary was consulted for further management of acute hypoxic respiratory failure.     Patient was seen and assessed by myself and his chart was reviewed for previous pulmonary visits, Labs and imaging.  Patient's laboratory studies do not signify an infectious process currently.  However her imaging did redemonstrate her lung malignancy with mucous plugging in the right middle lobe.  There is concern for possible lower lobe aspiration versus atelectasis.  Emphysematous changes noted as well on imaging.  Recommendations are as follows below     #Right non-small cell lung cancer  #History of COPD  #Prior PE not on anticoagulation  #Right middle lobe collapse  #Right lower lobe atelectasis versus aspiration     Recommendations are as follows:  -Titrate FiO2 to SpO2 greater  than 88%  -Aggressive bronchopulmonary hygiene with I-S, flutter valve, consider adding MetaNebs if she does not improve with the former.  -Scheduled bronchodilators every 6 hours   -Can continue systemic steroids.  Would recommend against inhaled steroids due to the fact that her using systemic steroid  -Agree with continuing Zosyn for antibiotics.  -No role for tiotropium which were using nebulized bronchodilators.   - PNA workup: MRSA Nares, Strep Pneumo urine ag, Legionella Urine Ag, Resp Culture, Procal  -Follow-up blood cultures        Review of Systems    See scanned documents attached to this note for review of systems, and appropriate scales/scores for this visit.     Objective   Physical Exam  Constitutional:       Appearance: Normal appearance.   HENT:      Head: Normocephalic and atraumatic.      Mouth/Throat:      Pharynx: Oropharynx is clear.   Cardiovascular:      Rate and Rhythm: Normal rate and regular rhythm.      Pulses: Normal pulses.      Heart sounds: Normal heart sounds.   Pulmonary:      Effort: Pulmonary effort is normal.      Breath sounds: Normal breath sounds. No wheezing, rhonchi or rales.   Abdominal:      General: Bowel sounds are normal.      Palpations: Abdomen is soft.   Musculoskeletal:         General: Normal range of motion.   Skin:     General: Skin is warm and dry.   Neurological:      General: No focal deficit present.      Mental Status: She is alert and oriented to person, place, and time.   Psychiatric:         Mood and Affect: Mood normal.       Assessment/Plan     69yr old with COPD and lung cancer    COPD: currently doing well  - continue symbicort and spiriva with albuterol as needed.     2. Hypoxia: not need O2 during the day currently  - continue night time O2    3. Lung cancer:  completed chemo/XRT, starting immune therapy today.    Follow up 3 months      Axel Barney MD 06/04/25 8:26 AM

## 2025-06-04 NOTE — PROGRESS NOTES
Patient scored a 9 on the PG-SGA. Patient states she has lost weight because it is summer and she has always eats less in the summer and she is much more active. Declines dietary consult.

## 2025-06-04 NOTE — PROGRESS NOTES
Patient ID: Billie Hernadez is a 69 y.o. female.    Subjective:  Returns for follow up for lung cancer.   Feels OK but still short of breath and dyspneic. Cough ++    Heme/Onc History:  - CT c screening (Oct 2024): RUL nodule  - PET/CT: RUL lung nodule with uptake. Small R hilar, mediastinal, and a supraclavicular LN with moderate uptake. L adrenal nodule with low uptake (could not see that one myself)  - Bronch (11/22/24): NSCLC. C/w adenosquamous. PD-L1 10%. NGS pending. Level 7 LN aspiration positive for malignant cells. Level 4R, 11R, 11L negative.   - Started Carbo/taxol/nivo on 12/17/24 => Admitted to hospital with worsening dyspnea on 12/31 likely 2/2 COPD exacerbation => Steroid taper  - Supraclavicular LN bx (12/30/24): Positive for malignancy.   - CT c/a/p (Feb 2025, after C3): Stable RUL lung mass. Stable hilar, mediastinal LAPs. Cannot visualize supraclavicular LAP well. New small R pleural effusion, too small to tap. L adrenal nodule stable.  - ChemoRT (Feb-Mar 2025) with weekly carbo-taxol  - CT Chest (Apr 2025): RUL nodule smaller. Significant atelectasis in RUL/RML  - Maintenace durvalumab started in May 2025  - PET/CT (May 2025): Mild uptake in mediastinal lymph nodes and RUL/RML nodules. Previously visible R supraclavicular LPA not active anymore. New small R supraclavicular lymph node with mild uptake and a new R axillary LAP with moderate uptake.    Assessment/Plan:  ? NSCLC: At least stage III with biopsy proven mediastinal LAPs. Could be considered to have stage IV with biopsy proven R supraclavicular LAP. L adrenal nodule may be adenoma (very mild uptake on PET).   The fact that supraclavicular LN is positive for malignancy was a poor sign.    After a long conversation with the patient, we had decided to go ahead with the following plan:   A. Start chemoimmunotherapy with CARBO-PACLitaxel + nivolumab (completed)  B. Repeat PET/CT after C3. If disease is controlled, start chemoRT to include R  "supraclavicular lymph node. (completed)      She had tolerated durvalumab C1 wel.. I reviewed PET/CT report and images. There is no concrete evidence of recurrence in lungs or mediastinum but R axillary lymph node is worrisome. Oon exam, I can feel it=> Mobile but hard. She will have a mammogram. We will watch this for now. If it grows, she will need a biopsy.     COPD: On symbicort. Not well controlled => Will see Pulm in June    I provide longitudinal care for Ms. Hernadez for her lung cancer    Review Of Systems:  Review of Systems   Constitutional: Negative.    HENT:  Negative.     Respiratory: Negative.     Cardiovascular: Negative.    Gastrointestinal: Negative.        Physical Exam:  /77 (BP Location: Left arm, Patient Position: Sitting, BP Cuff Size: Small adult)   Pulse 73   Temp 36 °C (96.8 °F) (Temporal)   Resp 16   Ht 1.582 m (5' 2.28\")   Wt 45.2 kg (99 lb 12.1 oz)   SpO2 94%   BMI 18.08 kg/m²   BSA: 1.41 meters squared  Performance Status: Asymptomatic  Physical Exam  HENT:      Head: Normocephalic and atraumatic.   Eyes:      General: No scleral icterus.  Pulmonary:      Effort: Pulmonary effort is normal.   Musculoskeletal:         General: Normal range of motion.   Skin:     Coloration: Skin is not jaundiced.   Neurological:      General: No focal deficit present.      Mental Status: She is alert and oriented to person, place, and time.         Results:  Diagnostic Results   Lab Results   Component Value Date    WBC 6.5 06/02/2025    HGB 11.7 (L) 06/02/2025    HCT 36.5 06/02/2025     (H) 06/02/2025     06/02/2025     Lab Results   Component Value Date    CALCIUM 9.5 06/02/2025     (L) 06/02/2025    K 4.2 06/02/2025    CO2 33 (H) 06/02/2025    CL 93 (L) 06/02/2025    BUN 10 06/02/2025    CREATININE 0.56 06/02/2025    ALT 13 06/02/2025    AST 26 06/02/2025       Current Outpatient Medications:     albuterol 2.5 mg /3 mL (0.083 %) nebulizer solution, Take 3 mL (2.5 mg) by " nebulization every 6 hours if needed for wheezing., Disp: 75 mL, Rfl: 3    albuterol 90 mcg/actuation inhaler, INHALE ONE TO TWO INHALTIONS BY MOUTH EVERY 4 TO 6 HOURS AS NEEDED, Disp: 51 g, Rfl: 2    alendronate (Fosamax) 70 mg tablet, TAKE ONE TABLET ONE TIME PER WEEK. TAKE IN THE MORNING WITH A FULL GLASS OF WATER, ON AN EMPTY STOMACH, AND DO NOT TAKE ANYTHING BY MOUTH OR LIE DOWN FOR 30 MINUTES, Disp: 12 tablet, Rfl: 3    budesonide-formoterol (Symbicort) 160-4.5 mcg/actuation inhaler, Inhale 2 puffs 2 times a day. Rinse mouth with water after use to reduce aftertaste and incidence of candidiasis. Do not swallow., Disp: 10.2 g, Rfl: 11    calcium citrate-vitamin D2 250 mg-2.5 mcg (100 unit) tablet, Take 1 tablet by mouth once daily., Disp: , Rfl:     cetirizine (ZyrTEC) 10 mg tablet, Take 1 tablet (10 mg) by mouth once daily., Disp: , Rfl:     guaiFENesin (Mucinex) 600 mg 12 hr tablet, Take 1 tablet (600 mg) by mouth 2 times a day. Do not crush, chew, or split., Disp: 14 tablet, Rfl: 0    magnesium oxide (Mag-Ox) 400 mg (241.3 mg elemental) tablet, TAKE ONE TABLET BY MOUTH TWICE DAILY, Disp: 60 tablet, Rfl: 1    metoprolol tartrate (Lopressor) 25 mg tablet, Take 0.5 tablets (12.5 mg) by mouth 2 times a day., Disp: 30 tablet, Rfl: 3    multivitamin with minerals tablet, Take 1 tablet by mouth once daily., Disp: , Rfl:     oxygen (O2) gas therapy, Inhale 1 each every 12 hours. (Patient taking differently: Inhale 1 each once daily as needed.), Disp: , Rfl:     sodium chloride (Ocean) 0.65 % nasal spray, Administer 1 spray into each nostril 4 times a day as needed for congestion., Disp: 30 mL, Rfl: 12    Spiriva with HandiHaler 18 mcg inhalation capsule, place ONE CAPSULE into inhaler AND inhale ONCE DAILY, Disp: 90 capsule, Rfl: 3    OLANZapine (ZyPREXA) 5 mg tablet, TAKE ONE TABLET BY MOUTH AT BEDTIME FOR FOUR DAYS STARTING THE evening of treatment. (Patient not taking: Reported on 6/4/2025), Disp: 4 tablet, Rfl:  2    pantoprazole (Protonix) 20 mg EC tablet, Take 1 tablet (20 mg) by mouth once daily. Do not crush, chew, or split. (Patient not taking: Reported on 6/4/2025), Disp: 30 tablet, Rfl: 2  No current facility-administered medications for this visit.    Facility-Administered Medications Ordered in Other Visits:     albuterol 2.5 mg /3 mL (0.083 %) nebulizer solution 3 mL, 3 mL, nebulization, PRN, Judith Santillan MD    alteplase (Cathflo Activase) injection 2 mg, 2 mg, intra-catheter, PRN, Judith Santillan MD    dextrose 5 % in water (D5W) bolus 500 mL, 500 mL, intravenous, PRN, Judith Santillan MD    diphenhydrAMINE (BENADryl) injection 50 mg, 50 mg, intravenous, PRN, Judith Santillan MD    durvalumab (Imfinzi) 1,500 mg in sodium chloride 0.9% 130 mL IV, 1,500 mg, intravenous, Once, Judith Santillan MD    EPINEPHrine (Epipen) injection syringe 0.3 mg, 0.3 mg, intramuscular, q5 min PRN, Judith Santillan MD    famotidine PF (Pepcid) injection 20 mg, 20 mg, intravenous, Once PRN, Judith Santillan MD    heparin flush 10 unit/mL syringe 50 Units, 50 Units, intra-catheter, PRN, Judith Santillan MD    heparin flush 100 unit/mL syringe 500 Units, 500 Units, intra-catheter, PRN, Judith Santillan MD    methylPREDNISolone sod succinate (SOLU-Medrol) 40 mg/mL injection 40 mg, 40 mg, intravenous, PRN, Judith Santillan MD    prochlorperazine (Compazine) injection 10 mg, 10 mg, intravenous, q6h PRN, Judith Santillan MD    prochlorperazine (Compazine) tablet 10 mg, 10 mg, oral, q6h PRN, Judith Santillan MD    sodium chloride 0.9 % bolus 500 mL, 500 mL, intravenous, PRN, Judith Santillan MD     Past Surgical History:   Procedure Laterality Date    EXCISION / BIOPSY BREAST / NIPPLE / DUCT Left 05/28/2014    LUNG BIOPSY  12/31/2024    Bronchoscopy and needle biopsy    OTHER SURGICAL HISTORY Left 03/12/2015    Open Treatment Of Tibial Shaft Fracture With Implant    PORTACATH PLACEMENT N/A     TONSILLECTOMY       No family  history on file.   reports that she quit smoking about 16 months ago. Her smoking use included cigarettes. She started smoking about 52 years ago. She has a 25.5 pack-year smoking history. She has been exposed to tobacco smoke. She has never used smokeless tobacco.  Social History     Socioeconomic History    Marital status:      Spouse name: Not on file    Number of children: Not on file    Years of education: Not on file    Highest education level: Not on file   Occupational History    Not on file   Tobacco Use    Smoking status: Former     Current packs/day: 0.00     Average packs/day: 0.5 packs/day for 51.1 years (25.5 ttl pk-yrs)     Types: Cigarettes     Start date:      Quit date: 2024     Years since quittin.3     Passive exposure: Past    Smokeless tobacco: Never   Vaping Use    Vaping status: Never Used   Substance and Sexual Activity    Alcohol use: Yes     Alcohol/week: 2.0 standard drinks of alcohol     Types: 2 Cans of beer per week     Comment: 1 every other month    Drug use: Never    Sexual activity: Defer   Other Topics Concern    Not on file   Social History Narrative    Not on file     Social Drivers of Health     Financial Resource Strain: Low Risk  (4/3/2025)    Overall Financial Resource Strain (CARDIA)     Difficulty of Paying Living Expenses: Not hard at all   Food Insecurity: No Food Insecurity (4/3/2025)    Hunger Vital Sign     Worried About Running Out of Food in the Last Year: Never true     Ran Out of Food in the Last Year: Never true   Transportation Needs: No Transportation Needs (4/3/2025)    PRAPARE - Transportation     Lack of Transportation (Medical): No     Lack of Transportation (Non-Medical): No   Physical Activity: Insufficiently Active (2025)    Exercise Vital Sign     Days of Exercise per Week: 3 days     Minutes of Exercise per Session: 40 min   Stress: No Stress Concern Present (2025)    [employer] - Occupational  Stress Questionnaire     Feeling of Stress : Not at all   Recent Concern: Stress - Stress Concern Present (4/3/2025)    [employer] - Occupational Stress Questionnaire     Feeling of Stress : To some extent   Social Connections: Moderately Integrated (6/4/2025)    Social Connection and Isolation Panel [NHANES]     Frequency of Communication with Friends and Family: Once a week     Frequency of Social Gatherings with Friends and Family: Twice a week     Attends Zoroastrianism Services: Never     Active Member of Clubs or Organizations: Yes     Attends Club or Organization Meetings: More than 4 times per year     Marital Status:    Intimate Partner Violence: Not At Risk (4/3/2025)    Humiliation, Afraid, Rape, and Kick questionnaire     Fear of Current or Ex-Partner: No     Emotionally Abused: No     Physically Abused: No     Sexually Abused: No   Housing Stability: Low Risk  (4/3/2025)    Housing Stability Vital Sign     Unable to Pay for Housing in the Last Year: No     Number of Times Moved in the Last Year: 1     Homeless in the Last Year: No       Diagnoses and all orders for this visit:  Malignant neoplasm of upper lobe of right lung (Multi)  -     Clinic Appointment Request Follow up  -     Clinic Appointment Request  -     Clinic Appointment Request; Future  -     Infusion Appointment Request; Future  -     CBC and Auto Differential; Future  -     Comprehensive metabolic panel; Future  -     Cortisol Am; Future  -     Tsh With Reflex To Free T4 If Abnormal; Future  Other orders  -     prochlorperazine (Compazine) tablet 10 mg  -     prochlorperazine (Compazine) injection 10 mg  -     durvalumab (Imfinzi) 1,500 mg in sodium chloride 0.9% 130 mL IV  -     sodium chloride 0.9 % bolus 500 mL  -     dextrose 5 % in water (D5W) bolus 500 mL  -     diphenhydrAMINE (BENADryl) injection 50 mg  -     methylPREDNISolone sod succinate (SOLU-Medrol) 40 mg/mL injection 40 mg  -     famotidine  PF (Pepcid) injection 20 mg  -     EPINEPHrine (Epipen) injection syringe 0.3 mg  -     albuterol 2.5 mg /3 mL (0.083 %) nebulizer solution 3 mL       Judith Santillan MD

## 2025-06-09 ENCOUNTER — HOSPITAL ENCOUNTER (OUTPATIENT)
Dept: RADIOLOGY | Facility: HOSPITAL | Age: 70
Discharge: HOME | End: 2025-06-09
Payer: MEDICARE

## 2025-06-09 DIAGNOSIS — J96.01 ACUTE RESPIRATORY FAILURE WITH HYPOXIA: ICD-10-CM

## 2025-06-09 DIAGNOSIS — Z12.31 ENCOUNTER FOR SCREENING MAMMOGRAM FOR MALIGNANT NEOPLASM OF BREAST: ICD-10-CM

## 2025-06-09 PROCEDURE — 77063 BREAST TOMOSYNTHESIS BI: CPT | Performed by: RADIOLOGY

## 2025-06-09 PROCEDURE — 77067 SCR MAMMO BI INCL CAD: CPT | Performed by: RADIOLOGY

## 2025-06-09 PROCEDURE — 77067 SCR MAMMO BI INCL CAD: CPT

## 2025-06-09 RX ORDER — ALBUTEROL SULFATE 0.83 MG/ML
2.5 SOLUTION RESPIRATORY (INHALATION) EVERY 6 HOURS PRN
Qty: 75 ML | Refills: 3 | Status: SHIPPED | OUTPATIENT
Start: 2025-06-09

## 2025-06-30 ENCOUNTER — APPOINTMENT (OUTPATIENT)
Facility: HOSPITAL | Age: 70
End: 2025-06-30
Payer: MEDICARE

## 2025-07-07 ENCOUNTER — INFUSION (OUTPATIENT)
Facility: HOSPITAL | Age: 70
End: 2025-07-07
Payer: MEDICARE

## 2025-07-07 VITALS
TEMPERATURE: 98.4 F | HEART RATE: 99 BPM | OXYGEN SATURATION: 99 % | DIASTOLIC BLOOD PRESSURE: 74 MMHG | SYSTOLIC BLOOD PRESSURE: 119 MMHG | RESPIRATION RATE: 18 BRPM

## 2025-07-07 DIAGNOSIS — J44.9 CHRONIC OBSTRUCTIVE PULMONARY DISEASE, UNSPECIFIED COPD TYPE (MULTI): ICD-10-CM

## 2025-07-07 DIAGNOSIS — C34.11 MALIGNANT NEOPLASM OF UPPER LOBE OF RIGHT LUNG (MULTI): ICD-10-CM

## 2025-07-07 LAB
ALBUMIN SERPL BCP-MCNC: 4.1 G/DL (ref 3.4–5)
ALP SERPL-CCNC: 56 U/L (ref 33–136)
ALT SERPL W P-5'-P-CCNC: 15 U/L (ref 7–45)
ANION GAP SERPL CALC-SCNC: 9 MMOL/L (ref 10–20)
AST SERPL W P-5'-P-CCNC: 22 U/L (ref 9–39)
BASOPHILS # BLD AUTO: 0.02 X10*3/UL (ref 0–0.1)
BASOPHILS NFR BLD AUTO: 0.3 %
BILIRUB SERPL-MCNC: 0.6 MG/DL (ref 0–1.2)
BUN SERPL-MCNC: 8 MG/DL (ref 6–23)
CALCIUM SERPL-MCNC: 9.1 MG/DL (ref 8.6–10.3)
CHLORIDE SERPL-SCNC: 94 MMOL/L (ref 98–107)
CO2 SERPL-SCNC: 31 MMOL/L (ref 21–32)
CREAT SERPL-MCNC: 0.44 MG/DL (ref 0.5–1.05)
EGFRCR SERPLBLD CKD-EPI 2021: >90 ML/MIN/1.73M*2
EOSINOPHIL # BLD AUTO: 0.07 X10*3/UL (ref 0–0.4)
EOSINOPHIL NFR BLD AUTO: 1.1 %
ERYTHROCYTE [DISTWIDTH] IN BLOOD BY AUTOMATED COUNT: 14.8 % (ref 11.5–14.5)
GLUCOSE SERPL-MCNC: 103 MG/DL (ref 74–99)
HCT VFR BLD AUTO: 35 % (ref 36–46)
HGB BLD-MCNC: 11.4 G/DL (ref 12–16)
IMM GRANULOCYTES # BLD AUTO: 0.02 X10*3/UL (ref 0–0.5)
IMM GRANULOCYTES NFR BLD AUTO: 0.3 % (ref 0–0.9)
LYMPHOCYTES # BLD AUTO: 0.8 X10*3/UL (ref 0.8–3)
LYMPHOCYTES NFR BLD AUTO: 12.7 %
MCH RBC QN AUTO: 32.3 PG (ref 26–34)
MCHC RBC AUTO-ENTMCNC: 32.6 G/DL (ref 32–36)
MCV RBC AUTO: 99 FL (ref 80–100)
MONOCYTES # BLD AUTO: 0.66 X10*3/UL (ref 0.05–0.8)
MONOCYTES NFR BLD AUTO: 10.5 %
NEUTROPHILS # BLD AUTO: 4.71 X10*3/UL (ref 1.6–5.5)
NEUTROPHILS NFR BLD AUTO: 75.1 %
NRBC BLD-RTO: 0 /100 WBCS (ref 0–0)
PLATELET # BLD AUTO: 262 X10*3/UL (ref 150–450)
POTASSIUM SERPL-SCNC: 4 MMOL/L (ref 3.5–5.3)
PROT SERPL-MCNC: 6.8 G/DL (ref 6.4–8.2)
RBC # BLD AUTO: 3.53 X10*6/UL (ref 4–5.2)
SODIUM SERPL-SCNC: 130 MMOL/L (ref 136–145)
TSH SERPL-ACNC: 0.47 MIU/L (ref 0.44–3.98)
WBC # BLD AUTO: 6.3 X10*3/UL (ref 4.4–11.3)

## 2025-07-07 PROCEDURE — 85025 COMPLETE CBC W/AUTO DIFF WBC: CPT

## 2025-07-07 PROCEDURE — 36415 COLL VENOUS BLD VENIPUNCTURE: CPT

## 2025-07-07 PROCEDURE — 82533 TOTAL CORTISOL: CPT | Mod: CONLAB

## 2025-07-07 PROCEDURE — 84075 ASSAY ALKALINE PHOSPHATASE: CPT

## 2025-07-07 PROCEDURE — 2500000004 HC RX 250 GENERAL PHARMACY W/ HCPCS (ALT 636 FOR OP/ED): Performed by: INTERNAL MEDICINE

## 2025-07-07 PROCEDURE — 36591 DRAW BLOOD OFF VENOUS DEVICE: CPT

## 2025-07-07 PROCEDURE — 84443 ASSAY THYROID STIM HORMONE: CPT

## 2025-07-07 RX ORDER — HEPARIN 100 UNIT/ML
500 SYRINGE INTRAVENOUS AS NEEDED
Status: DISCONTINUED | OUTPATIENT
Start: 2025-07-07 | End: 2025-07-07 | Stop reason: HOSPADM

## 2025-07-07 RX ORDER — HEPARIN SODIUM,PORCINE/PF 10 UNIT/ML
50 SYRINGE (ML) INTRAVENOUS AS NEEDED
Status: CANCELLED | OUTPATIENT
Start: 2025-07-07

## 2025-07-07 RX ORDER — TIOTROPIUM BROMIDE 18 UG/1
CAPSULE ORAL; RESPIRATORY (INHALATION)
Qty: 90 CAPSULE | Refills: 3 | Status: SHIPPED | OUTPATIENT
Start: 2025-07-07

## 2025-07-07 RX ORDER — HEPARIN 100 UNIT/ML
500 SYRINGE INTRAVENOUS AS NEEDED
Status: CANCELLED | OUTPATIENT
Start: 2025-07-07

## 2025-07-07 RX ADMIN — HEPARIN 500 UNITS: 100 SYRINGE at 13:15

## 2025-07-07 ASSESSMENT — ENCOUNTER SYMPTOMS
RESPIRATORY NEGATIVE: 1
NEUROLOGICAL NEGATIVE: 1
CONSTITUTIONAL NEGATIVE: 1
GASTROINTESTINAL NEGATIVE: 1
ENDOCRINE NEGATIVE: 1
EYES NEGATIVE: 1
MUSCULOSKELETAL NEGATIVE: 1
HEMATOLOGIC/LYMPHATIC NEGATIVE: 1
CARDIOVASCULAR NEGATIVE: 1
PSYCHIATRIC NEGATIVE: 1

## 2025-07-07 ASSESSMENT — PAIN SCALES - GENERAL: PAINLEVEL_OUTOF10: 0-NO PAIN

## 2025-07-07 NOTE — PROGRESS NOTES
[facility]   Infusion Clinic Note   Date: 2025   Name: Billie Hernadez  : 1955   MRN: [id number]         Reason for Visit: Port Draw         Today: Billie Hernadez had no medications administered during this visit.       Ordered By: No ref. provider found       For a Diagnosis of: Malignant neoplasm of upper lobe of right lung (Multi)       At today's visit patient accompanied by:       Today's Vitals:   Vitals:    25 1306   BP: 119/74   Pulse: 99   Resp: 18   Temp: 36.9 °C (98.4 °F)   TempSrc: Temporal   SpO2: 99%   PainSc: 0-No pain             Pre - Treatment Checklist:      - Previous reaction to current treatment: no      (Assess patient for the concerns below. Document provider notification as appropriate).  - Active or recent infection with/without current antibiotic use: n/a  - Recent or planned invasive dental work: n/a  - Recent or planned surgeries: n/a  - Recently received or plans to receive vaccinations: n/a  - Has treatment related toxicities: no  - Any chance may be pregnant:  no      Pain: 0   - Is the pain different from normal: n/a   - Is prescribing Doctor aware:  n/a      Labs: Labs drawn and sent per order      Fall Risk Screening: Germain Fall Risk  History of Falling, Immediate or Within 3 Months: No  Secondary Diagnosis: No  Ambulatory Aid: Walks without aid/bedrest/nurse assist  Intravenous Therapy/Heparin Lock: No  Gait/Transferring: Normal/bedrest/immobile  Mental Status: Oriented to own ability  Germain Fall Risk Score: 0       Review Of Systems:  Review of Systems   Constitutional: Negative.    HENT:  Negative.     Eyes: Negative.    Respiratory: Negative.     Cardiovascular: Negative.    Gastrointestinal: Negative.    Endocrine: Negative.    Genitourinary: Negative.     Musculoskeletal: Negative.    Skin: Negative.    Neurological: Negative.    Hematological: Negative.    Psychiatric/Behavioral: Negative.           Infusion Readiness:  -  Assessment Concerns Related to Infusion: Yes  - Provider notified: n/a      New Patient Education:    N/A (returning patient for continuation of therapy. Ongoing education provided as needed.)        Treatment Conditions & Drug Specific Questions:    NOT APPLICABLE        Weight Based Drug Calculations:    WEIGHT BASED DRUGS: NOT APPLICABLE / FLAT DOSE       Post Treatment: Patient tolerated treatment without issue and was discharged in no apparent distress.      Note Authored / Patient Cared for By: Lux Townsend RN

## 2025-07-08 LAB — CORTIS AM PEAK SERPL-MSCNC: 12.5 UG/DL (ref 5–20)

## 2025-07-09 ENCOUNTER — OFFICE VISIT (OUTPATIENT)
Dept: HEMATOLOGY/ONCOLOGY | Facility: HOSPITAL | Age: 70
End: 2025-07-09
Payer: MEDICARE

## 2025-07-09 ENCOUNTER — INFUSION (OUTPATIENT)
Dept: HEMATOLOGY/ONCOLOGY | Facility: HOSPITAL | Age: 70
End: 2025-07-09
Payer: MEDICARE

## 2025-07-09 VITALS
DIASTOLIC BLOOD PRESSURE: 80 MMHG | SYSTOLIC BLOOD PRESSURE: 147 MMHG | HEART RATE: 77 BPM | TEMPERATURE: 96.8 F | OXYGEN SATURATION: 96 % | RESPIRATION RATE: 17 BRPM

## 2025-07-09 VITALS
RESPIRATION RATE: 16 BRPM | SYSTOLIC BLOOD PRESSURE: 123 MMHG | HEIGHT: 62 IN | WEIGHT: 92.92 LBS | DIASTOLIC BLOOD PRESSURE: 81 MMHG | TEMPERATURE: 97.2 F | OXYGEN SATURATION: 98 % | HEART RATE: 79 BPM | BODY MASS INDEX: 17.1 KG/M2

## 2025-07-09 DIAGNOSIS — C34.11 MALIGNANT NEOPLASM OF UPPER LOBE OF RIGHT LUNG (MULTI): Primary | ICD-10-CM

## 2025-07-09 DIAGNOSIS — C34.11 MALIGNANT NEOPLASM OF UPPER LOBE OF RIGHT LUNG (MULTI): ICD-10-CM

## 2025-07-09 PROCEDURE — 3079F DIAST BP 80-89 MM HG: CPT | Performed by: INTERNAL MEDICINE

## 2025-07-09 PROCEDURE — 1126F AMNT PAIN NOTED NONE PRSNT: CPT | Performed by: INTERNAL MEDICINE

## 2025-07-09 PROCEDURE — 99215 OFFICE O/P EST HI 40 MIN: CPT | Performed by: INTERNAL MEDICINE

## 2025-07-09 PROCEDURE — G2211 COMPLEX E/M VISIT ADD ON: HCPCS | Performed by: INTERNAL MEDICINE

## 2025-07-09 PROCEDURE — 2500000004 HC RX 250 GENERAL PHARMACY W/ HCPCS (ALT 636 FOR OP/ED): Performed by: INTERNAL MEDICINE

## 2025-07-09 PROCEDURE — 99215 OFFICE O/P EST HI 40 MIN: CPT | Mod: 25 | Performed by: INTERNAL MEDICINE

## 2025-07-09 PROCEDURE — 1159F MED LIST DOCD IN RCRD: CPT | Performed by: INTERNAL MEDICINE

## 2025-07-09 PROCEDURE — 3074F SYST BP LT 130 MM HG: CPT | Performed by: INTERNAL MEDICINE

## 2025-07-09 PROCEDURE — 96413 CHEMO IV INFUSION 1 HR: CPT

## 2025-07-09 PROCEDURE — 3008F BODY MASS INDEX DOCD: CPT | Performed by: INTERNAL MEDICINE

## 2025-07-09 RX ORDER — DIPHENHYDRAMINE HYDROCHLORIDE 50 MG/ML
50 INJECTION, SOLUTION INTRAMUSCULAR; INTRAVENOUS AS NEEDED
OUTPATIENT
Start: 2025-08-06

## 2025-07-09 RX ORDER — HEPARIN SODIUM,PORCINE/PF 10 UNIT/ML
50 SYRINGE (ML) INTRAVENOUS AS NEEDED
OUTPATIENT
Start: 2025-07-09

## 2025-07-09 RX ORDER — PROCHLORPERAZINE MALEATE 5 MG
10 TABLET ORAL EVERY 6 HOURS PRN
OUTPATIENT
Start: 2025-08-06

## 2025-07-09 RX ORDER — PROCHLORPERAZINE EDISYLATE 5 MG/ML
10 INJECTION INTRAMUSCULAR; INTRAVENOUS EVERY 6 HOURS PRN
Status: DISCONTINUED | OUTPATIENT
Start: 2025-07-09 | End: 2025-07-09 | Stop reason: HOSPADM

## 2025-07-09 RX ORDER — PROCHLORPERAZINE MALEATE 10 MG
10 TABLET ORAL EVERY 6 HOURS PRN
Status: DISCONTINUED | OUTPATIENT
Start: 2025-07-09 | End: 2025-07-09 | Stop reason: HOSPADM

## 2025-07-09 RX ORDER — FAMOTIDINE 10 MG/ML
20 INJECTION, SOLUTION INTRAVENOUS ONCE AS NEEDED
Status: DISCONTINUED | OUTPATIENT
Start: 2025-07-09 | End: 2025-07-09 | Stop reason: HOSPADM

## 2025-07-09 RX ORDER — EPINEPHRINE 0.3 MG/.3ML
0.3 INJECTION SUBCUTANEOUS EVERY 5 MIN PRN
OUTPATIENT
Start: 2025-08-06

## 2025-07-09 RX ORDER — ALBUTEROL SULFATE 0.83 MG/ML
3 SOLUTION RESPIRATORY (INHALATION) AS NEEDED
OUTPATIENT
Start: 2025-08-06

## 2025-07-09 RX ORDER — HEPARIN 100 UNIT/ML
500 SYRINGE INTRAVENOUS AS NEEDED
OUTPATIENT
Start: 2025-07-09

## 2025-07-09 RX ORDER — EPINEPHRINE 0.3 MG/.3ML
0.3 INJECTION SUBCUTANEOUS EVERY 5 MIN PRN
Status: DISCONTINUED | OUTPATIENT
Start: 2025-07-09 | End: 2025-07-09 | Stop reason: HOSPADM

## 2025-07-09 RX ORDER — PROCHLORPERAZINE EDISYLATE 5 MG/ML
10 INJECTION INTRAMUSCULAR; INTRAVENOUS EVERY 6 HOURS PRN
OUTPATIENT
Start: 2025-08-06

## 2025-07-09 RX ORDER — ALBUTEROL SULFATE 0.83 MG/ML
3 SOLUTION RESPIRATORY (INHALATION) AS NEEDED
Status: DISCONTINUED | OUTPATIENT
Start: 2025-07-09 | End: 2025-07-09 | Stop reason: HOSPADM

## 2025-07-09 RX ORDER — FAMOTIDINE 10 MG/ML
20 INJECTION, SOLUTION INTRAVENOUS ONCE AS NEEDED
OUTPATIENT
Start: 2025-08-06

## 2025-07-09 RX ORDER — HEPARIN 100 UNIT/ML
500 SYRINGE INTRAVENOUS AS NEEDED
Status: DISCONTINUED | OUTPATIENT
Start: 2025-07-09 | End: 2025-07-09 | Stop reason: HOSPADM

## 2025-07-09 RX ORDER — DIPHENHYDRAMINE HYDROCHLORIDE 50 MG/ML
50 INJECTION, SOLUTION INTRAMUSCULAR; INTRAVENOUS AS NEEDED
Status: DISCONTINUED | OUTPATIENT
Start: 2025-07-09 | End: 2025-07-09 | Stop reason: HOSPADM

## 2025-07-09 RX ADMIN — HEPARIN 500 UNITS: 100 SYRINGE at 12:05

## 2025-07-09 RX ADMIN — SODIUM CHLORIDE 1500 MG: 9 INJECTION, SOLUTION INTRAVENOUS at 11:03

## 2025-07-09 ASSESSMENT — PATIENT HEALTH QUESTIONNAIRE - PHQ9
1. LITTLE INTEREST OR PLEASURE IN DOING THINGS: NOT AT ALL
SUM OF ALL RESPONSES TO PHQ9 QUESTIONS 1 & 2: 0
2. FEELING DOWN, DEPRESSED OR HOPELESS: NOT AT ALL

## 2025-07-09 ASSESSMENT — ENCOUNTER SYMPTOMS
CARDIOVASCULAR NEGATIVE: 1
CONSTITUTIONAL NEGATIVE: 1
GASTROINTESTINAL NEGATIVE: 1
RESPIRATORY NEGATIVE: 1

## 2025-07-09 ASSESSMENT — PAIN SCALES - GENERAL: PAINLEVEL_OUTOF10: 0-NO PAIN

## 2025-07-09 NOTE — PROGRESS NOTES
Patient scored 9 on the PG-SGA. She states she does this in the summer, she eats less and is much more active, so she loses weight. Declines dietary consult.

## 2025-07-09 NOTE — PROGRESS NOTES
Patient ID: Billie Hernadez is a 69 y.o. female.    Subjective:  Returns for follow up for lung cancer.   Feels OK but still short of breath and dyspneic. Cough ++    Heme/Onc History:  - CT c screening (Oct 2024): RUL nodule  - PET/CT: RUL lung nodule with uptake. Small R hilar, mediastinal, and a supraclavicular LN with moderate uptake. L adrenal nodule with low uptake (could not see that one myself)  - Bronch (11/22/24): NSCLC. C/w adenosquamous. PD-L1 10%. NGS pending. Level 7 LN aspiration positive for malignant cells. Level 4R, 11R, 11L negative.   - Started Carbo/taxol/nivo on 12/17/24 => Admitted to hospital with worsening dyspnea on 12/31 likely 2/2 COPD exacerbation => Steroid taper  - Supraclavicular LN bx (12/30/24): Positive for malignancy.   - CT c/a/p (Feb 2025, after C3): Stable RUL lung mass. Stable hilar, mediastinal LAPs. Cannot visualize supraclavicular LAP well. New small R pleural effusion, too small to tap. L adrenal nodule stable.  - ChemoRT (Feb-Mar 2025) with weekly carbo-taxol  - CT Chest (Apr 2025): RUL nodule smaller. Significant atelectasis in RUL/RML  - Maintenace durvalumab started in May 2025  - PET/CT (May 2025): Mild uptake in mediastinal lymph nodes and RUL/RML nodules. Previously visible R supraclavicular LPA not active anymore. New small R supraclavicular lymph node with mild uptake and a new R axillary LAP with moderate uptake.    Assessment/Plan:  ? NSCLC: At least stage III with biopsy proven mediastinal LAPs. Could be considered to have stage IV with biopsy proven R supraclavicular LAP. L adrenal nodule may be adenoma (very mild uptake on PET).   The fact that supraclavicular LN is positive for malignancy was a poor sign.    After a long conversation with the patient, we had decided to go ahead with the following plan:   A. Start chemoimmunotherapy with CARBO-PACLitaxel + nivolumab (completed)  B. Repeat PET/CT after C3. If disease is controlled, start chemoRT to include R  "supraclavicular lymph node. (completed)    Now on maintenance durvalumab. Tolerating well with no side effects.  On exam, I can feel the R axillary lymph node that is stable in size. Mammogram is negative.. We will watch this for now. If it grows, she will need a biopsy.     COPD: On symbicort. Not well controlled => Will see Pulm in June    I provide longitudinal care for Ms. Hernadez for her lung cancer    Review Of Systems:  Review of Systems   Constitutional: Negative.    HENT:  Negative.     Respiratory: Negative.     Cardiovascular: Negative.    Gastrointestinal: Negative.        Physical Exam:  /81 (BP Location: Left arm, Patient Position: Sitting, BP Cuff Size: Small adult)   Pulse 79   Temp 36.2 °C (97.2 °F) (Temporal)   Resp 16   Ht 1.582 m (5' 2.28\")   Wt (!) 42.1 kg (92 lb 14.8 oz)   SpO2 98%   BMI 16.84 kg/m²   BSA: 1.36 meters squared  Performance Status: Asymptomatic  Physical Exam  HENT:      Head: Normocephalic and atraumatic.   Eyes:      General: No scleral icterus.  Pulmonary:      Effort: Pulmonary effort is normal.   Musculoskeletal:         General: Normal range of motion.   Skin:     Coloration: Skin is not jaundiced.   Neurological:      General: No focal deficit present.      Mental Status: She is alert and oriented to person, place, and time.         Results:  Diagnostic Results   Lab Results   Component Value Date    WBC 6.3 07/07/2025    HGB 11.4 (L) 07/07/2025    HCT 35.0 (L) 07/07/2025    MCV 99 07/07/2025     07/07/2025     Lab Results   Component Value Date    CALCIUM 9.1 07/07/2025     (L) 07/07/2025    K 4.0 07/07/2025    CO2 31 07/07/2025    CL 94 (L) 07/07/2025    BUN 8 07/07/2025    CREATININE 0.44 (L) 07/07/2025    ALT 15 07/07/2025    AST 22 07/07/2025       Current Outpatient Medications:     albuterol 2.5 mg /3 mL (0.083 %) nebulizer solution, Take 3 mL (2.5 mg) by nebulization every 6 hours if needed for wheezing., Disp: 75 mL, Rfl: 3    albuterol 90 " mcg/actuation inhaler, INHALE ONE TO TWO INHALTIONS BY MOUTH EVERY 4 TO 6 HOURS AS NEEDED, Disp: 51 g, Rfl: 2    alendronate (Fosamax) 70 mg tablet, TAKE ONE TABLET ONE TIME PER WEEK. TAKE IN THE MORNING WITH A FULL GLASS OF WATER, ON AN EMPTY STOMACH, AND DO NOT TAKE ANYTHING BY MOUTH OR LIE DOWN FOR 30 MINUTES, Disp: 12 tablet, Rfl: 3    budesonide-formoterol (Symbicort) 160-4.5 mcg/actuation inhaler, Inhale 2 puffs 2 times a day. Rinse mouth with water after use to reduce aftertaste and incidence of candidiasis. Do not swallow., Disp: 10.2 g, Rfl: 11    calcium citrate-vitamin D2 250 mg-2.5 mcg (100 unit) tablet, Take 1 tablet by mouth once daily., Disp: , Rfl:     cetirizine (ZyrTEC) 10 mg tablet, Take 1 tablet (10 mg) by mouth once daily., Disp: , Rfl:     guaiFENesin (Mucinex) 600 mg 12 hr tablet, Take 1 tablet (600 mg) by mouth 2 times a day. Do not crush, chew, or split., Disp: 14 tablet, Rfl: 0    metoprolol tartrate (Lopressor) 25 mg tablet, Take 0.5 tablets (12.5 mg) by mouth 2 times a day., Disp: 30 tablet, Rfl: 3    multivitamin with minerals tablet, Take 1 tablet by mouth once daily., Disp: , Rfl:     oxygen (O2) gas therapy, Inhale 1 each every 12 hours. (Patient taking differently: Inhale 1 each once daily as needed.), Disp: , Rfl:     Spiriva with HandiHaler 18 mcg inhalation capsule, place ONE CAPSULE into inhaler AND inhale ONCE DAILY, Disp: 90 capsule, Rfl: 3    magnesium oxide (Mag-Ox) 400 mg (241.3 mg elemental) tablet, TAKE ONE TABLET BY MOUTH TWICE DAILY (Patient not taking: Reported on 7/9/2025), Disp: 60 tablet, Rfl: 1    OLANZapine (ZyPREXA) 5 mg tablet, TAKE ONE TABLET BY MOUTH AT BEDTIME FOR FOUR DAYS STARTING THE evening of treatment. (Patient not taking: Reported on 7/9/2025), Disp: 4 tablet, Rfl: 2    pantoprazole (Protonix) 20 mg EC tablet, Take 1 tablet (20 mg) by mouth once daily. Do not crush, chew, or split. (Patient not taking: Reported on 6/4/2025), Disp: 30 tablet, Rfl: 2     sodium chloride (Ocean) 0.65 % nasal spray, Administer 1 spray into each nostril 4 times a day as needed for congestion. (Patient not taking: Reported on 2025), Disp: 30 mL, Rfl: 12     Past Surgical History:   Procedure Laterality Date    EXCISION / BIOPSY BREAST / NIPPLE / DUCT Left 2014    LUNG BIOPSY  2024    Bronchoscopy and needle biopsy    OTHER SURGICAL HISTORY Left 2015    Open Treatment Of Tibial Shaft Fracture With Implant    PORTACATH PLACEMENT N/A     TONSILLECTOMY       No family history on file.   reports that she quit smoking about 17 months ago. Her smoking use included cigarettes. She started smoking about 52 years ago. She has a 25.5 pack-year smoking history. She has been exposed to tobacco smoke. She has never used smokeless tobacco.  Social History     Socioeconomic History    Marital status:      Spouse name: Not on file    Number of children: Not on file    Years of education: Not on file    Highest education level: Not on file   Occupational History    Not on file   Tobacco Use    Smoking status: Former     Current packs/day: 0.00     Average packs/day: 0.5 packs/day for 51.1 years (25.5 ttl pk-yrs)     Types: Cigarettes     Start date:      Quit date: 2024     Years since quittin.4     Passive exposure: Past    Smokeless tobacco: Never   Vaping Use    Vaping status: Never Used   Substance and Sexual Activity    Alcohol use: Yes     Alcohol/week: 2.0 standard drinks of alcohol     Types: 2 Cans of beer per week     Comment: 1 every other month    Drug use: Never    Sexual activity: Defer   Other Topics Concern    Not on file   Social History Narrative    Not on file     Social Drivers of Health     Financial Resource Strain: Low Risk  (4/3/2025)    Overall Financial Resource Strain (CARDIA)     Difficulty of Paying Living Expenses: Not hard at all   Food Insecurity: No Food Insecurity (4/3/2025)    Hunger Vital Sign     Worried About Running Out of  Food in the Last Year: Never true     Ran Out of Food in the Last Year: Never true   Transportation Needs: No Transportation Needs (4/3/2025)    PRAPARE - Transportation     Lack of Transportation (Medical): No     Lack of Transportation (Non-Medical): No   Physical Activity: Insufficiently Active (1/1/2025)    Exercise Vital Sign     Days of Exercise per Week: 3 days     Minutes of Exercise per Session: 40 min   Stress: No Stress Concern Present (6/4/2025)    [employer] - Occupational Stress Questionnaire     Feeling of Stress : Not at all   Recent Concern: Stress - Stress Concern Present (4/3/2025)    [employer] - Occupational Stress Questionnaire     Feeling of Stress : To some extent   Social Connections: Moderately Integrated (6/4/2025)    Social Connection and Isolation Panel [NHANES]     Frequency of Communication with Friends and Family: Once a week     Frequency of Social Gatherings with Friends and Family: Twice a week     Attends Scientologist Services: Never     Active Member of Clubs or Organizations: Yes     Attends Club or Organization Meetings: More than 4 times per year     Marital Status:    Intimate Partner Violence: Not At Risk (4/3/2025)    Humiliation, Afraid, Rape, and Kick questionnaire     Fear of Current or Ex-Partner: No     Emotionally Abused: No     Physically Abused: No     Sexually Abused: No   Housing Stability: Low Risk  (4/3/2025)    Housing Stability Vital Sign     Unable to Pay for Housing in the Last Year: No     Number of Times Moved in the Last Year: 1     Homeless in the Last Year: No       Diagnoses and all orders for this visit:  Malignant neoplasm of upper lobe of right lung (Multi)  -     Clinic Appointment Request  -     Clinic Appointment Request; Future  -     Infusion Appointment Request; Future  -     CBC and Auto Differential; Future  -     Comprehensive metabolic panel; Future  -     Clinic Appointment  Request; Future  Other orders  -     prochlorperazine (Compazine) tablet 10 mg  -     prochlorperazine (Compazine) injection 10 mg  -     durvalumab (Imfinzi) 1,500 mg in sodium chloride 0.9% 130 mL IV  -     sodium chloride 0.9 % bolus 500 mL  -     dextrose 5 % in water (D5W) bolus 500 mL  -     diphenhydrAMINE (BENADryl) injection 50 mg  -     methylPREDNISolone sod succinate (SOLU-Medrol) 40 mg/mL injection 40 mg  -     famotidine PF (Pepcid) injection 20 mg  -     EPINEPHrine (Epipen) injection syringe 0.3 mg  -     albuterol 2.5 mg /3 mL (0.083 %) nebulizer solution 3 mL       Judith Santillan MD

## 2025-07-09 NOTE — SIGNIFICANT EVENT

## 2025-07-25 ENCOUNTER — HOSPITAL ENCOUNTER (OUTPATIENT)
Dept: RADIATION ONCOLOGY | Facility: CLINIC | Age: 70
Setting detail: RADIATION/ONCOLOGY SERIES
Discharge: HOME | End: 2025-07-25
Payer: MEDICARE

## 2025-07-25 VITALS
RESPIRATION RATE: 18 BRPM | SYSTOLIC BLOOD PRESSURE: 125 MMHG | WEIGHT: 95.9 LBS | BODY MASS INDEX: 17.38 KG/M2 | HEART RATE: 92 BPM | DIASTOLIC BLOOD PRESSURE: 73 MMHG | OXYGEN SATURATION: 94 % | TEMPERATURE: 96.3 F

## 2025-07-25 DIAGNOSIS — C34.11 MALIGNANT NEOPLASM OF UPPER LOBE OF RIGHT LUNG (MULTI): Primary | ICD-10-CM

## 2025-07-25 PROCEDURE — 99214 OFFICE O/P EST MOD 30 MIN: CPT | Performed by: STUDENT IN AN ORGANIZED HEALTH CARE EDUCATION/TRAINING PROGRAM

## 2025-07-25 ASSESSMENT — ENCOUNTER SYMPTOMS
HEADACHES: 0
COUGH: 1
CHILLS: 0
LOSS OF SENSATION IN FEET: 0
FATIGUE: 0
FEVER: 0
OCCASIONAL FEELINGS OF UNSTEADINESS: 0
SHORTNESS OF BREATH: 1
DEPRESSION: 0

## 2025-07-25 ASSESSMENT — PAIN SCALES - GENERAL: PAINLEVEL_OUTOF10: 0-NO PAIN

## 2025-07-25 NOTE — PROGRESS NOTES
Radiation Oncology Nursing Note    Pain: The patient's current pain level was assessed.  They report currently having a pain of 0 out of 10.  They feel their pain is under control without the use of pain medications.    Review of Systems:  Review of Systems   Respiratory:  Positive for cough (Occasional) and shortness of breath (CADET).      Education Documentation  Treatment Plan and Schedule, taught by Cherelle Cooley RN at 7/25/2025 10:13 AM.  Learner: Significant Other, Patient  Readiness: Acceptance  Method: Explanation  Response: Verbalizes Understanding    When and How to Contact Clinic, taught by Cherelle Cooley RN at 7/25/2025 10:13 AM.  Learner: Significant Other, Patient  Readiness: Acceptance  Method: Explanation  Response: Verbalizes Understanding    Education Comments  07/25/25 1042 Cherelle Cooley RN  Per Dr. Bridges, patient to follow up in 2 months. Dr. Bridges sent patient to . HORTENSIA Isbell, RN, OCN    07/25/25 1015 Cherelle Cooley RN  Patient here today with spouse for follow up with Dr. Bridges for lung cancer. Patient completed 30 fractions of radiation on 3/24/25. Patient states that she does have a cough but she believes it is from allergies She also has CADET but denies CP/tightness.

## 2025-07-25 NOTE — PROGRESS NOTES
Radiation Oncology Follow-Up    Patient Name:  Billie Hernadez  MRN:  [id number]  :  1955    Referring Provider: Abimael Bridges MD  Primary Care Provider:  Fredi Nolen MD  Care Team: Patient Care Team:  KAREN Holliday-CNP as PCP - General  Fredi Nolen MD as PCP - [employer] PCP  Judith Santillan MD as Medical Oncologist (Hematology and Oncology)  Jose Miguel Santillan MD as Medical Oncologist (Hematology and Oncology)    Date of Service: 2025    SUBJECTIVE  History of Present Illness:  Billie Hernadez is a 69 y.o. female who was previously seen at the [facility] Department of Radiation Oncology for non-small cell lung cancer.    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 Hernadez is a female that was found to have on screening CT lung imaging on 10/8/2024 a irregular 1.4 cm nodule in the right upper lobe with subcentimeter nodules of the right lower lobe and increase in size and number of mediastinal lymph nodes.  The patient was seen by cardiothoracic surgery and underwent staging PET/CT on 2024 which showed right hypermetabolic supraclavicular lymph node, irregular right upper lobe for metabolic nodule, hypermetabolic activity in mediastinal and hilar lymph nodes including right lower paratracheal, subcarinal, right hilar and aortopulmonary lymph node and mild uptake and thickening in the left adrenal gland with no evidence of distant metastatic osseous disease.  The patient underwent bronchoscopy/EBUS on 2024 which revealed no evidence of tracheal, right or left mainstem bronchus lesions, sampling occurred from station 11 RS, 4R, 7 and 11L.  Rapid onsite evaluation was a suggestive of lymphoid tissue in the lymph nodes and in the right upper lobe atypical cells noted.  Final pathology revealed malignant cells of  adenosquamous possibility in the right upper lobe, atypical cells on bronchial right upper lobe brushings, malignant cells from non-small cell lung cancer in station 7 negative in station 4R, 11 RS and 11L..  PD-L1 TPS 10% (low).      The patient elected for intracranial staging with CT head with contrast, deferring MRI brain with and without contrast which showed no evidence of metastatic disease or acute hemorrhage on 12/2/2024.  The patient underwent CT abdomen with IV contrast to further evaluate the hypermetabolic left adrenal lesion.    The patient completed chemoradiation therapy 6000 cGy in 30 fractions, 2/11/2025-3/24/2025.  The patient initiated consolidative durvalumab in 5/2025.    7/25/2025: The patient is feeling well.  Tolerating durvalumab fair.  The patient was unfortunately found to have imaging development of hypermetabolic intrathoracic and extrathoracic findings concerning for possible recurrent or progressive metastatic disease.    Labs:  12/3/2024-PFTs: FEV1 0.7 L (32%), DLCO 53%     Pathology:  11/21/2024- A. LUNG, RIGHT UPPER LOBE; BIOPSY:-- Non-small cell carcinoma. Comment: The tumor is composed of two morphologically and immunophenotypically distinct populations; one of which is positive for p40 and negative for TTF-1 and the other is positive for TTF-1 and negative for p40. These findings raise the possibility of adenosquamous carcinoma, but this classification requires a resection specimen. Clinical and radiologic correlation is recommended.     11/21/2024- A. LUNG FINE NEEDLE ASPIRATION RIGHT UPPER LOBE NODULE, CYTOLOGY AND CELL BLOCK: --MALIGNANT CELLS DERIVED FROM NON-SMALL CELL CARCINOMA.  --SEE ALSO SURGICAL PATHOLOGY REPORT, [id number]. Note:  Two malignant components are identified. One component shows keratinizing squamous morphology and immunohistochemically is positive for p40, while it is negative for TTF-1. The other component shows adenocarcinoma morphology and  immunohistochemically is focally positive for TTF-1 and negative for p40, raising concern for adenosquamous carcinoma.  However a final classification cannot be done on this cell block material. Molecular testing (NGS) has been ordered and results will be issued in a separate report. The cell block contains low tumor cellularity representing roughly 20% of all nucleated cells.     B. BRONCHIAL BRUSH RIGHT UPPER LOBE, CYTOLOGY AND CELL BLOCK:  --PAUCICELLULAR SPECIMEN.  A FEW ATYPICAL CELLS ARE PRESENT, SUSPICIOUS FOR MALIGNANCY.       C. LYMPH NODE 11 L PULMONARY FINE NEEDLE ASPIRATION, CYTOLOGY AND CELL BLOCK:  --LIMITED LYMPHOID SPECIMEN. --NO MALIGNANT CELLS IDENTIFIED.           D. LYMPH NODE 7 PULMONARY FINE NEEDLE ASPIRATION, CYTOLOGY AND CELL BLOCK:  --MALIGNANT CELLS  DERIVED FROM NON-SMALL CELL CARCINOMA. Note:  Tumor cell morphology is similar to that of the adenocarcinoma component noted in part A (right upper lobe nodule).   Tumor cells show positive immunostaining for CK7 and keratins AE1/AE3 and CAM5.2.  Tumor cells show no immunostaining for TTF-1 or p40.      E. LYMPH NODE 4 R PULMONARY FINE NEEDLE ASPIRATION, CYTOLOGY AND CELL BLOCK: --LIMITED LYMPHOID SPECIMEN. --NO MALIGNANT CELLS IDENTIFIED.           F. LYMPH NODE 11 Rs PULMONARY FINE NEEDLE ASPIRATION, CYTOLOGY AND CELL BLOCK: --LIMITED LYMPHOID SPECIMEN. --NO MALIGNANT CELLS IDENTIFIED.       Disease Associated Genomics:   PD-L1 TPS 10% (low)    NGS:  MICROSATELLITE STATUS: Microsatellite Instability-High (MSI-H) is NOT DETECTED.        DISEASE ASSOCIATED GENOMIC FINDINGS:   MAP2K1 p.K57N ([id number] c.171G>C)     DISEASE RELEVANT ALTERATIONS NOT DETECTED:   Negative for ALK fusion.  Negative for BRAF V600E.  Negative for EGFR sensitizing mutation.  Negative for ERBB2 activating mutation  Negative for KRAS G12C.  Negative for MET exon 14 skipping mutation.  Negative for NTRK fusion.  Negative for RET fusion.  Negative for ROS1 fusion.         VARIANTS OF UNCERTAIN SIGNIFICANCE:   ERBB3 p.N101S ([id number] c.302A>G)       Disease Tumor Markers:  None      Imagin2025-PET/CT: Interval development of hypermetabolic lateral right supraclavicular lymph nodes with max SUV 4.5.  Resolution of previously seen supraclavicular hypermetabolic lymph node.  Interval decrease in the size and hypermetabolic activity of right middle lobe spiculated nodule with max SUV 3.5.  Hypermetabolic avid opacity with max SUV 3.3 along the right major fissure mild hypermetabolic subpleural opacity in the right lower lobe with max SUV 2.0.  Hypermetabolic avid opacity in the anterior basal segment of the right lower lobe with max SUV 4.1.  Hypermetabolic activity of right axillary lymph node with max SUV 7.3.  Decrease in persistent hypermetabolic activity within periaortic lymph nodes with max SUV 3.9, right upper paratracheal with max SUV 1.9 and subcarinal with max SUV 3.1.  Interval development of left periaortic soft tissue lymph node with max SUV 5.3.  Mild hypermetabolic activity of left adrenal gland thickening with max SUV 2.9.  Right adrenal gland unremarkable.  No evidence of osseous metastatic disease.    2025-CT chest without contrast: Stable right middle lobe spiculated nodule, interval worsening interlobar septal thickening, groundglass opacities and right pleural effusion which may be related to postobstructive pneumonitis however lymphangitic carcinomatosis cannot be excluded.  Worsening of right upper lobe subpleural opacities compatible with subsegmental atelectasis.    2024-CT abdomen with contrast: Nonspecific nodule in the right adrenal gland.     2024-CT chest without contrast: Mild centrilobular emphysematous changes with enlargement of the right upper lobe pulmonary nodule measuring 1.7 x 1.6 cm, right middle lobe consolidation and collapse and pulmonary nodule within the anterior right lower lobe measuring 6 mm, interval increase of  4 mm subpleural nodule within the left lower lobe and multiple punctate subcentimeter pulmonary nodules throughout the right lung.  Multiple prominent right-sided hilar and mediastinal lymph nodes with right paratracheal measuring 1.5 cm and additional right paratracheal lymph node measuring 1.2 cm.  Interlobar septal thickening in the right upper lobe surrounding the nodule, lymphangitic spread cannot be excluded.     11/6/2024-PET/CT: No evidence of hypermetabolic cervical lymphadenopathy.  There is a right hypermetabolic supraclavicular lymph node with max SUV 6.4.  Within the chest there is a irregular right upper lobe nodule with max SUV 9.1.  No hypermetabolic activity at the right lower lobe or subpleural left lower lobe nodules.  Mediastinal and hilar lymph nodes with hypermetabolic activity including right upper paratracheal lymph node with max SUV 3.3, right lower paratracheal lymph node with max SUV 5.9, subcarinal lymph node with max SUV 6.9 and right hilar lymph node with max SUV 7.0.  There is uptake and aortopulmonary lymph nodes with max SUV 4.3.  There is mild uptake within the left adrenal gland with max SUV 2.9.  No evidence of osseous metastatic disease.     10/8/2024-CT lung dose screening: Extensive tracheobronchial calcifications with narrowing of the right middle lobe bronchus with mucoid impaction.  Right middle lobe consolidation/collapse.  In the right upper lobe a irregular 1.1 x 1.4 cm nodule, within the anterior segment of the right lower lobe a irregular 5 mm lung nodule and 2 mm subpleural lung nodule with associated 3 mm focus in the right lower lobe of mucoid impaction.  Increased size and number of mediastinal lymph nodes.     Prior Systemic Therapies:  5/2025-current: Durvalumab  2/11/2025-3/24/2025: Concurrent CarboTaxol with radiation therapy  12/2024-current: CarboTaxol plus nivolumab     Prior Surgeries:  11/21/2024-bronchoscopy/EBUS: No evidence of lesions involving the  trachea, right or left bronchial tree.  Navigation bronchoscopy was conducted with sampling of the right upper lobe.  EBUS was conducted and sampling occurred from station 11 RS, 4R, 7 and 11L.  Rapid onsite evaluation was suggestive of lymphoid tissue in station 4R, 7 and 11L.  Rapid onsite evaluation of the right upper lobe lesion was suggestive of atypical cells.     Prior Radiation Therapy:  2/11/2025-3/24/2025: 6000 cGy in 30 fractions to the right lung, hilum and mediastinum    Treatment Rendered:   IMRT: Right Thorax, Midline Mediastinum, Right Supraclavicular fossa (Resolved)    Treatment Period Technique Fraction Dose Fractions Total Dose   Course 1 2/11/2025-3/24/2025  (days elapsed: 41)         RUL_Hil_Med 2/11/2025-3/24/2025 VMAT 200 / 200 cGy 30 / 30 6,000 / 6,000 cGy       Review of Systems:   Review of Systems   Constitutional:  Negative for chills, fatigue and fever.   Respiratory:  Positive for shortness of breath.    Cardiovascular:  Negative for chest pain.   Neurological:  Negative for headaches.       Performance Status:   The Karnofsky performance scale today is 80, Normal activity with effort; some signs or symptoms of disease (ECOG equivalent 1).       OBJECTIVE  Vital Signs:  There were no vitals taken for this visit.  Physical Exam  Vitals and nursing note reviewed.   HENT:      Head: Normocephalic.     Eyes:      Extraocular Movements: Extraocular movements intact.       Cardiovascular:      Rate and Rhythm: Normal rate.   Pulmonary:      Effort: Pulmonary effort is normal.      Breath sounds: Normal breath sounds.     Musculoskeletal:         General: Normal range of motion.     Neurological:      General: No focal deficit present.      Mental Status: She is alert.            ASSESSMENT:   Billie Hernadez is a 69 y.o. female with Malignant neoplasm of upper lobe of right lung (Multi), Clinical: Stage IIIB (cT1b, cN3, cM0).      Ms. 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  The patient is status post neoadjuvant chemo-immunotherapy followed by chemoradiation therapy.    The patient is following with medical oncology, plan for consolidative durvalumab.  The patient has CT imaging which shows right pleural effusion and worsening interlobar septal thickening which may be related to pneumonitis; however interval follow-up is required to ensure not related to lymphangitic carcinomatosis.  Follow-up PET/CT posttreatment reveals treatment response in the right middle lobe, but still has avidity concerning for possible recurrence.  There is additional patchy opacity in the right lower lobe that has developed which may be related to post radiation/immunotherapy related inflammation versus neoplastic process.  Decrease in the size but persistent avidity concerning for possible carrie residual metastases.  Nonspecific right supraclavicular, right axillary and left periaortic lymph nodes which are nonspecific.  No evidence of osseous metastatic disease.    It is uncertain given the chronicity from prior radiation and treatment with immunotherapy that hypermetabolic activity may be related to inflammatory forward infectious etiology; however, even the significant initial burden of disease intrathoracic or nonregional metastatic disease is high on the differential.    I discussed with the patient regarding short interval follow-up scan, with favoring of biopsy if interval growth of these additional nonregional lymph nodes or if they persist.  I additionally discussed the possibility of biopsy of nonregional lymph nodes and if consistent with metastatic recurrence of her cancer escalation of systemic therapy.    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  -Follow up in 9/2025  -Following with medical oncology with plan for short  interval CT imaging in 8/2025  -PET CT in 6/2025 shows concerning for intrathoracic and nonregional lymph node metastatic recurrence  -Status post chemoradiation therapy  -Status post neoadjuvant chemo-immunotherapy    #) Acute toxicities  -Fatigue: improved with rest, grade 1  -SOB: Grade 1, Monitor    #) Long term side effects    A total of 20 minutes were spent face-to-face with the patient, with an additional 10 minutes spent reviewing records including imaging, pathology and physician notes.    Abimael Bridges MD  [facility] [place]  [facility] clinical  - Department of Radiation Oncology  Phone: [phone]  Fax: [phone]  Sova secure chat preferred / Pager [phone]    Note: This was transcribed using Dragon voice recognition software. Attempts were made to correct any errors; however, errors or omissions may be present.     NCCN Guidelines were applicable to guide this patients treatment plan.

## 2025-08-04 ENCOUNTER — APPOINTMENT (OUTPATIENT)
Facility: HOSPITAL | Age: 70
End: 2025-08-04
Payer: MEDICARE

## 2025-08-04 VITALS
RESPIRATION RATE: 18 BRPM | OXYGEN SATURATION: 96 % | DIASTOLIC BLOOD PRESSURE: 73 MMHG | SYSTOLIC BLOOD PRESSURE: 113 MMHG | HEART RATE: 86 BPM | TEMPERATURE: 98.6 F

## 2025-08-04 DIAGNOSIS — C34.11 MALIGNANT NEOPLASM OF UPPER LOBE OF RIGHT LUNG (MULTI): ICD-10-CM

## 2025-08-04 LAB
ALBUMIN SERPL BCP-MCNC: 4.2 G/DL (ref 3.4–5)
ALP SERPL-CCNC: 62 U/L (ref 33–136)
ALT SERPL W P-5'-P-CCNC: 20 U/L (ref 7–45)
ANION GAP SERPL CALC-SCNC: 9 MMOL/L (ref 10–20)
AST SERPL W P-5'-P-CCNC: 29 U/L (ref 9–39)
BASOPHILS # BLD AUTO: 0.02 X10*3/UL (ref 0–0.1)
BASOPHILS NFR BLD AUTO: 0.3 %
BILIRUB SERPL-MCNC: 0.6 MG/DL (ref 0–1.2)
BUN SERPL-MCNC: 6 MG/DL (ref 6–23)
CALCIUM SERPL-MCNC: 9.3 MG/DL (ref 8.6–10.3)
CHLORIDE SERPL-SCNC: 93 MMOL/L (ref 98–107)
CO2 SERPL-SCNC: 32 MMOL/L (ref 21–32)
CREAT SERPL-MCNC: 0.45 MG/DL (ref 0.5–1.05)
EGFRCR SERPLBLD CKD-EPI 2021: >90 ML/MIN/1.73M*2
EOSINOPHIL # BLD AUTO: 0.1 X10*3/UL (ref 0–0.4)
EOSINOPHIL NFR BLD AUTO: 1.3 %
ERYTHROCYTE [DISTWIDTH] IN BLOOD BY AUTOMATED COUNT: 14.6 % (ref 11.5–14.5)
GLUCOSE SERPL-MCNC: 109 MG/DL (ref 74–99)
HCT VFR BLD AUTO: 36.3 % (ref 36–46)
HGB BLD-MCNC: 11.6 G/DL (ref 12–16)
IMM GRANULOCYTES # BLD AUTO: 0.02 X10*3/UL (ref 0–0.5)
IMM GRANULOCYTES NFR BLD AUTO: 0.3 % (ref 0–0.9)
LYMPHOCYTES # BLD AUTO: 0.81 X10*3/UL (ref 0.8–3)
LYMPHOCYTES NFR BLD AUTO: 10.6 %
MCH RBC QN AUTO: 31.4 PG (ref 26–34)
MCHC RBC AUTO-ENTMCNC: 32 G/DL (ref 32–36)
MCV RBC AUTO: 98 FL (ref 80–100)
MONOCYTES # BLD AUTO: 0.8 X10*3/UL (ref 0.05–0.8)
MONOCYTES NFR BLD AUTO: 10.5 %
NEUTROPHILS # BLD AUTO: 5.9 X10*3/UL (ref 1.6–5.5)
NEUTROPHILS NFR BLD AUTO: 77 %
NRBC BLD-RTO: 0 /100 WBCS (ref 0–0)
PLATELET # BLD AUTO: 280 X10*3/UL (ref 150–450)
POTASSIUM SERPL-SCNC: 4 MMOL/L (ref 3.5–5.3)
PROT SERPL-MCNC: 7.1 G/DL (ref 6.4–8.2)
RBC # BLD AUTO: 3.69 X10*6/UL (ref 4–5.2)
SODIUM SERPL-SCNC: 130 MMOL/L (ref 136–145)
WBC # BLD AUTO: 7.7 X10*3/UL (ref 4.4–11.3)

## 2025-08-04 PROCEDURE — 80053 COMPREHEN METABOLIC PANEL: CPT

## 2025-08-04 PROCEDURE — 2500000004 HC RX 250 GENERAL PHARMACY W/ HCPCS (ALT 636 FOR OP/ED): Performed by: INTERNAL MEDICINE

## 2025-08-04 PROCEDURE — 36415 COLL VENOUS BLD VENIPUNCTURE: CPT

## 2025-08-04 PROCEDURE — 36591 DRAW BLOOD OFF VENOUS DEVICE: CPT

## 2025-08-04 PROCEDURE — 85025 COMPLETE CBC W/AUTO DIFF WBC: CPT

## 2025-08-04 RX ORDER — HEPARIN 100 UNIT/ML
500 SYRINGE INTRAVENOUS AS NEEDED
Status: CANCELLED | OUTPATIENT
Start: 2025-08-04

## 2025-08-04 RX ORDER — HEPARIN SODIUM,PORCINE/PF 10 UNIT/ML
50 SYRINGE (ML) INTRAVENOUS AS NEEDED
Status: CANCELLED | OUTPATIENT
Start: 2025-08-04

## 2025-08-04 RX ORDER — HEPARIN 100 UNIT/ML
500 SYRINGE INTRAVENOUS AS NEEDED
Status: DISCONTINUED | OUTPATIENT
Start: 2025-08-04 | End: 2025-08-04 | Stop reason: HOSPADM

## 2025-08-04 RX ADMIN — HEPARIN 500 UNITS: 100 SYRINGE at 13:36

## 2025-08-04 ASSESSMENT — ENCOUNTER SYMPTOMS
EYES NEGATIVE: 1
ENDOCRINE NEGATIVE: 1
RESPIRATORY NEGATIVE: 1
NEUROLOGICAL NEGATIVE: 1
MUSCULOSKELETAL NEGATIVE: 1
CARDIOVASCULAR NEGATIVE: 1
CONSTITUTIONAL NEGATIVE: 1
HEMATOLOGIC/LYMPHATIC NEGATIVE: 1
PSYCHIATRIC NEGATIVE: 1
GASTROINTESTINAL NEGATIVE: 1

## 2025-08-04 ASSESSMENT — PAIN SCALES - GENERAL: PAINLEVEL_OUTOF10: 0-NO PAIN

## 2025-08-04 NOTE — PROGRESS NOTES
[facility]   Infusion Clinic Note   Date: 2025   Name: Billie Hernadez  : 1955   MRN: [id number]         Reason for Visit: Port Draw         Today: Billie Hernadez had no medications administered during this visit.       Referring Provider: No ref. provider found    Plan Provider: No plan provider found - the appt must be linked to an appt request from the therapy plan      For a Diagnosis of: Malignant neoplasm of upper lobe of right lung (Multi)       At today's visit patient accompanied by:       Today's Vitals:   Vitals:    25 1344   BP: 113/73   Pulse: 86   Resp: 18   Temp: 37 °C (98.6 °F)   TempSrc: Temporal   SpO2: 96%   PainSc: 0-No pain             Pre - Treatment Checklist:      - Previous reaction to current treatment: no      (Assess patient for the concerns below. Document provider notification as appropriate).  - Active or recent infection with/without current antibiotic use: n/a  - Recent or planned invasive dental work: n/a  - Recent or planned surgeries: n/a  - Recently received or plans to receive vaccinations: n/a  - Has treatment related toxicities: n/a  - Any chance may be pregnant:  n/a      Pain: 0   - Is the pain different from normal: n/a   - Is prescribing Doctor aware:  n/a      Labs: Labs drawn and sent per order      Fall Risk Screening: Germain Fall Risk  History of Falling, Immediate or Within 3 Months: No  Secondary Diagnosis: Yes  Ambulatory Aid: Walks without aid/bedrest/nurse assist  Intravenous Therapy/Heparin Lock: No  Gait/Transferring: Normal/bedrest/immobile  Mental Status: Oriented to own ability  Germain Fall Risk Score: 15            Review Of Systems:  Review of Systems   Constitutional: Negative.    HENT:  Negative.     Eyes: Negative.    Respiratory: Negative.     Cardiovascular: Negative.    Gastrointestinal: Negative.    Endocrine: Negative.    Genitourinary: Negative.     Musculoskeletal: Negative.    Skin: Negative.     Neurological: Negative.    Hematological: Negative.    Psychiatric/Behavioral: Negative.           Infusion Readiness:  - Assessment Concerns Related to Infusion: No  - Provider notified: n/a      New Patient Education:    N/A (returning patient for continuation of therapy. Ongoing education provided as needed.)        Treatment Conditions & Drug Specific Questions:    NOT APPLICABLE        Weight Based Drug Calculations:    WEIGHT BASED DRUGS: NOT APPLICABLE / FLAT DOSE       Post Treatment: Patient tolerated treatment without issue and was discharged in no apparent distress.      Note Authored / Patient Cared for By: Lux Townsend RN

## 2025-08-06 ENCOUNTER — INFUSION (OUTPATIENT)
Dept: HEMATOLOGY/ONCOLOGY | Facility: HOSPITAL | Age: 70
End: 2025-08-06
Payer: MEDICARE

## 2025-08-06 VITALS
SYSTOLIC BLOOD PRESSURE: 113 MMHG | OXYGEN SATURATION: 93 % | WEIGHT: 95.68 LBS | BODY MASS INDEX: 17.34 KG/M2 | TEMPERATURE: 95.9 F | RESPIRATION RATE: 16 BRPM | HEART RATE: 78 BPM | DIASTOLIC BLOOD PRESSURE: 69 MMHG

## 2025-08-06 DIAGNOSIS — C34.11 MALIGNANT NEOPLASM OF UPPER LOBE OF RIGHT LUNG (MULTI): ICD-10-CM

## 2025-08-06 DIAGNOSIS — I10 HYPERTENSION, UNSPECIFIED TYPE: ICD-10-CM

## 2025-08-06 PROCEDURE — 2500000004 HC RX 250 GENERAL PHARMACY W/ HCPCS (ALT 636 FOR OP/ED): Performed by: INTERNAL MEDICINE

## 2025-08-06 PROCEDURE — 96413 CHEMO IV INFUSION 1 HR: CPT

## 2025-08-06 RX ORDER — HEPARIN SODIUM,PORCINE/PF 10 UNIT/ML
50 SYRINGE (ML) INTRAVENOUS AS NEEDED
OUTPATIENT
Start: 2025-08-06

## 2025-08-06 RX ORDER — HEPARIN SODIUM,PORCINE/PF 10 UNIT/ML
50 SYRINGE (ML) INTRAVENOUS AS NEEDED
Status: DISCONTINUED | OUTPATIENT
Start: 2025-08-06 | End: 2025-08-06 | Stop reason: HOSPADM

## 2025-08-06 RX ORDER — PROCHLORPERAZINE MALEATE 10 MG
10 TABLET ORAL EVERY 6 HOURS PRN
Status: DISCONTINUED | OUTPATIENT
Start: 2025-08-06 | End: 2025-08-06 | Stop reason: HOSPADM

## 2025-08-06 RX ORDER — FAMOTIDINE 10 MG/ML
20 INJECTION, SOLUTION INTRAVENOUS ONCE AS NEEDED
Status: DISCONTINUED | OUTPATIENT
Start: 2025-08-06 | End: 2025-08-06 | Stop reason: HOSPADM

## 2025-08-06 RX ORDER — DIPHENHYDRAMINE HYDROCHLORIDE 50 MG/ML
50 INJECTION, SOLUTION INTRAMUSCULAR; INTRAVENOUS AS NEEDED
Status: DISCONTINUED | OUTPATIENT
Start: 2025-08-06 | End: 2025-08-06 | Stop reason: HOSPADM

## 2025-08-06 RX ORDER — HEPARIN 100 UNIT/ML
500 SYRINGE INTRAVENOUS AS NEEDED
Status: DISCONTINUED | OUTPATIENT
Start: 2025-08-06 | End: 2025-08-06 | Stop reason: HOSPADM

## 2025-08-06 RX ORDER — PROCHLORPERAZINE EDISYLATE 5 MG/ML
10 INJECTION INTRAMUSCULAR; INTRAVENOUS EVERY 6 HOURS PRN
Status: DISCONTINUED | OUTPATIENT
Start: 2025-08-06 | End: 2025-08-06 | Stop reason: HOSPADM

## 2025-08-06 RX ORDER — ALBUTEROL SULFATE 0.83 MG/ML
3 SOLUTION RESPIRATORY (INHALATION) AS NEEDED
Status: DISCONTINUED | OUTPATIENT
Start: 2025-08-06 | End: 2025-08-06 | Stop reason: HOSPADM

## 2025-08-06 RX ORDER — METOPROLOL TARTRATE 25 MG/1
12.5 TABLET, FILM COATED ORAL 2 TIMES DAILY
Qty: 30 TABLET | Refills: 3 | Status: SHIPPED | OUTPATIENT
Start: 2025-08-06

## 2025-08-06 RX ORDER — HEPARIN 100 UNIT/ML
500 SYRINGE INTRAVENOUS AS NEEDED
OUTPATIENT
Start: 2025-08-06

## 2025-08-06 RX ORDER — EPINEPHRINE 0.3 MG/.3ML
0.3 INJECTION SUBCUTANEOUS EVERY 5 MIN PRN
Status: DISCONTINUED | OUTPATIENT
Start: 2025-08-06 | End: 2025-08-06 | Stop reason: HOSPADM

## 2025-08-06 RX ADMIN — SODIUM CHLORIDE 1500 MG: 9 INJECTION, SOLUTION INTRAVENOUS at 10:49

## 2025-08-06 RX ADMIN — Medication 500 UNITS: at 11:51

## 2025-08-06 ASSESSMENT — ENCOUNTER SYMPTOMS
DEPRESSION: 0
LOSS OF SENSATION IN FEET: 0
OCCASIONAL FEELINGS OF UNSTEADINESS: 0

## 2025-08-06 ASSESSMENT — PAIN SCALES - GENERAL
PAINLEVEL_OUTOF10: 0-NO PAIN
PAINLEVEL_OUTOF10: 0-NO PAIN

## 2025-08-06 NOTE — SIGNIFICANT EVENT

## 2025-08-20 DIAGNOSIS — C34.11 MALIGNANT NEOPLASM OF UPPER LOBE OF RIGHT LUNG (MULTI): ICD-10-CM

## 2025-08-21 RX ORDER — PROCHLORPERAZINE MALEATE 10 MG
TABLET ORAL
Qty: 30 TABLET | Refills: 3 | Status: SHIPPED | OUTPATIENT
Start: 2025-08-21

## 2025-08-29 ENCOUNTER — INFUSION (OUTPATIENT)
Facility: HOSPITAL | Age: 70
End: 2025-08-29
Payer: MEDICARE

## 2025-08-29 ENCOUNTER — HOSPITAL ENCOUNTER (OUTPATIENT)
Dept: RADIOLOGY | Facility: HOSPITAL | Age: 70
Discharge: HOME | End: 2025-08-29
Payer: MEDICARE

## 2025-08-29 VITALS
RESPIRATION RATE: 16 BRPM | SYSTOLIC BLOOD PRESSURE: 104 MMHG | HEART RATE: 100 BPM | OXYGEN SATURATION: 93 % | DIASTOLIC BLOOD PRESSURE: 58 MMHG | TEMPERATURE: 98.6 F

## 2025-08-29 DIAGNOSIS — C34.11 MALIGNANT NEOPLASM OF UPPER LOBE OF RIGHT LUNG (MULTI): ICD-10-CM

## 2025-08-29 LAB
ALBUMIN SERPL BCP-MCNC: 3.9 G/DL (ref 3.4–5)
ALP SERPL-CCNC: 74 U/L (ref 33–136)
ALT SERPL W P-5'-P-CCNC: 12 U/L (ref 7–45)
ANION GAP SERPL CALC-SCNC: 12 MMOL/L (ref 10–20)
AST SERPL W P-5'-P-CCNC: 18 U/L (ref 9–39)
BASOPHILS # BLD AUTO: 0.03 X10*3/UL (ref 0–0.1)
BASOPHILS NFR BLD AUTO: 0.4 %
BILIRUB SERPL-MCNC: 0.7 MG/DL (ref 0–1.2)
BUN SERPL-MCNC: 8 MG/DL (ref 6–23)
CALCIUM SERPL-MCNC: 9.1 MG/DL (ref 8.6–10.3)
CHLORIDE SERPL-SCNC: 92 MMOL/L (ref 98–107)
CO2 SERPL-SCNC: 34 MMOL/L (ref 21–32)
CREAT SERPL-MCNC: 0.52 MG/DL (ref 0.5–1.05)
EGFRCR SERPLBLD CKD-EPI 2021: >90 ML/MIN/1.73M*2
EOSINOPHIL # BLD AUTO: 0.12 X10*3/UL (ref 0–0.4)
EOSINOPHIL NFR BLD AUTO: 1.6 %
ERYTHROCYTE [DISTWIDTH] IN BLOOD BY AUTOMATED COUNT: 14.6 % (ref 11.5–14.5)
GLUCOSE SERPL-MCNC: 152 MG/DL (ref 74–99)
HCT VFR BLD AUTO: 34.3 % (ref 36–46)
HGB BLD-MCNC: 10.8 G/DL (ref 12–16)
IMM GRANULOCYTES # BLD AUTO: 0.02 X10*3/UL (ref 0–0.5)
IMM GRANULOCYTES NFR BLD AUTO: 0.3 % (ref 0–0.9)
LYMPHOCYTES # BLD AUTO: 0.68 X10*3/UL (ref 0.8–3)
LYMPHOCYTES NFR BLD AUTO: 8.8 %
MCH RBC QN AUTO: 31.1 PG (ref 26–34)
MCHC RBC AUTO-ENTMCNC: 31.5 G/DL (ref 32–36)
MCV RBC AUTO: 99 FL (ref 80–100)
MONOCYTES # BLD AUTO: 0.71 X10*3/UL (ref 0.05–0.8)
MONOCYTES NFR BLD AUTO: 9.2 %
NEUTROPHILS # BLD AUTO: 6.14 X10*3/UL (ref 1.6–5.5)
NEUTROPHILS NFR BLD AUTO: 79.7 %
NRBC BLD-RTO: 0 /100 WBCS (ref 0–0)
PLATELET # BLD AUTO: 267 X10*3/UL (ref 150–450)
POTASSIUM SERPL-SCNC: 3.6 MMOL/L (ref 3.5–5.3)
PROT SERPL-MCNC: 7.2 G/DL (ref 6.4–8.2)
RBC # BLD AUTO: 3.47 X10*6/UL (ref 4–5.2)
SODIUM SERPL-SCNC: 134 MMOL/L (ref 136–145)
TSH SERPL-ACNC: 0.49 MIU/L (ref 0.44–3.98)
WBC # BLD AUTO: 7.7 X10*3/UL (ref 4.4–11.3)

## 2025-08-29 PROCEDURE — 36415 COLL VENOUS BLD VENIPUNCTURE: CPT

## 2025-08-29 PROCEDURE — 71250 CT THORAX DX C-: CPT

## 2025-08-29 PROCEDURE — 84443 ASSAY THYROID STIM HORMONE: CPT

## 2025-08-29 PROCEDURE — 80053 COMPREHEN METABOLIC PANEL: CPT

## 2025-08-29 PROCEDURE — 82533 TOTAL CORTISOL: CPT | Mod: CONLAB

## 2025-08-29 PROCEDURE — 2500000004 HC RX 250 GENERAL PHARMACY W/ HCPCS (ALT 636 FOR OP/ED): Performed by: INTERNAL MEDICINE

## 2025-08-29 PROCEDURE — 85025 COMPLETE CBC W/AUTO DIFF WBC: CPT

## 2025-08-29 PROCEDURE — 36591 DRAW BLOOD OFF VENOUS DEVICE: CPT

## 2025-08-29 RX ORDER — HEPARIN SODIUM,PORCINE/PF 10 UNIT/ML
50 SYRINGE (ML) INTRAVENOUS AS NEEDED
OUTPATIENT
Start: 2025-08-29

## 2025-08-29 RX ORDER — HEPARIN 100 UNIT/ML
500 SYRINGE INTRAVENOUS AS NEEDED
OUTPATIENT
Start: 2025-08-29

## 2025-08-29 RX ORDER — HEPARIN 100 UNIT/ML
500 SYRINGE INTRAVENOUS AS NEEDED
Status: DISCONTINUED | OUTPATIENT
Start: 2025-08-29 | End: 2025-08-29 | Stop reason: HOSPADM

## 2025-08-29 RX ADMIN — HEPARIN 500 UNITS: 100 SYRINGE at 13:10

## 2025-08-29 ASSESSMENT — PAIN SCALES - GENERAL: PAINLEVEL_OUTOF10: 0-NO PAIN

## 2025-08-30 LAB — CORTIS AM PEAK SERPL-MCNC: 10.8 UG/DL (ref 5–20)

## 2025-09-03 ENCOUNTER — INFUSION (OUTPATIENT)
Dept: HEMATOLOGY/ONCOLOGY | Facility: HOSPITAL | Age: 70
End: 2025-09-03
Payer: MEDICARE

## 2025-09-03 ENCOUNTER — OFFICE VISIT (OUTPATIENT)
Dept: HEMATOLOGY/ONCOLOGY | Facility: HOSPITAL | Age: 70
End: 2025-09-03
Payer: MEDICARE

## 2025-09-03 VITALS
SYSTOLIC BLOOD PRESSURE: 117 MMHG | RESPIRATION RATE: 16 BRPM | WEIGHT: 95.13 LBS | HEIGHT: 62 IN | TEMPERATURE: 96.4 F | DIASTOLIC BLOOD PRESSURE: 72 MMHG | OXYGEN SATURATION: 90 % | HEART RATE: 85 BPM | BODY MASS INDEX: 17.51 KG/M2

## 2025-09-03 VITALS
RESPIRATION RATE: 16 BRPM | HEART RATE: 92 BPM | DIASTOLIC BLOOD PRESSURE: 58 MMHG | TEMPERATURE: 97.9 F | SYSTOLIC BLOOD PRESSURE: 102 MMHG | OXYGEN SATURATION: 82 %

## 2025-09-03 DIAGNOSIS — C34.11 MALIGNANT NEOPLASM OF UPPER LOBE OF RIGHT LUNG (MULTI): ICD-10-CM

## 2025-09-03 DIAGNOSIS — C34.11 MALIGNANT NEOPLASM OF UPPER LOBE OF RIGHT LUNG (MULTI): Primary | ICD-10-CM

## 2025-09-03 PROCEDURE — 3078F DIAST BP <80 MM HG: CPT | Performed by: INTERNAL MEDICINE

## 2025-09-03 PROCEDURE — G2211 COMPLEX E/M VISIT ADD ON: HCPCS | Performed by: INTERNAL MEDICINE

## 2025-09-03 PROCEDURE — 99215 OFFICE O/P EST HI 40 MIN: CPT | Mod: 25 | Performed by: INTERNAL MEDICINE

## 2025-09-03 PROCEDURE — 1126F AMNT PAIN NOTED NONE PRSNT: CPT | Performed by: INTERNAL MEDICINE

## 2025-09-03 PROCEDURE — 2500000004 HC RX 250 GENERAL PHARMACY W/ HCPCS (ALT 636 FOR OP/ED): Performed by: INTERNAL MEDICINE

## 2025-09-03 PROCEDURE — 96413 CHEMO IV INFUSION 1 HR: CPT

## 2025-09-03 PROCEDURE — 3008F BODY MASS INDEX DOCD: CPT | Performed by: INTERNAL MEDICINE

## 2025-09-03 PROCEDURE — 99215 OFFICE O/P EST HI 40 MIN: CPT | Performed by: INTERNAL MEDICINE

## 2025-09-03 PROCEDURE — 3074F SYST BP LT 130 MM HG: CPT | Performed by: INTERNAL MEDICINE

## 2025-09-03 RX ORDER — HEPARIN 100 UNIT/ML
500 SYRINGE INTRAVENOUS AS NEEDED
Status: DISCONTINUED | OUTPATIENT
Start: 2025-09-03 | End: 2025-09-03 | Stop reason: HOSPADM

## 2025-09-03 RX ORDER — FAMOTIDINE 10 MG/ML
20 INJECTION, SOLUTION INTRAVENOUS ONCE AS NEEDED
Status: DISCONTINUED | OUTPATIENT
Start: 2025-09-03 | End: 2025-09-03 | Stop reason: HOSPADM

## 2025-09-03 RX ORDER — ALBUTEROL SULFATE 0.83 MG/ML
3 SOLUTION RESPIRATORY (INHALATION) AS NEEDED
Status: DISCONTINUED | OUTPATIENT
Start: 2025-09-03 | End: 2025-09-03 | Stop reason: HOSPADM

## 2025-09-03 RX ORDER — HEPARIN 100 UNIT/ML
500 SYRINGE INTRAVENOUS AS NEEDED
OUTPATIENT
Start: 2025-09-03

## 2025-09-03 RX ORDER — PROCHLORPERAZINE MALEATE 10 MG
10 TABLET ORAL EVERY 6 HOURS PRN
Status: DISCONTINUED | OUTPATIENT
Start: 2025-09-03 | End: 2025-09-03 | Stop reason: HOSPADM

## 2025-09-03 RX ORDER — EPINEPHRINE 0.3 MG/.3ML
0.3 INJECTION SUBCUTANEOUS EVERY 5 MIN PRN
Status: DISCONTINUED | OUTPATIENT
Start: 2025-09-03 | End: 2025-09-03 | Stop reason: HOSPADM

## 2025-09-03 RX ORDER — PROCHLORPERAZINE EDISYLATE 5 MG/ML
10 INJECTION INTRAMUSCULAR; INTRAVENOUS EVERY 6 HOURS PRN
Status: DISCONTINUED | OUTPATIENT
Start: 2025-09-03 | End: 2025-09-03 | Stop reason: HOSPADM

## 2025-09-03 RX ORDER — PREDNISONE 20 MG/1
20 TABLET ORAL DAILY
Qty: 5 TABLET | Refills: 0 | Status: SHIPPED | OUTPATIENT
Start: 2025-09-03 | End: 2025-09-08

## 2025-09-03 RX ORDER — AZITHROMYCIN 250 MG/1
250 TABLET, FILM COATED ORAL DAILY
Qty: 6 TABLET | Refills: 0 | Status: SHIPPED | OUTPATIENT
Start: 2025-09-03 | End: 2025-09-08

## 2025-09-03 RX ORDER — HEPARIN SODIUM,PORCINE/PF 10 UNIT/ML
50 SYRINGE (ML) INTRAVENOUS AS NEEDED
OUTPATIENT
Start: 2025-09-03

## 2025-09-03 RX ORDER — DIPHENHYDRAMINE HYDROCHLORIDE 50 MG/ML
50 INJECTION, SOLUTION INTRAMUSCULAR; INTRAVENOUS AS NEEDED
Status: DISCONTINUED | OUTPATIENT
Start: 2025-09-03 | End: 2025-09-03 | Stop reason: HOSPADM

## 2025-09-03 RX ADMIN — HEPARIN 500 UNITS: 100 SYRINGE at 12:05

## 2025-09-03 RX ADMIN — SODIUM CHLORIDE 1500 MG: 9 INJECTION, SOLUTION INTRAVENOUS at 11:05

## 2025-09-03 ASSESSMENT — ENCOUNTER SYMPTOMS
GASTROINTESTINAL NEGATIVE: 1
CARDIOVASCULAR NEGATIVE: 1
CONSTITUTIONAL NEGATIVE: 1
RESPIRATORY NEGATIVE: 1

## 2025-09-03 ASSESSMENT — PAIN SCALES - GENERAL: PAINLEVEL_OUTOF10: 0-NO PAIN

## 2025-09-04 RX ORDER — PROCHLORPERAZINE MALEATE 5 MG
10 TABLET ORAL EVERY 6 HOURS PRN
OUTPATIENT
Start: 2025-10-01

## 2025-09-04 RX ORDER — ALBUTEROL SULFATE 0.83 MG/ML
3 SOLUTION RESPIRATORY (INHALATION) AS NEEDED
OUTPATIENT
Start: 2025-10-29

## 2025-09-04 RX ORDER — EPINEPHRINE 0.3 MG/.3ML
0.3 INJECTION SUBCUTANEOUS EVERY 5 MIN PRN
OUTPATIENT
Start: 2025-10-29

## 2025-09-04 RX ORDER — FAMOTIDINE 10 MG/ML
20 INJECTION, SOLUTION INTRAVENOUS ONCE AS NEEDED
OUTPATIENT
Start: 2025-10-29

## 2025-09-04 RX ORDER — PROCHLORPERAZINE EDISYLATE 5 MG/ML
10 INJECTION INTRAMUSCULAR; INTRAVENOUS EVERY 6 HOURS PRN
OUTPATIENT
Start: 2025-10-01

## 2025-09-04 RX ORDER — ALBUTEROL SULFATE 0.83 MG/ML
3 SOLUTION RESPIRATORY (INHALATION) AS NEEDED
OUTPATIENT
Start: 2025-10-01

## 2025-09-04 RX ORDER — DIPHENHYDRAMINE HYDROCHLORIDE 50 MG/ML
50 INJECTION, SOLUTION INTRAMUSCULAR; INTRAVENOUS AS NEEDED
OUTPATIENT
Start: 2025-10-01

## 2025-09-04 RX ORDER — EPINEPHRINE 0.3 MG/.3ML
0.3 INJECTION SUBCUTANEOUS EVERY 5 MIN PRN
OUTPATIENT
Start: 2025-10-01

## 2025-09-04 RX ORDER — DIPHENHYDRAMINE HYDROCHLORIDE 50 MG/ML
50 INJECTION, SOLUTION INTRAMUSCULAR; INTRAVENOUS AS NEEDED
OUTPATIENT
Start: 2025-10-29

## 2025-09-04 RX ORDER — FAMOTIDINE 10 MG/ML
20 INJECTION, SOLUTION INTRAVENOUS ONCE AS NEEDED
OUTPATIENT
Start: 2025-10-01

## 2025-09-04 RX ORDER — PROCHLORPERAZINE MALEATE 5 MG
10 TABLET ORAL EVERY 6 HOURS PRN
OUTPATIENT
Start: 2025-10-29

## 2025-09-04 RX ORDER — PROCHLORPERAZINE EDISYLATE 5 MG/ML
10 INJECTION INTRAMUSCULAR; INTRAVENOUS EVERY 6 HOURS PRN
OUTPATIENT
Start: 2025-10-29

## 2025-09-05 ENCOUNTER — APPOINTMENT (OUTPATIENT)
Dept: PULMONOLOGY | Facility: CLINIC | Age: 70
End: 2025-09-05
Payer: MEDICARE

## 2025-10-01 ENCOUNTER — APPOINTMENT (OUTPATIENT)
Dept: HEMATOLOGY/ONCOLOGY | Facility: HOSPITAL | Age: 70
End: 2025-10-01
Payer: MEDICARE

## 2025-10-07 ENCOUNTER — APPOINTMENT (OUTPATIENT)
Dept: PRIMARY CARE | Facility: CLINIC | Age: 70
End: 2025-10-07
Payer: MEDICARE

## 2025-12-18 ENCOUNTER — APPOINTMENT (OUTPATIENT)
Dept: PULMONOLOGY | Facility: CLINIC | Age: 70
End: 2025-12-18
Payer: MEDICARE

## (undated) DEVICE — SOLUTION, IRRIGATION, STERILE WATER, 1000 ML, POUR BOTTLE

## (undated) DEVICE — SUTURE, MONOCRYL, 4-0, 18 IN, PS2, UNDYED

## (undated) DEVICE — GLOVE, SURGICAL, PROTEXIS PI , 7.0, PF, LF

## (undated) DEVICE — PAD, GROUNDING, ELECTROSURGICAL, W/9 FT CABLE, POLYHESIVE II, ADULT, LF

## (undated) DEVICE — SUTURE, SILK, 2-0, TIES, 12-30 IN, BLACK

## (undated) DEVICE — PROCEDURE KIT, ULTRASOUND, W/STERILE GEL AND TRANSDUCER COVER W/BAND, LF.

## (undated) DEVICE — SUTURE, VICRYL, 0, 27 IN, UR-6, VIOLET

## (undated) DEVICE — DRAPE, C-ARM IMAGE

## (undated) DEVICE — APPLICATOR, CHLORAPREP, W/ORANGE TINT, 26ML

## (undated) DEVICE — BAG, DECANTER

## (undated) DEVICE — DRAPE PACK, GENERAL, CUSTOM, GENEVA

## (undated) DEVICE — SOLUTION, INJECTION, USP, SINGLE PACK, NACL, SODIUM CHLORIDE 0.9%, 100 ML, BAG

## (undated) DEVICE — SUTURE, VICRYL, 5-0, 18 IN, P3, UNDYED

## (undated) DEVICE — Device

## (undated) DEVICE — 00000 VISIT COUNTER

## (undated) DEVICE — SYRINGE, LUER LOCK, 12ML

## (undated) DEVICE — SYRINGE, 20 CC, LUER LOCK, MONOJECT, W/O CAP, LF

## (undated) DEVICE — SUTURE, VICRYL, 3-0, 27 IN, SH

## (undated) DEVICE — GLOVE, SURGICAL, PROTEXIS PI BLUE W/NEUTHERA, 7.0, PF, LF

## (undated) DEVICE — SYRINGE, HEPARIN IV FLUSH, 5ML FILL IN 12 ML SYRINGE, 10 UNITS/ML